# Patient Record
Sex: FEMALE | Race: WHITE | NOT HISPANIC OR LATINO | Employment: OTHER | URBAN - METROPOLITAN AREA
[De-identification: names, ages, dates, MRNs, and addresses within clinical notes are randomized per-mention and may not be internally consistent; named-entity substitution may affect disease eponyms.]

---

## 2017-08-14 ENCOUNTER — OFFICE VISIT (OUTPATIENT)
Dept: ORTHOPEDIC SURGERY | Facility: CLINIC | Age: 82
End: 2017-08-14

## 2017-08-14 VITALS — TEMPERATURE: 98.5 F | WEIGHT: 154 LBS

## 2017-08-14 DIAGNOSIS — S83.241A TEAR OF MEDIAL MENISCUS OF RIGHT KNEE, CURRENT, UNSPECIFIED TEAR TYPE, INITIAL ENCOUNTER: ICD-10-CM

## 2017-08-14 DIAGNOSIS — M17.11 OSTEOARTHROSIS, LOCALIZED, PRIMARY, KNEE, RIGHT: ICD-10-CM

## 2017-08-14 DIAGNOSIS — M25.562 CHRONIC PAIN OF LEFT KNEE: Primary | ICD-10-CM

## 2017-08-14 DIAGNOSIS — G89.29 CHRONIC PAIN OF LEFT KNEE: Primary | ICD-10-CM

## 2017-08-14 PROCEDURE — 20610 DRAIN/INJ JOINT/BURSA W/O US: CPT | Performed by: ORTHOPAEDIC SURGERY

## 2017-08-14 PROCEDURE — 73562 X-RAY EXAM OF KNEE 3: CPT | Performed by: ORTHOPAEDIC SURGERY

## 2017-08-14 PROCEDURE — 99203 OFFICE O/P NEW LOW 30 MIN: CPT | Performed by: ORTHOPAEDIC SURGERY

## 2017-08-14 RX ORDER — LOSARTAN POTASSIUM AND HYDROCHLOROTHIAZIDE 12.5; 5 MG/1; MG/1
TABLET ORAL
COMMUNITY
Start: 2017-08-09 | End: 2020-10-01

## 2017-08-14 RX ORDER — LORAZEPAM 1 MG/1
1 TABLET ORAL NIGHTLY PRN
Status: ON HOLD | COMMUNITY
Start: 2017-04-06 | End: 2021-06-10 | Stop reason: SDUPTHER

## 2017-08-14 RX ADMIN — METHYLPREDNISOLONE ACETATE 80 MG: 80 INJECTION, SUSPENSION INTRA-ARTICULAR; INTRALESIONAL; INTRAMUSCULAR; SOFT TISSUE at 16:09

## 2017-08-14 RX ADMIN — BUPIVACAINE HYDROCHLORIDE 4 ML: 5 INJECTION, SOLUTION EPIDURAL; INTRACAUDAL at 16:09

## 2017-08-14 NOTE — PROGRESS NOTES
New Left Knee      Patient: Cristina Lemus        YOB: 1930    Medical Record Number: [id number]        Chief Complaints: Left Knee Pain  Chief Complaint   Patient presents with   • Left Knee - Establish Care           History of Present Illness: This is a  87 y.o. female who presents complaining of left knee pain is been ongoing 2 months she has significant night pain which is her biggest complaint.  Is primarily medial pain she has significant swelling no history of similar symptoms her symptoms are described as moderate intermittent with aching swelling her past medical history marked for cardiac disease which she states is stable      Allergies:   Allergies   Allergen Reactions   • Statins Nausea And Vomiting   • Sulfa Antibiotics Nausea And Vomiting and Other (See Comments)     CHILLS AND FEVER       Medications:   Home Medications:  No current outpatient prescriptions on file prior to visit.     No current facility-administered medications on file prior to visit.      Current Medications:  Scheduled Meds:  Continuous Infusions:  No current facility-administered medications for this visit.   PRN Meds:.    History reviewed. No pertinent past medical history.   No past surgical history on file.     Social History     Occupational History   • Not on file.     Social History Main Topics   • Smoking status: Never Smoker   • Smokeless tobacco: Not on file   • Alcohol use No   • Drug use: No   • Sexual activity: Defer    Social History     Social History Narrative   • No narrative on file      History reviewed. No pertinent family history.          Review of Systems: 14 point review of systems are remarkable for the knee pain only the remainder are negative    Review of Systems      Physical Exam: 87 y.o. female  General Appearance:    Alert, cooperative, in no acute distress                 Vitals:    08/14/17 1537   Temp: 98.5 °F (36.9 °C)   TempSrc: Temporal Artery    Weight: 154 lb (69.9 kg)      Patient  is alert and read ×3 no acute distress appears her above-listed at height weight and age.  Affect is normal respiratory rate is normal unlabored. Heart rate regular rate rhythm, sclera, dentition and hearing are normal for the purpose of this exam.        Ortho Exam Physical exam of the left knee reveals no effusion no redness.  The patient does have tenderness about the medial joint line.  No tenderness about the lateral joint line.  A negative bounce home and a positive medial Madonna.  There is some pain medially  with a lateral Madonna.  Patient has a stable ligamentous exam.  Quads are reasonable and symmetric bilaterally.  Calf is soft and nontender.  There is no overlying skin changes no lymphedema lymphadenopathy.  Patient has good hip range of motion full symmetric and asymptomatic and a normal ankle exam.    Large Joint Arthrocentesis  Date/Time: 8/14/2017 4:09 PM  Consent given by: patient  Site marked: site marked  Timeout: Immediately prior to procedure a time out was called to verify the correct patient, procedure, equipment, support staff and site/side marked as required   Supporting Documentation  Indications: pain   Procedure Details  Location: knee - L knee  Preparation: Patient was prepped and draped in the usual sterile fashion  Needle size: 25 G  Approach: anteromedial  Medications administered: 80 mg methylPREDNISolone acetate 80 MG/ML; 4 mL bupivacaine (PF) 0.5 %                     Radiology:   AP, Lateral and merchant views of the left knee  were ordered/reviewed to evauateknee pain.  I've no comparative films these show some osteopenia as well as mild patellofemoral OA and mild medial compartment narrowing      Assessment/Plan:    Left knee pain this may be degenerative her night pain makes me concerned about meniscal pathology.  At her age of is reasonable to try an injection as a diagnostic and therapeutic tool she is agreeable to do that she tolerated it well we'll also start her in  some stretching and strengthening she fails improvement we'll pursue other means of testing

## 2017-08-20 RX ORDER — METHYLPREDNISOLONE ACETATE 80 MG/ML
80 INJECTION, SUSPENSION INTRA-ARTICULAR; INTRALESIONAL; INTRAMUSCULAR; SOFT TISSUE
Status: COMPLETED | OUTPATIENT
Start: 2017-08-14 | End: 2017-08-14

## 2017-08-20 RX ORDER — BUPIVACAINE HYDROCHLORIDE 5 MG/ML
4 INJECTION, SOLUTION EPIDURAL; INTRACAUDAL
Status: COMPLETED | OUTPATIENT
Start: 2017-08-14 | End: 2017-08-14

## 2018-02-06 ENCOUNTER — OFFICE VISIT (OUTPATIENT)
Dept: ORTHOPEDIC SURGERY | Facility: CLINIC | Age: 83
End: 2018-02-06

## 2018-02-06 VITALS — WEIGHT: 150 LBS | HEIGHT: 65 IN | TEMPERATURE: 97.7 F | BODY MASS INDEX: 24.99 KG/M2

## 2018-02-06 DIAGNOSIS — M17.12 PRIMARY LOCALIZED OSTEOARTHROSIS OF LEFT LOWER LEG: Primary | ICD-10-CM

## 2018-02-06 PROCEDURE — 20610 DRAIN/INJ JOINT/BURSA W/O US: CPT | Performed by: ORTHOPAEDIC SURGERY

## 2018-02-06 RX ORDER — METHYLPREDNISOLONE ACETATE 80 MG/ML
80 INJECTION, SUSPENSION INTRA-ARTICULAR; INTRALESIONAL; INTRAMUSCULAR; SOFT TISSUE
Status: COMPLETED | OUTPATIENT
Start: 2018-02-06 | End: 2018-02-06

## 2018-02-06 RX ORDER — BUPIVACAINE HYDROCHLORIDE 5 MG/ML
4 INJECTION, SOLUTION EPIDURAL; INTRACAUDAL
Status: COMPLETED | OUTPATIENT
Start: 2018-02-06 | End: 2018-02-06

## 2018-02-06 RX ADMIN — METHYLPREDNISOLONE ACETATE 80 MG: 80 INJECTION, SUSPENSION INTRA-ARTICULAR; INTRALESIONAL; INTRAMUSCULAR; SOFT TISSUE at 13:22

## 2018-02-06 RX ADMIN — BUPIVACAINE HYDROCHLORIDE 4 ML: 5 INJECTION, SOLUTION EPIDURAL; INTRACAUDAL at 13:22

## 2018-02-06 NOTE — PROGRESS NOTES
"Knee Joint Injection      Patient: Cristina Lemus        YOB: 1930          Chief Complaint   Patient presents with   • Left Knee - Follow-up, Injections           History of Present Illness: Pt gets intermittent  injections with good relief. Is here for repeat injection. Understands options.      Physical Exam: 87 y.o. female  General Appearance:    Alert, cooperative, in no acute distress                   Vitals:    02/06/18 1319   Temp: 97.7 °F (36.5 °C)   TempSrc: Temporal Artery    Weight: 68 kg (150 lb)   Height: 165.1 cm (65\")      Patient is alert and read ×3 no acute distress appears her above-listed at height weight and age.  Affect is normal respiratory rate is normal unlabored. Heart rate regular rate rhythm, sclera, dentition and hearing are normal for the purpose of this exam.  Exam and complaints are unchanged.      Procedure:  Large Joint Arthrocentesis  Date/Time: 2/6/2018 1:22 PM  Consent given by: patient  Site marked: site marked  Timeout: Immediately prior to procedure a time out was called to verify the correct patient, procedure, equipment, support staff and site/side marked as required   Supporting Documentation  Indications: pain and joint swelling   Procedure Details  Location: knee - L knee  Preparation: Patient was prepped and draped in the usual sterile fashion  Needle size: 22 G  Approach: anterolateral  Medications administered: 4 mL bupivacaine (PF) 0.5 %; 80 mg methylPREDNISolone acetate 80 MG/ML  Patient tolerance: patient tolerated the procedure well with no immediate complications                Assessment. Persistent knee pain      Plan: Is to proceed with injection    The knee joint was injected under strict sterile technique with Marcaine and Depo-Medrol this was done sterilely and tolerated tolerated well.            "

## 2020-10-01 ENCOUNTER — LAB (OUTPATIENT)
Dept: LAB | Facility: HOSPITAL | Age: 85
End: 2020-10-01

## 2020-10-01 ENCOUNTER — OFFICE VISIT (OUTPATIENT)
Dept: CARDIOLOGY | Facility: CLINIC | Age: 85
End: 2020-10-01

## 2020-10-01 VITALS
SYSTOLIC BLOOD PRESSURE: 152 MMHG | DIASTOLIC BLOOD PRESSURE: 78 MMHG | BODY MASS INDEX: 23.56 KG/M2 | HEIGHT: 65 IN | HEART RATE: 73 BPM | WEIGHT: 141.4 LBS

## 2020-10-01 DIAGNOSIS — I10 HTN, GOAL BELOW 140/80: ICD-10-CM

## 2020-10-01 DIAGNOSIS — I50.32 CHRONIC DIASTOLIC (CONGESTIVE) HEART FAILURE (HCC): Primary | ICD-10-CM

## 2020-10-01 DIAGNOSIS — N18.4 STAGE 4 CHRONIC KIDNEY DISEASE (HCC): ICD-10-CM

## 2020-10-01 DIAGNOSIS — I48.21 PERMANENT ATRIAL FIBRILLATION (HCC): ICD-10-CM

## 2020-10-01 DIAGNOSIS — I50.32 CHRONIC DIASTOLIC (CONGESTIVE) HEART FAILURE (HCC): ICD-10-CM

## 2020-10-01 PROBLEM — Z86.711 HISTORY OF PULMONARY EMBOLUS (PE): Status: ACTIVE | Noted: 2020-06-30

## 2020-10-01 PROBLEM — I48.0 PAROXYSMAL ATRIAL FIBRILLATION: Status: ACTIVE | Noted: 2017-07-07

## 2020-10-01 LAB
ANION GAP SERPL CALCULATED.3IONS-SCNC: 14 MMOL/L (ref 5–15)
BUN SERPL-MCNC: 31 MG/DL (ref 8–23)
BUN/CREAT SERPL: 13.1 (ref 7–25)
CALCIUM SPEC-SCNC: 9.7 MG/DL (ref 8.2–9.6)
CHLORIDE SERPL-SCNC: 105 MMOL/L (ref 98–107)
CO2 SERPL-SCNC: 18 MMOL/L (ref 22–29)
CREAT SERPL-MCNC: 2.36 MG/DL (ref 0.57–1)
GFR SERPL CREATININE-BSD FRML MDRD: 19 ML/MIN/1.73
GLUCOSE SERPL-MCNC: 106 MG/DL (ref 65–99)
POTASSIUM SERPL-SCNC: 4.1 MMOL/L (ref 3.5–5.2)
SODIUM SERPL-SCNC: 137 MMOL/L (ref 136–145)

## 2020-10-01 PROCEDURE — 99204 OFFICE O/P NEW MOD 45 MIN: CPT | Performed by: INTERNAL MEDICINE

## 2020-10-01 PROCEDURE — 93000 ELECTROCARDIOGRAM COMPLETE: CPT | Performed by: INTERNAL MEDICINE

## 2020-10-01 PROCEDURE — 80048 BASIC METABOLIC PNL TOTAL CA: CPT

## 2020-10-01 PROCEDURE — 36415 COLL VENOUS BLD VENIPUNCTURE: CPT

## 2020-10-01 RX ORDER — ISOSORBIDE MONONITRATE 30 MG/1
30 TABLET, EXTENDED RELEASE ORAL DAILY
COMMUNITY
Start: 2020-09-21 | End: 2021-06-09

## 2020-10-01 RX ORDER — AMLODIPINE BESYLATE 10 MG/1
10 TABLET ORAL DAILY
COMMUNITY
Start: 2020-09-23 | End: 2021-02-10 | Stop reason: SDUPTHER

## 2020-10-01 RX ORDER — PANTOPRAZOLE SODIUM 40 MG/1
40 TABLET, DELAYED RELEASE ORAL EVERY MORNING
COMMUNITY
Start: 2020-09-21 | End: 2021-02-10

## 2020-10-01 RX ORDER — FUROSEMIDE 40 MG/1
40 TABLET ORAL DAILY
Qty: 30 TABLET | Refills: 11 | Status: SHIPPED | OUTPATIENT
Start: 2020-10-01 | End: 2020-10-26 | Stop reason: SDUPTHER

## 2020-10-01 NOTE — PROGRESS NOTES
[facility] New Patient Office Note     Encounter Date:10/01/20  Patient:Cristina Lemus  :3/13/1930  MRN:[id number]    Referring Provider: Lm Mart MD    Consulted for: Establish with new cardiologist    Chief Complaint:   Chief Complaint   Patient presents with   • Establish Care     History of Presenting Illness:      Ms. Evans is a 89 y.o. woman with past medical history notable for permanent atrial fibrillation, hypertension, chronic diastolic heart failure, and chronic kidney disease stage III who presents to our office to establish with a new cardiologist.  She has been followed in the [facility] and for the most part appears to receive good care.  She did have a recent hospitalization in  of this year where she was found to have new DVT and pulmonary emboli despite being on Coumadin therapy.  She was transitioned back to Eliquis therapy and discharged home.    In general patient does not want to be on Eliquis.  She was previously on Eliquis when she was first diagnosed with atrial fibrillation and was transitioned to Coumadin per patient preference.  She states that she feels cold whenever she takes medication.  Unfortunately it sounds like she is not particularly compliant with her Coumadin use as she feels like it flares of her gout.  This likely explains her DVT and PE while on Coumadin therapy.  Her INR was therapy therapeutic when she arrived to the hospital in .  Unfortunately since leaving the hospital they did try to transition her back to Eliquis but she again asked to go back to Coumadin.  From what I can tell it seems like her Coumadin levels were supratherapeutic and hard to control once again and was transitioned back to Eliquis.    Fortunately from an atrial fibrillation standpoint she has minimal symptoms.    She does have chronic lower extremity swelling from diastolic dysfunction as well as likely venous insufficiency.  This is been a chronic issue for her and  relatively stable.    With regards to her hypertension this is been under reasonable control although it is slightly elevated today in the office.      Review of Systems:  Review of Systems   Constitution: Positive for malaise/fatigue and weight gain.   HENT: Negative.    Cardiovascular: Positive for leg swelling.   Respiratory: Positive for shortness of breath.    Endocrine: Negative.    Hematologic/Lymphatic: Negative.    Skin: Negative.    Musculoskeletal: Negative.    Gastrointestinal: Negative.    Genitourinary: Negative.    Neurological: Negative.    Psychiatric/Behavioral: Negative.    Allergic/Immunologic: Negative.        Prior to Admission medications    Medication Sig Start Date End Date Taking? Authorizing Provider   amLODIPine (NORVASC) 10 MG tablet Take 10 mg by mouth Daily. 9/23/20  Yes Oswald Sheppard MD   apixaban (ELIQUIS) 2.5 MG tablet tablet Take 2.5 mg by mouth 2 (two) times a day. 9/22/20  Yes Oswald Sheppard MD   isosorbide mononitrate (IMDUR) 30 MG 24 hr tablet Take 30 mg by mouth Daily. 9/21/20  Yes Oswald Sheppard MD   LORazepam (ATIVAN) 1 MG tablet TAKE ONE TABLET BY MOUTH AT BEDTIME 4/6/17  Yes ProviderOswald MD   pantoprazole (PROTONIX) 40 MG EC tablet Take 40 mg by mouth Every Morning. 9/21/20  Yes Oswald Sheppard MD   furosemide (LASIX) 40 MG tablet Take 1 tablet by mouth Daily. 10/1/20   Lm Mart MD   aspirin 81 MG tablet Take 81 mg by mouth.  10/1/20  Oswald Sheppard MD   losartan-hydrochlorothiazide (HYZAAR) 50-12.5 MG per tablet  8/9/17 10/1/20  Oswald Sheppard MD       Allergies   Allergen Reactions   • Statins Nausea And Vomiting   • Sulfa Antibiotics Nausea And Vomiting and Other (See Comments)     CHILLS AND FEVER       Past Medical History:   Diagnosis Date   • Atrial fibrillation (CMS/HCC)    • CHF (congestive heart failure) (CMS/HCC)        Past Surgical History:   Procedure Laterality Date   • BACK SURGERY     • CYST  "REMOVAL     • OTHER SURGICAL HISTORY      rupture disk       Social History     Socioeconomic History   • Marital status: Single     Spouse name: Not on file   • Number of children: Not on file   • Years of education: Not on file   • Highest education level: Not on file   Tobacco Use   • Smoking status: Never Smoker   • Smokeless tobacco: Never Used   Substance and Sexual Activity   • Alcohol use: No     Comment: caffeine use    • Drug use: No   • Sexual activity: Defer       History reviewed. No pertinent family history.    The following portions of the patient's history were reviewed and updated as appropriate: allergies, current medications, past family history, past medical history, past social history, past surgical history and problem list.       Objective:       Vitals:    10/01/20 1359   BP: 152/78   BP Location: Left arm   Patient Position: Sitting   Pulse: 73   Weight: 64.1 kg (141 lb 6.4 oz)   Height: 165.1 cm (65\")       Physical Exam:  Constitutional:  Chronically ill-appearing, frail, no acute distress   HENT: Oropharynx clear and membrane moist  Eyes: Normal conjunctiva, no sclera icterus.  Neck: Supple, no carotid bruit bilaterally.  Cardiovascular: Irregularly irregular rate and rhythm, No Murmur, 1+ bilateral lower extremity edema.  Pulmonary: Normal respiratory effort, normal lung sounds, no wheezing.  Abdominal: Soft, nontender, no hepatosplenomegaly, liver is non-pulsatile.  Neurological: Alert and orient x 3.   Skin: Warm, dry, no ecchymosis, no rash.  Psych: Appropriate mood and affect. Normal judgment and insight.      Lab Results   Component Value Date    GLUCOSE 106 (H) 10/01/2020    BUN 31 (H) 10/01/2020    CREATININE 2.36 (H) 10/01/2020    EGFRIFNONA 19 (L) 10/01/2020    BCR 13.1 10/01/2020    K 4.1 10/01/2020    CO2 18.0 (L) 10/01/2020    CALCIUM 9.7 (H) 10/01/2020    ALBUMIN 3.8 07/01/2020    LABIL2 1.1 07/01/2020    AST 38 07/01/2020    ALT 13 07/01/2020       Lab Results   Component " Value Date    WBC 5.98 07/01/2020    HGB 12.0 07/01/2020    HCT 36.4 07/01/2020    MCV 87.9 07/01/2020     07/01/2020       Lab Results   Component Value Date    TROPONINI 0.028 06/30/2020       No results found for: CHOL, CHLPL  No results found for: TRIG  No results found for: HDL  No results found for: LDL, LDLDIRECT    Lab Results   Component Value Date    TSH 1.830 01/13/2020         ECG 12 Lead    Date/Time: 10/1/2020 4:51 PM  Performed by: Lm Mart MD  Authorized by: Lm Mart MD   Previous ECG: no previous ECG available  Rhythm: atrial fibrillation  Conduction: non-specific intraventricular conduction delay  Other findings: non-specific ST-T wave changes    Clinical impression: abnormal EKG        Echocardiogram 6/25/2020 [facility]:  · Normal LV systolic function with apical hypokinesis  · Asymmetric apical hypertrophy  · Mild aortic insufficiency  · Mild/moderate mitral regurgitation  · Mild tricuspid regurgitation          Assessment:          Diagnosis Plan   1. Chronic diastolic (congestive) heart failure (CMS/HCC)  Basic Metabolic Panel   2. Permanent atrial fibrillation (CMS/HCC)     3. HTN, goal below 140/80     4. Stage 4 chronic kidney disease (CMS/[facility])            Plan:       Ms. Evans is a 89 y.o. woman with past medical history notable for permanent atrial fibrillation, hypertension, chronic diastolic heart failure, and chronic kidney disease stage III who presents to our office to establish with a new cardiologist.  Overall she has received appropriate care up to this point.  I did have a long discussion with her and do agree that Coumadin would be a poor choice to manage her atrial fibrillation as well as her recent DVT/PE.  I would continue with renally dosed Eliquis.  I have talked her about adding a low-dose diuretic to see if this can help out with some of her leg swelling obviously with her chronic kidney disease we have to closely monitor her creatinine  clearance on this medication.  I have ordered the Lasix and will have her get a BMP next week.  Hopefully this can also help out somewhat with her hypertension.    Permanent atrial fibrillation:  · Continue renally dosed Eliquis at 2.5 mg twice a day  · Rate well controlled not on any rate controlling agents  · Metoprolol was held during her recent admission due to bradycardia    Hypertension:  · Monitor response to starting low-dose diuretic    Chronic diastolic congestive heart failure:  · Start Lasix 40 mg daily  · Follow-up BMP next week    Chronic kidney disease:  · We will need to closely monitor creatinine clearance while adding diuretics      Follow-up:  4 weeks      Thank you for allowing me to participate in the care of Cristina Lemus. Feel free to contact me directly with any further questions or concerns.    Lm Mart MD  [facility]  10/01/20  16:47 EDT

## 2020-10-02 NOTE — TELEPHONE ENCOUNTER
is out on vacation today.Pt request a refill on Eliquis. Pt was in the office yesterday.    Can you please sign this for me today?     Thanks

## 2020-10-15 ENCOUNTER — TELEPHONE (OUTPATIENT)
Dept: CARDIOLOGY | Facility: CLINIC | Age: 85
End: 2020-10-15

## 2020-10-15 NOTE — TELEPHONE ENCOUNTER
"Patient called stating that she wants to stop the Eliquis because she does not feel right on the medication. She thinks it's making her \"freeze\" all the time.     She can be reached at [phone]    Thanks   "

## 2020-10-15 NOTE — TELEPHONE ENCOUNTER
Called and talked with patient.  Try to convince her that she needs to be on a blood thinner given her atrial fibrillation and recent PE.  I have concerns about her going on Coumadin as she had a PE while on Coumadin and it looks like she had troubles controlling her INR with supratherapeutic INR's and was transition back to Eliquis.  I did suggest that we try her on a low dose Xarelto to see if this suture better and does not cause her to feel cold all the time.  Can we try and get her some samples to try.  Xarelto 15 mg daily

## 2020-10-16 ENCOUNTER — TELEPHONE (OUTPATIENT)
Dept: CARDIOLOGY | Facility: CLINIC | Age: 85
End: 2020-10-16

## 2020-10-16 DIAGNOSIS — I48.21 PERMANENT ATRIAL FIBRILLATION (HCC): Primary | ICD-10-CM

## 2020-10-16 RX ORDER — WARFARIN SODIUM 2 MG/1
2 TABLET ORAL DAILY
Qty: 180 TABLET | Refills: 3 | Status: SHIPPED | OUTPATIENT
Start: 2020-10-16 | End: 2020-12-14 | Stop reason: SDUPTHER

## 2020-10-16 NOTE — TELEPHONE ENCOUNTER
Correction patient has poor creatinine clearance and cannot prescribe Xarelto.  We will try and reach out to patient and explain.

## 2020-10-16 NOTE — TELEPHONE ENCOUNTER
Spoke with Ms. Lemus by phone. She reports she cannot  warfarin until Monday, 10/19/20, at which time she will begin warfarin. She agreed to her first INR check on Thurs, 10/22/20.

## 2020-10-16 NOTE — TELEPHONE ENCOUNTER
Hello to whom this may concern ,    This patient will be starting Coumadin over the weekend. Is there any way to get her an apt for Monday?  has already put in a referral. Please let me know if there is anything I need to do or help.    Thanks

## 2020-10-16 NOTE — TELEPHONE ENCOUNTER
Called and talked with patient and reviewed records from [facility] and appears that she stopped the warfarin due to concerns for gout which she states was not a major problem and better than then current symptoms she is having with Eliquis.      I discussed with her that we can try going back on coumadin again as it does seem like she will try and be more compliant with taking appropriate doses as I explained to her that she would have to go back to Eliquis if she does not do well with coumadin.    Hany can we work on getting her into the coumadin clinic I have called in warfarin and INR orders as well as referral.  Probably have her start her coumadin tomorrow and with INR on Monday

## 2020-10-22 ENCOUNTER — ANTICOAGULATION VISIT (OUTPATIENT)
Dept: PHARMACY | Facility: HOSPITAL | Age: 85
End: 2020-10-22

## 2020-10-22 DIAGNOSIS — I48.21 PERMANENT ATRIAL FIBRILLATION (HCC): ICD-10-CM

## 2020-10-22 LAB
INR PPP: 1.2 (ref 0.91–1.09)
PROTHROMBIN TIME: 14.2 SECONDS (ref 10–13.8)

## 2020-10-22 PROCEDURE — G0463 HOSPITAL OUTPT CLINIC VISIT: HCPCS

## 2020-10-22 PROCEDURE — 36416 COLLJ CAPILLARY BLOOD SPEC: CPT

## 2020-10-22 PROCEDURE — 85610 PROTHROMBIN TIME: CPT

## 2020-10-22 NOTE — PATIENT INSTRUCTIONS
Lab draw from [facility] at home on Monday 10/26---they will call Sunday evening to confirm (may be phone number from [place]).

## 2020-10-22 NOTE — PROGRESS NOTES
Anticoagulation Clinic Progress Note  Anticoagulation Summary  As of 10/22/2020    INR goal:  2.0-3.0   TTR:  --   INR used for dosin.2 (10/22/2020)   Warfarin maintenance plan:  2 mg every day   Weekly warfarin total:  14 mg   Weekly max warfarin dose:  14 mg   Plan last modified:  Skye Salmeron RPH (10/22/2020)   Next INR check:  10/26/2020   Target end date:  Indefinite    Indications    Permanent atrial fibrillation (CMS/HCC) [I48.21]             Anticoagulation Episode Summary     INR check location:      Preferred lab:      Send INR reminders to:  [facility]    Comments:        Anticoagulation Care Providers     Provider Role Specialty Phone number    Lm Mart MD Referring Cardiology [phone]          Clinic Interview:      Education:  Cristina Lemus is a new start in the [facility]. We discussed the followin) Warfarin's indication, mechanism, and dosing  2) Enforced the importance of taking warfarin as instructed and at the same time every day, preferably in the evening so that we can make dose adjustments more easily following subsequent clinic visits  3) What she should do about a missed dose; pts can take missed doses within about 12 hours of their usual scheduled dose, but she was instructed on the importance of not doubling up on doses unless told to do so by the [facility]  4) Explained possible side effects of warfarin therapy, including increased risk of bleeding, s/sx of bleeding and s/sx of any additional clots/PE/CVA.   5) Discussed monitoring of warfarin, the INR, goal INR range, and the frequency of monitoring  6) Reviewed drug/food/tobacco/EtOH interactions and provided written information covering these topics in more detail, explaining that green, leafy vegetables interact most heavily with warfarin  7) Instructed the pt not to take or discontinue any medications without informing her physician/pharmacist and  reminded her to inform us of any dietary changes, as well  8) Explained that she would be coming into the clinic more frequently in these first few weeks of therapy as we try to adjust her dose and achieve a therapeutic INR x 2 consecutive readings. Once that is achieved, patient will follow up in clinic every 4 weeks, on average.    She stated no problems with transportation or scheduling clinic appts in this manner. she expressed understanding of the information provided and has no additional questions at this time.    Cristina Lemus was presented with a copy of the Patients Rights and Responsibilities. she expressed verbal consent and agreement to receive care in the [facility] under the current collaborative care agreement with [facility].       INR History:  Can see INRs from [place] in July/August but no warfarin doses.    Plan:  1. INR is Subtherapeutic today- see above in Anticoagulation Summary.   Will instruct Cristina Lemus to Change their warfarin regimen- see above in Anticoagulation Summary.  2. Follow up in 4 days--[facility] draw per pt preference so she does not have to come to clinic.  3. Patient declines warfarin refills.  4. Verbal and written information provided. Patient expresses understanding and has no further questions at this time.      Skye Salmeron [facility]

## 2020-10-26 LAB — INR PPP: 1.08

## 2020-10-26 RX ORDER — FUROSEMIDE 40 MG/1
40 TABLET ORAL DAILY
Qty: 30 TABLET | Refills: 11 | Status: SHIPPED | OUTPATIENT
Start: 2020-10-26 | End: 2021-02-22 | Stop reason: SDUPTHER

## 2020-10-27 ENCOUNTER — ANTICOAGULATION VISIT (OUTPATIENT)
Dept: PHARMACY | Facility: HOSPITAL | Age: 85
End: 2020-10-27

## 2020-10-27 DIAGNOSIS — I48.21 PERMANENT ATRIAL FIBRILLATION (HCC): ICD-10-CM

## 2020-10-27 NOTE — PROGRESS NOTES
Anticoagulation Clinic Progress Note    Anticoagulation Summary  As of 10/27/2020    INR goal:  2.0-3.0   TTR:  --   INR used for dosin.08 (10/26/2020)   Warfarin maintenance plan:  2 mg every day   Weekly warfarin total:  14 mg   Plan last modified:  Skye Salmeron RPH (10/22/2020)   Next INR check:  10/29/2020   Target end date:  Indefinite    Indications    Permanent atrial fibrillation (CMS/HCC) [I48.21]             Anticoagulation Episode Summary     INR check location:      Preferred lab:      Send INR reminders to:   [facility]  POOL    Comments:  tried calling [facility] but no stable warf dosing available from July/Aug 2020, prior to Eliquis      Anticoagulation Care Providers     Provider Role Specialty Phone number    Lm Mart MD Referring Cardiology [phone]          Clinic Interview:      INR History:  Anticoagulation Monitoring 10/22/2020 10/27/2020   INR 1.2 1.08   INR Date 10/22/2020 10/26/2020   INR Goal 2.0-3.0 2.0-3.0   Trend - Same   Last Week Total 6 mg 16 mg   Next Week Total 16 mg 18 mg   Sun 2 mg -   Mon - -   Tue - 4 mg (10/27)   Wed - 4 mg (10/28)   Thu 3 mg (10/22) -   Fri 2 mg -   Sat 3 mg (10/24) -   Visit Report - -       Plan:  1. INR is Subtherapeutic today- see above in Anticoagulation Summary.   Will instruct Cristina Lemus to Increase their warfarin regimen- see above in Anticoagulation Summary.  2. Follow up in 3 days since need to do [facility] draw at home.  3. Spoke with daughter Shannan today. They have been instructed to call if any changes in medications, doses, concerns, etc.   Skye Salmeron RPH

## 2020-10-30 LAB — INR PPP: 1.32

## 2020-11-02 ENCOUNTER — ANTICOAGULATION VISIT (OUTPATIENT)
Dept: PHARMACY | Facility: HOSPITAL | Age: 85
End: 2020-11-02

## 2020-11-02 DIAGNOSIS — I48.21 PERMANENT ATRIAL FIBRILLATION (HCC): ICD-10-CM

## 2020-11-02 NOTE — PROGRESS NOTES
Anticoagulation Clinic Progress Note    Anticoagulation Summary  As of 2020    INR goal:  2.0-3.0   TTR:  0.0 % (4 d)   INR used for dosin.32 (10/30/2020)   Warfarin maintenance plan:  2 mg every day   Weekly warfarin total:  14 mg   Plan last modified:  Skye Salmeron RPH (10/22/2020)   Next INR check:  2020   Target end date:  Indefinite    Indications    Permanent atrial fibrillation (CMS/HCC) [I48.21]             Anticoagulation Episode Summary     INR check location:      Preferred lab:      [facility]    Comments:  tried calling [facility] but no stable warf dosing available from July/Aug 2020 , prior to Eliquis; [facility]      Anticoagulation Care Providers     Provider Role Specialty Phone number    Lm Mart MD Referring Cardiology [phone]          Clinic Interview:      INR History:  Anticoagulation Monitoring 10/22/2020 10/27/2020 2020   INR 1.2 1.08 1.32   INR Date 10/22/2020 10/26/2020 10/30/2020   INR Goal 2.0-3.0 2.0-3.0 2.0-3.0   Trend - Same Same   Last Week Total 6 mg 16 mg 18 mg   Next Week Total 16 mg 18 mg 20 mg   Sun 2 mg - -   Mon - - 4 mg ()   Tue - 4 mg (10/27) 4 mg (11/3)   Wed - 4 mg (10/28) 4 mg ()   Thu 3 mg (10/22) - -   Fri 2 mg - -   Sat 3 mg (10/24) - -   Visit Report - - -   Some recent data might be hidden       Plan:  1. INR is Subtherapeutic from [facility] on Friday- see above in Anticoagulation Summary.  Will instruct Cristina Lemus to Increase their warfarin regimen- see above in Anticoagulation Summary.  2. Follow up in 3 days in clinic   3 Verbal information provided. Daughter expresses understanding and has no further questions at this time.    Skye Salmeron RPH

## 2020-11-05 ENCOUNTER — ANTICOAGULATION VISIT (OUTPATIENT)
Dept: PHARMACY | Facility: HOSPITAL | Age: 85
End: 2020-11-05

## 2020-11-05 ENCOUNTER — OFFICE VISIT (OUTPATIENT)
Dept: CARDIOLOGY | Facility: CLINIC | Age: 85
End: 2020-11-05

## 2020-11-05 VITALS
DIASTOLIC BLOOD PRESSURE: 74 MMHG | SYSTOLIC BLOOD PRESSURE: 148 MMHG | HEART RATE: 65 BPM | WEIGHT: 133.6 LBS | HEIGHT: 65 IN | BODY MASS INDEX: 22.26 KG/M2

## 2020-11-05 DIAGNOSIS — N18.4 STAGE 4 CHRONIC KIDNEY DISEASE (HCC): ICD-10-CM

## 2020-11-05 DIAGNOSIS — I48.21 PERMANENT ATRIAL FIBRILLATION (HCC): ICD-10-CM

## 2020-11-05 DIAGNOSIS — I10 HTN, GOAL BELOW 140/80: ICD-10-CM

## 2020-11-05 DIAGNOSIS — I50.32 CHRONIC DIASTOLIC (CONGESTIVE) HEART FAILURE (HCC): Primary | ICD-10-CM

## 2020-11-05 LAB
INR PPP: 1.5 (ref 0.91–1.09)
PROTHROMBIN TIME: 18.6 SECONDS (ref 10–13.8)

## 2020-11-05 PROCEDURE — 93000 ELECTROCARDIOGRAM COMPLETE: CPT | Performed by: INTERNAL MEDICINE

## 2020-11-05 PROCEDURE — 36416 COLLJ CAPILLARY BLOOD SPEC: CPT

## 2020-11-05 PROCEDURE — 85610 PROTHROMBIN TIME: CPT

## 2020-11-05 PROCEDURE — G0463 HOSPITAL OUTPT CLINIC VISIT: HCPCS

## 2020-11-05 PROCEDURE — 99214 OFFICE O/P EST MOD 30 MIN: CPT | Performed by: INTERNAL MEDICINE

## 2020-11-05 NOTE — PROGRESS NOTES
Anticoagulation Clinic Progress Note    Anticoagulation Summary  As of 2020    INR goal:  2.0-3.0   TTR:  0.0 % (1.4 wk)   INR used for dosin.5 (2020)   Warfarin maintenance plan:  2 mg every day   Weekly warfarin total:  14 mg   Plan last modified:  Jennifer Groves, Pharmacy Intern (2020)   Next INR check:  2020   Target end date:  Indefinite    Indications    Permanent atrial fibrillation (CMS/HCC) [I48.21]             Anticoagulation Episode Summary     INR check location:      Preferred lab:      Send INR reminders to:   MAJO CHRISTINA  POOL    Comments:  tried calling Chaim BARCLAY but no stable warf dosing available from July/Aug 2020 , prior to Eliquis; PRECISION      Anticoagulation Care Providers     Provider Role Specialty Phone number    Lm Mart MD Referring Cardiology [phone]          Clinic Interview:  Patient Findings     Positives:  Missed doses    Negatives:  Signs/symptoms of thrombosis, Signs/symptoms of bleeding,   Laboratory test error suspected, Change in health, Change in alcohol use,   Change in activity, Upcoming invasive procedure, Emergency department   visit, Upcoming dental procedure, Extra doses, Change in medications,   Change in diet/appetite, Hospital admission, Bruising, Other complaints    Comments:  Monday () missed half dose (2 mg / 4 mg).      Clinical Outcomes     Negatives:  Major bleeding event, Thromboembolic event,   Anticoagulation-related hospital admission, Anticoagulation-related ED   visit, Anticoagulation-related fatality    Comments:  Monday () missed half dose (2 mg / 4 mg).        INR History:  Anticoagulation Monitoring 10/27/2020 2020 2020   INR 1.08 1.32 1.5   INR Date 10/26/2020 10/30/2020 2020   INR Goal 2.0-3.0 2.0-3.0 2.0-3.0   Trend Same Same Same   Last Week Total 16 mg 18 mg 18 mg   Next Week Total 18 mg 20 mg 18 mg   Sun - - 2 mg   Mon - 4 mg () -   Tu 4 mg (10/27) 4 mg (11/3) -   Wed  4 mg (10/28) 4 mg (11/4) -   Thu - - 4 mg (11/5)   Fri - - 2 mg   Sat - - 2 mg   Visit Report - - -   Some recent data might be hidden       Plan:  1. INR is Subtherapeutic today- see above in Anticoagulation Summary.  Will instruct Cristina Lemus to Change their warfarin regimen- see above in Anticoagulation Summary.  2. Follow up in 4 days  3. Patient declines warfarin refills.  4. Verbal and written information provided. Patient expresses understanding and has no further questions at this time.    Jennifer Groves, Pharmacy Intern

## 2020-11-05 NOTE — PROGRESS NOTES
[facility] Follow Up Office Note     Encounter Date:20  Patient:Cristina Lemus  :3/13/1930  MRN:[id number]      Chief Complaint:   Chief Complaint   Patient presents with   • Congestive Heart Failure     1 month f/u   • Atrial Fibrillation   • Hypertension     History of Presenting Illness:      Ms. Evans is a 89 y.o. woman with past medical history notable for permanent atrial fibrillation, hypertension, chronic diastolic heart failure, and chronic kidney disease stage III who presents to our office for scheduled follow up.  She was last seen approximately 1 month ago as a new patient visit and had multiple chronic medical issues were working on making some adjustments to these and thus wanted to see her back for close follow-up.  We did start her on a low-dose Lasix which seems to have helped out somewhat with some of her leg swelling.  Our plan was to continue her on Eliquis even though she stated that she did not like the side effects from it but did have issues with Coumadin in the past.  She finally stated that she was essentially willing to not take her Eliquis and thus we decided to at least try and have her on some appropriate anticoagulation given her recent DVT and history of atrial fibrillation and thus did switch her over to Coumadin with close monitoring in her anticoagulation clinic.  Unfortunately we are limited in anticoagulation options for her given her chronic kidney disease.  Fortunately with these changes she seems to be doing reasonably well.  Her leg swelling is stable.  She thus far is following up with the anticoagulation clinic for her INR checks.    Denies any bleeding on her current medical regimen.    Review of Systems:  Review of Systems   Constitution: Positive for malaise/fatigue.   HENT: Negative.    Cardiovascular: Positive for leg swelling.   Respiratory: Positive for shortness of breath.    Endocrine: Negative.    Hematologic/Lymphatic: Negative.    Skin: Negative.     Musculoskeletal: Negative.    Gastrointestinal: Negative.    Genitourinary: Negative.    Neurological: Negative.    Psychiatric/Behavioral: Negative.    Allergic/Immunologic: Negative.        Prior to Admission medications    Medication Sig Start Date End Date Taking? Authorizing Provider   amLODIPine (NORVASC) 10 MG tablet Take 10 mg by mouth Daily. 9/23/20  Yes Oswald Sheppard MD   apixaban (ELIQUIS) 2.5 MG tablet tablet Take 2.5 mg by mouth 2 (two) times a day. 9/22/20  Yes Oswald Sheppard MD   isosorbide mononitrate (IMDUR) 30 MG 24 hr tablet Take 30 mg by mouth Daily. 9/21/20  Yes Oswald Sheppard MD   LORazepam (ATIVAN) 1 MG tablet TAKE ONE TABLET BY MOUTH AT BEDTIME 4/6/17  Yes Oswald Sheppard MD   pantoprazole (PROTONIX) 40 MG EC tablet Take 40 mg by mouth Every Morning. 9/21/20  Yes Oswald Sheppard MD   furosemide (LASIX) 40 MG tablet Take 1 tablet by mouth Daily. 10/1/20   Lm Mart MD   aspirin 81 MG tablet Take 81 mg by mouth.  10/1/20  Oswald Sheppard MD   losartan-hydrochlorothiazide (HYZAAR) 50-12.5 MG per tablet  8/9/17 10/1/20  Oswald Sheppard MD       Allergies   Allergen Reactions   • Statins Nausea And Vomiting   • Sulfa Antibiotics Nausea And Vomiting and Other (See Comments)     CHILLS AND FEVER       Past Medical History:   Diagnosis Date   • Atrial fibrillation (CMS/HCC)    • CHF (congestive heart failure) (CMS/[facility])        Past Surgical History:   Procedure Laterality Date   • BACK SURGERY     • CYST REMOVAL     • OTHER SURGICAL HISTORY      rupture disk       Social History     Socioeconomic History   • Marital status: Single     Spouse name: Not on file   • Number of children: Not on file   • Years of education: Not on file   • Highest education level: Not on file   Tobacco Use   • Smoking status: Never Smoker   • Smokeless tobacco: Never Used   Substance and Sexual Activity   • Alcohol use: No     Comment: caffeine use    • Drug use: No  "  • Sexual activity: Defer       History reviewed. No pertinent family history.    The following portions of the patient's history were reviewed and updated as appropriate: allergies, current medications, past family history, past medical history, past social history, past surgical history and problem list.       Objective:       Vitals:    11/05/20 1423   BP: 148/74   BP Location: Left arm   Patient Position: Sitting   Pulse: 65   Weight: 60.6 kg (133 lb 9.6 oz)   Height: 165.1 cm (65\")       Physical Exam:  Constitutional:  Chronically ill-appearing, frail, no acute distress   HENT: Oropharynx clear and membrane moist  Eyes: Normal conjunctiva, no sclera icterus.  Neck: Supple, no carotid bruit bilaterally.  Cardiovascular: Irregularly irregular rate and rhythm, No Murmur, 1+ bilateral lower extremity edema.  Pulmonary: Normal respiratory effort, normal lung sounds, no wheezing.  Abdominal: Soft, nontender, no hepatosplenomegaly, liver is non-pulsatile.  Neurological: Alert and orient x 3.   Skin: Warm, dry, no ecchymosis, no rash.  Psych: Appropriate mood and affect. Normal judgment and insight.      Lab Results   Component Value Date    GLUCOSE 106 (H) 10/01/2020    BUN 31 (H) 10/01/2020    CREATININE 2.36 (H) 10/01/2020    EGFRIFNONA 19 (L) 10/01/2020    BCR 13.1 10/01/2020    K 4.1 10/01/2020    CO2 18.0 (L) 10/01/2020    CALCIUM 9.7 (H) 10/01/2020    ALBUMIN 3.8 07/01/2020    LABIL2 1.1 07/01/2020    AST 38 07/01/2020    ALT 13 07/01/2020       Lab Results   Component Value Date    WBC 5.98 07/01/2020    HGB 12.0 07/01/2020    HCT 36.4 07/01/2020    MCV 87.9 07/01/2020     07/01/2020       Lab Results   Component Value Date    TROPONINI 0.028 06/30/2020       No results found for: CHOL, CHLPL  No results found for: TRIG  No results found for: HDL  No results found for: LDL, LDLDIRECT    Lab Results   Component Value Date    TSH 1.830 01/13/2020         ECG 12 Lead    Date/Time: 11/5/2020 3:29 " PM  Performed by: Lm Mart MD  Authorized by: Lm Mart MD   Comparison: compared with previous ECG from 10/1/2020  Similar to previous ECG  Rhythm: atrial fibrillation  T inversion: V3, V4, V5 and V6    Clinical impression: abnormal EKG        Echocardiogram 6/25/2020 [facility]:  · Normal LV systolic function with apical hypokinesis  · Asymmetric apical hypertrophy  · Mild aortic insufficiency  · Mild/moderate mitral regurgitation  · Mild tricuspid regurgitation          Assessment:          Diagnosis Plan   1. Chronic diastolic (congestive) heart failure (CMS/HCC)     2. Permanent atrial fibrillation (CMS/HCC)     3. Stage 4 chronic kidney disease (CMS/HCC)  Basic Metabolic Panel   4. HTN, goal below 140/80            Plan:       Ms. Evans is a 89 y.o. woman with past medical history notable for permanent atrial fibrillation, hypertension, chronic diastolic heart failure, and chronic kidney disease stage III who presents to our office for scheduled follow-up.  Overall patient seem to doing reasonably well.  She does need a follow-up BMP as she accidentally got her lab work the day of her last office visit afterwards.  We will follow-up on these lab results and adjust diuretics as needed.    Permanent atrial fibrillation:  · Continue Coumadin   · Rate well controlled not on any rate controlling agents  · Metoprolol was held during her recent admission due to bradycardia    Hypertension:  · Reasonably well controlled for age no changes needed at this time     Chronic diastolic congestive heart failure:  · Continue Lasix 40 mg daily  · Follow-up BMP ordered    Chronic kidney disease:  · We will need to closely monitor creatinine clearance while adding diuretics      Follow-up:  3 Months      Thank you for allowing me to participate in the care of Cristina Lemus. Feel free to contact me directly with any further questions or concerns.    Lm Mart MD  [facility]  11/05/20  15:31 EST

## 2020-11-05 NOTE — PROGRESS NOTES
I have supervised and reviewed the notes, assessments, and/or procedures performed by our Pharmacy Intern. The documented assessment and plan were developed cooperatively, and the plan was implemented in my presence. I concur with the documentation of this patient encounter.    Skye Salmeron [facility]

## 2020-11-08 LAB — INR PPP: 1.81

## 2020-11-09 ENCOUNTER — ANTICOAGULATION VISIT (OUTPATIENT)
Dept: PHARMACY | Facility: HOSPITAL | Age: 85
End: 2020-11-09

## 2020-11-09 DIAGNOSIS — I48.21 PERMANENT ATRIAL FIBRILLATION (HCC): ICD-10-CM

## 2020-11-09 NOTE — PROGRESS NOTES
Anticoagulation Clinic Progress Note    Anticoagulation Summary  As of 2020    INR goal:  2.0-3.0   TTR:  0.0 % (1.9 wk)   INR used for dosin.81 (2020)   Warfarin maintenance plan:  4 mg every Mon, Wed, Fri; 2 mg all other days   Weekly warfarin total:  20 mg   Plan last modified:  Wilma Lynne RPH (2020)   Next INR check:  2020   Target end date:  Indefinite    Indications    Permanent atrial fibrillation (CMS/HCC) [I48.21]             Anticoagulation Episode Summary     INR check location:      Preferred lab:      Send INR reminders to:  AURELIANO MARIE  POOL    Comments:  tried calling Chaim BARCLAY but no stable warf dosing available from July/Aug 2020 , prior to Eliquis; PRECISION      Anticoagulation Care Providers     Provider Role Specialty Phone number    Lm Mart MD Referring Cardiology [phone]          Clinic Interview:  Patient Findings     Negatives:  Signs/symptoms of thrombosis, Signs/symptoms of bleeding,   Laboratory test error suspected, Change in health, Change in alcohol use,   Change in activity, Upcoming invasive procedure, Emergency department   visit, Upcoming dental procedure, Missed doses, Extra doses, Change in   medications, Change in diet/appetite, Hospital admission, Bruising, Other   complaints      Clinical Outcomes     Negatives:  Major bleeding event, Thromboembolic event,   Anticoagulation-related hospital admission, Anticoagulation-related ED   visit, Anticoagulation-related fatality        INR History:  Anticoagulation Monitoring 2020   INR 1.32 1.5 1.81   INR Date 10/30/2020 2020 2020   INR Goal 2.0-3.0 2.0-3.0 2.0-3.0   Trend Same Up Up   Last Week Total 18 mg 18 mg 20 mg   Next Week Total 20 mg 18 mg 20 mg   Sun - 2 mg 2 mg   Mon 4 mg () - 4 mg ()   Tue 4 mg (11/3) - 2 mg   Wed 4 mg () - 4 mg   Thu - 4 mg () 2 mg   Fri - 2 mg 4 mg   Sat - 2 mg 2 mg   Visit Report - - -      Some recent data might be hidden       Plan:  1. INR is Subtherapeutic today- see above in Anticoagulation Summary.   Will instruct Cristina Lemus to Increase their warfarin regimen- see above in Anticoagulation Summary.  2. Follow up in 1 week  3. Discussed with patient. They have been instructed to call if any changes in medications, doses, concerns, etc. Patient expresses understanding and has no further questions at this time.    Wilma Lynne [facility]

## 2020-11-17 LAB — INR PPP: 1.93

## 2020-11-18 ENCOUNTER — ANTICOAGULATION VISIT (OUTPATIENT)
Dept: PHARMACY | Facility: HOSPITAL | Age: 85
End: 2020-11-18

## 2020-11-18 DIAGNOSIS — I48.21 PERMANENT ATRIAL FIBRILLATION (HCC): ICD-10-CM

## 2020-11-18 NOTE — PROGRESS NOTES
Anticoagulation Clinic Progress Note    Anticoagulation Summary  As of 2020    INR goal:  2.0-3.0   TTR:  0.0 % (3.1 wk)   INR used for dosin.93 (2020)   Warfarin maintenance plan:  4 mg every Mon, Wed, Fri; 2 mg all other days   Weekly warfarin total:  20 mg   Plan last modified:  Wilma Lynne RPH (2020)   Next INR check:  2020   Target end date:  Indefinite    Indications    Permanent atrial fibrillation (CMS/HCC) [I48.21]             Anticoagulation Episode Summary     INR check location:      Preferred lab:      Send INR reminders to:  AURELIANO MARIE  POOL    Comments:  tried calling Chaim BARCLAY but no stable warf dosing available from July/Aug 2020 , prior to Eliquis; PRECISION      Anticoagulation Care Providers     Provider Role Specialty Phone number    Lm Mart MD Referring Cardiology [phone]          Clinic Interview:  Patient Findings     Negatives:  Signs/symptoms of thrombosis, Signs/symptoms of bleeding,   Laboratory test error suspected, Change in health, Change in alcohol use,   Change in activity, Upcoming invasive procedure, Emergency department   visit, Upcoming dental procedure, Missed doses, Extra doses, Change in   medications, Change in diet/appetite, Hospital admission, Bruising, Other   complaints      Clinical Outcomes     Negatives:  Major bleeding event, Thromboembolic event,   Anticoagulation-related hospital admission, Anticoagulation-related ED   visit, Anticoagulation-related fatality        INR History:  Anticoagulation Monitoring 2020   INR 1.5 1.81 1.93   INR Date 2020   INR Goal 2.0-3.0 2.0-3.0 2.0-3.0   Trend Up Up Same   Last Week Total 18 mg 20 mg 20 mg   Next Week Total 18 mg 20 mg 20 mg   Sun 2 mg 2 mg 2 mg   Mon - 4 mg () -   Tue - 2 mg -   Wed - 4 mg 4 mg   Thu 4 mg () 2 mg 2 mg   Fri 2 mg 4 mg 4 mg   Sat 2 mg 2 mg 2 mg   Visit Report - - -   Some recent  data might be hidden       Plan:  1. INR is Subtherapeutic today- see above in Anticoagulation Summary.   Will instruct Cristina Lemus to Continue their warfarin regimen with INR being so close to goal and due for higher dose this evening- see above in Anticoagulation Summary.  2. Follow up in 1 week  3. Spoke with patient. They have been instructed to call if any changes in medications, doses, concerns, etc. Patient expresses understanding and has no further questions at this time.    Wilma Lynne, [facility]

## 2020-11-24 ENCOUNTER — ANTICOAGULATION VISIT (OUTPATIENT)
Dept: PHARMACY | Facility: HOSPITAL | Age: 85
End: 2020-11-24

## 2020-11-24 DIAGNOSIS — I48.21 PERMANENT ATRIAL FIBRILLATION (HCC): ICD-10-CM

## 2020-11-24 LAB — INR PPP: 1.69

## 2020-11-24 NOTE — PROGRESS NOTES
Anticoagulation Clinic Progress Note    Anticoagulation Summary  As of 2020    INR goal:  2.0-3.0   TTR:  0.0 % (4 wk)   INR used for dosin.69 (2020)   Warfarin maintenance plan:  2 mg every Tue, Thu, Sat; 4 mg all other days   Weekly warfarin total:  22 mg   Plan last modified:  Wilma Lynne RPH (2020)   Next INR check:  2020   Target end date:  Indefinite    Indications    Permanent atrial fibrillation (CMS/HCC) [I48.21]             Anticoagulation Episode Summary     INR check location:      Preferred lab:      Send INR reminders to:  AURELIANO MARIE  POOL    Comments:  tried calling Chaim BARCLAY but no stable warf dosing available from July/Aug 2020 , prior to Eliquis; PRECISION      Anticoagulation Care Providers     Provider Role Specialty Phone number    Lm Mart MD Referring Cardiology [phone]          Clinic Interview:  Patient Findings     Negatives:  Signs/symptoms of thrombosis, Signs/symptoms of bleeding,   Laboratory test error suspected, Change in health, Change in alcohol use,   Change in activity, Upcoming invasive procedure, Emergency department   visit, Upcoming dental procedure, Missed doses, Extra doses, Change in   medications, Change in diet/appetite, Hospital admission, Bruising, Other   complaints    Comments:  No changes reported      Clinical Outcomes     Negatives:  Major bleeding event, Thromboembolic event,   Anticoagulation-related hospital admission, Anticoagulation-related ED   visit, Anticoagulation-related fatality    Comments:  No changes or missed doses reported        INR History:  Anticoagulation Monitoring 2020   INR 1.81 1.93 1.69   INR Date 2020   INR Goal 2.0-3.0 2.0-3.0 2.0-3.0   Trend Up Same Up   Last Week Total 20 mg 20 mg 20 mg   Next Week Total 20 mg 20 mg 24 mg   Sun 2 mg 2 mg 4 mg   Mon 4 mg () - -   Tue 2 mg - 4 mg ()   Wed 4 mg 4 mg 4 mg   Thu 2  mg 2 mg 2 mg   Fri 4 mg 4 mg 4 mg   Sat 2 mg 2 mg 2 mg   Visit Report - - -   Some recent data might be hidden       Plan:  1. INR is Subtherapeutic today- see above in Anticoagulation Summary.   Will instruct Cristina Lemus to Change their warfarin regimen- see above in Anticoagulation Summary.  2. Follow up in 1 week  3. Spoke with patient. They have been instructed to call if any changes in medications, doses, concerns, etc. Patient expresses understanding and has no further questions at this time.    Wilma Lynne, [facility]

## 2020-11-30 LAB — INR PPP: 1.83

## 2020-12-01 ENCOUNTER — ANTICOAGULATION VISIT (OUTPATIENT)
Dept: PHARMACY | Facility: HOSPITAL | Age: 85
End: 2020-12-01

## 2020-12-01 DIAGNOSIS — I48.21 PERMANENT ATRIAL FIBRILLATION (HCC): ICD-10-CM

## 2020-12-06 LAB — INR PPP: 2.15

## 2020-12-07 ENCOUNTER — ANTICOAGULATION VISIT (OUTPATIENT)
Dept: PHARMACY | Facility: HOSPITAL | Age: 85
End: 2020-12-07

## 2020-12-07 DIAGNOSIS — I48.21 PERMANENT ATRIAL FIBRILLATION (HCC): ICD-10-CM

## 2020-12-07 NOTE — PROGRESS NOTES
Anticoagulation Clinic Progress Note    Anticoagulation Summary  As of 2020    INR goal:  2.0-3.0   TTR:  6.9 % (1.4 mo)   INR used for dosin.15 (2020)   Warfarin maintenance plan:  2 mg every Sat; 4 mg all other days   Weekly warfarin total:  26 mg   Plan last modified:  Mc Zaidi [facility] (2020)   Next INR check:  2020   Target end date:  Indefinite    Indications    Permanent atrial fibrillation (CMS/HCC) [I48.21]             Anticoagulation Episode Summary     INR check location:      Preferred lab:      Send INR reminders to:   MAJO MARIE  POOL    Comments:  tried calling Rucker DENY but no stable warf dosing available from July/Aug 2020 , prior to Eliquis; PRECISION      Anticoagulation Care Providers     Provider Role Specialty Phone number    Lm Mart MD Referring Cardiology [phone]          Clinic Interview:  No pertinent clinical findings have been reported.    INR History:  Anticoagulation Monitoring 2020   INR 1.69 1.83 2.15   INR Date 2020   INR Goal 2.0-3.0 2.0-3.0 2.0-3.0   Trend Up Up Same   Last Week Total 20 mg 24 mg 26 mg   Next Week Total 24 mg 26 mg 26 mg   Sun 4 mg 4 mg 4 mg   Mon - - 4 mg   Tue 4 mg () 4 mg 4 mg   Wed 4 mg 4 mg 4 mg   Thu 2 mg 4 mg 4 mg   Fri 4 mg 4 mg 4 mg   Sat 2 mg 2 mg 2 mg   Visit Report - - -   Some recent data might be hidden       Plan:  1. INR is therapeutic today- see above in Anticoagulation Summary.    Cristina Lemus to continue their warfarin regimen of 26mg/week- see above in Anticoagulation Summary.  2. Follow up in 1 week  3. Pt has agreed to only be called if INR out of range. They have been instructed to call if any changes in medications, doses, concerns, etc. Patient expresses understanding and has no further questions at this time.    Jaki Castro [facility]

## 2020-12-13 LAB — INR PPP: 2.8

## 2020-12-14 ENCOUNTER — ANTICOAGULATION VISIT (OUTPATIENT)
Dept: PHARMACY | Facility: HOSPITAL | Age: 85
End: 2020-12-14

## 2020-12-14 DIAGNOSIS — I48.21 PERMANENT ATRIAL FIBRILLATION (HCC): ICD-10-CM

## 2020-12-14 RX ORDER — WARFARIN SODIUM 2 MG/1
TABLET ORAL
Qty: 170 TABLET | Refills: 3 | Status: SHIPPED | OUTPATIENT
Start: 2020-12-14 | End: 2021-06-09

## 2020-12-14 NOTE — PROGRESS NOTES
Anticoagulation Clinic Progress Note    Anticoagulation Summary  As of 2020    INR goal:  2.0-3.0   TTR:  20.4 % (1.6 mo)   INR used for dosin.80 (2020)   Warfarin maintenance plan:  2 mg every Sat; 4 mg all other days   Weekly warfarin total:  26 mg   No change documented:  Mc Zaidi RPH   Plan last modified:  Mc Zaidi RPH (2020)   Next INR check:  2020   Target end date:  Indefinite    Indications    Permanent atrial fibrillation (CMS/HCC) [I48.21]             Anticoagulation Episode Summary     INR check location:      Preferred lab:      Send INR reminders to:   MAJO MARIE  POOL    Comments:  tried calling Chaim BARCLAY but no stable warf dosing available from July/Aug 2020 , prior to Eliquis; PRECISION      Anticoagulation Care Providers     Provider Role Specialty Phone number    Lm Mart MD Referring Cardiology [phone]          Clinic Interview:  Patient Findings     Negatives:  Signs/symptoms of thrombosis, Signs/symptoms of bleeding,   Laboratory test error suspected, Change in health, Change in alcohol use,   Change in activity, Upcoming invasive procedure, Emergency department   visit, Upcoming dental procedure, Missed doses, Extra doses, Change in   medications, Change in diet/appetite, Hospital admission, Bruising, Other   complaints      Clinical Outcomes     Negatives:  Major bleeding event, Thromboembolic event,   Anticoagulation-related hospital admission, Anticoagulation-related ED   visit, Anticoagulation-related fatality        INR History:  Anticoagulation Monitoring 2020   INR 1.83 2.15 2.80   INR Date 2020   INR Goal 2.0-3.0 2.0-3.0 2.0-3.0   Trend Up Same Same   Last Week Total 24 mg 26 mg 26 mg   Next Week Total 26 mg 26 mg 26 mg   Sun 4 mg 4 mg 4 mg   Mon - 4 mg 4 mg   Tue 4 mg 4 mg 4 mg   Wed 4 mg 4 mg 4 mg   Thu 4 mg 4 mg 4 mg   Fri 4 mg 4 mg 4 mg   Sat 2 mg 2 mg 2 mg   Visit  Report - - -   Some recent data might be hidden       Plan:  1. INR is Therapeutic today- see above in Anticoagulation Summary.   Will instruct Cristina Lemus to Continue their warfarin regimen- see above in Anticoagulation Summary.  2. Follow up in 2 weeks  3. They have been instructed to call if any changes in medications, doses, concerns, etc. Patient expresses understanding and has no further questions at this time.    Mc Zaidi [facility]

## 2020-12-27 LAB — INR PPP: 3.4

## 2020-12-28 ENCOUNTER — ANTICOAGULATION VISIT (OUTPATIENT)
Dept: PHARMACY | Facility: HOSPITAL | Age: 85
End: 2020-12-28

## 2020-12-28 DIAGNOSIS — I48.21 PERMANENT ATRIAL FIBRILLATION (HCC): ICD-10-CM

## 2020-12-28 NOTE — PROGRESS NOTES
Anticoagulation Clinic Progress Note    Anticoagulation Summary  As of 12/28/2020    INR goal:  2.0-3.0   TTR:  23.4 % (2.1 mo)   INR used for dosing:  3.40 (12/27/2020)   Warfarin maintenance plan:  2 mg every Mon, Fri; 4 mg all other days   Weekly warfarin total:  24 mg   Plan last modified:  Anderson Fong RPH (12/28/2020)   Next INR check:  1/3/2021   Priority:  High   Target end date:  Indefinite    Indications    Permanent atrial fibrillation (CMS/HCC) [I48.21]             Anticoagulation Episode Summary     INR check location:      Preferred lab:      Send INR reminders to:  AURELIANO MARIE  POOL    Comments:  tried calling Chaim BARCLAY but no stable warf dosing available from July/Aug 2020 , prior to Eliquis; [facility]      Anticoagulation Care Providers     Provider Role Specialty Phone number    Lm Mart MD Referring Cardiology [phone]          Clinic Interview:  Patient Findings     Negatives:  Signs/symptoms of thrombosis, Signs/symptoms of bleeding,   Laboratory test error suspected, Change in health, Change in alcohol use,   Change in activity, Upcoming invasive procedure, Emergency department   visit, Upcoming dental procedure, Missed doses, Extra doses, Change in   medications, Change in diet/appetite, Hospital admission, Bruising, Other   complaints      Clinical Outcomes     Negatives:  Major bleeding event, Thromboembolic event,   Anticoagulation-related hospital admission, Anticoagulation-related ED   visit, Anticoagulation-related fatality        INR History:  Anticoagulation Monitoring 12/7/2020 12/14/2020 12/28/2020   INR 2.15 2.80 3.40   INR Date 12/6/2020 12/13/2020 12/27/2020   INR Goal 2.0-3.0 2.0-3.0 2.0-3.0   Trend Same Same Down   Last Week Total 26 mg 26 mg 26 mg   Next Week Total 26 mg 26 mg 24 mg   Sun 4 mg 4 mg -   Mon 4 mg 4 mg 2 mg   Tue 4 mg 4 mg 4 mg   Wed 4 mg 4 mg 4 mg   Thu 4 mg 4 mg 4 mg   Fri 4 mg 4 mg 2 mg   Sat 2 mg 2 mg 4 mg   Visit Report - - -      Some recent data might be hidden       Plan:  1. INR is Supratherapeutic today- see above in Anticoagulation Summary.   Will instruct Cristina Lemus to Change their warfarin regimen- see above in Anticoagulation Summary.  2. Follow up in 1 week  3. Pt will be contacted with each INR result    Anderson Fong RPH

## 2021-01-06 LAB — INR PPP: 2.65

## 2021-01-07 ENCOUNTER — ANTICOAGULATION VISIT (OUTPATIENT)
Dept: PHARMACY | Facility: HOSPITAL | Age: 86
End: 2021-01-07

## 2021-01-07 DIAGNOSIS — I48.21 PERMANENT ATRIAL FIBRILLATION (HCC): ICD-10-CM

## 2021-01-07 NOTE — PROGRESS NOTES
Anticoagulation Clinic Progress Note    Anticoagulation Summary  As of 2021    INR goal:  2.0-3.0   TTR:  26.6 % (2.4 mo)   INR used for dosin.65 (2021)   Warfarin maintenance plan:  2 mg every Mon, Fri; 4 mg all other days   Weekly warfarin total:  24 mg   Plan last modified:  Anderson Fong RP (2020)   Next INR check:  2021   Priority:  High   Target end date:  Indefinite    Indications    Permanent atrial fibrillation (CMS/HCC) [I48.21]             Anticoagulation Episode Summary     INR check location:      Preferred lab:      Send INR reminders to:   MAJO MARIE  POOL    Comments:  tried calling Chaim BARCLAY but no stable warf dosing available from July/Aug 2020 , prior to Eliquis; [facility]      Anticoagulation Care Providers     Provider Role Specialty Phone number    Lm Mart MD Referring Cardiology [phone]          Clinic Interview:      INR History:  Anticoagulation Monitoring 2020   INR 2.80 3.40 2.65   INR Date 2020   INR Goal 2.0-3.0 2.0-3.0 2.0-3.0   Trend Same Down Same   Last Week Total 26 mg 26 mg 24 mg   Next Week Total 26 mg 24 mg 24 mg   Sun 4 mg - 4 mg   Mon 4 mg 2 mg 2 mg   Tue 4 mg 4 mg 4 mg   Wed 4 mg 4 mg -   Thu 4 mg 4 mg 4 mg   Fri 4 mg 2 mg 2 mg   Sat 2 mg 4 mg 4 mg   Visit Report - - -   Some recent data might be hidden       Plan:  1. INR is Therapeutic today- see above in Anticoagulation Summary.   Will instruct Cristina Leums to Continue their warfarin regimen- see above in Anticoagulation Summary.  2. Follow up in 1 week--[facility]  3. Spoke with daughter today. They have been instructed to call if any changes in medications, doses, concerns, etc.   Skye Salmeron [facility]

## 2021-01-13 LAB — INR PPP: 2.38

## 2021-01-14 ENCOUNTER — ANTICOAGULATION VISIT (OUTPATIENT)
Dept: PHARMACY | Facility: HOSPITAL | Age: 86
End: 2021-01-14

## 2021-01-14 DIAGNOSIS — I48.21 PERMANENT ATRIAL FIBRILLATION (HCC): ICD-10-CM

## 2021-01-14 NOTE — PROGRESS NOTES
Anticoagulation Clinic Progress Note    Anticoagulation Summary  As of 2021    INR goal:  2.0-3.0   TTR:  33.1 % (2.6 mo)   INR used for dosin.38 (2021)   Warfarin maintenance plan:  2 mg every Mon, Fri; 4 mg all other days   Weekly warfarin total:  24 mg   Plan last modified:  Anderson Fong [facility] (2020)   Next INR check:  2021   Priority:  High   Target end date:  Indefinite    Indications    Permanent atrial fibrillation (CMS/HCC) [I48.21]             Anticoagulation Episode Summary     INR check location:      Preferred lab:      Send INR reminders to:   MAJO MARIE  POOL    Comments:  tried calling Chaim BARCLAY but no stable warf dosing available from July/Aug 2020 , prior to Eliquis; [facility]      Anticoagulation Care Providers     Provider Role Specialty Phone number    Lm aMrt MD Referring Cardiology [phone]          Clinic Interview:      INR History:  Anticoagulation Monitoring 2020   INR 3.40 2.65 2.38   INR Date 2020   INR Goal 2.0-3.0 2.0-3.0 2.0-3.0   Trend Down Same Same   Last Week Total 26 mg 24 mg 24 mg   Next Week Total 24 mg 24 mg 24 mg   Sun - 4 mg 4 mg   Mon 2 mg 2 mg 2 mg   Tue 4 mg 4 mg 4 mg   Wed 4 mg - 4 mg   Thu 4 mg 4 mg 4 mg   Fri 2 mg 2 mg 2 mg   Sat 4 mg 4 mg 4 mg   Visit Report - - -   Some recent data might be hidden       Plan:  1. INR is Therapeutic today- see above in Anticoagulation Summary.   Will instruct Cristina Lemus to Continue their warfarin regimen- see above in Anticoagulation Summary.  2. Follow up in 2 weeks--[facility]  3. Spoke with daughter today. They have been instructed to call if any changes in medications, doses, concerns, etc. Patient expresses understanding and has no further questions at this time.    Skye Salmeron [facility]

## 2021-01-19 ENCOUNTER — APPOINTMENT (OUTPATIENT)
Dept: CARDIOLOGY | Facility: HOSPITAL | Age: 86
End: 2021-01-19

## 2021-01-19 ENCOUNTER — HOSPITAL ENCOUNTER (EMERGENCY)
Facility: HOSPITAL | Age: 86
Discharge: HOME | End: 2021-01-19
Attending: EMERGENCY MEDICINE | Admitting: EMERGENCY MEDICINE

## 2021-01-19 VITALS
OXYGEN SATURATION: 96 % | HEART RATE: 97 BPM | RESPIRATION RATE: 16 BRPM | SYSTOLIC BLOOD PRESSURE: 143 MMHG | TEMPERATURE: 96.6 F | DIASTOLIC BLOOD PRESSURE: 73 MMHG

## 2021-01-19 DIAGNOSIS — D68.32 WARFARIN-INDUCED COAGULOPATHY (HCC): ICD-10-CM

## 2021-01-19 DIAGNOSIS — I80.01 THROMBOPHLEBITIS OF SUPERFICIAL VEINS OF RIGHT LOWER EXTREMITY: Primary | ICD-10-CM

## 2021-01-19 DIAGNOSIS — T45.515A WARFARIN-INDUCED COAGULOPATHY (HCC): ICD-10-CM

## 2021-01-19 LAB
ALBUMIN SERPL-MCNC: 4.6 G/DL (ref 3.5–5.2)
ALBUMIN/GLOB SERPL: 1.5 G/DL
ALP SERPL-CCNC: 81 U/L (ref 39–117)
ALT SERPL W P-5'-P-CCNC: 8 U/L (ref 1–33)
ANION GAP SERPL CALCULATED.3IONS-SCNC: 15.5 MMOL/L (ref 5–15)
AST SERPL-CCNC: 20 U/L (ref 1–32)
BASOPHILS # BLD AUTO: 0.03 10*3/MM3 (ref 0–0.2)
BASOPHILS NFR BLD AUTO: 0.4 % (ref 0–1.5)
BH CV LOW VAS RIGHT VARICOSITY BK VESSEL: 1
BH CV LOWER VASCULAR LEFT COMMON FEMORAL AUGMENT: NORMAL
BH CV LOWER VASCULAR LEFT COMMON FEMORAL COMPETENT: NORMAL
BH CV LOWER VASCULAR LEFT COMMON FEMORAL COMPRESS: NORMAL
BH CV LOWER VASCULAR LEFT COMMON FEMORAL PHASIC: NORMAL
BH CV LOWER VASCULAR LEFT COMMON FEMORAL SPONT: NORMAL
BH CV LOWER VASCULAR RIGHT COMMON FEMORAL AUGMENT: NORMAL
BH CV LOWER VASCULAR RIGHT COMMON FEMORAL COMPETENT: NORMAL
BH CV LOWER VASCULAR RIGHT COMMON FEMORAL COMPRESS: NORMAL
BH CV LOWER VASCULAR RIGHT COMMON FEMORAL PHASIC: NORMAL
BH CV LOWER VASCULAR RIGHT COMMON FEMORAL SPONT: NORMAL
BH CV LOWER VASCULAR RIGHT DISTAL FEMORAL COMPRESS: NORMAL
BH CV LOWER VASCULAR RIGHT GASTRONEMIUS COMPRESS: NORMAL
BH CV LOWER VASCULAR RIGHT GREATER SAPH AK COMPRESS: NORMAL
BH CV LOWER VASCULAR RIGHT GREATER SAPH BK COMPRESS: NORMAL
BH CV LOWER VASCULAR RIGHT LESSER SAPH COMPRESS: NORMAL
BH CV LOWER VASCULAR RIGHT MID FEMORAL AUGMENT: NORMAL
BH CV LOWER VASCULAR RIGHT MID FEMORAL COMPETENT: NORMAL
BH CV LOWER VASCULAR RIGHT MID FEMORAL COMPRESS: NORMAL
BH CV LOWER VASCULAR RIGHT MID FEMORAL PHASIC: NORMAL
BH CV LOWER VASCULAR RIGHT MID FEMORAL SPONT: NORMAL
BH CV LOWER VASCULAR RIGHT PERONEAL COMPRESS: NORMAL
BH CV LOWER VASCULAR RIGHT POPLITEAL AUGMENT: NORMAL
BH CV LOWER VASCULAR RIGHT POPLITEAL COMPETENT: NORMAL
BH CV LOWER VASCULAR RIGHT POPLITEAL COMPRESS: NORMAL
BH CV LOWER VASCULAR RIGHT POPLITEAL PHASIC: NORMAL
BH CV LOWER VASCULAR RIGHT POPLITEAL SPONT: NORMAL
BH CV LOWER VASCULAR RIGHT POSTERIOR TIBIAL COMPRESS: NORMAL
BH CV LOWER VASCULAR RIGHT PROFUNDA FEMORAL COMPRESS: NORMAL
BH CV LOWER VASCULAR RIGHT PROXIMAL FEMORAL COMPRESS: NORMAL
BH CV LOWER VASCULAR RIGHT SAPHENOFEMORAL JUNCTION COMPRESS: NORMAL
BH CV LOWER VASCULAR RIGHT VARICOSITY BK COMPRESS: NORMAL
BH CV LOWER VASCULAR RIGHT VARICOSITY BK THROMBUS: NORMAL
BILIRUB SERPL-MCNC: 0.6 MG/DL (ref 0–1.2)
BUN SERPL-MCNC: 52 MG/DL (ref 8–23)
BUN/CREAT SERPL: 20.2 (ref 7–25)
CALCIUM SPEC-SCNC: 9 MG/DL (ref 8.2–9.6)
CHLORIDE SERPL-SCNC: 100 MMOL/L (ref 98–107)
CO2 SERPL-SCNC: 21.5 MMOL/L (ref 22–29)
CREAT SERPL-MCNC: 2.57 MG/DL (ref 0.57–1)
DEPRECATED RDW RBC AUTO: 43.3 FL (ref 37–54)
EOSINOPHIL # BLD AUTO: 0.13 10*3/MM3 (ref 0–0.4)
EOSINOPHIL NFR BLD AUTO: 1.9 % (ref 0.3–6.2)
ERYTHROCYTE [DISTWIDTH] IN BLOOD BY AUTOMATED COUNT: 14.1 % (ref 12.3–15.4)
GFR SERPL CREATININE-BSD FRML MDRD: 18 ML/MIN/1.73
GLOBULIN UR ELPH-MCNC: 3 GM/DL
GLUCOSE SERPL-MCNC: 120 MG/DL (ref 65–99)
HCT VFR BLD AUTO: 35.4 % (ref 34–46.6)
HGB BLD-MCNC: 11.8 G/DL (ref 12–15.9)
HOLD SPECIMEN: NORMAL
HOLD SPECIMEN: NORMAL
IMM GRANULOCYTES # BLD AUTO: 0.01 10*3/MM3 (ref 0–0.05)
IMM GRANULOCYTES NFR BLD AUTO: 0.1 % (ref 0–0.5)
INR PPP: 2.42 (ref 0.9–1.1)
LYMPHOCYTES # BLD AUTO: 1.54 10*3/MM3 (ref 0.7–3.1)
LYMPHOCYTES NFR BLD AUTO: 22.4 % (ref 19.6–45.3)
MCH RBC QN AUTO: 28 PG (ref 26.6–33)
MCHC RBC AUTO-ENTMCNC: 33.3 G/DL (ref 31.5–35.7)
MCV RBC AUTO: 83.9 FL (ref 79–97)
MONOCYTES # BLD AUTO: 0.6 10*3/MM3 (ref 0.1–0.9)
MONOCYTES NFR BLD AUTO: 8.7 % (ref 5–12)
NEUTROPHILS NFR BLD AUTO: 4.55 10*3/MM3 (ref 1.7–7)
NEUTROPHILS NFR BLD AUTO: 66.5 % (ref 42.7–76)
NRBC BLD AUTO-RTO: 0 /100 WBC (ref 0–0.2)
PLATELET # BLD AUTO: 272 10*3/MM3 (ref 140–450)
PMV BLD AUTO: 11 FL (ref 6–12)
POTASSIUM SERPL-SCNC: 3.5 MMOL/L (ref 3.5–5.2)
PROT SERPL-MCNC: 7.6 G/DL (ref 6–8.5)
PROTHROMBIN TIME: 26.1 SECONDS (ref 11.7–14.2)
RBC # BLD AUTO: 4.22 10*6/MM3 (ref 3.77–5.28)
SODIUM SERPL-SCNC: 137 MMOL/L (ref 136–145)
WBC # BLD AUTO: 6.86 10*3/MM3 (ref 3.4–10.8)
WHOLE BLOOD HOLD SPECIMEN: NORMAL
WHOLE BLOOD HOLD SPECIMEN: NORMAL

## 2021-01-19 PROCEDURE — 93971 EXTREMITY STUDY: CPT

## 2021-01-19 PROCEDURE — 85025 COMPLETE CBC W/AUTO DIFF WBC: CPT

## 2021-01-19 PROCEDURE — 36415 COLL VENOUS BLD VENIPUNCTURE: CPT

## 2021-01-19 PROCEDURE — 99282 EMERGENCY DEPT VISIT SF MDM: CPT

## 2021-01-19 PROCEDURE — 80053 COMPREHEN METABOLIC PANEL: CPT | Performed by: EMERGENCY MEDICINE

## 2021-01-19 PROCEDURE — 85610 PROTHROMBIN TIME: CPT

## 2021-01-19 NOTE — DISCHARGE INSTRUCTIONS
As we discussed, apply some warm compresses 30 to 40 minutes 5 times a day.  Make sure you not burn your skin.  Also use compression stockings to lower extremities.

## 2021-01-19 NOTE — ED NOTES
"Pt c/o \"lump\" in her right calf that she found this am. Pt reports previous hx of DVT. Pt reports she is on Coumadin.    Mask placed on patient in triage. Triage staff wore appropriate PPE during interaction with patient.        Diane Allen, RN  01/19/21 5539    "

## 2021-01-19 NOTE — ED PROVIDER NOTES
EMERGENCY DEPARTMENT ENCOUNTER    Room Number:  18/18  Date of encounter:  1/19/2021  PCP: Laura Sharpe MD  Historian: Patient      HPI:  Chief Complaint: Right lower extremity with area of the knee of the calf that is a little tender and swollen  A complete HPI/ROS/PMH/PSH/SH/FH are unobtainable due to: Not applicable  Context: Cristina Lemus is a 89 y.o. female who presents to the ED c/o planes of an area to her right calf that is little swollen and tender.  Is been going on for about a day.  She wants to make sure it is not deep vein clot.  She is on Coumadin because a history of DVTs in the past.  Denies any chest pain, shortness of breath, or any other complaints.  Denies any fever or chills or trauma to that area.        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   • History of pulmonary embolus (PE) 06/30/2020   • HTN, goal below 140/80 09/01/2016   • Permanent atrial fibrillation (CMS/HCC) 07/07/2017   • Stage 4 chronic kidney disease (CMS/HCC) 06/30/2020   • Chronic diastolic (congestive) heart failure (CMS/HCC) 10/01/2020     Resolved Ambulatory Problems     Diagnosis Date Noted   • No Resolved Ambulatory Problems     Past Medical History:   Diagnosis Date   • Atrial fibrillation (CMS/HCC)    • CHF (congestive heart failure) (CMS/[facility])          PAST SURGICAL HISTORY  Past Surgical History:   Procedure Laterality Date   • BACK SURGERY     • CYST REMOVAL     • OTHER SURGICAL HISTORY      rupture disk         FAMILY HISTORY  No family history on file.      SOCIAL HISTORY  Social History     Socioeconomic History   • Marital status: Single     Spouse name: Not on file   • Number of children: Not on file   • Years of education: Not on file   • Highest education level: Not on file   Tobacco Use   • Smoking status: Never Smoker   •  Smokeless tobacco: Never Used   Substance and Sexual Activity   • Alcohol use: No     Comment: caffeine use    • Drug use: No   • Sexual activity: Defer         ALLERGIES  Statins and Sulfa antibiotics        REVIEW OF SYSTEMS  Review of Systems     All systems reviewed and negative except for those discussed in HPI.       PHYSICAL EXAM    I have reviewed the triage vital signs and nursing notes.    ED Triage Vitals   Temp Heart Rate Resp BP SpO2   01/19/21 1543 01/19/21 1543 01/19/21 1543 01/19/21 1544 01/19/21 1543   96.6 °F (35.9 °C) 97 16 143/73 96 %      Temp src Heart Rate Source Patient Position BP Location FiO2 (%)   01/19/21 1543 -- -- -- --   Tympanic           GENERAL: Elderly female no acute distress.Vital signs on my initial evaluation unremarkable  HENT: nares patent  Head/neck/ face are symmetric without gross deformity, signs of trauma, or swelling  EYES: no scleral icterus, no conjunctival pallor.  NECK: Supple, no meningismus  CV: regular rhythm, regular rate with intact distal pulses.  RESPIRATORY: normal effort and no respiratory distress.  ABDOMEN: soft and non-tender.  MUSCULOSKELETAL: no deformity.  To right lower extremity at her proximal calf and medial aspect there is an small area that is approximately an inch to an inch and a half prominent superficial dilated vein.  There is very minimal tenderness associated with an area of palpitation.  It is not warm or red is not hot.  Rest of compartments are soft.  Intact distal pulses.  No motor or sensory changes.  NEURO: alert and appropriate, moves all extremities, follows commands.  Alert and oriented x3.  No focal motor or sensory changes.  SKIN: warm, dry    Vital signs and nursing notes reviewed.        LAB RESULTS  Recent Results (from the past 24 hour(s))   Comprehensive Metabolic Panel    Collection Time: 01/19/21  4:14 PM    Specimen: Blood   Result Value Ref Range    Glucose 120 (H) 65 - 99 mg/dL    BUN 52 (H) 8 - 23 mg/dL     Creatinine 2.57 (H) 0.57 - 1.00 mg/dL    Sodium 137 136 - 145 mmol/L    Potassium 3.5 3.5 - 5.2 mmol/L    Chloride 100 98 - 107 mmol/L    CO2 21.5 (L) 22.0 - 29.0 mmol/L    Calcium 9.0 8.2 - 9.6 mg/dL    Total Protein 7.6 6.0 - 8.5 g/dL    Albumin 4.60 3.50 - 5.20 g/dL    ALT (SGPT) 8 1 - 33 U/L    AST (SGOT) 20 1 - 32 U/L    Alkaline Phosphatase 81 39 - 117 U/L    Total Bilirubin 0.6 0.0 - 1.2 mg/dL    eGFR Non African Amer 18 (L) >60 mL/min/1.73    Globulin 3.0 gm/dL    A/G Ratio 1.5 g/dL    BUN/Creatinine Ratio 20.2 7.0 - 25.0    Anion Gap 15.5 (H) 5.0 - 15.0 mmol/L   Protime-INR    Collection Time: 01/19/21  4:14 PM    Specimen: Blood   Result Value Ref Range    Protime 26.1 (H) 11.7 - 14.2 Seconds    INR 2.42 (H) 0.90 - 1.10   Light Blue Top    Collection Time: 01/19/21  4:14 PM   Result Value Ref Range    Extra Tube hold for add-on    Green Top (Gel)    Collection Time: 01/19/21  4:14 PM   Result Value Ref Range    Extra Tube Hold for add-ons.    Lavender Top    Collection Time: 01/19/21  4:14 PM   Result Value Ref Range    Extra Tube hold for add-on    Gold Top - SST    Collection Time: 01/19/21  4:14 PM   Result Value Ref Range    Extra Tube Hold for add-ons.    CBC Auto Differential    Collection Time: 01/19/21  4:14 PM    Specimen: Blood   Result Value Ref Range    WBC 6.86 3.40 - 10.80 10*3/mm3    RBC 4.22 3.77 - 5.28 10*6/mm3    Hemoglobin 11.8 (L) 12.0 - 15.9 g/dL    Hematocrit 35.4 34.0 - 46.6 %    MCV 83.9 79.0 - 97.0 fL    MCH 28.0 26.6 - 33.0 pg    MCHC 33.3 31.5 - 35.7 g/dL    RDW 14.1 12.3 - 15.4 %    RDW-SD 43.3 37.0 - 54.0 fl    MPV 11.0 6.0 - 12.0 fL    Platelets 272 140 - 450 10*3/mm3    Neutrophil % 66.5 42.7 - 76.0 %    Lymphocyte % 22.4 19.6 - 45.3 %    Monocyte % 8.7 5.0 - 12.0 %    Eosinophil % 1.9 0.3 - 6.2 %    Basophil % 0.4 0.0 - 1.5 %    Immature Grans % 0.1 0.0 - 0.5 %    Neutrophils, Absolute 4.55 1.70 - 7.00 10*3/mm3    Lymphocytes, Absolute 1.54 0.70 - 3.10 10*3/mm3    Monocytes,  Absolute 0.60 0.10 - 0.90 10*3/mm3    Eosinophils, Absolute 0.13 0.00 - 0.40 10*3/mm3    Basophils, Absolute 0.03 0.00 - 0.20 10*3/mm3    Immature Grans, Absolute 0.01 0.00 - 0.05 10*3/mm3    nRBC 0.0 0.0 - 0.2 /100 WBC   Duplex Venous Lower Extremity Right    Collection Time: 01/19/21  5:33 PM   Result Value Ref Range    Right Varicosity BK Vessel 1     Right Common Femoral Spont Y     Right Common Femoral Phasic Y     Right Common Femoral Augment Y     Right Common Femoral Competent Y     Right Common Femoral Compress C     Right Saphenofemoral Junction Compress C     Right Profunda Femoral Compress C     Right Proximal Femoral Compress C     Right Mid Femoral Spont Y     Right Mid Femoral Phasic Y     Right Mid Femoral Augment Y     Right Mid Femoral Competent Y     Right Mid Femoral Compress C     Right Distal Femoral Compress C     Right Popliteal Spont Y     Right Popliteal Phasic Y     Right Popliteal Augment Y     Right Popliteal Competent Y     Right Popliteal Compress C     Right Posterior Tibial Compress C     Right Peroneal Compress C     Right GastronemiusSoleal Compress C     Right Greater Saph AK Compress C     Right Greater Saph BK Compress C     Right Lesser Saph Compress C     Right Varicosity BK Compress N     Right Varicosity BK Thrombus A     Left Common Femoral Spont Y     Left Common Femoral Phasic Y     Left Common Femoral Augment Y     Left Common Femoral Competent Y     Left Common Femoral Compress C        Ordered the above labs and independently reviewed the results.        RADIOLOGY  No Radiology Exams Resulted Within Past 24 Hours    I ordered the above noted radiological studies. Reviewed by me and discussed with radiologist.  See dictation for official radiology interpretation.      PROCEDURES    Procedures      MEDICATIONS GIVEN IN ER    Medications - No data to display      PROGRESS, DATA ANALYSIS, CONSULTS, AND MEDICAL DECISION MAKING    Venous Doppler of patient's right lower  extremity has a very localized area of SVT.  There is no DVT to her right lower extremities.  Her Coumadin level is therapeutic.  I reviewed her lab work and no acute change.  Informed the patient to apply warm compresses and also do some compression stockings to help decrease the dilation and irritation to the superficial veins.  All questions answered  We are currently under a pandemic from the COVID19 infection.  The patient presented to the emergency department by ambulance or personal vehicle. I followed the current protocols required by Infection Control at [facility] in my evaluation and treatment of the patient. The patient was wearing a face mask during my evaluation and throughout my encounter. During my whole encounter with this patient I used appropriate personal protective equipment.  This equipment consisted of eye protection, facemask, gown, and gloves.  I applied this equipment before entering the room.      All labs have been independently reviewed by me.  All radiology studies have been reviewed by me and discussed with radiologist dictating the report.   EKG's independently viewed and interpreted by me.  Discussion below represents my analysis of pertinent findings related to patient's condition, differential diagnosis, treatment plan and final disposition.      ED Course as of Jan 19 1831   Tue Jan 19, 2021 1821 Chronic renal failure no significant change from 3 months ago.   Creatinine(!): 2.57 [MM]   1821 Warfarin induced coagulopathy.   INR(!): 2.42 [MM]   1822 Smokes ago hemoglobin was 12.   Hemoglobin(!): 11.8 [MM]      ED Course User Index  [MM] Malcolm Smith MD       AS OF 18:31 EST VITALS:    BP - 143/73  HR - 97  TEMP - 96.6 °F (35.9 °C) (Tympanic)  02 SATS - 96%        DIAGNOSIS  Final diagnoses:   Thrombophlebitis of superficial veins of right lower extremity   Warfarin-induced coagulopathy (CMS/HCC)         DISPOSITION  DISCHARGE    Patient discharged in stable  condition.    Reviewed implications of results, diagnosis, meds, responsibility to follow up, warning signs and symptoms of possible worsening, potential complications and reasons to return to ER, including worsening of symptoms, fever, chest pain, shortness of breath, any concerns..    Patient/Family voiced understanding of above instructions.    Discussed plan for discharge, as there is no emergent indication for admission. Pt/family is agreeable and understands need for follow up and repeat testing.  Pt is aware that discharge does not mean that nothing is wrong but it indicates no emergency is present that requires admission and they must continue care with follow-up as given below or physician of their choice.     FOLLOW-UP  Laura Sharpe MD  [address]  [place] [address]  [phone]    In 1 week  Return if pain worsens, shortness of breath, fever, any concerns         Medication List      No changes were made to your prescriptions during this visit.                  Malcolm Smith MD  01/19/21 1078

## 2021-01-28 LAB — INR PPP: 2.2

## 2021-01-29 ENCOUNTER — ANTICOAGULATION VISIT (OUTPATIENT)
Dept: PHARMACY | Facility: HOSPITAL | Age: 86
End: 2021-01-29

## 2021-01-29 DIAGNOSIS — I48.21 PERMANENT ATRIAL FIBRILLATION (HCC): ICD-10-CM

## 2021-01-29 NOTE — PROGRESS NOTES
Anticoagulation Clinic Progress Note    Anticoagulation Summary  As of 2021    INR goal:  2.0-3.0   TTR:  43.8 % (3.1 mo)   INR used for dosin.20 (2021)   Warfarin maintenance plan:  2 mg every Mon, Fri; 4 mg all other days   Weekly warfarin total:  24 mg   No change documented:  Marisol Villegas [facility]   Plan last modified:  Anderson Fong RPH (2020)   Next INR check:  2021   Priority:  High   Target end date:  Indefinite    Indications    Permanent atrial fibrillation (CMS/HCC) [I48.21]             Anticoagulation Episode Summary     INR check location:      Preferred lab:      Send INR reminders to:   MAJO MARIE  POOL    Comments:  tried calling Chaim BARCLAY but no stable warf dosing available from July/Aug 2020 , prior to Eliquis; [facility]      Anticoagulation Care Providers     Provider Role Specialty Phone number    Lm Mart MD Referring Cardiology [phone]          Clinic Interview:      INR History:  Anticoagulation Monitoring 2021   INR 2.65 2.38 2.20   INR Date 2021   INR Goal 2.0-3.0 2.0-3.0 2.0-3.0   Trend Same Same Same   Last Week Total 24 mg 24 mg 24 mg   Next Week Total 24 mg 24 mg 24 mg   Sun 4 mg 4 mg 4 mg   Mon 2 mg 2 mg 2 mg   Tue 4 mg 4 mg 4 mg   Wed - 4 mg 4 mg   Thu 4 mg 4 mg 4 mg   Fri 2 mg 2 mg 2 mg   Sat 4 mg 4 mg 4 mg   Visit Report - - -   Some recent data might be hidden       Plan:  1. INR is Therapeutic today- see above in Anticoagulation Summary.   Will instruct Cristina Lemus to Continue their warfarin regimen- see above in Anticoagulation Summary.  2. Follow up in 2 weeks  3. Pt has agreed to only be called if INR out of range. They have been instructed to call if any changes in medications, doses, concerns, etc. Patient expresses understanding and has no further questions at this time.    Marisol Villegas RP

## 2021-02-10 ENCOUNTER — OFFICE VISIT (OUTPATIENT)
Dept: CARDIOLOGY | Facility: CLINIC | Age: 86
End: 2021-02-10

## 2021-02-10 VITALS
HEART RATE: 67 BPM | SYSTOLIC BLOOD PRESSURE: 152 MMHG | DIASTOLIC BLOOD PRESSURE: 70 MMHG | WEIGHT: 131.4 LBS | BODY MASS INDEX: 21.89 KG/M2 | HEIGHT: 65 IN

## 2021-02-10 DIAGNOSIS — I48.21 PERMANENT ATRIAL FIBRILLATION (HCC): ICD-10-CM

## 2021-02-10 DIAGNOSIS — I50.32 CHRONIC DIASTOLIC (CONGESTIVE) HEART FAILURE (HCC): Primary | ICD-10-CM

## 2021-02-10 DIAGNOSIS — N18.4 STAGE 4 CHRONIC KIDNEY DISEASE (HCC): ICD-10-CM

## 2021-02-10 DIAGNOSIS — Z86.711 HISTORY OF PULMONARY EMBOLUS (PE): ICD-10-CM

## 2021-02-10 PROCEDURE — 93000 ELECTROCARDIOGRAM COMPLETE: CPT | Performed by: INTERNAL MEDICINE

## 2021-02-10 PROCEDURE — 99214 OFFICE O/P EST MOD 30 MIN: CPT | Performed by: INTERNAL MEDICINE

## 2021-02-10 RX ORDER — AMLODIPINE BESYLATE 5 MG/1
5 TABLET ORAL DAILY
Qty: 90 TABLET | Refills: 3
Start: 2021-02-10 | End: 2021-02-22 | Stop reason: SDUPTHER

## 2021-02-10 NOTE — PROGRESS NOTES
[facility] Follow Up Office Note     Encounter Date:02/10/21  Patient:Cristina Lemus  :3/13/1930  MRN:[id number]      Chief Complaint: Follow up atrial fibrillation and diastolic heart failure  Chief Complaint   Patient presents with   • Congestive Heart Failure     3 month f/u   • Atrial Fibrillation   • Hypertension     History of Presenting Illness:      Ms. Evans is a 89 y.o. woman with past medical history notable for permanent atrial fibrillation, hypertension, chronic diastolic heart failure, and chronic kidney disease stage III who presents to our office for scheduled follow up.  She was last seen approximately 3 months ago and at that time was doing better from a volume standpoint after starting lasix.  Since then she has done well and has lost about 10 lbs.  She did have a recent concern for leg swelling and pain and went the ED where she was found to have a superficial thrombophlebitis and was managed conservatively and has done well since.  Labs during that visit demonstrated stable Cr and Hb.  Her doppler was negative for DVT but positive for superficial thrombosis.       Review of Systems:  Review of Systems   Constitution: Positive for malaise/fatigue.   HENT: Negative.    Eyes: Negative.    Cardiovascular: Positive for leg swelling.   Respiratory: Positive for shortness of breath.    Endocrine: Negative.    Hematologic/Lymphatic: Negative.    Skin: Negative.    Musculoskeletal: Negative.    Gastrointestinal: Negative.    Genitourinary: Negative.    Neurological: Negative.    Psychiatric/Behavioral: Negative.    Allergic/Immunologic: Negative.      Current Outpatient Medications on File Prior to Visit   Medication Sig Dispense Refill   • amLODIPine (NORVASC) 10 MG tablet Take 10 mg by mouth Daily.     • furosemide (LASIX) 40 MG tablet Take 1 tablet by mouth Daily. 30 tablet 11   • isosorbide mononitrate (IMDUR) 30 MG 24 hr tablet Take 30 mg by mouth Daily.     • LORazepam (ATIVAN) 1 MG tablet  "TAKE ONE TABLET BY MOUTH AT BEDTIME     • warfarin (COUMADIN) 2 MG tablet Take one tablet (2 mg) by mouth Sat, and take two tablets (4 mg) all other days or as directed. 170 tablet 3   • [DISCONTINUED] pantoprazole (PROTONIX) 40 MG EC tablet Take 40 mg by mouth Every Morning.       No current facility-administered medications on file prior to visit.          Allergies   Allergen Reactions   • Statins Nausea And Vomiting   • Sulfa Antibiotics Nausea And Vomiting and Other (See Comments)     CHILLS AND FEVER       Past Medical History:   Diagnosis Date   • Atrial fibrillation (CMS/HCC)    • CHF (congestive heart failure) (CMS/HCC)        Past Surgical History:   Procedure Laterality Date   • BACK SURGERY     • CYST REMOVAL     • OTHER SURGICAL HISTORY      rupture disk       Social History     Socioeconomic History   • Marital status: Single     Spouse name: Not on file   • Number of children: Not on file   • Years of education: Not on file   • Highest education level: Not on file   Tobacco Use   • Smoking status: Never Smoker   • Smokeless tobacco: Never Used   Substance and Sexual Activity   • Alcohol use: No     Comment: caffeine use    • Drug use: No   • Sexual activity: Defer       History reviewed. No pertinent family history.    The following portions of the patient's history were reviewed and updated as appropriate: allergies, current medications, past family history, past medical history, past social history, past surgical history and problem list.       Objective:       Vitals:    02/10/21 1431   BP: 152/70   BP Location: Right arm   Patient Position: Sitting   Pulse: 67   Weight: 59.6 kg (131 lb 6.4 oz)   Height: 165.1 cm (65\")       Physical Exam:  Constitutional:  Chronically ill-appearing, frail, no acute distress   HENT: Oropharynx clear and membrane moist  Eyes: Normal conjunctiva, no sclera icterus.  Neck: Supple, no carotid bruit bilaterally.  Cardiovascular: Irregularly irregular rate and rhythm, No " Murmur, 1+ bilateral lower extremity edema.  Pulmonary: Normal respiratory effort, normal lung sounds, no wheezing.  Abdominal: Soft, nontender, no hepatosplenomegaly, liver is non-pulsatile.  Neurological: Alert and orient x 3.   Skin: Warm, dry, no ecchymosis, no rash.  Psych: Appropriate mood and affect. Normal judgment and insight.      Lab Results   Component Value Date    GLUCOSE 120 (H) 01/19/2021    BUN 52 (H) 01/19/2021    CREATININE 2.57 (H) 01/19/2021    EGFRIFNONA 18 (L) 01/19/2021    BCR 20.2 01/19/2021    K 3.5 01/19/2021    CO2 21.5 (L) 01/19/2021    CALCIUM 9.0 01/19/2021    ALBUMIN 4.60 01/19/2021    LABIL2 1.1 07/01/2020    AST 20 01/19/2021    ALT 8 01/19/2021       Lab Results   Component Value Date    WBC 6.86 01/19/2021    HGB 11.8 (L) 01/19/2021    HCT 35.4 01/19/2021    MCV 83.9 01/19/2021     01/19/2021       Lab Results   Component Value Date    TROPONINI 0.028 06/30/2020       No results found for: CHOL, CHLPL  No results found for: TRIG  No results found for: HDL  No results found for: LDL, LDLDIRECT    Lab Results   Component Value Date    TSH 1.830 01/13/2020         ECG 12 Lead    Date/Time: 2/10/2021 3:41 PM  Performed by: Lm Mart MD  Authorized by: Lm Mart MD   Comparison: compared with previous ECG from 11/5/2020  Rhythm: atrial fibrillation  Conduction: non-specific intraventricular conduction delay    Clinical impression: non-specific ECG        Lower Extremity Duplex 1/19/2021:  · Acute right lower extremity superficial thrombophlebitis noted in the varicosity (below knee).  · All other right sided veins appeared normal.    Echocardiogram 6/25/2020 [facility]:  · Normal LV systolic function with apical hypokinesis  · EF 71%  · Asymmetric apical hypertrophy  · Mild aortic insufficiency  · Mild/moderate mitral regurgitation  · Mild tricuspid regurgitation        Assessment:          Diagnosis Plan   1. Chronic diastolic (congestive) heart failure  (CMS/HCC)     2. Permanent atrial fibrillation (CMS/HCC)     3. Stage 4 chronic kidney disease (CMS/HCC)     4. History of pulmonary embolus (PE)            Plan:       Ms. Evans is a 89 y.o. woman with past medical history notable for permanent atrial fibrillation, hypertension, chronic diastolic heart failure, and chronic kidney disease stage 3b who presents to our office for scheduled follow-up.  Overall patient seem to doing reasonably well.  She does have some continued leg swelling and I would like to try and decrease her amlodipine to see if this will help with her leg swelling.  We are limited on other medications due bradycardia with beta blockers in the past and CKD.    Permanent atrial fibrillation:  · Continue Coumadin   · Rate well controlled not on any rate controlling agents  · Metoprolol was held during her recent admission due to bradycardia    Hypertension:  · Reasonably well controlled for age   · Will decrease amlodipine from 10 mg to 5 mg     Chronic diastolic congestive heart failure:  · Continue Lasix 40 mg daily  · Recent BMP 1/2021 shows stable Cr and normal NA, and K levels    Chronic kidney disease:  · Stable on last BMP checked 1/2021      Follow-up:  3 Months      Thank you for allowing me to participate in the care of Cristina Lemus. Feel free to contact me directly with any further questions or concerns.    Lm Mart MD  [facility]  02/10/21  14:59 EST

## 2021-02-13 LAB — INR PPP: 2.45

## 2021-02-15 ENCOUNTER — ANTICOAGULATION VISIT (OUTPATIENT)
Dept: PHARMACY | Facility: HOSPITAL | Age: 86
End: 2021-02-15

## 2021-02-15 DIAGNOSIS — I48.21 PERMANENT ATRIAL FIBRILLATION (HCC): ICD-10-CM

## 2021-02-18 NOTE — PROGRESS NOTES
Anticoagulation Clinic Progress Note    Anticoagulation Summary  As of 2/15/2021    INR goal:  2.0-3.0   TTR:  52.0 % (3.7 mo)   INR used for dosin.45 (2021)   Warfarin maintenance plan:  2 mg every Mon, Fri; 4 mg all other days   Weekly warfarin total:  24 mg   Plan last modified:  Anderson Fong [facility] (2020)   Next INR check:  3/1/2021   Priority:  High   Target end date:  Indefinite    Indications    Permanent atrial fibrillation (CMS/HCC) [I48.21]             Anticoagulation Episode Summary     INR check location:      Preferred lab:      Send INR reminders to:   MAJO MARIE  POOL    Comments:  tried calling Chaim BARCLAY but no stable warf dosing available from July/Aug 2020 , prior to Eliquis; PRECISION      Anticoagulation Care Providers     Provider Role Specialty Phone number    Lm Mart MD Referring Cardiology [phone]          INR History:  Anticoagulation Monitoring 2021 2021 2/15/2021   INR 2.38 2.20 2.45   INR Date 2021   INR Goal 2.0-3.0 2.0-3.0 2.0-3.0   Trend Same Same Same   Last Week Total 24 mg 24 mg 24 mg   Next Week Total 24 mg 24 mg 24 mg   Sun 4 mg 4 mg 4 mg   Mon 2 mg 2 mg 2 mg   Tue 4 mg 4 mg 4 mg   Wed 4 mg 4 mg 4 mg   Thu 4 mg 4 mg 4 mg   Fri 2 mg 2 mg 2 mg   Sat 4 mg 4 mg 4 mg   Visit Report - - -   Some recent data might be hidden       Plan:  1. INR is Therapeutic - see above in Anticoagulation Summary.   Will instruct Cristina Lemus to Continue their warfarin regimen- see above in Anticoagulation Summary.  2. Follow up in 2.5 weeks  3. Spoke with pt on . They have been instructed to call if any changes in medications, doses, concerns, etc. Patient expresses understanding and has no further questions at this time.    Skye Salmeron [facility]

## 2021-02-22 RX ORDER — AMLODIPINE BESYLATE 5 MG/1
5 TABLET ORAL DAILY
Qty: 90 TABLET | Refills: 3 | Status: SHIPPED | OUTPATIENT
Start: 2021-02-22 | End: 2021-06-11 | Stop reason: HOSPADM

## 2021-02-22 RX ORDER — FUROSEMIDE 40 MG/1
40 TABLET ORAL DAILY
Qty: 30 TABLET | Refills: 11 | Status: ON HOLD | OUTPATIENT
Start: 2021-02-22 | End: 2021-06-11 | Stop reason: SDUPTHER

## 2021-02-26 ENCOUNTER — TELEPHONE (OUTPATIENT)
Dept: CARDIOLOGY | Facility: CLINIC | Age: 86
End: 2021-02-26

## 2021-02-26 NOTE — TELEPHONE ENCOUNTER
Patient called stating that when she was in the office 02/10/21  changed her Amlodipine from 10 mg daily to 5 mg daily. She stated that the 5 mg was not keeping her B/P down.    I tried calling patient to get some B/P readings. No answer left voicemail for her to call me back.    She can be reached at [phone]

## 2021-03-02 LAB — INR PPP: 3.13

## 2021-03-03 ENCOUNTER — ANTICOAGULATION VISIT (OUTPATIENT)
Dept: PHARMACY | Facility: HOSPITAL | Age: 86
End: 2021-03-03

## 2021-03-03 DIAGNOSIS — I48.21 PERMANENT ATRIAL FIBRILLATION (HCC): ICD-10-CM

## 2021-03-04 NOTE — PROGRESS NOTES
Anticoagulation Clinic Progress Note    Anticoagulation Summary  As of 3/3/2021    INR goal:  2.0-3.0   TTR:  55.8 % (4.2 mo)   INR used for dosing:  3.13 (3/2/2021)   Warfarin maintenance plan:  2 mg every Mon, Fri; 4 mg all other days   Weekly warfarin total:  24 mg   Plan last modified:  Anderson Fong RPH (12/28/2020)   Next INR check:  3/16/2021   Priority:  High   Target end date:  Indefinite    Indications    Permanent atrial fibrillation (CMS/HCC) [I48.21]             Anticoagulation Episode Summary     INR check location:      Preferred lab:      Send INR reminders to:   MAJO MARIE  POOL    Comments:  tried calling Chaim BARCLAY but no stable warf dosing available from July/Aug 2020 , prior to Eliquis; PRECISION      Anticoagulation Care Providers     Provider Role Specialty Phone number    Lm Mart MD Referring Cardiology [phone]          Clinic Interview:  Patient Findings     Negatives:  Signs/symptoms of thrombosis, Signs/symptoms of bleeding,   Laboratory test error suspected, Change in health, Change in alcohol use,   Change in activity, Upcoming invasive procedure, Emergency department   visit, Upcoming dental procedure, Missed doses, Extra doses, Change in   medications, Change in diet/appetite, Hospital admission, Bruising, Other   complaints      Clinical Outcomes     Negatives:  Major bleeding event, Thromboembolic event,   Anticoagulation-related hospital admission, Anticoagulation-related ED   visit, Anticoagulation-related fatality        INR History:  Anticoagulation Monitoring 1/29/2021 2/15/2021 3/3/2021   INR 2.20 2.45 3.13   INR Date 1/28/2021 2/13/2021 3/2/2021   INR Goal 2.0-3.0 2.0-3.0 2.0-3.0   Trend Same Same Same   Last Week Total 24 mg 24 mg 24 mg   Next Week Total 24 mg 24 mg 22 mg   Sun 4 mg 4 mg 4 mg   Mon 2 mg 2 mg 2 mg   Tue 4 mg 4 mg 4 mg   Wed 4 mg 4 mg 4 mg   Thu 4 mg 4 mg 2 mg (3/4); Otherwise 4 mg   Fri 2 mg 2 mg 2 mg   Sat 4 mg 4 mg 4 mg      Visit Report - - -   Some recent data might be hidden       Plan:  1. INR was Supratherapeutic 3/2/21 - see above in Anticoagulation Summary. Unsuccessful reaching by phone until 3/4/21.   Will instruct Cristina Lemus to Change their warfarin regimen- see above in Anticoagulation Summary.  2. Follow up in 2 weeks  3. They have been instructed to call if any changes in medications, doses, concerns, etc. Patient expresses understanding and has no further questions at this time.    Mc Zaidi [facility]

## 2021-03-16 LAB — INR PPP: 2.62

## 2021-03-17 ENCOUNTER — ANTICOAGULATION VISIT (OUTPATIENT)
Dept: PHARMACY | Facility: HOSPITAL | Age: 86
End: 2021-03-17

## 2021-03-17 DIAGNOSIS — I48.21 PERMANENT ATRIAL FIBRILLATION (HCC): ICD-10-CM

## 2021-03-17 NOTE — PROGRESS NOTES
Anticoagulation Clinic Progress Note    Anticoagulation Summary  As of 3/17/2021    INR goal:  2.0-3.0   TTR:  57.7 % (4.7 mo)   INR used for dosin.62 (3/16/2021)   Warfarin maintenance plan:  2 mg every Mon, Fri; 4 mg all other days   Weekly warfarin total:  24 mg   Plan last modified:  Anderson Fong [facility] (2020)   Next INR check:  3/30/2021   Priority:  High   Target end date:  Indefinite    Indications    Permanent atrial fibrillation (CMS/HCC) [I48.21]             Anticoagulation Episode Summary     INR check location:      Preferred lab:      Send INR reminders to:   MAJO VASQUEZ  POOL    Comments:  tried calling Chaim BARCLAY but no stable warf dosing available from July/Aug 2020 , prior to Eliquis; PRECISION      Anticoagulation Care Providers     Provider Role Specialty Phone number    Lm Mart MD Referring Cardiology [phone]          Clinic Interview:      INR History:  Anticoagulation Monitoring 2/15/2021 3/3/2021 3/17/2021   INR 2.45 3.13 2.62   INR Date 2021 3/2/2021 3/16/2021   INR Goal 2.0-3.0 2.0-3.0 2.0-3.0   Trend Same Same Same   Last Week Total 24 mg 24 mg 24 mg   Next Week Total 24 mg 22 mg 24 mg   Sun 4 mg 4 mg 4 mg   Mon 2 mg 2 mg 2 mg   Tue 4 mg 4 mg 4 mg   Wed 4 mg 4 mg 4 mg   Thu 4 mg 2 mg (3/4); Otherwise 4 mg 4 mg   Fri 2 mg 2 mg 2 mg   Sat 4 mg 4 mg 4 mg   Visit Report - - -   Some recent data might be hidden       Plan:  1. INR is Therapeutic today- see above in Anticoagulation Summary.   Will instruct Cristina Lemus to Continue their warfarin regimen- see above in Anticoagulation Summary.  2. Follow up in 2 weeks--Precision  3. Spoke with daughter today. They have been instructed to call if any changes in medications, doses, concerns, etc.   Skye Salmeron [facility]

## 2021-03-31 LAB — INR PPP: 3.08

## 2021-04-01 ENCOUNTER — ANTICOAGULATION VISIT (OUTPATIENT)
Dept: PHARMACY | Facility: HOSPITAL | Age: 86
End: 2021-04-01

## 2021-04-01 DIAGNOSIS — I48.21 PERMANENT ATRIAL FIBRILLATION (HCC): ICD-10-CM

## 2021-04-01 NOTE — PROGRESS NOTES
Anticoagulation Clinic Progress Note    Anticoagulation Summary  As of 4/1/2021    INR goal:  2.0-3.0   TTR:  60.1 % (5.2 mo)   INR used for dosing:  3.08 (3/31/2021)   Warfarin maintenance plan:  2 mg every Mon, Wed, Fri; 4 mg all other days   Weekly warfarin total:  22 mg   Plan last modified:  Mc Zaidi RP (4/1/2021)   Next INR check:  4/13/2021   Priority:  High   Target end date:  Indefinite    Indications    Permanent atrial fibrillation (CMS/HCC) [I48.21]             Anticoagulation Episode Summary     INR check location:      Preferred lab:      Send INR reminders to:  AURELIANO MARIE  POOL    Comments:  tried calling Chaim BARCLAY but no stable warf dosing available from July/Aug 2020 , prior to Eliquis; PRECISION      Anticoagulation Care Providers     Provider Role Specialty Phone number    Lm Mart MD Referring Cardiology [phone]          Clinic Interview:  Patient Findings     Negatives:  Signs/symptoms of thrombosis, Signs/symptoms of bleeding,   Laboratory test error suspected, Change in health, Change in alcohol use,   Change in activity, Upcoming invasive procedure, Emergency department   visit, Upcoming dental procedure, Missed doses, Extra doses, Change in   medications, Change in diet/appetite, Hospital admission, Bruising, Other   complaints      Clinical Outcomes     Negatives:  Major bleeding event, Thromboembolic event,   Anticoagulation-related hospital admission, Anticoagulation-related ED   visit, Anticoagulation-related fatality        INR History:  Anticoagulation Monitoring 3/3/2021 3/17/2021 4/1/2021   INR 3.13 2.62 3.08   INR Date 3/2/2021 3/16/2021 3/31/2021   INR Goal 2.0-3.0 2.0-3.0 2.0-3.0   Trend Same Same Down   Last Week Total 24 mg 24 mg 24 mg   Next Week Total 22 mg 24 mg 22 mg   Sun 4 mg 4 mg 4 mg   Mon 2 mg 2 mg 2 mg   Tue 4 mg 4 mg 4 mg   Wed 4 mg 4 mg 2 mg   Thu 2 mg (3/4); Otherwise 4 mg 4 mg 4 mg   Fri 2 mg 2 mg 2 mg   Sat 4 mg 4 mg 4 mg      Visit Report - - -   Some recent data might be hidden       Plan:  1. INR is Supratherapeutic today- see above in Anticoagulation Summary.   Will instruct Cristina Lemus to Change their warfarin regimen- see above in Anticoagulation Summary.  2. Follow up in 2 weeks  3. They have been instructed to call if any changes in medications, doses, concerns, etc. Patient expresses understanding and has no further questions at this time.    Mc Zaidi [facility]

## 2021-04-13 LAB — INR PPP: 3.47

## 2021-04-14 ENCOUNTER — ANTICOAGULATION VISIT (OUTPATIENT)
Dept: PHARMACY | Facility: HOSPITAL | Age: 86
End: 2021-04-14

## 2021-04-14 DIAGNOSIS — I48.21 PERMANENT ATRIAL FIBRILLATION (HCC): Primary | ICD-10-CM

## 2021-04-14 NOTE — PROGRESS NOTES
Anticoagulation Clinic Progress Note    Anticoagulation Summary  As of 4/14/2021    INR goal:  2.0-3.0   TTR:  55.5 % (5.6 mo)   INR used for dosing:  3.47 (4/13/2021)   Warfarin maintenance plan:  4 mg every Sun, Tue, Thu; 2 mg all other days   Weekly warfarin total:  20 mg   Plan last modified:  Mc Zaidi RPH (4/14/2021)   Next INR check:  4/27/2021   Priority:  High   Target end date:  Indefinite    Indications    Permanent atrial fibrillation (CMS/HCC) [I48.21]             Anticoagulation Episode Summary     INR check location:      Preferred lab:      Send INR reminders to:   MAJO VASQUEZ  POOL    Comments:  tried calling Chaim BARCLAY but no stable warf dosing available from July/Aug 2020 , prior to Eliquis; PRECISION      Anticoagulation Care Providers     Provider Role Specialty Phone number    Lm Mart MD Referring Cardiology [phone]          Clinic Interview:  Patient Findings     Positives:  Change in health    Negatives:  Signs/symptoms of thrombosis, Signs/symptoms of bleeding,   Laboratory test error suspected, Change in alcohol use, Change in   activity, Upcoming invasive procedure, Emergency department visit,   Upcoming dental procedure, Missed doses, Extra doses, Change in   medications, Change in diet/appetite, Hospital admission, Bruising, Other   complaints    Comments:  Reports minor gout flare, but denies any change in meds r/t   it.      Clinical Outcomes     Negatives:  Major bleeding event, Thromboembolic event,   Anticoagulation-related hospital admission, Anticoagulation-related ED   visit, Anticoagulation-related fatality    Comments:  Reports minor gout flare, but denies any change in meds r/t   it.        INR History:  Anticoagulation Monitoring 3/17/2021 4/1/2021 4/14/2021   INR 2.62 3.08 3.47   INR Date 3/16/2021 3/31/2021 4/13/2021   INR Goal 2.0-3.0 2.0-3.0 2.0-3.0   Trend Same Down Down   Last Week Total 24 mg 24 mg 22 mg   Next Week Total 24 mg 22 mg 20  mg   Sun 4 mg 4 mg 4 mg   Mon 2 mg 2 mg 2 mg   Tue 4 mg 4 mg 4 mg   Wed 4 mg 2 mg 2 mg   Thu 4 mg 4 mg 4 mg   Fri 2 mg 2 mg 2 mg   Sat 4 mg 4 mg 2 mg   Visit Report - - -   Some recent data might be hidden       Plan:  1. INR is Supratherapeutic today- see above in Anticoagulation Summary.   Will instruct Cristina Lemus to Change their warfarin regimen- see above in Anticoagulation Summary.  2. Follow up in 2 weeks  3. They have been instructed to call if any changes in medications, doses, concerns, etc. Patient expresses understanding and has no further questions at this time.    Mc Zaidi [facility]

## 2021-04-20 NOTE — PROGRESS NOTES
"Chief Complaint  Establish Care    Subjective          Cristina Lemus presents to [facility]  History of Present Illness     Prior PCP [facility]    Sees Dr. Mart for HTN, atrial fibrillation for which she takes amlodipine, furosemide, Imdur, and coumadin.      Takes lorazepam for sleep from Dr. Sharpe.   PDMP reviewed and is appropriate.  It appears she got a fill on March 1 for 90 days.  I explained to the patient that she will need to call Dr. Sweeney's office to release that medication to me.    Also says in the chart that she has stage IV chronic kidney disease.  It appears her creatinine has been greater than 2 for several draws    Also says she has gout but I do not see a uric acid in the chart.  I will need to get a uric acid to confirm and then decide if treatment would be helpful.    Objective   Vital Signs:   /78 (BP Location: Right arm, Patient Position: Sitting, Cuff Size: Adult)   Pulse 84   Temp 97.3 °F (36.3 °C) (Temporal)   Resp 16   Ht 165.1 cm (65\")   Wt 58.5 kg (129 lb)   SpO2 98%   BMI 21.47 kg/m²     Physical Exam  Vitals and nursing note reviewed.   Constitutional:       General: She is not in acute distress.     Appearance: Normal appearance.   Cardiovascular:      Rate and Rhythm: Normal rate. Rhythm regularly irregular.      Heart sounds: Normal heart sounds.   Pulmonary:      Effort: Pulmonary effort is normal.      Breath sounds: Normal breath sounds.   Musculoskeletal:      Right lower leg: No edema.      Left lower leg: No edema.   Neurological:      Mental Status: She is alert.        Result Review :   The following data was reviewed by: Wood Elias MD on 04/23/2021:  Common labs    Common Labsle 7/1/20 7/1/20 10/1/20 1/19/21 1/19/21    0342 0342  1614 1614   Glucose   106 (A)  120 (A)   Glucose  89      BUN  41 (A) 31 (A)  52 (A)   Creatinine  1.7 (A) 2.36 (A)  2.57 (A)   eGFR Non African Am   19 (A)  18 (A)   Sodium  131 (A) 137  137 "   Potassium  4.4 4.1  3.5   Chloride  104 105  100   Calcium  8.5 9.7 (A)  9.0   Albumin  3.8   4.60   Total Bilirubin  1.0   0.6   Alkaline Phosphatase  52   81   AST (SGOT)  38   20   ALT (SGPT)  13   8   WBC 5.98   6.86    Hemoglobin 12.0   11.8 (A)    Hematocrit 36.4   35.4    Platelets 209   272    (A) Abnormal value       Comments are available for some flowsheets but are not being displayed.                     Assessment and Plan    Diagnoses and all orders for this visit:    1. Encounter to establish care with new doctor (Primary)  -     Comprehensive Metabolic Panel    2. Stage 4 chronic kidney disease (CMS/HCC)  -     Comprehensive Metabolic Panel    3. HTN, goal below 140/80  -     Comprehensive Metabolic Panel    4. Chronic gout without tophus, unspecified cause, unspecified site  -     Comprehensive Metabolic Panel  -     Uric acid    5. Anxiety disorder, unspecified type      Gets INRs checked at her home.  CMP and uric acid today.  Once the uric acid is back we can discuss possibilities of treatment.  Also the CMP will help me determine what stage she is in of the kidney disease.  We will plan on following up with her about every 3 months as she does not have a kidney doctor monitoring her kidney function.  Blood pressure today is stable so she should continue the medications as listed above.      Follow Up   Return in about 3 months (around 7/23/2021) for Recheck.  Patient was given instructions and counseling regarding her condition or for health maintenance advice. Please see specific information pulled into the AVS if appropriate.

## 2021-04-23 ENCOUNTER — OFFICE VISIT (OUTPATIENT)
Dept: INTERNAL MEDICINE | Facility: CLINIC | Age: 86
End: 2021-04-23

## 2021-04-23 VITALS
RESPIRATION RATE: 16 BRPM | BODY MASS INDEX: 21.49 KG/M2 | HEIGHT: 65 IN | DIASTOLIC BLOOD PRESSURE: 78 MMHG | OXYGEN SATURATION: 98 % | WEIGHT: 129 LBS | HEART RATE: 84 BPM | SYSTOLIC BLOOD PRESSURE: 140 MMHG | TEMPERATURE: 97.3 F

## 2021-04-23 DIAGNOSIS — N18.4 STAGE 4 CHRONIC KIDNEY DISEASE (HCC): ICD-10-CM

## 2021-04-23 DIAGNOSIS — I10 HTN, GOAL BELOW 140/80: ICD-10-CM

## 2021-04-23 DIAGNOSIS — M1A.9XX0 CHRONIC GOUT WITHOUT TOPHUS, UNSPECIFIED CAUSE, UNSPECIFIED SITE: ICD-10-CM

## 2021-04-23 DIAGNOSIS — Z76.89 ENCOUNTER TO ESTABLISH CARE WITH NEW DOCTOR: Primary | ICD-10-CM

## 2021-04-23 DIAGNOSIS — F41.9 ANXIETY DISORDER, UNSPECIFIED TYPE: ICD-10-CM

## 2021-04-23 PROBLEM — I48.21 PERMANENT ATRIAL FIBRILLATION (HCC): Chronic | Status: ACTIVE | Noted: 2017-07-07

## 2021-04-23 LAB
ALBUMIN SERPL-MCNC: 4.7 G/DL (ref 3.5–5.2)
ALBUMIN/GLOB SERPL: 1.5 G/DL
ALP SERPL-CCNC: 88 U/L (ref 39–117)
ALT SERPL-CCNC: 11 U/L (ref 1–33)
AST SERPL-CCNC: 22 U/L (ref 1–32)
BILIRUB SERPL-MCNC: 0.6 MG/DL (ref 0–1.2)
BUN SERPL-MCNC: 54 MG/DL (ref 8–23)
BUN/CREAT SERPL: 20.7 (ref 7–25)
CALCIUM SERPL-MCNC: 9.5 MG/DL (ref 8.2–9.6)
CHLORIDE SERPL-SCNC: 100 MMOL/L (ref 98–107)
CO2 SERPL-SCNC: 25.5 MMOL/L (ref 22–29)
CREAT SERPL-MCNC: 2.61 MG/DL (ref 0.57–1)
GLOBULIN SER CALC-MCNC: 3.2 GM/DL
GLUCOSE SERPL-MCNC: 101 MG/DL (ref 65–99)
POTASSIUM SERPL-SCNC: 4.3 MMOL/L (ref 3.5–5.2)
PROT SERPL-MCNC: 7.9 G/DL (ref 6–8.5)
SODIUM SERPL-SCNC: 140 MMOL/L (ref 136–145)
URATE SERPL-MCNC: 10.9 MG/DL (ref 2.4–5.7)

## 2021-04-23 PROCEDURE — 99204 OFFICE O/P NEW MOD 45 MIN: CPT | Performed by: FAMILY MEDICINE

## 2021-04-23 RX ORDER — COLCHICINE 0.6 MG/1
1.2 TABLET ORAL
COMMUNITY
Start: 2021-03-16 | End: 2021-04-23

## 2021-04-23 NOTE — PATIENT INSTRUCTIONS
Gout    Gout is painful swelling of your joints. Gout is a type of arthritis. It is caused by having too much uric acid in your body. Uric acid is a chemical that is made when your body breaks down substances called purines. If your body has too much uric acid, sharp crystals can form and build up in your joints. This causes pain and swelling.  Gout attacks can happen quickly and be very painful (acute gout). Over time, the attacks can affect more joints and happen more often (chronic gout).  What are the causes?  · Too much uric acid in your blood. This can happen because:  ? Your kidneys do not remove enough uric acid from your blood.  ? Your body makes too much uric acid.  ? You eat too many foods that are high in purines. These foods include organ meats, some seafood, and beer.  · Trauma or stress.  What increases the risk?  · Having a family history of gout.  · Being male and middle-aged.  · Being female and having gone through menopause.  · Being very overweight (obese).  · Drinking alcohol, especially beer.  · Not having enough water in the body (being dehydrated).  · Losing weight too quickly.  · Having an organ transplant.  · Having lead poisoning.  · Taking certain medicines.  · Having kidney disease.  · Having a skin condition called psoriasis.  What are the signs or symptoms?  An attack of acute gout usually happens in just one joint. The most common place is the big toe. Attacks often start at night. Other joints that may be affected include joints of the feet, ankle, knee, fingers, wrist, or elbow. Symptoms of an attack may include:  · Very bad pain.  · Warmth.  · Swelling.  · Stiffness.  · Shiny, red, or purple skin.  · Tenderness. The affected joint may be very painful to touch.  · Chills and fever.  Chronic gout may cause symptoms more often. More joints may be involved. You may also have white or yellow lumps (tophi) on your hands or feet or in other areas near your joints.  How is this  treated?  · Treatment for this condition has two phases: treating an acute attack and preventing future attacks.  · Acute gout treatment may include:  ? NSAIDs.  ? Steroids. These are taken by mouth or injected into a joint.  ? Colchicine. This medicine relieves pain and swelling. It can be given by mouth or through an IV tube.  · Preventive treatment may include:  ? Taking small doses of NSAIDs or colchicine daily.  ? Using a medicine that reduces uric acid levels in your blood.  ? Making changes to your diet. You may need to see a food expert (dietitian) about what to eat and drink to prevent gout.  Follow these instructions at home:  During a gout attack    · If told, put ice on the painful area:  ? Put ice in a plastic bag.  ? Place a towel between your skin and the bag.  ? Leave the ice on for 20 minutes, 2-3 times a day.  · Raise (elevate) the painful joint above the level of your heart as often as you can.  · Rest the joint as much as possible. If the joint is in your leg, you may be given crutches.  · Follow instructions from your doctor about what you cannot eat or drink.  Avoiding future gout attacks  · Eat a low-purine diet. Avoid foods and drinks such as:  ? Liver.  ? Kidney.  ? Anchovies.  ? Asparagus.  ? Herring.  ? Mushrooms.  ? Mussels.  ? Beer.  · Stay at a healthy weight. If you want to lose weight, talk with your doctor. Do not lose weight too fast.  · Start or continue an exercise plan as told by your doctor.  Eating and drinking  · Drink enough fluids to keep your pee (urine) pale yellow.  · If you drink alcohol:  ? Limit how much you use to:  § 0-1 drink a day for women.  § 0-2 drinks a day for men.  ? Be aware of how much alcohol is in your drink. In the [place] one drink equals one 12 oz bottle of beer (355 mL), one 5 oz glass of wine (148 mL), or one 1½ oz glass of hard liquor (44 mL).  General instructions  · Take over-the-counter and prescription medicines only as told by your doctor.  · Do  not drive or use heavy machinery while taking prescription pain medicine.  · Return to your normal activities as told by your doctor. Ask your doctor what activities are safe for you.  · Keep all follow-up visits as told by your doctor. This is important.  Contact a doctor if:  · You have another gout attack.  · You still have symptoms of a gout attack after 10 days of treatment.  · You have problems (side effects) because of your medicines.  · You have chills or a fever.  · You have burning pain when you pee (urinate).  · You have pain in your lower back or belly.  Get help right away if:  · You have very bad pain.  · Your pain cannot be controlled.  · You cannot pee.  Summary  · Gout is painful swelling of the joints.  · The most common site of pain is the big toe, but it can affect other joints.  · Medicines and avoiding some foods can help to prevent and treat gout attacks.  This information is not intended to replace advice given to you by your health care provider. Make sure you discuss any questions you have with your health care provider.  Document Revised: 07/10/2019 Document Reviewed: 07/10/2019  Elsevier Patient Education © 2021 [employer]

## 2021-04-24 DIAGNOSIS — N18.4 STAGE 4 CHRONIC KIDNEY DISEASE (HCC): ICD-10-CM

## 2021-04-24 DIAGNOSIS — E79.0 HYPERURICEMIA: Primary | ICD-10-CM

## 2021-04-24 RX ORDER — ALLOPURINOL 100 MG/1
50 TABLET ORAL DAILY
Qty: 15 TABLET | Refills: 2 | Status: SHIPPED | OUTPATIENT
Start: 2021-04-26 | End: 2021-06-30 | Stop reason: SDDI

## 2021-04-26 ENCOUNTER — TELEPHONE (OUTPATIENT)
Dept: INTERNAL MEDICINE | Facility: CLINIC | Age: 86
End: 2021-04-26

## 2021-04-26 NOTE — TELEPHONE ENCOUNTER
Caller: Steven Lemus    Relationship: Self    Best call back number: [phone]     What is the best time to reach you: ANYTIME    Who are you requesting to speak with MA OR DOCTOR      Do you know the name of the person who called: STEVEN    What was the call regarding: PATIENT IS WANTING A CALLBACK TO DISCUSS HER MEDICATIONS. THE WARFARIN, AND THE ALLOPURINOL.    Do you require a callback: YES

## 2021-04-27 NOTE — TELEPHONE ENCOUNTER
Patient informed,  she states she will just continue current regimen for now.  Will take the allopurinol and continue the warfarin.

## 2021-04-27 NOTE — TELEPHONE ENCOUNTER
Pt was advised.  She was more concerned about the risk of INR change.    She had two more questions-asked if she can get a steroid pack so she can get some relief quickly.  Also asked if she can go back on Eliquis.  She stated she did not have problems with gout when she was taking that.

## 2021-04-27 NOTE — TELEPHONE ENCOUNTER
I started her on 50mg which is a renally dosed amount.  There is a theoretical risk of an INR change but benefits outweigh risks.  If she wants to discuss with her nephrologist first that would be fine.

## 2021-04-27 NOTE — TELEPHONE ENCOUNTER
Pt picked up the Allopurinol yesterday, but did not take any, as she states the pharmacist showed some concern with the Warfarin and Allopurinol being taken together.  She wanted to make sure okay to take and reports her hand pain is worsening.

## 2021-04-28 LAB — INR PPP: 2.31

## 2021-04-29 ENCOUNTER — ANTICOAGULATION VISIT (OUTPATIENT)
Dept: PHARMACY | Facility: HOSPITAL | Age: 86
End: 2021-04-29

## 2021-04-29 DIAGNOSIS — I48.21 PERMANENT ATRIAL FIBRILLATION (HCC): Primary | ICD-10-CM

## 2021-04-29 NOTE — PROGRESS NOTES
Anticoagulation Clinic Progress Note  Anticoagulation Summary  As of 2021    INR goal:  2.0-3.0   TTR:  55.8 % (6.1 mo)   INR used for dosin.31 (2021)   Warfarin maintenance plan:  4 mg every Sun, Tue, Thu; 2 mg all other days   Weekly warfarin total:  20 mg   Plan last modified:  Mc Zaidi RPH (2021)   Next INR check:  2021   Priority:  High   Target end date:  Indefinite    Indications    Permanent atrial fibrillation (CMS/HCC) [I48.21]             Anticoagulation Episode Summary     INR check location:      Preferred lab:      Send INR reminders to:   MAJO MARIE  POOL    Comments:  tried calling Chaim BARCLAY but no stable warf dosing available from July/Aug 2020 , prior to Eliquis; [facility]      Anticoagulation Care Providers     Provider Role Specialty Phone number    Lm Mart MD Referring Cardiology [phone]        Clinic Interview:  Patient Findings     Positives:  Change in medications    Negatives:  Signs/symptoms of thrombosis, Signs/symptoms of bleeding,   Laboratory test error suspected, Change in health, Change in alcohol use,   Change in activity, Upcoming invasive procedure, Emergency department   visit, Upcoming dental procedure, Missed doses, Extra doses, Change in   diet/appetite, Hospital admission, Bruising, Other complaints    Comments:  Recently started on Allopurinol 50mg daily ( SCr 2.61)     Clinical Outcomes     Negatives:  Major bleeding event, Thromboembolic event,   Anticoagulation-related hospital admission, Anticoagulation-related ED   visit, Anticoagulation-related fatality    Comments:  Recently started on Allopurinol 50mg daily ( SCr 2.61)     INR History:  Anticoagulation Monitoring 2021   INR 3.08 3.47 2.31   INR Date 3/31/2021 2021 2021   INR Goal 2.0-3.0 2.0-3.0 2.0-3.0   Trend Down Down Same   Last Week Total 24 mg 22 mg 20 mg   Next Week Total 22 mg 20 mg 20 mg   Sun 4 mg 4 mg 4 mg    Mon 2 mg 2 mg 2 mg   Tue 4 mg 4 mg -   Wed 2 mg 2 mg -   Thu 4 mg 4 mg 4 mg   Fri 2 mg 2 mg 2 mg   Sat 4 mg 2 mg 2 mg   Visit Report - - -   Some recent data might be hidden     Plan:  1. INR is Therapeutic today- see above in Anticoagulation Summary.   Will instruct Cristina Lemus to Continue their warfarin regimen- see above in Anticoagulation Summary.  2. Follow up in 1 week via [facility] labs given drug-drug interaction with Allopurinol.    3. They have been instructed to call if any changes in medications, doses, concerns, etc. Patient expresses understanding and has no further questions at this time.    Tylor Schmitt, PharmD

## 2021-05-03 NOTE — TELEPHONE ENCOUNTER
Caller: Cristina Lemus    Relationship: Self    Best call back number: [phone]    Medication needed:   Requested Prescriptions     Pending Prescriptions Disp Refills   • LORazepam (ATIVAN) 1 MG tablet       Sig: Take 1 tablet by mouth every night at bedtime.       When do you need the refill by: BEFORE NEXT WEEK     What additional details did the patient provide when requesting the medication: PATIENT STATES THAT THE PHARMACY INFORMED HER THAT THE PROVIDER WILL HAVE TO ALL THEM IN ORDER TO SET THIS PRESCRIPTION UP WITH THEM.     Does the patient have less than a 3 day supply:  [] Yes  [x] No    What is the patient's preferred pharmacy: [facility] - [place], [place] - [address] AT PORTAL TO [facility] - [phone]  - [phone] FX

## 2021-05-04 LAB — INR PPP: 2.42

## 2021-05-05 ENCOUNTER — ANTICOAGULATION VISIT (OUTPATIENT)
Dept: PHARMACY | Facility: HOSPITAL | Age: 86
End: 2021-05-05

## 2021-05-05 DIAGNOSIS — I48.21 PERMANENT ATRIAL FIBRILLATION (HCC): Primary | ICD-10-CM

## 2021-05-05 NOTE — PROGRESS NOTES
Anticoagulation Clinic Progress Note    Anticoagulation Summary  As of 2021    INR goal:  2.0-3.0   TTR:  57.2 % (6.3 mo)   INR used for dosin.42 (2021)   Warfarin maintenance plan:  4 mg every Sun, Tue, Thu; 2 mg all other days   Weekly warfarin total:  20 mg   No change documented:  Mc Zaidi RPH   Plan last modified:  Mc Zaidi RPH (2021)   Next INR check:  2021   Priority:  High   Target end date:  Indefinite    Indications    Permanent atrial fibrillation (CMS/HCC) [I48.21]             Anticoagulation Episode Summary     INR check location:      Preferred lab:      Send INR reminders to:   MAJO MARIE  POOL    Comments:  tried calling Chaim BARCLYA but no stable warf dosing available from July/Aug 2020 , prior to Eliquis; [facility]      Anticoagulation Care Providers     Provider Role Specialty Phone number    Lm Mart MD Referring Cardiology [phone]          Clinic Interview:  Patient Findings     Negatives:  Signs/symptoms of thrombosis, Signs/symptoms of bleeding,   Laboratory test error suspected, Change in health, Change in alcohol use,   Change in activity, Upcoming invasive procedure, Emergency department   visit, Upcoming dental procedure, Missed doses, Extra doses, Change in   medications, Change in diet/appetite, Hospital admission, Bruising, Other   complaints      Clinical Outcomes     Negatives:  Major bleeding event, Thromboembolic event,   Anticoagulation-related hospital admission, Anticoagulation-related ED   visit, Anticoagulation-related fatality        INR History:  Anticoagulation Monitoring 2021   INR 3.47 2.31 2.42   INR Date 2021   INR Goal 2.0-3.0 2.0-3.0 2.0-3.0   Trend Down Same Same   Last Week Total 22 mg 20 mg 20 mg   Next Week Total 20 mg 20 mg 20 mg   Sun 4 mg 4 mg 4 mg   Mon 2 mg 2 mg 2 mg   Tue 4 mg - 4 mg   Wed 2 mg - 2 mg   Thu 4 mg 4 mg 4 mg   Fri 2 mg 2 mg 2 mg   Sat 2 mg  2 mg 2 mg   Visit Report - - -   Some recent data might be hidden       Plan:  1. INR is Therapeutic today- see above in Anticoagulation Summary.   Will instruct Cristina Lemus to Continue their warfarin regimen- see above in Anticoagulation Summary.  2. Follow up in 2 weeks  3. They have been instructed to call if any changes in medications, doses, concerns, etc. Patient expresses understanding and has no further questions at this time.    Mc Zaidi [facility]

## 2021-05-06 RX ORDER — LORAZEPAM 1 MG/1
1 TABLET ORAL
Qty: 90 TABLET | Refills: 2 | OUTPATIENT
Start: 2021-05-06

## 2021-05-06 NOTE — TELEPHONE ENCOUNTER
I explained to the patient when I met her that Dr. Sharpe would have to put something in the chart indicating that she is no longer her patient and would turn over the Rx to me.  I see nothing in the chart.

## 2021-05-18 LAB — INR PPP: 2.62

## 2021-05-25 ENCOUNTER — ANTICOAGULATION VISIT (OUTPATIENT)
Dept: PHARMACY | Facility: HOSPITAL | Age: 86
End: 2021-05-25

## 2021-05-25 DIAGNOSIS — I48.21 PERMANENT ATRIAL FIBRILLATION (HCC): Primary | ICD-10-CM

## 2021-05-25 NOTE — PROGRESS NOTES
Anticoagulation Clinic Progress Note    Anticoagulation Summary  As of 2021    INR goal:  2.0-3.0   TTR:  60.1 % (6.8 mo)   INR used for dosin.62 (2021)   Warfarin maintenance plan:  4 mg every Mon, Wed, Fri; 2 mg all other days   Weekly warfarin total:  20 mg   Plan last modified:  Mc Zaidi RPH (2021)   Next INR check:  6/15/2021   Priority:  High   Target end date:  Indefinite    Indications    Permanent atrial fibrillation (CMS/HCC) [I48.21]             Anticoagulation Episode Summary     INR check location:      Preferred lab:      Send INR reminders to:   MAJO MARIE  POOL    Comments:  tried calling Chaim BARCLAY but no stable warf dosing available from July/Aug 2020 , prior to Eliquis; [facility]      Anticoagulation Care Providers     Provider Role Specialty Phone number    Lm Mrat MD Referring Cardiology [phone]          Clinic Interview:  Patient Findings     Positives:  Other complaints    Negatives:  Signs/symptoms of thrombosis, Signs/symptoms of bleeding,   Laboratory test error suspected, Change in health, Change in alcohol use,   Change in activity, Upcoming invasive procedure, Emergency department   visit, Upcoming dental procedure, Missed doses, Extra doses, Change in   medications, Change in diet/appetite, Hospital admission, Bruising    Comments:  Pt reports taking 4 mg MWF, 2 mg AOD (same wkly dose, just   different days of the week).       Clinical Outcomes     Negatives:  Major bleeding event, Thromboembolic event,   Anticoagulation-related hospital admission, Anticoagulation-related ED   visit, Anticoagulation-related fatality    Comments:  Pt reports taking 4 mg MWF, 2 mg AOD (same wkly dose, just   different days of the week).         INR History:  Anticoagulation Monitoring 2021   INR 2.31 2.42 2.62   INR Date 2021   INR Goal 2.0-3.0 2.0-3.0 2.0-3.0   Trend Same Same Same   Last Week Total 20  mg 20 mg 20 mg   Next Week Total 20 mg 20 mg 20 mg   Sun 4 mg 4 mg 2 mg   Mon 2 mg 2 mg 4 mg   Tue - 4 mg 2 mg   Wed - 2 mg 4 mg   Thu 4 mg 4 mg 2 mg   Fri 2 mg 2 mg 4 mg   Sat 2 mg 2 mg 2 mg   Visit Report - - -   Some recent data might be hidden       Plan:  1. INR was Therapeutic 5/18/21 - see above in Anticoagulation Summary.   Will instruct Cristina Lemus to Continue their warfarin regimen as reported - see above in Anticoagulation Summary.  2. Follow up in 4 weeks from last INR check  3. They have been instructed to call if any changes in medications, doses, concerns, etc. Patient expresses understanding and has no further questions at this time.    Mc Zaidi RP

## 2021-05-26 ENCOUNTER — OFFICE VISIT (OUTPATIENT)
Dept: CARDIOLOGY | Facility: CLINIC | Age: 86
End: 2021-05-26

## 2021-05-26 VITALS
HEIGHT: 65 IN | DIASTOLIC BLOOD PRESSURE: 92 MMHG | WEIGHT: 124 LBS | HEART RATE: 77 BPM | BODY MASS INDEX: 20.66 KG/M2 | SYSTOLIC BLOOD PRESSURE: 154 MMHG

## 2021-05-26 DIAGNOSIS — I10 HTN, GOAL BELOW 140/80: Chronic | ICD-10-CM

## 2021-05-26 DIAGNOSIS — I48.21 PERMANENT ATRIAL FIBRILLATION (HCC): Chronic | ICD-10-CM

## 2021-05-26 DIAGNOSIS — I50.32 CHRONIC DIASTOLIC (CONGESTIVE) HEART FAILURE (HCC): Primary | ICD-10-CM

## 2021-05-26 PROCEDURE — 93000 ELECTROCARDIOGRAM COMPLETE: CPT | Performed by: INTERNAL MEDICINE

## 2021-05-26 PROCEDURE — 99214 OFFICE O/P EST MOD 30 MIN: CPT | Performed by: INTERNAL MEDICINE

## 2021-05-26 NOTE — PROGRESS NOTES
[facility] Follow Up Office Note     Encounter Date:21  Patient:Cristina Lemus  :3/13/1930  MRN:[id number]      Chief Complaint: Follow up atrial fibrillation and diastolic heart failure  Chief Complaint   Patient presents with   • Congestive Heart Failure     3 month f/u   • Atrial Fibrillation   • Hypertension     History of Presenting Illness:      Ms. Evans is a 89 y.o. woman with past medical history notable for permanent atrial fibrillation, hypertension, chronic diastolic heart failure, and chronic kidney disease stage III who presents to our office for scheduled follow up.  Since her last appointment patient is doing about the same.  We did decrease her amlodipine to try and see if this would help out somewhat with her leg swelling but unfortunately this has not changed much.  Her blood pressure remains borderline elevated but given her chronic kidney disease and advanced age we have been fine with letting it be a little bit on the higher side.  Otherwise she remains fairly weak and deconditioned but seems to be doing reasonably well.    Daughter is here to help with providing history and updates on patient's condition.    Review of Systems:  Review of Systems   Constitutional: Positive for malaise/fatigue.   HENT: Negative.    Eyes: Negative.    Cardiovascular: Positive for leg swelling.   Respiratory: Positive for shortness of breath.    Endocrine: Negative.    Hematologic/Lymphatic: Negative.    Skin: Negative.    Musculoskeletal: Negative.    Gastrointestinal: Negative.    Genitourinary: Negative.    Neurological: Negative.    Psychiatric/Behavioral: Negative.    Allergic/Immunologic: Negative.      Current Outpatient Medications on File Prior to Visit   Medication Sig Dispense Refill   • allopurinol (ZYLOPRIM) 100 MG tablet Take 0.5 tablets by mouth Daily. 15 tablet 2   • amLODIPine (NORVASC) 5 MG tablet Take 1 tablet by mouth Daily. 90 tablet 3   • furosemide (LASIX) 40 MG tablet Take 1  "tablet by mouth Daily. 30 tablet 11   • isosorbide mononitrate (IMDUR) 30 MG 24 hr tablet Take 30 mg by mouth Daily.     • LORazepam (ATIVAN) 1 MG tablet TAKE ONE TABLET BY MOUTH AT BEDTIME     • warfarin (COUMADIN) 2 MG tablet Take one tablet (2 mg) by mouth Sat, and take two tablets (4 mg) all other days or as directed. 170 tablet 3     No current facility-administered medications on file prior to visit.         Allergies   Allergen Reactions   • Statins Nausea And Vomiting   • Sulfa Antibiotics Nausea And Vomiting and Other (See Comments)     CHILLS AND FEVER       Past Medical History:   Diagnosis Date   • Atrial fibrillation (CMS/HCC)    • CHF (congestive heart failure) (CMS/HCC)        Past Surgical History:   Procedure Laterality Date   • BACK SURGERY     • CYST REMOVAL     • OTHER SURGICAL HISTORY      rupture disk       Social History     Socioeconomic History   • Marital status: Single     Spouse name: Not on file   • Number of children: Not on file   • Years of education: Not on file   • Highest education level: Not on file   Tobacco Use   • Smoking status: Never Smoker   • Smokeless tobacco: Never Used   Substance and Sexual Activity   • Alcohol use: No     Comment: caffeine use    • Drug use: No   • Sexual activity: Defer       History reviewed. No pertinent family history.    The following portions of the patient's history were reviewed and updated as appropriate: allergies, current medications, past family history, past medical history, past social history, past surgical history and problem list.       Objective:       Vitals:    05/26/21 1424   BP: 154/92   BP Location: Left arm   Patient Position: Sitting   Pulse: 77   Weight: 56.2 kg (124 lb)   Height: 165.1 cm (65\")       Body mass index is 20.63 kg/m².     Physical Exam:  Constitutional:  Chronically ill-appearing, frail, no acute distress   HENT: Oropharynx clear and membrane moist  Eyes: Normal conjunctiva, no sclera icterus.  Neck: Supple, no " carotid bruit bilaterally.  Cardiovascular: Irregularly irregular rate and rhythm, No Murmur, 1+ bilateral lower extremity edema.  Pulmonary: Normal respiratory effort, normal lung sounds, no wheezing.  Abdominal: Soft, nontender, no hepatosplenomegaly, liver is non-pulsatile.  Neurological: Alert and orient x 3.   Skin: Warm, dry, no ecchymosis, no rash.  Psych: Appropriate mood and affect. Normal judgment and insight.      Lab Results   Component Value Date    GLUCOSE 120 (H) 01/19/2021    BUN 54 (H) 04/23/2021    CREATININE 2.61 (H) 04/23/2021    EGFRIFNONA 17 (L) 04/23/2021    EGFRIFAFRI 21 (L) 04/23/2021    BCR 20.7 04/23/2021    K 4.3 04/23/2021    CO2 25.5 04/23/2021    CALCIUM 9.5 04/23/2021    PROTENTOTREF 7.9 04/23/2021    ALBUMIN 4.70 04/23/2021    LABIL2 1.5 04/23/2021    AST 22 04/23/2021    ALT 11 04/23/2021       Lab Results   Component Value Date    WBC 6.86 01/19/2021    HGB 11.8 (L) 01/19/2021    HCT 35.4 01/19/2021    MCV 83.9 01/19/2021     01/19/2021       Lab Results   Component Value Date    TROPONINI 0.028 06/30/2020       No results found for: CHOL, CHLPL  No results found for: TRIG  No results found for: HDL  No results found for: LDL, LDLDIRECT    Lab Results   Component Value Date    TSH 1.830 01/13/2020         ECG 12 Lead    Date/Time: 5/26/2021 2:52 PM  Performed by: Lm Mart MD  Authorized by: Lm Mart MD   Comparison: compared with previous ECG from 2/10/2021  Similar to previous ECG  Rhythm: atrial fibrillation  Conduction: non-specific intraventricular conduction delay  T inversion: V4, V5 and V6    Clinical impression: abnormal EKG        Lower Extremity Duplex 1/19/2021:  · Acute right lower extremity superficial thrombophlebitis noted in the varicosity (below knee).  · All other right sided veins appeared normal.    Echocardiogram 6/25/2020 [facility]:  · Normal LV systolic function with apical hypokinesis  · EF 71%  · Asymmetric apical  hypertrophy  · Mild aortic insufficiency  · Mild/moderate mitral regurgitation  · Mild tricuspid regurgitation        Assessment:          Diagnosis Plan   1. Chronic diastolic (congestive) heart failure (CMS/HCC)     2. Permanent atrial fibrillation (CMS/HCC)  ECG 12 Lead   3. HTN, goal below 140/80            Plan:       Ms. Evans is a 89 y.o. woman with past medical history notable for permanent atrial fibrillation, hypertension, chronic diastolic heart failure, and chronic kidney disease stage 3b who presents to our office for scheduled follow-up.  Overall patient is doing about the same.  She continues to have leg swelling but a lot of this I think is venous insufficiency.  In general her weight is significantly better compared to where it has been when we first met.  She seems to tolerated continue diuresis.  She is seeing her nephrologist tomorrow who closely follows her for her kidney disease.  Her blood pressure is elevated today but given her age I think this is a reasonable range for her to be in.  No changes are needed at this time and we will see her back in 3 months      Permanent atrial fibrillation:  · Continue Coumadin   · Rate well controlled not on any rate controlling agents  · Metoprolol was held during her recent admission due to bradycardia    Hypertension:  · Reasonably well controlled for age   · Will continue amlodipine 5 mg     Chronic diastolic congestive heart failure:  · Continue Lasix 40 mg daily  · Recent BMP 1/2021 shows stable Cr and normal NA, and K levels    Chronic kidney disease:  · Stable on last BMP checked 1/2021  · Seeing nephrologist tomorrow      Follow-up:  3 Months      Thank you for allowing me to participate in the care of Cristina Lemus. Feel free to contact me directly with any further questions or concerns.    Lm Mart MD  [facility]  05/26/21  14:53 EDT

## 2021-05-27 ENCOUNTER — TRANSCRIBE ORDERS (OUTPATIENT)
Dept: ADMINISTRATIVE | Facility: HOSPITAL | Age: 86
End: 2021-05-27

## 2021-05-27 DIAGNOSIS — N17.9 ACUTE RENAL FAILURE, UNSPECIFIED ACUTE RENAL FAILURE TYPE (HCC): Primary | ICD-10-CM

## 2021-05-27 DIAGNOSIS — N18.9 CHRONIC KIDNEY DISEASE, UNSPECIFIED CKD STAGE: ICD-10-CM

## 2021-05-28 ENCOUNTER — HOSPITAL ENCOUNTER (OUTPATIENT)
Dept: ULTRASOUND IMAGING | Facility: HOSPITAL | Age: 86
Discharge: HOME | End: 2021-05-28

## 2021-05-28 ENCOUNTER — LAB (OUTPATIENT)
Dept: LAB | Facility: HOSPITAL | Age: 86
End: 2021-05-28

## 2021-05-28 ENCOUNTER — TRANSCRIBE ORDERS (OUTPATIENT)
Dept: ADMINISTRATIVE | Facility: HOSPITAL | Age: 86
End: 2021-05-28

## 2021-05-28 DIAGNOSIS — I10 ESSENTIAL HYPERTENSION, MALIGNANT: ICD-10-CM

## 2021-05-28 DIAGNOSIS — E55.9 VITAMIN D DEFICIENCY: ICD-10-CM

## 2021-05-28 DIAGNOSIS — N17.9 ACUTE RENAL FAILURE, UNSPECIFIED ACUTE RENAL FAILURE TYPE (HCC): ICD-10-CM

## 2021-05-28 DIAGNOSIS — N18.5 CHRONIC KIDNEY DISEASE, STAGE V (HCC): Primary | ICD-10-CM

## 2021-05-28 DIAGNOSIS — N25.81 SECONDARY HYPERPARATHYROIDISM OF RENAL ORIGIN (HCC): ICD-10-CM

## 2021-05-28 DIAGNOSIS — N18.5 CHRONIC KIDNEY DISEASE, STAGE V (HCC): ICD-10-CM

## 2021-05-28 DIAGNOSIS — N18.9 CHRONIC KIDNEY DISEASE, UNSPECIFIED CKD STAGE: ICD-10-CM

## 2021-05-28 LAB
25(OH)D3 SERPL-MCNC: 19.7 NG/ML (ref 30–100)
ALBUMIN SERPL-MCNC: 4.8 G/DL (ref 3.5–5.2)
ALBUMIN/GLOB SERPL: 1.3 G/DL
ALP SERPL-CCNC: 86 U/L (ref 39–117)
ALT SERPL W P-5'-P-CCNC: 13 U/L (ref 1–33)
ANION GAP SERPL CALCULATED.3IONS-SCNC: 19.7 MMOL/L (ref 5–15)
AST SERPL-CCNC: 28 U/L (ref 1–32)
BACTERIA UR QL AUTO: ABNORMAL /HPF
BASOPHILS # BLD AUTO: 0.03 10*3/MM3 (ref 0–0.2)
BASOPHILS NFR BLD AUTO: 0.4 % (ref 0–1.5)
BILIRUB SERPL-MCNC: 0.7 MG/DL (ref 0–1.2)
BILIRUB UR QL STRIP: NEGATIVE
BUN SERPL-MCNC: 76 MG/DL (ref 8–23)
BUN/CREAT SERPL: 23.2 (ref 7–25)
C3 SERPL-MCNC: 119 MG/DL (ref 82–167)
C4 SERPL-MCNC: 20 MG/DL (ref 14–44)
CALCIUM SPEC-SCNC: 9.6 MG/DL (ref 8.2–9.6)
CHLORIDE SERPL-SCNC: 99 MMOL/L (ref 98–107)
CLARITY UR: CLEAR
CO2 SERPL-SCNC: 19.3 MMOL/L (ref 22–29)
COLOR UR: YELLOW
CREAT SERPL-MCNC: 3.27 MG/DL (ref 0.57–1)
CREAT UR-MCNC: 21.6 MG/DL
DEPRECATED RDW RBC AUTO: 47.8 FL (ref 37–54)
EOSINOPHIL # BLD AUTO: 0.14 10*3/MM3 (ref 0–0.4)
EOSINOPHIL NFR BLD AUTO: 2 % (ref 0.3–6.2)
ERYTHROCYTE [DISTWIDTH] IN BLOOD BY AUTOMATED COUNT: 15.9 % (ref 12.3–15.4)
GFR SERPL CREATININE-BSD FRML MDRD: 13 ML/MIN/1.73
GFR SERPL CREATININE-BSD FRML MDRD: ABNORMAL ML/MIN/{1.73_M2}
GLOBULIN UR ELPH-MCNC: 3.8 GM/DL
GLUCOSE SERPL-MCNC: 104 MG/DL (ref 65–99)
GLUCOSE UR STRIP-MCNC: NEGATIVE MG/DL
HBV SURFACE AG SERPL QL IA: NORMAL
HCT VFR BLD AUTO: 35 % (ref 34–46.6)
HCV AB SER DONR QL: NORMAL
HGB BLD-MCNC: 11.4 G/DL (ref 12–15.9)
HGB UR QL STRIP.AUTO: ABNORMAL
HYALINE CASTS UR QL AUTO: ABNORMAL /LPF
IMM GRANULOCYTES # BLD AUTO: 0.02 10*3/MM3 (ref 0–0.05)
IMM GRANULOCYTES NFR BLD AUTO: 0.3 % (ref 0–0.5)
KETONES UR QL STRIP: NEGATIVE
LEUKOCYTE ESTERASE UR QL STRIP.AUTO: ABNORMAL
LYMPHOCYTES # BLD AUTO: 1.3 10*3/MM3 (ref 0.7–3.1)
LYMPHOCYTES NFR BLD AUTO: 18.7 % (ref 19.6–45.3)
MAGNESIUM SERPL-MCNC: 2.3 MG/DL (ref 1.7–2.3)
MCH RBC QN AUTO: 26.9 PG (ref 26.6–33)
MCHC RBC AUTO-ENTMCNC: 32.6 G/DL (ref 31.5–35.7)
MCV RBC AUTO: 82.5 FL (ref 79–97)
MONOCYTES # BLD AUTO: 0.44 10*3/MM3 (ref 0.1–0.9)
MONOCYTES NFR BLD AUTO: 6.3 % (ref 5–12)
NEUTROPHILS NFR BLD AUTO: 5.02 10*3/MM3 (ref 1.7–7)
NEUTROPHILS NFR BLD AUTO: 72.3 % (ref 42.7–76)
NITRITE UR QL STRIP: NEGATIVE
NRBC BLD AUTO-RTO: 0 /100 WBC (ref 0–0.2)
PH UR STRIP.AUTO: 6 [PH] (ref 5–8)
PHOSPHATE SERPL-MCNC: 4.2 MG/DL (ref 2.5–4.5)
PLATELET # BLD AUTO: 226 10*3/MM3 (ref 140–450)
PMV BLD AUTO: 11.1 FL (ref 6–12)
POTASSIUM SERPL-SCNC: 3 MMOL/L (ref 3.5–5.2)
PROT SERPL-MCNC: 8.6 G/DL (ref 6–8.5)
PROT UR QL STRIP: ABNORMAL
PROT UR-MCNC: 44 MG/DL
PTH-INTACT SERPL-MCNC: 190 PG/ML (ref 15–65)
RBC # BLD AUTO: 4.24 10*6/MM3 (ref 3.77–5.28)
RBC # UR: ABNORMAL /HPF
REF LAB TEST METHOD: ABNORMAL
SODIUM SERPL-SCNC: 138 MMOL/L (ref 136–145)
SP GR UR STRIP: 1.01 (ref 1–1.03)
SQUAMOUS #/AREA URNS HPF: ABNORMAL /HPF
URATE SERPL-MCNC: 8.6 MG/DL (ref 2.4–5.7)
UROBILINOGEN UR QL STRIP: ABNORMAL
WBC # BLD AUTO: 6.95 10*3/MM3 (ref 3.4–10.8)
WBC UR QL AUTO: ABNORMAL /HPF

## 2021-05-28 PROCEDURE — 83520 IMMUNOASSAY QUANT NOS NONAB: CPT

## 2021-05-28 PROCEDURE — 87340 HEPATITIS B SURFACE AG IA: CPT

## 2021-05-28 PROCEDURE — 76775 US EXAM ABDO BACK WALL LIM: CPT

## 2021-05-28 PROCEDURE — 83735 ASSAY OF MAGNESIUM: CPT

## 2021-05-28 PROCEDURE — 86256 FLUORESCENT ANTIBODY TITER: CPT

## 2021-05-28 PROCEDURE — 82570 ASSAY OF URINE CREATININE: CPT

## 2021-05-28 PROCEDURE — 86038 ANTINUCLEAR ANTIBODIES: CPT

## 2021-05-28 PROCEDURE — 84156 ASSAY OF PROTEIN URINE: CPT

## 2021-05-28 PROCEDURE — 82784 ASSAY IGA/IGD/IGG/IGM EACH: CPT

## 2021-05-28 PROCEDURE — 83970 ASSAY OF PARATHORMONE: CPT

## 2021-05-28 PROCEDURE — 82306 VITAMIN D 25 HYDROXY: CPT

## 2021-05-28 PROCEDURE — 84550 ASSAY OF BLOOD/URIC ACID: CPT

## 2021-05-28 PROCEDURE — 36415 COLL VENOUS BLD VENIPUNCTURE: CPT

## 2021-05-28 PROCEDURE — 84100 ASSAY OF PHOSPHORUS: CPT

## 2021-05-28 PROCEDURE — 86160 COMPLEMENT ANTIGEN: CPT

## 2021-05-28 PROCEDURE — 81001 URINALYSIS AUTO W/SCOPE: CPT

## 2021-05-28 PROCEDURE — 85025 COMPLETE CBC W/AUTO DIFF WBC: CPT

## 2021-05-28 PROCEDURE — 86803 HEPATITIS C AB TEST: CPT

## 2021-05-28 PROCEDURE — 86225 DNA ANTIBODY NATIVE: CPT

## 2021-05-28 PROCEDURE — 86335 IMMUNFIX E-PHORSIS/URINE/CSF: CPT

## 2021-05-28 PROCEDURE — 80053 COMPREHEN METABOLIC PANEL: CPT

## 2021-05-28 PROCEDURE — 86334 IMMUNOFIX E-PHORESIS SERUM: CPT

## 2021-05-29 LAB
ANA SER QL: POSITIVE
DSDNA AB SER-ACNC: 1 IU/ML (ref 0–9)
Lab: NORMAL

## 2021-05-30 LAB
C-ANCA TITR SER IF: NORMAL TITER
MYELOPEROXIDASE AB SER IA-ACNC: <9 U/ML (ref 0–9)
P-ANCA ATYPICAL TITR SER IF: NORMAL TITER
P-ANCA TITR SER IF: NORMAL TITER
PROTEINASE3 AB SER IA-ACNC: <3.5 U/ML (ref 0–3.5)

## 2021-06-01 LAB
IGA SERPL-MCNC: 387 MG/DL (ref 64–422)
IGG SERPL-MCNC: 1422 MG/DL (ref 586–1602)
IGM SERPL-MCNC: 53 MG/DL (ref 26–217)
PROT PATTERN SERPL IFE-IMP: NORMAL

## 2021-06-02 LAB — INTERPRETATION UR IFE-IMP: NORMAL

## 2021-06-04 ENCOUNTER — TELEPHONE (OUTPATIENT)
Dept: INTERNAL MEDICINE | Facility: CLINIC | Age: 86
End: 2021-06-04

## 2021-06-04 RX ORDER — LORAZEPAM 1 MG/1
1 TABLET ORAL
Qty: 90 TABLET | Refills: 1 | OUTPATIENT
Start: 2021-06-04

## 2021-06-04 NOTE — TELEPHONE ENCOUNTER
Pt informed verbalized understanding, will call Dr. Mayberry again to ask to send us communication stating okay for Dr. Elias to take over the prescription.

## 2021-06-04 NOTE — TELEPHONE ENCOUNTER
This patient had intended to switch this medication to me but last prescribed by Dr. Mayberry.  I have not received any correspondence from that doctor indicating the okay for me to take it over.  I had informed the patient at our first visit that she has to get his permission and have that doctor send me something to okay me to take it over.

## 2021-06-04 NOTE — TELEPHONE ENCOUNTER
I had patient Cristina Lemus's previous Doctors office call from DR Sharpe's office called wanting to know if we fill patients Ativan for Anxiety apparently patient called her previous Doctor for the prescription

## 2021-06-04 NOTE — TELEPHONE ENCOUNTER
Caller: Cristina Lemus    Relationship: Self    Best call back number: [phone]     Medication needed:   Requested Prescriptions     Pending Prescriptions Disp Refills   • LORazepam (ATIVAN) 1 MG tablet       Sig: Take 1 tablet by mouth every night at bedtime.       When do you need the refill by: 06/04/21    What additional details did the patient provide when requesting the medication: PATIENT STATED THAT SHE SWITCHED THIS MEDICATION OVER TO DR. PRADO    Does the patient have less than a 3 day supply:  [x] Yes  [] No    What is the patient's preferred pharmacy: [facility] - [place], [place] - [address] AT PORTAL TO [facility] - [phone]  - [phone] FX

## 2021-06-06 NOTE — TELEPHONE ENCOUNTER
She would have to discuss with her heart doctor Dr. Mart.  He would have to make the call about her anticoagulation I would also be concerned about the steroid exacerbating her atrial fibrillation.  So she would need to check with him.  I was trying to place her on the most safe regimen I could think of.   Attending with

## 2021-06-07 ENCOUNTER — TELEPHONE (OUTPATIENT)
Dept: INTERNAL MEDICINE | Facility: CLINIC | Age: 86
End: 2021-06-07

## 2021-06-07 NOTE — TELEPHONE ENCOUNTER
Glenna called and spoke with Dr. Sharpe's office.  They were her previous provider and Glenna advised them to fax a letter to [phone]  stating she was their pt before and okay for us to take over her prescription.

## 2021-06-09 ENCOUNTER — HOSPITAL ENCOUNTER (OUTPATIENT)
Facility: HOSPITAL | Age: 86
Setting detail: OBSERVATION
Discharge: HOME | End: 2021-06-11
Attending: INTERNAL MEDICINE | Admitting: STUDENT IN AN ORGANIZED HEALTH CARE EDUCATION/TRAINING PROGRAM

## 2021-06-09 DIAGNOSIS — E86.0 DEHYDRATION: ICD-10-CM

## 2021-06-09 DIAGNOSIS — N17.9 AKI (ACUTE KIDNEY INJURY) (HCC): Primary | ICD-10-CM

## 2021-06-09 DIAGNOSIS — I50.32 CHRONIC DIASTOLIC (CONGESTIVE) HEART FAILURE (HCC): ICD-10-CM

## 2021-06-09 DIAGNOSIS — I48.21 PERMANENT ATRIAL FIBRILLATION (HCC): ICD-10-CM

## 2021-06-09 DIAGNOSIS — Z79.01 CURRENT USE OF LONG TERM ANTICOAGULATION: ICD-10-CM

## 2021-06-09 LAB
ALBUMIN SERPL-MCNC: 4.5 G/DL (ref 3.5–5.2)
ALBUMIN/GLOB SERPL: 1.2 G/DL
ALP SERPL-CCNC: 79 U/L (ref 39–117)
ALT SERPL W P-5'-P-CCNC: 14 U/L (ref 1–33)
ANION GAP SERPL CALCULATED.3IONS-SCNC: 18.9 MMOL/L (ref 5–15)
AST SERPL-CCNC: 25 U/L (ref 1–32)
BACTERIA UR QL AUTO: NORMAL /HPF
BASOPHILS # BLD AUTO: 0.03 10*3/MM3 (ref 0–0.2)
BASOPHILS NFR BLD AUTO: 0.5 % (ref 0–1.5)
BILIRUB SERPL-MCNC: 0.6 MG/DL (ref 0–1.2)
BILIRUB UR QL STRIP: NEGATIVE
BUN SERPL-MCNC: 83 MG/DL (ref 8–23)
BUN/CREAT SERPL: 24.8 (ref 7–25)
CALCIUM SPEC-SCNC: 9.3 MG/DL (ref 8.2–9.6)
CHLORIDE SERPL-SCNC: 91 MMOL/L (ref 98–107)
CLARITY UR: CLEAR
CO2 SERPL-SCNC: 25.1 MMOL/L (ref 22–29)
COLOR UR: YELLOW
CREAT SERPL-MCNC: 3.35 MG/DL (ref 0.57–1)
DEPRECATED RDW RBC AUTO: 47.8 FL (ref 37–54)
EOSINOPHIL # BLD AUTO: 0.12 10*3/MM3 (ref 0–0.4)
EOSINOPHIL NFR BLD AUTO: 2 % (ref 0.3–6.2)
ERYTHROCYTE [DISTWIDTH] IN BLOOD BY AUTOMATED COUNT: 15.8 % (ref 12.3–15.4)
GFR SERPL CREATININE-BSD FRML MDRD: 13 ML/MIN/1.73
GFR SERPL CREATININE-BSD FRML MDRD: ABNORMAL ML/MIN/{1.73_M2}
GLOBULIN UR ELPH-MCNC: 3.7 GM/DL
GLUCOSE SERPL-MCNC: 109 MG/DL (ref 65–99)
GLUCOSE UR STRIP-MCNC: NEGATIVE MG/DL
HCT VFR BLD AUTO: 37.3 % (ref 34–46.6)
HGB BLD-MCNC: 12.1 G/DL (ref 12–15.9)
HGB UR QL STRIP.AUTO: ABNORMAL
HYALINE CASTS UR QL AUTO: NORMAL /LPF
IMM GRANULOCYTES # BLD AUTO: 0.02 10*3/MM3 (ref 0–0.05)
IMM GRANULOCYTES NFR BLD AUTO: 0.3 % (ref 0–0.5)
INR PPP: 2.43 (ref 0.9–1.1)
KETONES UR QL STRIP: NEGATIVE
LEUKOCYTE ESTERASE UR QL STRIP.AUTO: NEGATIVE
LYMPHOCYTES # BLD AUTO: 1.34 10*3/MM3 (ref 0.7–3.1)
LYMPHOCYTES NFR BLD AUTO: 22 % (ref 19.6–45.3)
MCH RBC QN AUTO: 27.3 PG (ref 26.6–33)
MCHC RBC AUTO-ENTMCNC: 32.4 G/DL (ref 31.5–35.7)
MCV RBC AUTO: 84.2 FL (ref 79–97)
MONOCYTES # BLD AUTO: 0.43 10*3/MM3 (ref 0.1–0.9)
MONOCYTES NFR BLD AUTO: 7.1 % (ref 5–12)
NEUTROPHILS NFR BLD AUTO: 4.14 10*3/MM3 (ref 1.7–7)
NEUTROPHILS NFR BLD AUTO: 68.1 % (ref 42.7–76)
NITRITE UR QL STRIP: NEGATIVE
NRBC BLD AUTO-RTO: 0 /100 WBC (ref 0–0.2)
PH UR STRIP.AUTO: 6 [PH] (ref 5–8)
PLATELET # BLD AUTO: 279 10*3/MM3 (ref 140–450)
PMV BLD AUTO: 12.2 FL (ref 6–12)
POTASSIUM SERPL-SCNC: 3.5 MMOL/L (ref 3.5–5.2)
PROT SERPL-MCNC: 8.2 G/DL (ref 6–8.5)
PROT UR QL STRIP: ABNORMAL
PROTHROMBIN TIME: 26.1 SECONDS (ref 11.7–14.2)
QT INTERVAL: 485 MS
RBC # BLD AUTO: 4.43 10*6/MM3 (ref 3.77–5.28)
RBC # UR: NORMAL /HPF
REF LAB TEST METHOD: NORMAL
SARS-COV-2 ORF1AB RESP QL NAA+PROBE: NOT DETECTED
SODIUM SERPL-SCNC: 135 MMOL/L (ref 136–145)
SP GR UR STRIP: 1.01 (ref 1–1.03)
SQUAMOUS #/AREA URNS HPF: NORMAL /HPF
UROBILINOGEN UR QL STRIP: ABNORMAL
WBC # BLD AUTO: 6.08 10*3/MM3 (ref 3.4–10.8)
WBC UR QL AUTO: NORMAL /HPF

## 2021-06-09 PROCEDURE — 80053 COMPREHEN METABOLIC PANEL: CPT | Performed by: NURSE PRACTITIONER

## 2021-06-09 PROCEDURE — 85610 PROTHROMBIN TIME: CPT | Performed by: NURSE PRACTITIONER

## 2021-06-09 PROCEDURE — U0005 INFEC AGEN DETEC AMPLI PROBE: HCPCS | Performed by: EMERGENCY MEDICINE

## 2021-06-09 PROCEDURE — G0378 HOSPITAL OBSERVATION PER HR: HCPCS

## 2021-06-09 PROCEDURE — 99285 EMERGENCY DEPT VISIT HI MDM: CPT

## 2021-06-09 PROCEDURE — 93005 ELECTROCARDIOGRAM TRACING: CPT | Performed by: NURSE PRACTITIONER

## 2021-06-09 PROCEDURE — P9612 CATHETERIZE FOR URINE SPEC: HCPCS

## 2021-06-09 PROCEDURE — 96360 HYDRATION IV INFUSION INIT: CPT

## 2021-06-09 PROCEDURE — C9803 HOPD COVID-19 SPEC COLLECT: HCPCS

## 2021-06-09 PROCEDURE — 85025 COMPLETE CBC W/AUTO DIFF WBC: CPT | Performed by: NURSE PRACTITIONER

## 2021-06-09 PROCEDURE — 93010 ELECTROCARDIOGRAM REPORT: CPT | Performed by: INTERNAL MEDICINE

## 2021-06-09 PROCEDURE — 96361 HYDRATE IV INFUSION ADD-ON: CPT

## 2021-06-09 PROCEDURE — 81001 URINALYSIS AUTO W/SCOPE: CPT | Performed by: NURSE PRACTITIONER

## 2021-06-09 PROCEDURE — U0004 COV-19 TEST NON-CDC HGH THRU: HCPCS | Performed by: EMERGENCY MEDICINE

## 2021-06-09 RX ORDER — LORAZEPAM 1 MG/1
1 TABLET ORAL NIGHTLY PRN
Status: DISCONTINUED | OUTPATIENT
Start: 2021-06-09 | End: 2021-06-11 | Stop reason: HOSPADM

## 2021-06-09 RX ORDER — CLONIDINE HYDROCHLORIDE 0.1 MG/1
0.1 TABLET ORAL 3 TIMES DAILY
Status: ON HOLD | COMMUNITY
End: 2021-06-09

## 2021-06-09 RX ORDER — ALLOPURINOL 100 MG/1
50 TABLET ORAL DAILY
Status: DISCONTINUED | OUTPATIENT
Start: 2021-06-10 | End: 2021-06-11 | Stop reason: HOSPADM

## 2021-06-09 RX ORDER — UREA 10 %
3 LOTION (ML) TOPICAL NIGHTLY PRN
Status: DISCONTINUED | OUTPATIENT
Start: 2021-06-09 | End: 2021-06-11 | Stop reason: HOSPADM

## 2021-06-09 RX ORDER — WARFARIN SODIUM 2 MG/1
4 TABLET ORAL SEE ADMIN INSTRUCTIONS
COMMUNITY
End: 2022-02-25 | Stop reason: SDUPTHER

## 2021-06-09 RX ORDER — WARFARIN SODIUM 4 MG/1
4 TABLET ORAL
Status: DISCONTINUED | OUTPATIENT
Start: 2021-06-09 | End: 2021-06-11

## 2021-06-09 RX ORDER — HYDRALAZINE HYDROCHLORIDE 25 MG/1
25 TABLET, FILM COATED ORAL ONCE
Status: COMPLETED | OUTPATIENT
Start: 2021-06-10 | End: 2021-06-10

## 2021-06-09 RX ORDER — ONDANSETRON 4 MG/1
4 TABLET, FILM COATED ORAL EVERY 6 HOURS PRN
Status: DISCONTINUED | OUTPATIENT
Start: 2021-06-09 | End: 2021-06-11 | Stop reason: HOSPADM

## 2021-06-09 RX ORDER — TORSEMIDE 100 MG/1
1 TABLET ORAL DAILY
COMMUNITY
Start: 2021-05-27 | End: 2021-06-09

## 2021-06-09 RX ORDER — WARFARIN SODIUM 2 MG/1
2 TABLET ORAL SEE ADMIN INSTRUCTIONS
COMMUNITY
End: 2022-04-01

## 2021-06-09 RX ORDER — WARFARIN SODIUM 2 MG/1
2 TABLET ORAL
Status: DISCONTINUED | OUTPATIENT
Start: 2021-06-10 | End: 2021-06-10

## 2021-06-09 RX ORDER — NITROGLYCERIN 0.4 MG/1
0.4 TABLET SUBLINGUAL
Status: DISCONTINUED | OUTPATIENT
Start: 2021-06-09 | End: 2021-06-11 | Stop reason: HOSPADM

## 2021-06-09 RX ORDER — SODIUM CHLORIDE 9 MG/ML
75 INJECTION, SOLUTION INTRAVENOUS CONTINUOUS
Status: DISCONTINUED | OUTPATIENT
Start: 2021-06-09 | End: 2021-06-11

## 2021-06-09 RX ORDER — ACETAMINOPHEN 325 MG/1
650 TABLET ORAL EVERY 4 HOURS PRN
Status: DISCONTINUED | OUTPATIENT
Start: 2021-06-09 | End: 2021-06-11 | Stop reason: HOSPADM

## 2021-06-09 RX ORDER — CLONIDINE HYDROCHLORIDE 0.1 MG/1
0.1 TABLET ORAL 3 TIMES DAILY
COMMUNITY
End: 2021-06-27 | Stop reason: HOSPADM

## 2021-06-09 RX ORDER — ONDANSETRON 2 MG/ML
4 INJECTION INTRAMUSCULAR; INTRAVENOUS EVERY 6 HOURS PRN
Status: DISCONTINUED | OUTPATIENT
Start: 2021-06-09 | End: 2021-06-11 | Stop reason: HOSPADM

## 2021-06-09 RX ORDER — AMLODIPINE BESYLATE 10 MG/1
10 TABLET ORAL DAILY
Status: DISCONTINUED | OUTPATIENT
Start: 2021-06-10 | End: 2021-06-11 | Stop reason: HOSPADM

## 2021-06-09 RX ORDER — CLONIDINE HYDROCHLORIDE 0.1 MG/1
0.1 TABLET ORAL 3 TIMES DAILY
Status: DISCONTINUED | OUTPATIENT
Start: 2021-06-09 | End: 2021-06-11 | Stop reason: HOSPADM

## 2021-06-09 RX ADMIN — WARFARIN 4 MG: 4 TABLET ORAL at 21:57

## 2021-06-09 RX ADMIN — SODIUM CHLORIDE 75 ML/HR: 9 INJECTION, SOLUTION INTRAVENOUS at 20:53

## 2021-06-09 RX ADMIN — SODIUM CHLORIDE 500 ML: 9 INJECTION, SOLUTION INTRAVENOUS at 18:00

## 2021-06-09 NOTE — ED NOTES
Pt takes coumadin, a bp med and a water pill.  Can't remember the names of her meds     Degonda, Janet, RN  06/09/21 2558

## 2021-06-09 NOTE — ED PROVIDER NOTES
I supervised care provided by the midlevel provider.   We have discussed this patient's history, physical exam, and treatment plan.  I have reviewed the note and personally saw and examined the patient and agree with the plan of care.       I have seen and examined this patient. Comes to Ed because of generalized weakness and unable to get up or walk because to weak. Has had urinary frequency, but denies anydysuria. She has decrease PO intake for several days. No desire to eat or drink. Denies any pain anywhere. No fever or chills. No focal weakness,     GENERAL:  Elderly female fraile. not distressed  HENT: nares patent  Head/neck/ face are symmetric without gross deformity or swelling. Dry Oral mucosa  EYES: no scleral icterus  CV: regular rhythm, regular rate with intact distal pulses  RESPIRATORY: normal effort and no respiratory distress  ABDOMEN: soft and non-tender  MUSCULOSKELETAL: no deformity. No edema intact and symetric distal pulses.  NEURO: alert and appropriate, moves all extremities, follows commands. No focal motor or sensory changes.  SKIN: warm, dry    Vital signs and nursing notes reviewed.    Plan I have reviewed lab work. Has worsening renal function from 1 month ago. I suspect she has acute on chronic renal failure like from volume contraction.     We are currently under a pandemic from the COVID19 infection.  The patient presented to the emergency department by ambulance or personal vehicle. I followed the current protocols required by Infection Control at [facility] in my evaluation and treatment of the patient. The patient was wearing a face mask during my evaluation and throughout my encounter. During my whole encounter with this patient I used appropriate personal protective equipment.  This equipment consisted of eye protection, facemask, gown, and gloves.  I applied this equipment before entering the room.    ECG 12 Lead   Final Result   HEART RATE= 75  bpm   RR Interval=  796  ms   MD Interval=   ms   P Horizontal Axis=   deg   P Front Axis=   deg   QRSD Interval= 109  ms   QT Interval= 485  ms   QRS Axis= -26  deg   T Wave Axis= 242  deg   - ABNORMAL ECG -   Atrial fibrillation   Ventricular premature complex   Borderline left axis deviation   Abnrm T, consider ischemia, anterolateral lds   Prolonged QT interval   NO PRIOR TRACING AVAILABLE FOR COMPARISON   Electronically Signed By: Edouard Ojeda ([facility]) 09-Jun-2021 17:01:19   Date and Time of Study: 2021-06-09 15:46:14        EKR reading DONE 1546  Atrial fibrillation with rate of 75. MILD IVCD.   NON-SPECIFIC ST an T wave changes., NO acute ischemic injury pattern appreciated.  QT prolonged  NO old EKG         Malcolm Smith MD  06/09/21 1729

## 2021-06-09 NOTE — PROGRESS NOTES
Clinical Pharmacy Services: Medication History    Cristina Lemus is a 89 y.o. female presenting to [facility] for   Chief Complaint   Patient presents with   • Urinary Frequency       She  has a past medical history of Atrial fibrillation (CMS/[facility]) and CHF (congestive heart failure) (CMS/[facility]).    Allergies as of 06/09/2021 - Reviewed 06/09/2021   Allergen Reaction Noted   • Statins Nausea And Vomiting 04/04/2017   • Sulfa antibiotics Nausea And Vomiting and Other (See Comments) 04/04/2017       Medication information was obtained from: patient and pharmacy  Pharmacy and Phone Number:   [facility] [facility] [address] [facility] [place] [address] [facility] [address] - [phone]  - [phone]   [address] [facility] [address]  Phone: [phone] Fax: [phone]    [facility] - [place], [place] - [address] to [facility] - [phone]  - [phone]   [address]  [facility] [address]  Phone: [phone] Fax: [phone]        Prior to Admission Medications     Prescriptions Last Dose Informant Patient Reported? Taking?    allopurinol (ZYLOPRIM) 100 MG tablet  Pharmacy No Yes    Take 0.5 tablets by mouth Daily.    amLODIPine (NORVASC) 5 MG tablet  Pharmacy No Yes    Take 1 tablet by mouth Daily.    Patient taking differently:  Take 10 mg by mouth Daily.    cloNIDine (CATAPRES) 0.1 MG tablet  Pharmacy Yes Yes    Take 0.1 mg by mouth 3 (Three) Times a Day.    furosemide (LASIX) 40 MG tablet  Pharmacy No Yes    Take 1 tablet by mouth Daily.    LORazepam (ATIVAN) 1 MG tablet  Pharmacy Yes Yes    Take 1 mg by mouth At Night As Needed.    warfarin (COUMADIN) 2 MG tablet  Self Yes Yes    Take 2 mg by mouth See Admin Instructions. Every day BUT Monday, Wednesday and Friday    warfarin (COUMADIN) 2 MG tablet  Self Yes Yes    Take 4 mg by mouth 3 (Three) Times a Week. Monday,Wednesday and Friday            Medication  notes:     This medication list is complete to the best of my knowledge as of 6/9/2021    Please call if questions.    Arlette Mitchell [facility]  Medication History Technician  [phone]    6/9/2021 18:28 EDT

## 2021-06-09 NOTE — ED PROVIDER NOTES
EMERGENCY DEPARTMENT ENCOUNTER    Room Number:  12/12  Date of encounter:  6/9/2021  PCP: Wood Elias MD  Historian: patient  Full history not obtainable due to: none     HPI:  Chief Complaint: Urinary frequency     Context: Cristina Lemus is a 89 y.o. female who presents to the ED c/o urinary frequency onset in the middle of the night. Symptoms were constant. Severe. Nothing seemed to worse or improve them. No dysuria or hematuria. No fever. No vomiting. No pain. No soa. Associated generalized weakness this am. States she felt so weak it was difficult to walk.       MEDICAL RECORD REVIEW:    2D Echo 6/2020 , results as below. EF 71%  Conclusions      Summary    Normal LV systolic function with apical hypokinesis    Asymmetric apical hypertrophy    Mild aortic insufficiency    Mild/moderate mitral regurgitation    Mild tricuspid regurgitation      PAST MEDICAL HISTORY    Active Ambulatory Problems     Diagnosis Date Noted   • AVNRT (AV joy re-entry tachycardia) (CMS/[facility]) 10/29/2014   • History of pulmonary embolus (PE) 06/30/2020   • HTN, goal below 140/80 09/01/2016   • Permanent atrial fibrillation (CMS/[facility]) 07/07/2017   • Stage 4 chronic kidney disease (CMS/HCC) 06/30/2020   • Chronic diastolic (congestive) heart failure (CMS/[facility]) 10/01/2020   • Anxiety disorder 04/23/2021   • Chronic gout without tophus 04/23/2021     Resolved Ambulatory Problems     Diagnosis Date Noted   • No Resolved Ambulatory Problems     Past Medical History:   Diagnosis Date   • Atrial fibrillation (CMS/[facility])    • CHF (congestive heart failure) (CMS/[facility])          PAST SURGICAL HISTORY  Past Surgical History:   Procedure Laterality Date   • BACK SURGERY     • CYST REMOVAL     • OTHER SURGICAL HISTORY      rupture disk         FAMILY HISTORY  History reviewed. No pertinent family history.      SOCIAL HISTORY  Social History     Socioeconomic History   • Marital status: Single     Spouse name: Not on file   • Number of children: Not on  file   • Years of education: Not on file   • Highest education level: Not on file   Tobacco Use   • Smoking status: Never Smoker   • Smokeless tobacco: Never Used   Substance and Sexual Activity   • Alcohol use: No     Comment: caffeine use    • Drug use: No   • Sexual activity: Defer         ALLERGIES  Statins and Sulfa antibiotics        REVIEW OF SYSTEMS  Review of Systems   All systems reviewed and marked as negative except as listed in HPI       PHYSICAL EXAM    I have reviewed the triage vital signs and nursing notes.    ED Triage Vitals [06/09/21 1343]   Temp Heart Rate Resp BP SpO2   97.7 °F (36.5 °C) 81 16 162/66 98 %      Temp src Heart Rate Source Patient Position BP Location FiO2 (%)   Tympanic -- -- -- --       GENERAL: Alert well developed, well nourished in no distress, elderly and frail appearing   HENT: NCAT, neck supple, trachea midline  EYES: no scleral icterus, PERRL, normal conjunctivae  CV: regular rhythm, regular rate, no murmur  RESPIRATORY: unlabored effort, CTAB  ABDOMEN: soft, nontender, nondistended, bowel sounds present  MUSCULOSKELETAL: no gross deformity  NEURO: alert,  sensory and motor function of extremities grossly intact, speech clear, mental status normal/baseline  SKIN: warm, dry, no rash  PSYCH:  Appropriate mood and affect    Vital signs and nursing notes reviewed.          LAB RESULTS  Recent Results (from the past 24 hour(s))   Protime-INR    Collection Time: 06/09/21  3:19 PM    Specimen: Blood   Result Value Ref Range    Protime 26.1 (H) 11.7 - 14.2 Seconds    INR 2.43 (H) 0.90 - 1.10   Comprehensive Metabolic Panel    Collection Time: 06/09/21  3:19 PM    Specimen: Blood   Result Value Ref Range    Glucose 109 (H) 65 - 99 mg/dL    BUN 83 (H) 8 - 23 mg/dL    Creatinine 3.35 (H) 0.57 - 1.00 mg/dL    Sodium 135 (L) 136 - 145 mmol/L    Potassium 3.5 3.5 - 5.2 mmol/L    Chloride 91 (L) 98 - 107 mmol/L    CO2 25.1 22.0 - 29.0 mmol/L    Calcium 9.3 8.2 - 9.6 mg/dL    Total  Protein 8.2 6.0 - 8.5 g/dL    Albumin 4.50 3.50 - 5.20 g/dL    ALT (SGPT) 14 1 - 33 U/L    AST (SGOT) 25 1 - 32 U/L    Alkaline Phosphatase 79 39 - 117 U/L    Total Bilirubin 0.6 0.0 - 1.2 mg/dL    eGFR Non African Amer 13 (L) >60 mL/min/1.73    eGFR  African Amer      Globulin 3.7 gm/dL    A/G Ratio 1.2 g/dL    BUN/Creatinine Ratio 24.8 7.0 - 25.0    Anion Gap 18.9 (H) 5.0 - 15.0 mmol/L   CBC Auto Differential    Collection Time: 06/09/21  3:19 PM    Specimen: Blood   Result Value Ref Range    WBC 6.08 3.40 - 10.80 10*3/mm3    RBC 4.43 3.77 - 5.28 10*6/mm3    Hemoglobin 12.1 12.0 - 15.9 g/dL    Hematocrit 37.3 34.0 - 46.6 %    MCV 84.2 79.0 - 97.0 fL    MCH 27.3 26.6 - 33.0 pg    MCHC 32.4 31.5 - 35.7 g/dL    RDW 15.8 (H) 12.3 - 15.4 %    RDW-SD 47.8 37.0 - 54.0 fl    MPV 12.2 (H) 6.0 - 12.0 fL    Platelets 279 140 - 450 10*3/mm3    Neutrophil % 68.1 42.7 - 76.0 %    Lymphocyte % 22.0 19.6 - 45.3 %    Monocyte % 7.1 5.0 - 12.0 %    Eosinophil % 2.0 0.3 - 6.2 %    Basophil % 0.5 0.0 - 1.5 %    Immature Grans % 0.3 0.0 - 0.5 %    Neutrophils, Absolute 4.14 1.70 - 7.00 10*3/mm3    Lymphocytes, Absolute 1.34 0.70 - 3.10 10*3/mm3    Monocytes, Absolute 0.43 0.10 - 0.90 10*3/mm3    Eosinophils, Absolute 0.12 0.00 - 0.40 10*3/mm3    Basophils, Absolute 0.03 0.00 - 0.20 10*3/mm3    Immature Grans, Absolute 0.02 0.00 - 0.05 10*3/mm3    nRBC 0.0 0.0 - 0.2 /100 WBC   Urinalysis With Microscopic If Indicated (No Culture) - Urine, Catheter    Collection Time: 06/09/21  3:20 PM    Specimen: Urine, Catheter   Result Value Ref Range    Color, UA Yellow Yellow, Straw    Appearance, UA Clear Clear    pH, UA 6.0 5.0 - 8.0    Specific Gravity, UA 1.007 1.005 - 1.030    Glucose, UA Negative Negative    Ketones, UA Negative Negative    Bilirubin, UA Negative Negative    Blood, UA Trace (A) Negative    Protein,  mg/dL (2+) (A) Negative    Leuk Esterase, UA Negative Negative    Nitrite, UA Negative Negative    Urobilinogen, UA 0.2  E.U./dL 0.2 - 1.0 E.U./dL   Urinalysis, Microscopic Only - Urine, Catheter    Collection Time: 06/09/21  3:20 PM    Specimen: Urine, Catheter   Result Value Ref Range    RBC, UA 0-2 None Seen, 0-2 /HPF    WBC, UA 0-2 None Seen, 0-2 /HPF    Bacteria, UA None Seen None Seen /HPF    Squamous Epithelial Cells, UA 0-2 None Seen, 0-2 /HPF    Hyaline Casts, UA 0-2 None Seen /LPF    Methodology Automated Microscopy    ECG 12 Lead    Collection Time: 06/09/21  3:46 PM   Result Value Ref Range    QT Interval 485 ms       Ordered the above labs and independently reviewed the results.        PROCEDURES    Procedures        MEDICATIONS GIVEN IN ER    Medications   nitroglycerin (NITROSTAT) SL tablet 0.4 mg (has no administration in time range)   acetaminophen (TYLENOL) tablet 650 mg (has no administration in time range)   ondansetron (ZOFRAN) tablet 4 mg (has no administration in time range)     Or   ondansetron (ZOFRAN) injection 4 mg (has no administration in time range)   melatonin tablet 3 mg (has no administration in time range)   sodium chloride 0.9 % infusion (has no administration in time range)   sodium chloride 0.9 % bolus 500 mL (500 mL Intravenous New Bag 6/9/21 1800)         PROGRESS, DATA ANALYSIS, CONSULTS, AND MEDICAL DECISION MAKING    All labs have been independently reviewed by me.  All radiology studies have been reviewed by me.   EKG's independently reviewed by me.  Discussion below represents my analysis of pertinent findings related to patient's condition, differential diagnosis, treatment plan and final disposition.    DIFFERENTIAL DIAGNOSIS INCLUDE BUT NOT LIMITED TO: Dehydration, KIM, metabolic encephalopathy, arrhythmia, unstable angina, AMI, hypothyroidism, electrolyte imbalance, anemia, sepsis, UTI, pneumonia, viral syndrome      ED Course as of Jun 09 1905   Wed Jun 09, 2021   1624 Creatinine(!): 3.35 [JS]   1730 Medical record review reveals Cr of 3.27 twelve days prior and 2.61 1 month prior.  Prior to 1 month ago Cr was ranging around 2.6.    [JS]   1802 Discussed pt with Dr Joseph who agrees to admit the pt to the hospital     [JS]   1859 BUN(!): 83 [JS]   1904 WBC: 6.08 [JS]   1905 Hemoglobin: 12.1 [JS]   1905 INR(!): 2.43 [JS]      ED Course User Index  [JS] Arlette Abel APRN       AS OF 19:05 EDT VITALS:        BP - 178/90  HR - 66  TEMP - 97.7 °F (36.5 °C) (Tympanic)  O2 SATS - 95%        DIAGNOSIS  Final diagnoses:   KIM (acute kidney injury) (CMS/HCC)   Dehydration   Current use of long term anticoagulation         DISPOSITION  Admission     Pt masked in first look. I wore appropriate PPE throughout my encounters with the pt. I performed hand hygiene on entry into the pt room and upon exit.     Dictated utilizing Dragon dictation:  Much of this encounter note is an electronic transcription/translation of spoken language to printed text. The electronic translation of spoken language may permit erroneous, or at times, nonsensical words or phrases to be inadvertently transcribed; Although I have reviewed the note for such errors, some may still exist.     Arlette Abel, RAE  06/09/21 1905

## 2021-06-09 NOTE — ED NOTES
Pt to this ED from home via EMS c/o increased urinary frequency and generalized weakness beginning last night.  Pt denies pain, fever, chills, N/V/D; reports history of kidney disease.     Wilma Hatch RN  06/09/21 6245

## 2021-06-09 NOTE — H&P
HISTORY AND PHYSICAL   [facility]        Patient Identification:  Name: Cristina Lemus  Age: 89 y.o.  Sex: female  :  3/13/1930  MRN: [id number]                     Primary Care Physician: Wood Elias MD    Chief Complaint:  89 year old female who presented to the emergency room with frequency of urination which started last night; she denies pain; no hematuria; no nausea or vomiting; she also felt weak and could not walk; she lives with her daughter who has not been ill    History of Present Illness:   As above    Past Medical History:  Past Medical History:   Diagnosis Date   • Atrial fibrillation (CMS/HCC)    • CHF (congestive heart failure) (CMS/HCC)      Past Surgical History:  Past Surgical History:   Procedure Laterality Date   • BACK SURGERY     • CYST REMOVAL     • OTHER SURGICAL HISTORY      rupture disk      Home Meds:  (Not in a hospital admission)      Allergies:  Allergies   Allergen Reactions   • Statins Nausea And Vomiting   • Sulfa Antibiotics Nausea And Vomiting and Other (See Comments)     CHILLS AND FEVER     Immunizations:  Immunization History   Administered Date(s) Administered   • COVID-19 (MODERNA) 2021   • FLUAD TRI 65YR+ 10/08/2020     Social History:   Social History     Social History Narrative   • Not on file     Social History     Socioeconomic History   • Marital status: Single     Spouse name: Not on file   • Number of children: Not on file   • Years of education: Not on file   • Highest education level: Not on file   Tobacco Use   • Smoking status: Never Smoker   • Smokeless tobacco: Never Used   Substance and Sexual Activity   • Alcohol use: No     Comment: caffeine use    • Drug use: No   • Sexual activity: Defer       Family History:  History reviewed. No pertinent family history.     Review of Systems  See history of present illness and past medical history.  Patient denies headache, dizziness, syncope, falls, trauma, change in vision, change in hearing,  "change in taste, changes in weight, changes in appetite, focal weakness, numbness, or paresthesia.  Patient denies chest pain,  cough, sinus pressure, rhinorrhea, epistaxis, hemoptysis, nausea, vomiting,hematemesis, diarrhea, constipation or hematchezia.  Denies cold or heat intolerance, polydipsia, polyuria, polyphagia. Denies hematuria, pyuria, dysuria, hesitancy, frequency or urgency. Denies consumption of raw and under cooked meats foods or change in water source.  Denies fever, chills, sweats, night sweats.  Denies missing any routine medications. Remainder of ROS is negative.    Objective:  T Max 24 hrs: Temp (24hrs), Av.7 °F (36.5 °C), Min:97.7 °F (36.5 °C), Max:97.7 °F (36.5 °C)    Vitals Ranges:   Temp:  [97.7 °F (36.5 °C)] 97.7 °F (36.5 °C)  Heart Rate:  [63-97] 66  Resp:  [16] 16  BP: (162-191)/(66-95) 178/90      Exam:  /90 (BP Location: Right arm, Patient Position: Lying)   Pulse 66   Temp 97.7 °F (36.5 °C) (Tympanic)   Resp 16   Ht 165.1 cm (65\")   Wt 53.5 kg (118 lb)   SpO2 95%   BMI 19.64 kg/m²     General Appearance:    Alert, cooperative, no distress, appears stated age   Head:    Normocephalic, without obvious abnormality, atraumatic   Eyes:    PERRL, conjunctivae/corneas clear, EOM's intact, both eyes   Ears:    Normal external ear canals, both ears   Nose:   Nares normal, septum midline, mucosa normal, no drainage    or sinus tenderness   Throat:   Lips, mucosa, and tongue normal   Neck:   Supple, symmetrical, trachea midline, no adenopathy;     thyroid:  no enlargement/tenderness/nodules; no carotid    bruit or JVD   Back:     Symmetric, no curvature, ROM normal, no CVA tenderness   Lungs:     Decreased breath sounds bilaterally, respirations unlabored   Chest Wall:    No tenderness or deformity    Heart:    Regular rate and rhythm, S1 and S2 normal, no murmur, rub   or gallop   Abdomen:     Soft, nontender, bowel sounds active all four quadrants,     no masses, no " hepatomegaly, no splenomegaly   Extremities:   Extremities normal, atraumatic, no cyanosis or edema   Pulses:   2+ and symmetric all extremities   Skin:   Skin color, texture, turgor normal, no rashes or lesions   Lymph nodes:   Cervical, supraclavicular, and axillary nodes normal   Neurologic:   CNII-XII intact, normal strength, sensation intact throughout      .    Data Review:  Labs in chart were reviewed.  WBC   Date Value Ref Range Status   06/09/2021 6.08 3.40 - 10.80 10*3/mm3 Final     Hemoglobin   Date Value Ref Range Status   06/09/2021 12.1 12.0 - 15.9 g/dL Final     Hematocrit   Date Value Ref Range Status   06/09/2021 37.3 34.0 - 46.6 % Final     Platelets   Date Value Ref Range Status   06/09/2021 279 140 - 450 10*3/mm3 Final     Sodium   Date Value Ref Range Status   06/09/2021 135 (L) 136 - 145 mmol/L Final     Potassium   Date Value Ref Range Status   06/09/2021 3.5 3.5 - 5.2 mmol/L Final     Comment:     Slight hemolysis detected by analyzer. Results may be affected.     Chloride   Date Value Ref Range Status   06/09/2021 91 (L) 98 - 107 mmol/L Final     CO2   Date Value Ref Range Status   06/09/2021 25.1 22.0 - 29.0 mmol/L Final     BUN   Date Value Ref Range Status   06/09/2021 83 (H) 8 - 23 mg/dL Final     Creatinine   Date Value Ref Range Status   06/09/2021 3.35 (H) 0.57 - 1.00 mg/dL Final     Glucose   Date Value Ref Range Status   06/09/2021 109 (H) 65 - 99 mg/dL Final     Calcium   Date Value Ref Range Status   06/09/2021 9.3 8.2 - 9.6 mg/dL Final     AST (SGOT)   Date Value Ref Range Status   06/09/2021 25 1 - 32 U/L Final     ALT (SGPT)   Date Value Ref Range Status   06/09/2021 14 1 - 33 U/L Final     Alkaline Phosphatase   Date Value Ref Range Status   06/09/2021 79 39 - 117 U/L Final                Imaging Results (All)     None        Patient Active Problem List   Diagnosis Code   • AVNRT (AV joy re-entry tachycardia) (CMS/HCC) I47.1   • History of pulmonary embolus (PE) Z86.711   •  HTN, goal below 140/80 I10   • Permanent atrial fibrillation (CMS/HCC) I48.21   • Stage 4 chronic kidney disease (CMS/[facility]) N18.4   • Chronic diastolic (congestive) heart failure (CMS/[facility]) I50.32   • Anxiety disorder F41.9   • Chronic gout without tophus [id number]   • KIM (acute kidney injury) (CMS/[facility]) N17.9       Assessment:    KIM (acute kidney injury) (CMS/[facility])  ckd4  paf  Chronic diastolic chf  Hypertension  Hyperglycemia  hyponatremia    Plan:  Will hold lasix  Hydrate gently  Monitor on telemetry  inr is therapeutic  Physical therapy to see  D.w ED Provider  Rizwana Joseph MD  6/9/2021  18:37 EDT

## 2021-06-10 DIAGNOSIS — F51.01 PRIMARY INSOMNIA: Primary | ICD-10-CM

## 2021-06-10 LAB
ANION GAP SERPL CALCULATED.3IONS-SCNC: 14.8 MMOL/L (ref 5–15)
BUN SERPL-MCNC: 80 MG/DL (ref 8–23)
BUN/CREAT SERPL: 27 (ref 7–25)
CALCIUM SPEC-SCNC: 8.1 MG/DL (ref 8.2–9.6)
CHLORIDE SERPL-SCNC: 100 MMOL/L (ref 98–107)
CO2 SERPL-SCNC: 23.2 MMOL/L (ref 22–29)
CREAT SERPL-MCNC: 2.96 MG/DL (ref 0.57–1)
DEPRECATED RDW RBC AUTO: 46.7 FL (ref 37–54)
ERYTHROCYTE [DISTWIDTH] IN BLOOD BY AUTOMATED COUNT: 15.7 % (ref 12.3–15.4)
GFR SERPL CREATININE-BSD FRML MDRD: 15 ML/MIN/1.73
GLUCOSE SERPL-MCNC: 84 MG/DL (ref 65–99)
HCT VFR BLD AUTO: 28.7 % (ref 34–46.6)
HGB BLD-MCNC: 9.4 G/DL (ref 12–15.9)
INR PPP: 2.99 (ref 0.9–1.1)
MCH RBC QN AUTO: 26.7 PG (ref 26.6–33)
MCHC RBC AUTO-ENTMCNC: 32.8 G/DL (ref 31.5–35.7)
MCV RBC AUTO: 81.5 FL (ref 79–97)
PLATELET # BLD AUTO: 212 10*3/MM3 (ref 140–450)
PMV BLD AUTO: 12 FL (ref 6–12)
POTASSIUM SERPL-SCNC: 3.4 MMOL/L (ref 3.5–5.2)
PROTHROMBIN TIME: 30.8 SECONDS (ref 11.7–14.2)
RBC # BLD AUTO: 3.52 10*6/MM3 (ref 3.77–5.28)
SODIUM SERPL-SCNC: 138 MMOL/L (ref 136–145)
WBC # BLD AUTO: 6.02 10*3/MM3 (ref 3.4–10.8)

## 2021-06-10 PROCEDURE — G0378 HOSPITAL OBSERVATION PER HR: HCPCS

## 2021-06-10 PROCEDURE — 96361 HYDRATE IV INFUSION ADD-ON: CPT

## 2021-06-10 PROCEDURE — 80048 BASIC METABOLIC PNL TOTAL CA: CPT | Performed by: INTERNAL MEDICINE

## 2021-06-10 PROCEDURE — 85610 PROTHROMBIN TIME: CPT | Performed by: INTERNAL MEDICINE

## 2021-06-10 PROCEDURE — 85027 COMPLETE CBC AUTOMATED: CPT | Performed by: INTERNAL MEDICINE

## 2021-06-10 RX ORDER — LORAZEPAM 1 MG/1
1 TABLET ORAL NIGHTLY PRN
Qty: 90 TABLET | Refills: 1 | Status: SHIPPED | OUTPATIENT
Start: 2021-06-10 | End: 2021-12-03

## 2021-06-10 RX ORDER — WARFARIN SODIUM 2 MG/1
2 TABLET ORAL
Status: DISCONTINUED | OUTPATIENT
Start: 2021-06-12 | End: 2021-06-11 | Stop reason: HOSPADM

## 2021-06-10 RX ORDER — WARFARIN SODIUM 1 MG/1
0.5 TABLET ORAL
Status: COMPLETED | OUTPATIENT
Start: 2021-06-10 | End: 2021-06-10

## 2021-06-10 RX ADMIN — SODIUM CHLORIDE 75 ML/HR: 9 INJECTION, SOLUTION INTRAVENOUS at 12:04

## 2021-06-10 RX ADMIN — AMLODIPINE BESYLATE 10 MG: 10 TABLET ORAL at 08:44

## 2021-06-10 RX ADMIN — HYDRALAZINE HYDROCHLORIDE 25 MG: 25 TABLET, FILM COATED ORAL at 00:36

## 2021-06-10 RX ADMIN — WARFARIN 0.5 MG: 1 TABLET ORAL at 17:23

## 2021-06-10 RX ADMIN — ALLOPURINOL 50 MG: 100 TABLET ORAL at 08:44

## 2021-06-10 RX ADMIN — LORAZEPAM 1 MG: 1 TABLET ORAL at 01:17

## 2021-06-10 NOTE — PLAN OF CARE
Goal Outcome Evaluation:  Plan of Care Reviewed With: patient        Progress: improving  Outcome Summary: pt admitted for KIM and dehydration. BP elevated-hydralazine given. IVF started. VS otherwise stable. continue to monitor

## 2021-06-10 NOTE — PROGRESS NOTES
" LOS: 0 days     Name: Cristina Lemus  Age: 89 y.o.  Sex: female  :  3/13/1930  MRN: [id number]         Primary Care Physician: Wood Elias MD    Subjective   Subjective  Feels better today overall.  Still with some increased urinary frequency and urgency.  Denies dysuria.  No fevers or chills.  No abdominal pain.    Objective   Vital Signs  Temp:  [97.7 °F (36.5 °C)-98.2 °F (36.8 °C)] 98 °F (36.7 °C)  Heart Rate:  [63-98] 98  Resp:  [16-18] 16  BP: (154-191)/(66-95) 155/84  Body mass index is 19.54 kg/m².    Objective:  General Appearance:  Comfortable and in no acute distress (Looks elderly, roughly her stated age, little bit weak and deconditioned appearing).    Vital signs: (most recent): Blood pressure 155/84, pulse 98, temperature 98 °F (36.7 °C), temperature source Oral, resp. rate 16, height 165.1 cm (65\"), weight 53.3 kg (117 lb 6.4 oz), SpO2 91 %.    Lungs:  Normal effort and normal respiratory rate.  There are decreased breath sounds.    Heart: Normal rate.  Regular rhythm.    Abdomen: Abdomen is soft.  Bowel sounds are normal.   There is no abdominal tenderness.     Extremities: There is no dependent edema or local swelling.    Neurological: Patient is alert and oriented to person, place and time.    Skin:  Warm and dry.              Results Review:       I reviewed the patient's new clinical results.    Results from last 7 days   Lab Units 06/10/21  03321  1519   WBC 10*3/mm3 6.02 6.08   HEMOGLOBIN g/dL 9.4* 12.1   PLATELETS 10*3/mm3 212 279     Results from last 7 days   Lab Units 06/10/21  03321  1519   SODIUM mmol/L 138 135*   POTASSIUM mmol/L 3.4* 3.5   CHLORIDE mmol/L 100 91*   CO2 mmol/L 23.2 25.1   BUN mg/dL 80* 83*   CREATININE mg/dL 2.96* 3.35*   CALCIUM mg/dL 8.1* 9.3   GLUCOSE mg/dL 84 109*     Results from last 7 days   Lab Units 06/10/21  0331 21  1519   INR  2.99* 2.43*             Scheduled Meds:   allopurinol, 50 mg, Oral, Daily  amLODIPine, 10 mg, Oral, " Daily  cloNIDine, 0.1 mg, Oral, TID  warfarin, 0.5 mg, Oral, Once  [START ON 6/12/2021] warfarin, 2 mg, Oral, Once per day on Sun Tue Thu Sat  warfarin, 4 mg, Oral, Once per day on Mon Wed Fri      PRN Meds:   •  acetaminophen  •  LORazepam  •  melatonin  •  nitroglycerin  •  ondansetron **OR** ondansetron  •  Pharmacy to dose warfarin  Continuous Infusions:  Pharmacy to dose warfarin,   sodium chloride, 75 mL/hr, Last Rate: 75 mL/hr (06/09/21 4617)        Assessment/Plan   Active Hospital Problems    Diagnosis  POA   • **KIM (acute kidney injury) (CMS/[facility]) [N17.9]  Yes   • Chronic diastolic (congestive) heart failure (CMS/[facility]) [I50.32]  Yes   • Stage 4 chronic kidney disease (CMS/[facility]) [N18.4]  Yes   • History of pulmonary embolus (PE) [Z86.711]  Yes   • Permanent atrial fibrillation (CMS/[facility]) [I48.21]  Yes      Resolved Hospital Problems   No resolved problems to display.       Assessment & Plan    -Currently on gentle IV fluids with improvement of creatinine today.  Lasix currently on hold.  We will consult her nephrologist who she recently established care with at the end of last month.  -Pharmacy to dose Coumadin  -Up out of bed today        I wore full protective equipment throughout the patient encounter including eye protection and facemask.  Hand hygiene was performed before donning protective equipment and after removal when leaving the room.    Carlos Carr MD  [facility]  06/10/21  11:35 EDT

## 2021-06-10 NOTE — PLAN OF CARE
Problem: Adult Inpatient Plan of Care  Goal: Plan of Care Review  Outcome: Ongoing, Progressing  Flowsheets (Taken 6/10/2021 7590)  Progress: improving  Plan of Care Reviewed With: patient  Outcome Summary: Pt vitals stable up to chair most of day. Ambulated in moore x1. IVF continued. Neph consulted. No c/o pain. Will continue to monitor   Goal Outcome Evaluation:  Plan of Care Reviewed With: patient        Progress: improving  Outcome Summary: Pt vitals stable up to chair most of day. Ambulated in moore x1. IVF continued. Neph consulted. No c/o pain. Will continue to monitor

## 2021-06-10 NOTE — CASE MANAGEMENT/SOCIAL WORK
Discharge Planning Assessment  [facility]     Patient Name: Cristina Lemus  MRN: [id number]  Today's Date: 6/10/2021    Admit Date: 6/9/2021    Discharge Needs Assessment     Row Name 06/10/21 1015       Living Environment    Lives With  child(liana), adult    Name(s) of Who Lives With Patient  Shannan Lemus, daughter, [phone]    Current Living Arrangements  home/apartment/condo    Primary Care Provided by  self    Provides Primary Care For  no one, unable/limited ability to care for self    Family Caregiver if Needed  child(liana), adult    Family Caregiver Names  Shannan Lemus, daughter, [phone]    Quality of Family Relationships  unable to assess    Able to Return to Prior Arrangements  yes       Resource/Environmental Concerns    Resource/Environmental Concerns  none       Transition Planning    Patient/Family Anticipates Transition to  home with family    Patient/Family Anticipated Services at Transition  none    Transportation Anticipated  other (see comments) Will need transport home at D/C       Discharge Needs Assessment    Readmission Within the Last 30 Days  no previous admission in last 30 days    Equipment Currently Used at Home  walker, rolling    Concerns to be Addressed  care coordination/care conferences;discharge planning    Anticipated Changes Related to Illness  none    Equipment Needed After Discharge  none    Discharge Facility/Level of Care Needs  home with home health Pt states she has someone come to her home to draw her INRs    Provided Post Acute Provider List?  Refused    Provided Post Acute Provider Quality & Resource List?  Refused    Current Discharge Risk  chronically ill        Discharge Plan    No documentation.       Continued Care and Services - Admitted Since 6/9/2021    Coordination has not been started for this encounter.         Demographic Summary     Row Name 06/10/21 1014       General Information    Admission Type  observation    Arrived From  emergency department     Required Notices Provided  Observation Status Notice    Reason for Consult  discharge planning;care coordination/care conference    Preferred Language  English     Used During This Interaction  no       Contact Information    Permission Granted to Share Info With  family/designee Shannan Lemus, daughter, [phone]        Functional Status     Row Name 06/10/21 1015       Functional Status    Usual Activity Tolerance  moderate    Current Activity Tolerance  moderate       Functional Status, IADL    Medications  assistive equipment and person    Meal Preparation  assistive equipment and person    Housekeeping  assistive equipment and person    Laundry  assistive equipment and person    Shopping  assistive equipment and person       Mental Status    General Appearance WDL  WDL       Mental Status Summary    Recent Changes in Mental Status/Cognitive Functioning  no changes                Donaldo Grover, RN

## 2021-06-10 NOTE — CASE MANAGEMENT/SOCIAL WORK
Continued Stay Note  [facility]     Patient Name: Cristina Lemus  MRN: [id number]  Today's Date: 6/10/2021    Admit Date: 6/9/2021    Discharge Plan     Row Name 06/10/21 1202       Plan    Plan  Return home with daughter. PT eval pending. Will need transport setup at D/C by [facility].    Patient/Family in Agreement with Plan  yes    Plan Comments  Met with pt. at bedside. Explained roll of . Face sheet and pharmacy verified. Pt lives with Shannan Lemus, daughter, [phone]. There are no steps to enter home.  DME equipment includes a walker.  Pt is independent with ADLs. Pt has never been to Rehab or used HH. Pt states [facility] come to her home and draws her INRs. Pt’s PCP is Dr. LADAN Elias. Uses [facility] on [address]. Pt nor daughter drive. Pt states she gets to appointments by cab or [employer]. At discharge, pt. will need transport setup by [facility]. Explained that [facility] would follow to assess for discharge needs.  Donaldo Grover RN-BC        Discharge Codes    No documentation.             Donaldo Grover RN

## 2021-06-10 NOTE — PROGRESS NOTES
Pharmacy Consult: Warfarin Dosing/ Monitoring    Cristina Lemus is a 89 y.o. female, estimated creatinine clearance is 10.4 mL/min (A) (by C-G formula based on SCr of 2.96 mg/dL (H)). weighing 53.3 kg (117 lb 6.4 oz).     has a past medical history of Atrial fibrillation (CMS/HCC) and CHF (congestive heart failure) (CMS/formerly [facility]).    Social History     Tobacco Use   • Smoking status: Never Smoker   • Smokeless tobacco: Never Used   Substance Use Topics   • Alcohol use: No     Comment: caffeine use    • Drug use: No       Results from last 7 days   Lab Units 06/10/21  0331 06/09/21  1519   INR  2.99* 2.43*   HEMOGLOBIN g/dL 9.4* 12.1   HEMATOCRIT % 28.7* 37.3   PLATELETS 10*3/mm3 212 279     Results from last 7 days   Lab Units 06/10/21  0331 06/09/21  1519   SODIUM mmol/L 138 135*   POTASSIUM mmol/L 3.4* 3.5   CHLORIDE mmol/L 100 91*   CO2 mmol/L 23.2 25.1   BUN mg/dL 80* 83*   CREATININE mg/dL 2.96* 3.35*   CALCIUM mg/dL 8.1* 9.3   BILIRUBIN mg/dL  --  0.6   ALK PHOS U/L  --  79   ALT (SGPT) U/L  --  14   AST (SGOT) U/L  --  25   GLUCOSE mg/dL 84 109*     Anticoagulation history: chronic warfarin for A.fib (warfarin 2 mg nightly except 4 mg Mon, Wed, Fri)    Hospital Anticoagulation:  Consulting provider: Dr. Joseph  Start date: 6/9/21  Indication: A.fib  Target INR: 2-3  Expected duration: Indefinite   Bridge Therapy: No                Date 6/9 6/10           INR 2.43 2.99           Warfarin dose 4 mg 0.5 mg             Potential drug interactions: none    Relevant nutrition status: Regular diet; consistent carbs + renal    Education complete?/ Date:TBD      Assessment/Plan:  · INR therapeutic at 2.99 (Goal 2-3). In light of the regimen being at the upper end of normal I will opt to give the patient a reduced dose today of warfarin 0.5 mg x 1 with tentative plans to resume home regimen tomorrow as detailed above pending INR in the AM.     Pharmacy will continue to follow until discharge or discontinuation of warfarin.    Verónica Hinojosa, [facility]  6/10/2021

## 2021-06-10 NOTE — CONSULTS
[facility] Consult Note      Patient Name: Cristina Lemus  : 3/13/1930  MRN: [id number]  Primary Care Physician:  Wood Elias MD  Referring Physician: Rizwana Joseph MD  Date of admission: 2021    Subjective     Reason for Consult: KIM on CKD stage IV    HPI:   Cristina Lemus is a 89 y.o. female with underlying CKD stage IV, chronic diastolic congestive heart failure, baseline serum creatinine of around 2.5 mg/dL, presented with weakness and urinary frequency.  He was suspected to be volume depleted.  She was started on gentle IV hydration with normal saline and her Lasix has been placed on hold.  She says she feels somewhat better today.  Appetite slowly improving  Denies shortness of breath or lower extremity swelling  Upon presentation serum creatinine was up to 3.3 mg/dL    Review of Systems:   14 point review of systems is otherwise negative except for mentioned above on HPI    Personal History     Past Medical History:   Diagnosis Date   • Atrial fibrillation (CMS/HCC)    • CHF (congestive heart failure) (CMS/HCC)        Past Surgical History:   Procedure Laterality Date   • BACK SURGERY     • CYST REMOVAL     • OTHER SURGICAL HISTORY      rupture disk       Family History: family history is not on file.    Social History:  reports that she has never smoked. She has never used smokeless tobacco. She reports that she does not drink alcohol and does not use drugs.    Home Medications:  Prior to Admission medications    Medication Sig Start Date End Date Taking? Authorizing Provider   allopurinol (ZYLOPRIM) 100 MG tablet Take 0.5 tablets by mouth Daily. 21  Yes Wood Elias MD   amLODIPine (NORVASC) 5 MG tablet Take 1 tablet by mouth Daily.  Patient taking differently: Take 10 mg by mouth Daily. 21  Yes Lm Mart MD   cloNIDine (CATAPRES) 0.1 MG tablet Take 0.1 mg by mouth 3 (Three) Times a Day. Pt states she has not started taking this medication.    Yes Oswald Sheppard MD   furosemide (LASIX) 40 MG tablet Take 1 tablet by mouth Daily. 2/22/21  Yes Lm Mart MD   warfarin (COUMADIN) 2 MG tablet Take 2 mg by mouth See Admin Instructions. Every day BUT Monday, Wednesday and Friday   Yes Oswald Sheppard MD   warfarin (COUMADIN) 2 MG tablet Take 4 mg by mouth 3 (Three) Times a Week. Monday,Wednesday and Friday   Yes Oswald Sheppard MD   LORazepam (ATIVAN) 1 MG tablet Take 1 mg by mouth At Night As Needed. 4/6/17 6/10/21 Yes Oswald Sheppard MD   LORazepam (ATIVAN) 1 MG tablet Take 1 tablet by mouth At Night As Needed for Sleep. 6/10/21   Wood Elias MD       Allergies:  Allergies   Allergen Reactions   • Statins Nausea And Vomiting   • Sulfa Antibiotics Nausea And Vomiting and Other (See Comments)     CHILLS AND FEVER       Objective     Vitals:   Temp:  [97.9 °F (36.6 °C)-98.2 °F (36.8 °C)] 98 °F (36.7 °C)  Heart Rate:  [65-98] 65  Resp:  [16-18] 16  BP: (141-191)/(69-95) 141/69    Intake/Output Summary (Last 24 hours) at 6/10/2021 1536  Last data filed at 6/10/2021 1304  Gross per 24 hour   Intake 280 ml   Output 500 ml   Net -220 ml       Physical Exam:    General Appearance: alert, oriented x 3, no acute distress   Skin: warm and dry  HEENT: oral mucosa normal, nonicteric sclera  Neck: supple, no JVD  Lungs: CTA  Heart: RRR, normal S1 and S2  Abdomen: soft, nontender, nondistended  Extremities: no edema, cyanosis or clubbing  Neuro: normal speech and mental status     Scheduled Meds:     allopurinol, 50 mg, Oral, Daily  amLODIPine, 10 mg, Oral, Daily  cloNIDine, 0.1 mg, Oral, TID  warfarin, 0.5 mg, Oral, Once  [START ON 6/12/2021] warfarin, 2 mg, Oral, Once per day on Sun Tue Thu Sat  warfarin, 4 mg, Oral, Once per day on Mon Wed Fri      IV Meds:   Pharmacy to dose warfarin,   sodium chloride, 75 mL/hr, Last Rate: 75 mL/hr (06/10/21 2777)        Results Reviewed:   I have personally reviewed the results from the time of  this admission to 6/10/2021 15:36 EDT     Lab Results   Component Value Date    GLUCOSE 84 06/10/2021    CALCIUM 8.1 (L) 06/10/2021     06/10/2021    K 3.4 (L) 06/10/2021    CO2 23.2 06/10/2021     06/10/2021    BUN 80 (H) 06/10/2021    CREATININE 2.96 (H) 06/10/2021    EGFRIFAFRI  06/09/2021      Comment:      <15 Indicative of kidney failure.    EGFRIFNONA 15 (L) 06/10/2021    BCR 27.0 (H) 06/10/2021    ANIONGAP 14.8 06/10/2021      Lab Results   Component Value Date    MG 2.3 05/28/2021    PHOS 4.2 05/28/2021    ALBUMIN 4.50 06/09/2021     US Renal Bilateral    Result Date: 5/28/2021  RENAL ULTRASOUND  HISTORY: Chronic kidney disease stage V  COMPARISON: None  TECHNIQUE: Real-time ultrasound of the kidneys and bladder was performed. Static images reviewed.  FINDINGS: The right kidney measures 9 cm in length, and the left kidney measures 8.3 cm in length. There is a cyst within the right kidney that measures 1 cm. There is thinning of the renal cortex bilaterally consistent with history of chronic renal disease. There is no hydronephrosis. Survey view of the bladder is unremarkable.      Impression: Renal cortical thinning chronic renal disease  This report was finalized on 5/28/2021 1:29 PM by Dr. Brian Abel M.D.         Assessment / Plan     ASSESSMENT:  1.  Acute kidney injury on CKD stage IV, baseline creatinine of 2.5 mg/dL, with creatinine up to 3.3 mg/dL Lauren due to prerenal state from mild volume depletion.  2.  Hypertension  3.  Chronic diastolic congestive heart failure    PLAN:  1.  Agree with gentle IV hydration with normal saline at 75 cc/h which we will likely stop by tomorrow if appetite continues to improve as blood pressure is quite stable  2.  Agree with holding Lasix for now  3.  Renal panel magnesium in a.m.    Thank you for involving us in the care of Cristina Lemus.  Please feel free to call with any questions.    Karlos Reyes MD  06/10/21  15:36 EDT    [facility]  [phone]      Much of this encounter note is an electronic transcription/translation of spoken language to printed text. The electronic translation of spoken language may permit erroneous, or at times, nonsensical words or phrases to be inadvertently transcribed; Although I have reviewed the note for such errors, some may still exist.

## 2021-06-10 NOTE — PROGRESS NOTES
Pharmacy Consult: Warfarin Dosing/ Monitoring    Cristina Lemus is a 89 y.o. female, estimated creatinine clearance is 9.2 mL/min (A) (by C-G formula based on SCr of 3.35 mg/dL (H)). weighing 53.3 kg (117 lb 6.4 oz).     has a past medical history of Atrial fibrillation (CMS/HCC) and CHF (congestive heart failure) (CMS/[facility]).    Social History     Tobacco Use    Smoking status: Never Smoker    Smokeless tobacco: Never Used   Substance Use Topics    Alcohol use: No     Comment: caffeine use     Drug use: No       Results from last 7 days   Lab Units 06/09/21  1519   INR  2.43*   HEMOGLOBIN g/dL 12.1   HEMATOCRIT % 37.3   PLATELETS 10*3/mm3 279     Results from last 7 days   Lab Units 06/09/21  1519   SODIUM mmol/L 135*   POTASSIUM mmol/L 3.5   CHLORIDE mmol/L 91*   CO2 mmol/L 25.1   BUN mg/dL 83*   CREATININE mg/dL 3.35*   CALCIUM mg/dL 9.3   BILIRUBIN mg/dL 0.6   ALK PHOS U/L 79   ALT (SGPT) U/L 14   AST (SGOT) U/L 25   GLUCOSE mg/dL 109*     Anticoagulation history: chronic warfarin for A.fib (warfarin 2 mg nightly except 4 mg Mon, Wed, Fri)    Hospital Anticoagulation:  Consulting provider: Dr. Joseph  Start date: 6/9/21  Indication: A.fib  Target INR: 2-3  Expected duration: Indefinite   Bridge Therapy: No                Date 6/9            INR 2.43            Warfarin dose 4 mg              Potential drug interactions: none    Relevant nutrition status:     Other:     Education complete?/ Date: not yet completed    Assessment/Plan:  INR therapuetic, continue home regimen.  Monitor daily PT/INR  Follow up 6/10    Pharmacy will continue to follow until discharge or discontinuation of warfarin.   Jayjay Alston III, [facility]  6/10/2021

## 2021-06-11 ENCOUNTER — READMISSION MANAGEMENT (OUTPATIENT)
Dept: CALL CENTER | Facility: HOSPITAL | Age: 86
End: 2021-06-11

## 2021-06-11 ENCOUNTER — HOME HEALTH ADMISSION (OUTPATIENT)
Dept: HOME HEALTH SERVICES | Facility: HOME HEALTHCARE | Age: 86
End: 2021-06-11

## 2021-06-11 VITALS
DIASTOLIC BLOOD PRESSURE: 84 MMHG | HEART RATE: 66 BPM | WEIGHT: 124 LBS | OXYGEN SATURATION: 90 % | SYSTOLIC BLOOD PRESSURE: 148 MMHG | RESPIRATION RATE: 18 BRPM | HEIGHT: 65 IN | TEMPERATURE: 98.1 F | BODY MASS INDEX: 20.66 KG/M2

## 2021-06-11 LAB
ALBUMIN SERPL-MCNC: 3.5 G/DL (ref 3.5–5.2)
ANION GAP SERPL CALCULATED.3IONS-SCNC: 13.6 MMOL/L (ref 5–15)
BUN SERPL-MCNC: 75 MG/DL (ref 8–23)
BUN/CREAT SERPL: 27.1 (ref 7–25)
CALCIUM SPEC-SCNC: 8 MG/DL (ref 8.2–9.6)
CHLORIDE SERPL-SCNC: 104 MMOL/L (ref 98–107)
CO2 SERPL-SCNC: 21.4 MMOL/L (ref 22–29)
CREAT SERPL-MCNC: 2.77 MG/DL (ref 0.57–1)
DEPRECATED RDW RBC AUTO: 48.9 FL (ref 37–54)
ERYTHROCYTE [DISTWIDTH] IN BLOOD BY AUTOMATED COUNT: 15.9 % (ref 12.3–15.4)
GFR SERPL CREATININE-BSD FRML MDRD: 16 ML/MIN/1.73
GLUCOSE SERPL-MCNC: 84 MG/DL (ref 65–99)
HCT VFR BLD AUTO: 28.7 % (ref 34–46.6)
HGB BLD-MCNC: 9.3 G/DL (ref 12–15.9)
INR PPP: 2.99 (ref 0.9–1.1)
MAGNESIUM SERPL-MCNC: 2.3 MG/DL (ref 1.7–2.3)
MCH RBC QN AUTO: 27.3 PG (ref 26.6–33)
MCHC RBC AUTO-ENTMCNC: 32.4 G/DL (ref 31.5–35.7)
MCV RBC AUTO: 84.2 FL (ref 79–97)
PHOSPHATE SERPL-MCNC: 3.6 MG/DL (ref 2.5–4.5)
PLATELET # BLD AUTO: 203 10*3/MM3 (ref 140–450)
PMV BLD AUTO: 10.9 FL (ref 6–12)
POTASSIUM SERPL-SCNC: 3.9 MMOL/L (ref 3.5–5.2)
PROTHROMBIN TIME: 30.8 SECONDS (ref 11.7–14.2)
RBC # BLD AUTO: 3.41 10*6/MM3 (ref 3.77–5.28)
SODIUM SERPL-SCNC: 139 MMOL/L (ref 136–145)
WBC # BLD AUTO: 7.17 10*3/MM3 (ref 3.4–10.8)

## 2021-06-11 PROCEDURE — 97530 THERAPEUTIC ACTIVITIES: CPT

## 2021-06-11 PROCEDURE — 80069 RENAL FUNCTION PANEL: CPT | Performed by: INTERNAL MEDICINE

## 2021-06-11 PROCEDURE — 97162 PT EVAL MOD COMPLEX 30 MIN: CPT

## 2021-06-11 PROCEDURE — 85027 COMPLETE CBC AUTOMATED: CPT | Performed by: INTERNAL MEDICINE

## 2021-06-11 PROCEDURE — 96361 HYDRATE IV INFUSION ADD-ON: CPT

## 2021-06-11 PROCEDURE — 85610 PROTHROMBIN TIME: CPT | Performed by: INTERNAL MEDICINE

## 2021-06-11 PROCEDURE — 83735 ASSAY OF MAGNESIUM: CPT | Performed by: INTERNAL MEDICINE

## 2021-06-11 PROCEDURE — G0378 HOSPITAL OBSERVATION PER HR: HCPCS

## 2021-06-11 RX ORDER — FUROSEMIDE 40 MG/1
40 TABLET ORAL DAILY
Qty: 30 TABLET | Refills: 11 | Status: SHIPPED | OUTPATIENT
Start: 2021-06-14 | End: 2021-06-27 | Stop reason: HOSPADM

## 2021-06-11 RX ORDER — WARFARIN SODIUM 4 MG/1
4 TABLET ORAL
Status: DISCONTINUED | OUTPATIENT
Start: 2021-06-12 | End: 2021-06-11 | Stop reason: HOSPADM

## 2021-06-11 RX ORDER — AMLODIPINE BESYLATE 10 MG/1
10 TABLET ORAL DAILY
Qty: 90 TABLET | Refills: 0 | Status: SHIPPED | OUTPATIENT
Start: 2021-06-12 | End: 2021-08-17 | Stop reason: SDUPTHER

## 2021-06-11 RX ORDER — WARFARIN SODIUM 2 MG/1
2 TABLET ORAL
Status: DISCONTINUED | OUTPATIENT
Start: 2021-06-11 | End: 2021-06-11 | Stop reason: HOSPADM

## 2021-06-11 RX ADMIN — ALLOPURINOL 50 MG: 100 TABLET ORAL at 08:41

## 2021-06-11 RX ADMIN — AMLODIPINE BESYLATE 10 MG: 10 TABLET ORAL at 08:41

## 2021-06-11 RX ADMIN — SODIUM CHLORIDE 75 ML/HR: 9 INJECTION, SOLUTION INTRAVENOUS at 00:12

## 2021-06-11 RX ADMIN — LORAZEPAM 1 MG: 1 TABLET ORAL at 00:11

## 2021-06-11 NOTE — OUTREACH NOTE
Prep Survey      Responses   [facility] patient discharged from?  [place]   Is LACE score < 7 ?  No   Emergency Room discharge w/ pulse ox?  No   Eligibility  [facility]   Date of Admission  06/09/21   Date of Discharge  06/11/21   Discharge Disposition  Home or Self Care   Discharge diagnosis  KIM Stage 4 chronic kidney disease    Does the patient have one of the following disease processes/diagnoses(primary or secondary)?  Other   Does the patient have Home health ordered?  Yes   What is the Home health agency?   [facility]     Is there a DME ordered?  No   Prep survey completed?  Yes          Jaki Lopez RN

## 2021-06-11 NOTE — DISCHARGE SUMMARY
Patient Name: Cristina Lemus  : 3/13/1930  MRN: [id number]    Date of Admission: 2021  Date of Discharge:  2021  Primary Care Physician: Wood Elias MD      Chief Complaint:   Urinary Frequency      Discharge Diagnoses     Active Hospital Problems    Diagnosis  POA   • **KIM (acute kidney injury) (CMS/[facility]) [N17.9]  Yes   • Chronic diastolic (congestive) heart failure (CMS/[facility]) [I50.32]  Yes   • Stage 4 chronic kidney disease (CMS/[facility]) [N18.4]  Yes   • History of pulmonary embolus (PE) [Z86.711]  Yes   • Permanent atrial fibrillation (CMS/[facility]) [I48.21]  Yes      Resolved Hospital Problems   No resolved problems to display.        Hospital Course       89-year-old female with a past medical history of permanent atrial fibrillation, history of pulmonary embolus, and chronic diastolic heart failure presented the ER on  with urinary frequency.  She is found to have an acute kidney injury with her creatinine 3.35 on arrival.  Nephrology was consulted, her Lasix was held.  Her creatinine improved with gentle fluid resuscitation.  She was discharged on  with the instruction to not restart Lasix until Monday, .  I have communicated this with her daughter.      In addition, her blood pressure was also somewhat elevated throughout the course of this admission.  Amlodipine was increased from 5 mg to 10 mg daily.    She will have a follow-up appointment nephrology for further management of her diuretic as well as monitoring of her kidney function.    Day of Discharge     Physical Exam:  Temp:  [98.1 °F (36.7 °C)] 98.1 °F (36.7 °C)  Heart Rate:  [67-72] 72  Resp:  [16-18] 18  BP: (158-167)/(81-91) 167/84  Body mass index is 20.63 kg/m².  Physical Exam  Constitutional:       Appearance: Normal appearance.   HENT:      Head: Normocephalic and atraumatic.   Cardiovascular:      Rate and Rhythm: Normal rate. Rhythm irregular.   Pulmonary:      Effort: Pulmonary effort is normal. No respiratory distress.    Abdominal:      General: There is no distension.      Palpations: Abdomen is soft.      Tenderness: There is no abdominal tenderness.   Neurological:      General: No focal deficit present.      Mental Status: She is alert and oriented to person, place, and time.         Consultants     Consult Orders (all) (From admission, onward)     Start     Ordered    06/10/21 1035  Inpatient Nephrology Consult  Once     Specialty:  Nephrology  Provider:  Hong Newman MD    06/10/21 1035    06/09/21 1756  [facility] (on-call MD unless specified) Details  Once     Specialty:  Hospitalist  Provider:  Rizwana Joseph MD    06/09/21 1755              Procedures     Imaging Results (All)     None          Pertinent Labs     Results from last 7 days   Lab Units 06/11/21  0406 06/10/21  0331 06/09/21  1519   WBC 10*3/mm3 7.17 6.02 6.08   HEMOGLOBIN g/dL 9.3* 9.4* 12.1   PLATELETS 10*3/mm3 203 212 279     Results from last 7 days   Lab Units 06/11/21  0406 06/10/21  0331 06/09/21  1519   SODIUM mmol/L 139 138 135*   POTASSIUM mmol/L 3.9 3.4* 3.5   CHLORIDE mmol/L 104 100 91*   CO2 mmol/L 21.4* 23.2 25.1   BUN mg/dL 75* 80* 83*   CREATININE mg/dL 2.77* 2.96* 3.35*   GLUCOSE mg/dL 84 84 109*   Estimated Creatinine Clearance: 11.7 mL/min (A) (by C-G formula based on SCr of 2.77 mg/dL (H)).  Results from last 7 days   Lab Units 06/11/21  0406 06/09/21  1519   ALBUMIN g/dL 3.50 4.50   BILIRUBIN mg/dL  --  0.6   ALK PHOS U/L  --  79   AST (SGOT) U/L  --  25   ALT (SGPT) U/L  --  14     Results from last 7 days   Lab Units 06/11/21  0406 06/10/21  0331 06/09/21  1519   CALCIUM mg/dL 8.0* 8.1* 9.3   ALBUMIN g/dL 3.50  --  4.50   MAGNESIUM mg/dL 2.3  --   --    PHOSPHORUS mg/dL 3.6  --   --                Invalid input(s): LDLCALC        Test Results Pending at Discharge       Discharge Details        Discharge Medications      Changes to Medications      Instructions Start Date   amLODIPine 10 MG tablet  Commonly known as:  NORVASC  What changed:   · medication strength  · how much to take   10 mg, Oral, Daily   Start Date: June 12, 2021     furosemide 40 MG tablet  Commonly known as: LASIX  What changed: These instructions start on June 14, 2021. If you are unsure what to do until then, ask your doctor or other care provider.   40 mg, Oral, Daily   Start Date: June 14, 2021     LORazepam 1 MG tablet  Commonly known as: ATIVAN  What changed: reasons to take this   1 mg, Oral, Nightly PRN         Continue These Medications      Instructions Start Date   allopurinol 100 MG tablet  Commonly known as: ZYLOPRIM   50 mg, Oral, Daily      cloNIDine 0.1 MG tablet  Commonly known as: CATAPRES   0.1 mg, Oral, 3 Times Daily, Pt states she has not started taking this medication.       warfarin 2 MG tablet  Commonly known as: COUMADIN   2 mg, Oral, See Admin Instructions, Every day BUT Monday, Wednesday and Friday       warfarin 2 MG tablet  Commonly known as: COUMADIN   4 mg, Oral, 3 Times Weekly, Monday,Wednesday and Friday              Allergies   Allergen Reactions   • Statins Nausea And Vomiting   • Sulfa Antibiotics Nausea And Vomiting and Other (See Comments)     CHILLS AND FEVER         Discharge Disposition:  Home or Self Care    Discharge Diet:  Diet Order   Procedures   • Diet Regular; Cardiac, Consistent Carbohydrate, Renal       Discharge Activity: as tolerated       CODE STATUS:    Code Status and Medical Interventions:   Ordered at: 06/09/21 1845     Code Status:    CPR     Medical Interventions (Level of Support Prior to Arrest):    Full       Future Appointments   Date Time Provider Department Center   7/22/2021  1:15 PM Wood Elias MD [facility]   8/31/2021  1:30 PM Joelle Pinto APRN [facility]     Additional Instructions for the Follow-ups that You Need to Schedule     Ambulatory Referral to Home Health   As directed      Face to Face Visit Date: 6/11/2021    Follow-up provider for Plan of Care?: I  treated the patient in an acute care facility and will not continue treatment after discharge.    Follow-up provider: WOOD PRADO [[id number]]    Reason/Clinical Findings: age related physical debility    Describe mobility limitations that make leaving home difficult: age related physical debility    Nursing/Therapeutic Services Requested: Physical Therapy    PT orders: Strengthening Therapeutic exercise    Frequency: 1 Week 1           Follow-up Information     Wood Prado MD .    Specialties: Family Medicine, Emergency Medicine  Contact information:  [address]  [facility] [address]  [place] [id number] [phone]             Karlos Reyes MD Follow up.    Specialty: Nephrology  Why: Call to be an appointment with your nephrologist approximately 1 week after discharge or earliest available for monitoring of your kidney function.  Contact information:  [address]  [facility] [address]  [place] [id number] [phone]                   Additional Instructions for the Follow-ups that You Need to Schedule     Ambulatory Referral to Home Health   As directed      Face to Face Visit Date: 6/11/2021    Follow-up provider for Plan of Care?: I treated the patient in an acute care facility and will not continue treatment after discharge.    Follow-up provider: WOOD PRADO [[id number]]    Reason/Clinical Findings: age related physical debility    Describe mobility limitations that make leaving home difficult: age related physical debility    Nursing/Therapeutic Services Requested: Physical Therapy    PT orders: Strengthening Therapeutic exercise    Frequency: 1 Week 1           Time Spent on Discharge:  Greater than 30 minutes      Roger Blackburn MD  [facility]  06/11/21  13:12 EDT

## 2021-06-11 NOTE — PROGRESS NOTES
Home Health SN/PT orders received. Spoke with patient regarding [facility] services to which she is agreeable. INRs are drawn at home by [facility] and Coumadin managed by [facility]. Contact information confirmed. Thank you.

## 2021-06-11 NOTE — CASE MANAGEMENT/SOCIAL WORK
Case Management Discharge Note      Final Note: Home with daughter and [facility] to see for nursing and PT. Family transporting home.    Provided Post Acute Provider List?: Refused  Provided Post Acute Provider Quality & Resource List?: Refused    Selected Continued Care - Admitted Since 6/9/2021     Destination    No services have been selected for the patient.              Durable Medical Equipment    No services have been selected for the patient.              Dialysis/Infusion    No services have been selected for the patient.              Home Medical Care Coordination complete    Service Provider Selected Services Address Phone Fax Patient Preferred     [facility]  Home Health Services [address] [address] [phone] --          Therapy    No services have been selected for the patient.              Community Resources    No services have been selected for the patient.              Community & DME    No services have been selected for the patient.                  Transportation Services  Private: Car    Final Discharge Disposition Code: 06 - home with home health care

## 2021-06-11 NOTE — PLAN OF CARE
Goal Outcome Evaluation:  Plan of Care Reviewed With: patient        Progress: improving  Outcome Summary: no acute changes overnight. BP better tonight. IVF continue. VSS. continue to monitor

## 2021-06-11 NOTE — PLAN OF CARE
Goal Outcome Evaluation:  Plan of Care Reviewed With: patient        Progress: improving  Outcome Summary: Patient is a 89 yo female who presented with weakness and increased urinary frequency and admitted with KIM and dehydration. The patient states she lives with her daughter with no steps to enter home. Pt uses rwx at home for the last few months and is independent with mobility and ADLs. The patient presents today with decreased balance, activity tolerance, and strength. The patient completed supine to sitting EOB with SBA, STSx2 with CGA, and ambulated 175ft with rwx and CGA. No LOB or unsteadiness with ambulation this date. Slight unsteadiness in standing to perform lower body dressing at toilet. The patient will likely continue to benefit from skilled PT to increase level of independence. Anticipate home with daughter.  Patient was intermittently wearing a face mask during this therapy encounter. Therapist used appropriate personal protective equipment including eye protection, mask, and gloves.  Mask used was standard procedure mask. Appropriate PPE was worn during the entire therapy session. Hand hygiene was completed before and after therapy session. Patient is not in enhanced droplet precautions.

## 2021-06-11 NOTE — PROGRESS NOTES
Pharmacy Consult: Warfarin Dosing/ Monitoring    Cristina Lemus is a 89 y.o. female, estimated creatinine clearance is 11.7 mL/min (A) (by C-G formula based on SCr of 2.77 mg/dL (H)). weighing 56.2 kg (124 lb).     has a past medical history of Atrial fibrillation (CMS/[facility]) and CHF (congestive heart failure) (CMS/[facility]).    Social History     Tobacco Use   • Smoking status: Never Smoker   • Smokeless tobacco: Never Used   Substance Use Topics   • Alcohol use: No     Comment: caffeine use    • Drug use: No       Results from last 7 days   Lab Units 06/11/21  0406 06/10/21  0331 06/09/21  1519   INR  2.99* 2.99* 2.43*   HEMOGLOBIN g/dL 9.3* 9.4* 12.1   HEMATOCRIT % 28.7* 28.7* 37.3   PLATELETS 10*3/mm3 203 212 279     Results from last 7 days   Lab Units 06/11/21  0406 06/10/21  0331 06/09/21  1519   SODIUM mmol/L 139 138 135*   POTASSIUM mmol/L 3.9 3.4* 3.5   CHLORIDE mmol/L 104 100 91*   CO2 mmol/L 21.4* 23.2 25.1   BUN mg/dL 75* 80* 83*   CREATININE mg/dL 2.77* 2.96* 3.35*   CALCIUM mg/dL 8.0* 8.1* 9.3   BILIRUBIN mg/dL  --   --  0.6   ALK PHOS U/L  --   --  79   ALT (SGPT) U/L  --   --  14   AST (SGOT) U/L  --   --  25   GLUCOSE mg/dL 84 84 109*     Anticoagulation history: chronic warfarin for A.fib (warfarin 2 mg nightly except 4 mg Mon, Wed, Fri)    Hospital Anticoagulation:  Consulting provider: Dr. Joseph  Start date: 6/9/21  Indication: A.fib  Target INR: 2-3  Expected duration: Indefinite   Bridge Therapy: No                Date 6/9 6/10 6/11          INR 2.43 2.99 2.99          Warfarin dose 4 mg 0.5 mg 2 mg            Potential drug interactions: none    Relevant nutrition status: Regular diet; consistent carbs + renal    Education complete?/ Date:TBD      Assessment/Plan:  · INR therapeutic at 2.99 (Goal 2-3), unchanged from yesterday. In light of the regimen being at the upper end of normal I will opt to give the patient a reduced dose today of warfarin 2 mg x 1 with tentative plans to resume home regimen  tomorrow as detailed above pending INR in the AM.     Pharmacy will continue to follow until discharge or discontinuation of warfarin.   Verónica Hinojosa, [facility]  6/11/2021

## 2021-06-11 NOTE — DISCHARGE PLACEMENT REQUEST
"Steven Lemus (89 y.o. Female)     Date of Birth Social Security Number Address Home Phone MRN    03/13/1930  [address] [place] [id number] [phone] [id number]    [employer] Marital Status          None Single       Admission Date Admission Type Admitting Provider Attending Provider Department, Room/Bed    6/9/21 Emergency Stingl, MD Alexey Reynoso Aakash, MD [address], [address]    Discharge Date Discharge Disposition Discharge Destination         Home or Self Care              Attending Provider: Roger Blackburn MD    Allergies: Statins, Sulfa Antibiotics    Isolation: None   Infection: None   Code Status: CPR    Ht: 165.1 cm (65\")   Wt: 56.2 kg (124 lb)    Admission Cmt: None   Principal Problem: KIM (acute kidney injury) (CMS/[facility]) [N17.9]                 Active Insurance as of 6/9/2021     Primary Coverage     Payor Plan Insurance Group Employer/Plan Group    MEDICARE MEDICARE A & B      Payor Plan Address Payor Plan Phone Number Payor Plan Fax Number Effective Dates    [address] [phone]  3/1/1995 - None Entered    [facility] [id number]       Subscriber Name Subscriber Birth Date Member ID       STEVEN LEMUS 3/13/1930 [id number]           Secondary Coverage     Payor Plan Insurance Group Employer/Plan Group    [facility] SUPP [id number]     Payor Plan Address Payor Plan Phone Number Payor Plan Fax Number Effective Dates    [address]   12/1/2016 - None Entered    [facility] [id number]       Subscriber Name Subscriber Birth Date Member ID       STEVEN LEMUS 3/13/1930 [id number]                 Emergency Contacts      (Rel.) Home Phone Work Phone Mobile Phone    Shannan Lemus (Daughter) -- -- [phone]              "

## 2021-06-11 NOTE — PROGRESS NOTES
[facility] Progress Note      Patient Name: Cristina Lemus  : 3/13/1930  MRN: [id number]  Primary Care Physician:  Wood Elias MD  Date of admission: 2021    Subjective     Interval History:   Feeling better overall  Blood pressure stable  Excellent urine output  Renal function improved    Review of Systems:   14 point review of systems is otherwise negative except for mentioned above on HPI    Objective     Vitals:   Temp:  [98 °F (36.7 °C)-98.1 °F (36.7 °C)] 98.1 °F (36.7 °C)  Heart Rate:  [65-72] 72  Resp:  [16-18] 18  BP: (141-167)/(69-91) 167/84    Intake/Output Summary (Last 24 hours) at 2021 1139  Last data filed at 2021 0900  Gross per 24 hour   Intake 360 ml   Output 450 ml   Net -90 ml       Physical Exam:    General Appearance: alert, oriented x 3, no acute distress   Skin: warm and dry  HEENT: oral mucosa normal, nonicteric sclera  Neck: supple, no JVD  Lungs: CTA  Heart: RRR, normal S1 and S2  Abdomen: soft, nontender, nondistended  Extremities: no edema, cyanosis or clubbing  Neuro: normal speech and mental status     Scheduled Meds:     allopurinol, 50 mg, Oral, Daily  amLODIPine, 10 mg, Oral, Daily  cloNIDine, 0.1 mg, Oral, TID  [START ON 2021] warfarin, 2 mg, Oral, Once per day on Sun Tue Thu Sat  warfarin, 2 mg, Oral, Once  [START ON 2021] warfarin, 4 mg, Oral, Once per day on       IV Meds:   Pharmacy to dose warfarin,         Results Reviewed:   I have personally reviewed the results from the time of this admission to 2021 11:39 EDT     Lab Results   Component Value Date    GLUCOSE 84 2021    CALCIUM 8.0 (L) 2021     2021    K 3.9 2021    CO2 21.4 (L) 2021     2021    BUN 75 (H) 2021    CREATININE 2.77 (H) 2021    EGFRIFAFRI  2021      Comment:      <15 Indicative of kidney failure.    EGFRIFNONA 16 (L) 2021    BCR 27.1 (H) 2021    ANIONGAP 13.6  06/11/2021      Lab Results   Component Value Date    CALCIUM 8.0 (L) 06/11/2021    PHOS 3.6 06/11/2021     Lab Results   Component Value Date    MG 2.3 06/11/2021      US Renal Bilateral    Result Date: 5/28/2021  RENAL ULTRASOUND  HISTORY: Chronic kidney disease stage V  COMPARISON: None  TECHNIQUE: Real-time ultrasound of the kidneys and bladder was performed. Static images reviewed.  FINDINGS: The right kidney measures 9 cm in length, and the left kidney measures 8.3 cm in length. There is a cyst within the right kidney that measures 1 cm. There is thinning of the renal cortex bilaterally consistent with history of chronic renal disease. There is no hydronephrosis. Survey view of the bladder is unremarkable.      Impression: Renal cortical thinning chronic renal disease  This report was finalized on 5/28/2021 1:29 PM by Dr. Brian Abel M.D.         Assessment / Plan     ASSESSMENT:  1.  Acute kidney injury on CKD stage IV, baseline creatinine of 2.5 mg/dL, with creatinine up to 3.3 mg/dL likely due to prerenal state from mild volume depletion.  2.  Hypertension  3.  Chronic diastolic congestive heart failure    PLAN:  1.  Discontinue IV fluids  2.  Encourage p.o. intake continue to hold Lasix for today  3.  Renal panel in a.m.  Otherwise okay to DC from renal standpoint with outpatient follow-up in the renal clinic in 1 to 2 weeks for reassessment of her volume status and adjustment of diuretics as necessary.    Thank you for involving us in the care of Cristina Lemus.  Please feel free to call with any questions.    Karlos Reyes MD  06/11/21  11:39 EDT    [facility]  [phone]      Much of this encounter note is an electronic transcription/translation of spoken language to printed text. The electronic translation of spoken language may permit erroneous, or at times, nonsensical words or phrases to be inadvertently transcribed; Although I have reviewed the note for such errors,  some may still exist.

## 2021-06-12 ENCOUNTER — HOME CARE VISIT (OUTPATIENT)
Dept: HOME HEALTH SERVICES | Facility: HOME HEALTHCARE | Age: 86
End: 2021-06-12

## 2021-06-12 PROCEDURE — G0299 HHS/HOSPICE OF RN EA 15 MIN: HCPCS

## 2021-06-14 ENCOUNTER — TRANSITIONAL CARE MANAGEMENT TELEPHONE ENCOUNTER (OUTPATIENT)
Dept: CALL CENTER | Facility: HOSPITAL | Age: 86
End: 2021-06-14

## 2021-06-14 ENCOUNTER — HOME CARE VISIT (OUTPATIENT)
Dept: HOME HEALTH SERVICES | Facility: HOME HEALTHCARE | Age: 86
End: 2021-06-14

## 2021-06-14 PROCEDURE — G0299 HHS/HOSPICE OF RN EA 15 MIN: HCPCS

## 2021-06-14 NOTE — OUTREACH NOTE
Call Center TCM Note      Responses   [facility] patient discharged from?  [facility]   Does the patient have one of the following disease processes/diagnoses(primary or secondary)?  Other   TCM attempt successful?  Yes   Call start time  1311   Call end time  1318   Discharge diagnosis  KIM Stage 4 chronic kidney disease, CHF   Is patient permission given to speak with other caregiver?  Yes   List who call center can speak with  Shannan Lemus, daughter   Person spoke with today (if not patient) and relationship  Shannan Lemus, daughter   Meds reviewed with patient/caregiver?  Yes   Is the patient having any side effects they believe may be caused by any medication additions or changes?  No   Does the patient have all medications ordered at discharge?  Yes   Is the patient taking all medications as directed (includes completed medication regime)?  No [Patient not taking the clonidine. Daughter states that it just makes her sick. ]   What is preventing the patient from taking all medications as directed?  Side effects   Nursing Interventions  Nurse provided patient education   Does the patient have a primary care provider?   Yes   Comments regarding PCP  PCP Dr Wood Elias. Daughter declined to schedule PCP appt with call today. She reports patient seeing nephrologist next week.    Has the patient kept scheduled appointments due by today?  N/A   What is the Home health agency?   [facility] [place], operated by [facility]    Has home health visited the patient within 72 hours of discharge?  Yes   Psychosocial issues?  No   Did the patient receive a copy of their discharge instructions?  Yes   Nursing interventions  Reviewed instructions with patient   What is the patient's perception of their health status since discharge?  Improving   Is the patient/caregiver able to teach back signs and symptoms related to disease process for when to call PCP?  Yes   Is the patient/caregiver able to teach back signs and symptoms related to disease process  for when to call 911?  Yes   Is the patient/caregiver able to teach back the hierarchy of who to call/visit for symptoms/problems? PCP, Specialist, Home health nurse, Urgent Care, ED, 911  Yes   If the patient is a current smoker, are they able to teach back resources for cessation?  Not a smoker   TCM call completed?  Yes   Wrap up additional comments  Denies further questions or needs today.           Jaki Galvan RN    6/14/2021, 13:18 EDT

## 2021-06-15 ENCOUNTER — TELEPHONE (OUTPATIENT)
Dept: INTERNAL MEDICINE | Facility: CLINIC | Age: 86
End: 2021-06-15

## 2021-06-15 ENCOUNTER — HOME CARE VISIT (OUTPATIENT)
Dept: HOME HEALTH SERVICES | Facility: HOME HEALTHCARE | Age: 86
End: 2021-06-15

## 2021-06-15 VITALS
OXYGEN SATURATION: 94 % | TEMPERATURE: 98.3 F | SYSTOLIC BLOOD PRESSURE: 142 MMHG | RESPIRATION RATE: 20 BRPM | DIASTOLIC BLOOD PRESSURE: 72 MMHG | HEART RATE: 68 BPM

## 2021-06-15 VITALS
SYSTOLIC BLOOD PRESSURE: 122 MMHG | HEART RATE: 62 BPM | TEMPERATURE: 97.5 F | RESPIRATION RATE: 18 BRPM | OXYGEN SATURATION: 96 % | DIASTOLIC BLOOD PRESSURE: 64 MMHG

## 2021-06-15 DIAGNOSIS — Z91.14 NONCOMPLIANCE WITH MEDICATION REGIMEN: Primary | ICD-10-CM

## 2021-06-15 DIAGNOSIS — Z78.9 SELF-CARE DEFICIT: ICD-10-CM

## 2021-06-15 LAB — INR PPP: 2.4

## 2021-06-15 NOTE — TELEPHONE ENCOUNTER
I put an order in for social work.  I have never ordered social work in an ambulatory environment so I am not sure how that ends up working.  However the order is in.

## 2021-06-15 NOTE — HOME HEALTH
nursing arrived to find patient not feeling well. She states she is very weak, c/o headache, denies any dizziness now but was this am.   has some sinus drip,  denies any gi or gu issues.  appetite fair, she had some beef stew and cheese sandwich, pb and crackers for breakfast.   just does not feel well. They did not call any physicians today for apts. Dtr states they were suppose to go see nephroligist tomorrow for labs. but they do not know if he still   needs them since she was in hosp But did not call them to even see .   Patient looks totally frustrated today, feels bad, refuses to take her clonidine. Has not talked to MD about it either.  visits, Mon, Thur, x 2 then Thurs x 2

## 2021-06-15 NOTE — TELEPHONE ENCOUNTER
Aimee w/ [facility] called to inform us that Ms. Lemus has stopped taking her Allopurinol (as she indicated she do not need it) she also stopped taking her Clonidine ( she states it make her feel funny as well as her tongue).     She reports that Ms. Lemus was not feeling well yesterday and she really need to be in rehab. As Ms. Lemus is very weak w/ no strength and has not been using her walker and has been holding onto the walls to walk around the house.    Aimee asked if an order for a  can be placed so they can try to get Ms. Lemus into a rehab facility as she's not agreeable to being in one.      Please advise.    Thanks,    Venkata

## 2021-06-16 ENCOUNTER — HOME CARE VISIT (OUTPATIENT)
Dept: HOME HEALTH SERVICES | Facility: HOME HEALTHCARE | Age: 86
End: 2021-06-16

## 2021-06-16 ENCOUNTER — ANTICOAGULATION VISIT (OUTPATIENT)
Dept: PHARMACY | Facility: HOSPITAL | Age: 86
End: 2021-06-16

## 2021-06-16 DIAGNOSIS — I48.21 PERMANENT ATRIAL FIBRILLATION (HCC): Primary | ICD-10-CM

## 2021-06-16 PROCEDURE — G0151 HHCP-SERV OF PT,EA 15 MIN: HCPCS

## 2021-06-16 NOTE — CASE COMMUNICATION
HUD SOC – HUDDLE START OF CARE    Caregiver Status: LIVES WITH DTR WHO HAS MS  AND UP WITH WALKER, PATIENT IS NORMALLY HER CG   Availability: LIVE TOGETHER  Ability to meet patient's needs: AT TIMES, THEY REALLY WORK TOGETHER.   Willingness: YES     Risk for Hospitalization:  HIGH     Patient's goal(s): TO GET STONGER   Services required to achieve goals:  SN, PT , MSW    Potential Issues for goal attainment:  WEAK AND FATIGUES EASILY, STUBBORN AS WELL     Discipline Assessment Updates:   89 YO FEMALE CHF, NO AD, A&O X 3  NEW MEDS, SEE CARE PLAN FOR GOALS.     Problems identified: NOT TAKING SOME OF HER MEDS AS SHE REFUSES TO TAKE ALLOPURINAL     Disease processes: CHF, AFIB,   Acuity and severity: LEVEL 2  Comorbidities:  Level of independence with management: INDEPENDENT , WITH OCCASIONAL ASSIST FROM DTR     Knowledge deficits: DISEASE PROCESS MGMT , MEDS,   Current conditions: CHF AFIB  Medications: SEE NOTE  Care procedures:     Functional status and safety: UP NO AD ,HOLDING ONTO WALLS , EDUCATION REVIEWED.     Mobility:    Self-care:     ADLs:     Any Duplication of Services: NONE     Environmental issues: NONE   physical environment:  set up/compatibility with patients needs  accessibility and safety    Equipment/Adjunct Services: NO ISSUES, SHE HAS WALKER REFUSES TO USE.   Devices for care present in the home:  Devices needed and not present:  NONE     Community resources involved/needed: MSW TO SEE FOR POSSIBLE PLACEMENT TO REHAB  Dialysis:  Transportation:   Meals on Wheels:    Any additional needs:    Based upon record review and collaboration conference, the recommended frequency for this patient is:     SN-----1WK1, 2WK2, 1WK3    PT-----1WK1 TO EVAL    OT----    ST-----    HHA---    MSW---TO EVAL          Please note that above is a recommendation only and that the plan of care should reflect the patient's clinical needs and goals. If in agreement, please complete note. If a different frequency is  necessary, please explain in note box and proceed per patients clinical needs.

## 2021-06-16 NOTE — PROGRESS NOTES
Anticoagulation Clinic Progress Note  Anticoagulation Summary  As of 2021    INR goal:  2.0-3.0   TTR:  64.9 % (7.7 mo)   INR used for dosin.40 (6/15/2021)   Warfarin maintenance plan:  4 mg every Mon, Wed, Fri; 2 mg all other days   Weekly warfarin total:  20 mg   No change documented:  Tylor Schmitt, PharmD   Plan last modified:  Mc Zaidi RP (2021)   Next INR check:  2021   Priority:  High   Target end date:  Indefinite    Indications    Permanent atrial fibrillation (CMS/HCC) [I48.21]             Anticoagulation Episode Summary     INR check location:      Preferred lab:      Send INR reminders to:   MAJO MARIE  POOL    Comments:  tried calling Chaim BARCLAY but no stable warf dosing available from July/Aug 2020 , prior to Eliquis; [facility]      Anticoagulation Care Providers     Provider Role Specialty Phone number    Lm Mart MD Referring Cardiology [phone]        Clinic Interview:  Patient Findings     Positives:  Hospital admission    Negatives:  Signs/symptoms of thrombosis, Signs/symptoms of bleeding,   Laboratory test error suspected, Change in health, Change in alcohol use,   Change in activity, Upcoming invasive procedure, Emergency department   visit, Upcoming dental procedure, Missed doses, Extra doses, Change in   medications, Change in diet/appetite, Bruising, Other complaints    Comments:  In hospital - for presentation with urinary frequency.    Found to have KIM.  During hospital had 1 day of lower dosing (0.5mg on 6/10) and   patient states she resumed typical home dosing on .        Clinical Outcomes     Negatives:  Major bleeding event, Thromboembolic event,   Anticoagulation-related hospital admission, Anticoagulation-related ED   visit, Anticoagulation-related fatality      INR History:  Anticoagulation Monitoring 2021   INR 2.42 2.62 2.40   INR Date 2021 2021 6/15/2021   INR Goal 2.0-3.0  2.0-3.0 2.0-3.0   Trend Same Same Same   Last Week Total 20 mg 20 mg 18.5 mg   Next Week Total 20 mg 20 mg 20 mg   Sun 4 mg 2 mg 2 mg   Mon 2 mg 4 mg 4 mg   Tue 4 mg 2 mg -   Wed 2 mg 4 mg 4 mg   Thu 4 mg 2 mg 2 mg   Fri 2 mg 4 mg 4 mg   Sat 2 mg 2 mg 2 mg   Visit Report - - -   Some recent data might be hidden     Plan:  1. INR is Therapeutic today- see above in Anticoagulation Summary.   Will instruct Cristina Lemus to Continue their warfarin regimen- see above in Anticoagulation Summary.  2. Follow up in 1 week via [facility]  3. They have been instructed to call if any changes in medications, doses, concerns, etc. Patient expresses understanding and has no further questions at this time.    Tylor Schmitt, PharmD

## 2021-06-17 VITALS
BODY MASS INDEX: 21.02 KG/M2 | OXYGEN SATURATION: 97 % | DIASTOLIC BLOOD PRESSURE: 80 MMHG | SYSTOLIC BLOOD PRESSURE: 148 MMHG | WEIGHT: 126.3 LBS | HEART RATE: 71 BPM | TEMPERATURE: 98.2 F

## 2021-06-17 PROCEDURE — G0180 MD CERTIFICATION HHA PATIENT: HCPCS | Performed by: FAMILY MEDICINE

## 2021-06-17 NOTE — HOME HEALTH
She was referred to  due to weakness and CHF(E).  Nursing has already evaluated.  She lives with her daughter who can provide limited help due to her MS.  Patient is [place], has some mild age-related memory deficits but overall was responsive to evaluation today.  She has B LE edema and I had her weigh herself, which she looked to be able to do safely.  She was at 126.3 pounds today, which is a potential 9 pound weight gain over this week.  She was instructed to weight everyday, at same time of day, for a better record of her weight gain.     She is walking with no AD with enviromental support and wide KONG.  No LOB seen today and daughter states the patient is better today vs the nurses's visit last time.  She uses her walker or daughter's rollator when leaving her condo.     Patient given basic instruction for UE and LE ROM but she was not interested in progressive HEP.  PT eval only.

## 2021-06-18 ENCOUNTER — HOME CARE VISIT (OUTPATIENT)
Dept: HOME HEALTH SERVICES | Facility: HOME HEALTHCARE | Age: 86
End: 2021-06-18

## 2021-06-18 VITALS
OXYGEN SATURATION: 95 % | WEIGHT: 128.13 LBS | HEART RATE: 64 BPM | TEMPERATURE: 97.1 F | BODY MASS INDEX: 21.32 KG/M2 | SYSTOLIC BLOOD PRESSURE: 128 MMHG | DIASTOLIC BLOOD PRESSURE: 74 MMHG

## 2021-06-18 PROCEDURE — G0299 HHS/HOSPICE OF RN EA 15 MIN: HCPCS

## 2021-06-18 PROCEDURE — G0300 HHS/HOSPICE OF LPN EA 15 MIN: HCPCS

## 2021-06-21 ENCOUNTER — HOME CARE VISIT (OUTPATIENT)
Dept: HOME HEALTH SERVICES | Facility: HOME HEALTHCARE | Age: 86
End: 2021-06-21

## 2021-06-23 LAB — INR PPP: 2.37

## 2021-06-24 ENCOUNTER — APPOINTMENT (OUTPATIENT)
Dept: CT IMAGING | Facility: HOSPITAL | Age: 86
End: 2021-06-24

## 2021-06-24 ENCOUNTER — HOSPITAL ENCOUNTER (INPATIENT)
Facility: HOSPITAL | Age: 86
LOS: 2 days | Discharge: HOME HEALTH | End: 2021-06-27
Attending: EMERGENCY MEDICINE | Admitting: INTERNAL MEDICINE

## 2021-06-24 ENCOUNTER — ANTICOAGULATION VISIT (OUTPATIENT)
Dept: PHARMACY | Facility: HOSPITAL | Age: 86
End: 2021-06-24

## 2021-06-24 ENCOUNTER — APPOINTMENT (OUTPATIENT)
Dept: GENERAL RADIOLOGY | Facility: HOSPITAL | Age: 86
End: 2021-06-24

## 2021-06-24 ENCOUNTER — HOME CARE VISIT (OUTPATIENT)
Dept: HOME HEALTH SERVICES | Facility: HOME HEALTHCARE | Age: 86
End: 2021-06-24

## 2021-06-24 DIAGNOSIS — I48.20 CHRONIC ATRIAL FIBRILLATION (HCC): ICD-10-CM

## 2021-06-24 DIAGNOSIS — Z79.01 CHRONIC ANTICOAGULATION: ICD-10-CM

## 2021-06-24 DIAGNOSIS — I48.21 PERMANENT ATRIAL FIBRILLATION (HCC): Primary | ICD-10-CM

## 2021-06-24 DIAGNOSIS — R93.89 ABNORMAL CHEST X-RAY: ICD-10-CM

## 2021-06-24 DIAGNOSIS — R77.8 TROPONIN LEVEL ELEVATED: ICD-10-CM

## 2021-06-24 DIAGNOSIS — N18.9 CHRONIC KIDNEY DISEASE, UNSPECIFIED CKD STAGE: ICD-10-CM

## 2021-06-24 DIAGNOSIS — R06.00 ACUTE DYSPNEA: Primary | ICD-10-CM

## 2021-06-24 LAB
ALBUMIN SERPL-MCNC: 4.8 G/DL (ref 3.5–5.2)
ALBUMIN/GLOB SERPL: 1.5 G/DL
ALP SERPL-CCNC: 81 U/L (ref 39–117)
ALT SERPL W P-5'-P-CCNC: 15 U/L (ref 1–33)
ANION GAP SERPL CALCULATED.3IONS-SCNC: 16.5 MMOL/L (ref 5–15)
AST SERPL-CCNC: 21 U/L (ref 1–32)
BASOPHILS # BLD AUTO: 0.04 10*3/MM3 (ref 0–0.2)
BASOPHILS NFR BLD AUTO: 0.7 % (ref 0–1.5)
BILIRUB SERPL-MCNC: 0.6 MG/DL (ref 0–1.2)
BUN SERPL-MCNC: 59 MG/DL (ref 8–23)
BUN/CREAT SERPL: 19.8 (ref 7–25)
CALCIUM SPEC-SCNC: 9.2 MG/DL (ref 8.2–9.6)
CHLORIDE SERPL-SCNC: 100 MMOL/L (ref 98–107)
CO2 SERPL-SCNC: 19.5 MMOL/L (ref 22–29)
CREAT SERPL-MCNC: 2.98 MG/DL (ref 0.57–1)
D-LACTATE SERPL-SCNC: 0.7 MMOL/L (ref 0.5–2)
DEPRECATED RDW RBC AUTO: 49.9 FL (ref 37–54)
EOSINOPHIL # BLD AUTO: 0.19 10*3/MM3 (ref 0–0.4)
EOSINOPHIL NFR BLD AUTO: 3.2 % (ref 0.3–6.2)
ERYTHROCYTE [DISTWIDTH] IN BLOOD BY AUTOMATED COUNT: 16.1 % (ref 12.3–15.4)
GFR SERPL CREATININE-BSD FRML MDRD: 15 ML/MIN/1.73
GLOBULIN UR ELPH-MCNC: 3.3 GM/DL
GLUCOSE SERPL-MCNC: 99 MG/DL (ref 65–99)
HCT VFR BLD AUTO: 37.3 % (ref 34–46.6)
HGB BLD-MCNC: 11.9 G/DL (ref 12–15.9)
HOLD SPECIMEN: NORMAL
HOLD SPECIMEN: NORMAL
IMM GRANULOCYTES # BLD AUTO: 0.01 10*3/MM3 (ref 0–0.05)
IMM GRANULOCYTES NFR BLD AUTO: 0.2 % (ref 0–0.5)
INR PPP: 2.5 (ref 0.9–1.1)
INR PPP: 2.65 (ref 0.9–1.1)
LYMPHOCYTES # BLD AUTO: 1.14 10*3/MM3 (ref 0.7–3.1)
LYMPHOCYTES NFR BLD AUTO: 19 % (ref 19.6–45.3)
MCH RBC QN AUTO: 27 PG (ref 26.6–33)
MCHC RBC AUTO-ENTMCNC: 31.9 G/DL (ref 31.5–35.7)
MCV RBC AUTO: 84.8 FL (ref 79–97)
MONOCYTES # BLD AUTO: 0.34 10*3/MM3 (ref 0.1–0.9)
MONOCYTES NFR BLD AUTO: 5.7 % (ref 5–12)
NEUTROPHILS NFR BLD AUTO: 4.27 10*3/MM3 (ref 1.7–7)
NEUTROPHILS NFR BLD AUTO: 71.2 % (ref 42.7–76)
NRBC BLD AUTO-RTO: 0 /100 WBC (ref 0–0.2)
NT-PROBNP SERPL-MCNC: 5191 PG/ML (ref 0–1800)
PLATELET # BLD AUTO: 255 10*3/MM3 (ref 140–450)
PMV BLD AUTO: 10.7 FL (ref 6–12)
POTASSIUM SERPL-SCNC: 3.6 MMOL/L (ref 3.5–5.2)
PROCALCITONIN SERPL-MCNC: 0.08 NG/ML (ref 0–0.25)
PROT SERPL-MCNC: 8.1 G/DL (ref 6–8.5)
PROTHROMBIN TIME: 26.7 SECONDS (ref 11.7–14.2)
PROTHROMBIN TIME: 28 SECONDS (ref 11.7–14.2)
QT INTERVAL: 471 MS
RBC # BLD AUTO: 4.4 10*6/MM3 (ref 3.77–5.28)
SARS-COV-2 ORF1AB RESP QL NAA+PROBE: NOT DETECTED
SODIUM SERPL-SCNC: 136 MMOL/L (ref 136–145)
TROPONIN T SERPL-MCNC: 0.08 NG/ML (ref 0–0.03)
TROPONIN T SERPL-MCNC: 0.09 NG/ML (ref 0–0.03)
WBC # BLD AUTO: 5.99 10*3/MM3 (ref 3.4–10.8)
WHOLE BLOOD HOLD SPECIMEN: NORMAL

## 2021-06-24 PROCEDURE — 93005 ELECTROCARDIOGRAM TRACING: CPT | Performed by: EMERGENCY MEDICINE

## 2021-06-24 PROCEDURE — U0005 INFEC AGEN DETEC AMPLI PROBE: HCPCS | Performed by: EMERGENCY MEDICINE

## 2021-06-24 PROCEDURE — 71250 CT THORAX DX C-: CPT

## 2021-06-24 PROCEDURE — 80053 COMPREHEN METABOLIC PANEL: CPT | Performed by: EMERGENCY MEDICINE

## 2021-06-24 PROCEDURE — 93010 ELECTROCARDIOGRAM REPORT: CPT | Performed by: INTERNAL MEDICINE

## 2021-06-24 PROCEDURE — 84145 PROCALCITONIN (PCT): CPT | Performed by: EMERGENCY MEDICINE

## 2021-06-24 PROCEDURE — 84484 ASSAY OF TROPONIN QUANT: CPT | Performed by: INTERNAL MEDICINE

## 2021-06-24 PROCEDURE — 85610 PROTHROMBIN TIME: CPT | Performed by: INTERNAL MEDICINE

## 2021-06-24 PROCEDURE — 83605 ASSAY OF LACTIC ACID: CPT | Performed by: EMERGENCY MEDICINE

## 2021-06-24 PROCEDURE — G0378 HOSPITAL OBSERVATION PER HR: HCPCS

## 2021-06-24 PROCEDURE — 85025 COMPLETE CBC W/AUTO DIFF WBC: CPT

## 2021-06-24 PROCEDURE — 83880 ASSAY OF NATRIURETIC PEPTIDE: CPT

## 2021-06-24 PROCEDURE — 99284 EMERGENCY DEPT VISIT MOD MDM: CPT

## 2021-06-24 PROCEDURE — U0004 COV-19 TEST NON-CDC HGH THRU: HCPCS | Performed by: EMERGENCY MEDICINE

## 2021-06-24 PROCEDURE — 85610 PROTHROMBIN TIME: CPT

## 2021-06-24 PROCEDURE — 36415 COLL VENOUS BLD VENIPUNCTURE: CPT

## 2021-06-24 PROCEDURE — 84484 ASSAY OF TROPONIN QUANT: CPT | Performed by: EMERGENCY MEDICINE

## 2021-06-24 PROCEDURE — 87040 BLOOD CULTURE FOR BACTERIA: CPT | Performed by: EMERGENCY MEDICINE

## 2021-06-24 PROCEDURE — 71045 X-RAY EXAM CHEST 1 VIEW: CPT

## 2021-06-24 RX ORDER — FUROSEMIDE 10 MG/ML
60 INJECTION INTRAMUSCULAR; INTRAVENOUS DAILY
Status: COMPLETED | OUTPATIENT
Start: 2021-06-25 | End: 2021-06-27

## 2021-06-24 RX ORDER — ERGOCALCIFEROL 1.25 MG/1
50000 CAPSULE ORAL ONCE
Status: COMPLETED | OUTPATIENT
Start: 2021-06-24 | End: 2021-06-24

## 2021-06-24 RX ORDER — ACETAMINOPHEN 160 MG/5ML
650 SOLUTION ORAL EVERY 4 HOURS PRN
Status: DISCONTINUED | OUTPATIENT
Start: 2021-06-24 | End: 2021-06-27 | Stop reason: HOSPADM

## 2021-06-24 RX ORDER — SODIUM CHLORIDE 0.9 % (FLUSH) 0.9 %
10 SYRINGE (ML) INJECTION AS NEEDED
Status: DISCONTINUED | OUTPATIENT
Start: 2021-06-24 | End: 2021-06-27 | Stop reason: HOSPADM

## 2021-06-24 RX ORDER — ONDANSETRON 2 MG/ML
4 INJECTION INTRAMUSCULAR; INTRAVENOUS EVERY 6 HOURS PRN
Status: DISCONTINUED | OUTPATIENT
Start: 2021-06-24 | End: 2021-06-27 | Stop reason: HOSPADM

## 2021-06-24 RX ORDER — AMLODIPINE BESYLATE 10 MG/1
10 TABLET ORAL NIGHTLY
Status: DISCONTINUED | OUTPATIENT
Start: 2021-06-25 | End: 2021-06-27 | Stop reason: HOSPADM

## 2021-06-24 RX ORDER — ACETAMINOPHEN 650 MG/1
650 SUPPOSITORY RECTAL EVERY 4 HOURS PRN
Status: DISCONTINUED | OUTPATIENT
Start: 2021-06-24 | End: 2021-06-27 | Stop reason: HOSPADM

## 2021-06-24 RX ORDER — WARFARIN SODIUM 1 MG/1
1 TABLET ORAL
Status: COMPLETED | OUTPATIENT
Start: 2021-06-24 | End: 2021-06-24

## 2021-06-24 RX ORDER — SODIUM CHLORIDE 0.9 % (FLUSH) 0.9 %
10 SYRINGE (ML) INJECTION EVERY 12 HOURS SCHEDULED
Status: DISCONTINUED | OUTPATIENT
Start: 2021-06-24 | End: 2021-06-27 | Stop reason: HOSPADM

## 2021-06-24 RX ORDER — ACETAMINOPHEN 325 MG/1
650 TABLET ORAL EVERY 4 HOURS PRN
Status: DISCONTINUED | OUTPATIENT
Start: 2021-06-24 | End: 2021-06-27 | Stop reason: HOSPADM

## 2021-06-24 RX ORDER — FUROSEMIDE 10 MG/ML
60 INJECTION INTRAMUSCULAR; INTRAVENOUS DAILY
Status: DISCONTINUED | OUTPATIENT
Start: 2021-06-25 | End: 2021-06-24

## 2021-06-24 RX ORDER — MELATONIN
5000 DAILY
Status: DISCONTINUED | OUTPATIENT
Start: 2021-06-25 | End: 2021-06-27 | Stop reason: HOSPADM

## 2021-06-24 RX ADMIN — ERGOCALCIFEROL 50000 UNITS: 1.25 CAPSULE ORAL at 20:02

## 2021-06-24 RX ADMIN — WARFARIN 1 MG: 1 TABLET ORAL at 21:53

## 2021-06-24 RX ADMIN — SODIUM CHLORIDE, PRESERVATIVE FREE 10 ML: 5 INJECTION INTRAVENOUS at 20:02

## 2021-06-24 NOTE — PROGRESS NOTES
Anticoagulation Clinic Progress Note    Anticoagulation Summary  As of 2021    INR goal:  2.0-3.0   TTR:  66.1 % (8 mo)   INR used for dosin.37 (2021)   Warfarin maintenance plan:  4 mg every Mon, Wed, Fri; 2 mg all other days   Weekly warfarin total:  20 mg   No change documented:  Hubert Miranda, PharmD   Plan last modified:  Mc Zaidi RPH (2021)   Next INR check:  2021   Priority:  High   Target end date:  Indefinite    Indications    Permanent atrial fibrillation (CMS/HCC) [I48.21]             Anticoagulation Episode Summary     INR check location:      Preferred lab:      Send INR reminders to:   [facility]  POOL    Comments:  tried calling Chaim BARCLAY but no stable warf dosing available from July/Aug 2020 , prior to Eliquis; [facility]      Anticoagulation Care Providers     Provider Role Specialty Phone number    Lm Mart MD Referring Cardiology [phone]          Clinic Interview:  Patient Findings     Negatives:  Signs/symptoms of thrombosis, Signs/symptoms of bleeding,   Laboratory test error suspected, Change in health, Change in alcohol use,   Change in activity, Upcoming invasive procedure, Emergency department   visit, Upcoming dental procedure, Missed doses, Extra doses, Change in   medications, Change in diet/appetite, Hospital admission, Bruising, Other   complaints      Clinical Outcomes     Negatives:  Major bleeding event, Thromboembolic event,   Anticoagulation-related hospital admission, Anticoagulation-related ED   visit, Anticoagulation-related fatality        INR History:  Anticoagulation Monitoring 2021   INR 2.62 2.40 2.37   INR Date 2021 6/15/2021 2021   INR Goal 2.0-3.0 2.0-3.0 2.0-3.0   Trend Same Same Same   Last Week Total 20 mg 18.5 mg 20 mg   Next Week Total 20 mg 20 mg 20 mg   Sun 2 mg 2 mg 2 mg   Mon 4 mg 4 mg 4 mg   Tue 2 mg - 2 mg   Wed 4 mg 4 mg 4 mg   Thu 2 mg 2 mg 2 mg   Fri 4 mg 4 mg 4 mg    Sat 2 mg 2 mg 2 mg   Visit Report - - -   Some recent data might be hidden       Plan:  1. INR is Therapeutic today- see above in Anticoagulation Summary.   Will instruct Cristina Lemus to Continue their warfarin regimen- see above in Anticoagulation Summary.  2. Follow up in 2 weeks - [facility] Home INR Draw   3. They have been instructed to call if any changes in medications, doses, concerns, etc. Patient expresses understanding and has no further questions at this time.    Hubert Miranda, PharmD

## 2021-06-25 PROBLEM — R06.00 ACUTE DYSPNEA: Status: ACTIVE | Noted: 2021-06-25

## 2021-06-25 PROBLEM — R06.00 DYSPNEA: Status: ACTIVE | Noted: 2021-06-25

## 2021-06-25 PROBLEM — I10 ESSENTIAL HYPERTENSION: Status: ACTIVE | Noted: 2021-06-25

## 2021-06-25 PROBLEM — J90 PLEURAL EFFUSION: Status: ACTIVE | Noted: 2021-06-25

## 2021-06-25 LAB
ANION GAP SERPL CALCULATED.3IONS-SCNC: 13.1 MMOL/L (ref 5–15)
BASOPHILS # BLD AUTO: 0.03 10*3/MM3 (ref 0–0.2)
BASOPHILS NFR BLD AUTO: 0.5 % (ref 0–1.5)
BUN SERPL-MCNC: 59 MG/DL (ref 8–23)
BUN/CREAT SERPL: 21.6 (ref 7–25)
CALCIUM SPEC-SCNC: 8.3 MG/DL (ref 8.2–9.6)
CHLORIDE SERPL-SCNC: 103 MMOL/L (ref 98–107)
CO2 SERPL-SCNC: 20.9 MMOL/L (ref 22–29)
CREAT SERPL-MCNC: 2.73 MG/DL (ref 0.57–1)
DEPRECATED RDW RBC AUTO: 51.8 FL (ref 37–54)
EOSINOPHIL # BLD AUTO: 0.24 10*3/MM3 (ref 0–0.4)
EOSINOPHIL NFR BLD AUTO: 4.1 % (ref 0.3–6.2)
ERYTHROCYTE [DISTWIDTH] IN BLOOD BY AUTOMATED COUNT: 16.2 % (ref 12.3–15.4)
GFR SERPL CREATININE-BSD FRML MDRD: 16 ML/MIN/1.73
GLUCOSE SERPL-MCNC: 79 MG/DL (ref 65–99)
HCT VFR BLD AUTO: 32.2 % (ref 34–46.6)
HGB BLD-MCNC: 9.9 G/DL (ref 12–15.9)
IMM GRANULOCYTES # BLD AUTO: 0.03 10*3/MM3 (ref 0–0.05)
IMM GRANULOCYTES NFR BLD AUTO: 0.5 % (ref 0–0.5)
INR PPP: 2.99 (ref 0.9–1.1)
LYMPHOCYTES # BLD AUTO: 1.42 10*3/MM3 (ref 0.7–3.1)
LYMPHOCYTES NFR BLD AUTO: 24.5 % (ref 19.6–45.3)
MCH RBC QN AUTO: 26.8 PG (ref 26.6–33)
MCHC RBC AUTO-ENTMCNC: 30.7 G/DL (ref 31.5–35.7)
MCV RBC AUTO: 87 FL (ref 79–97)
MONOCYTES # BLD AUTO: 0.52 10*3/MM3 (ref 0.1–0.9)
MONOCYTES NFR BLD AUTO: 9 % (ref 5–12)
NEUTROPHILS NFR BLD AUTO: 3.56 10*3/MM3 (ref 1.7–7)
NEUTROPHILS NFR BLD AUTO: 61.4 % (ref 42.7–76)
NRBC BLD AUTO-RTO: 0 /100 WBC (ref 0–0.2)
PLATELET # BLD AUTO: 201 10*3/MM3 (ref 140–450)
PMV BLD AUTO: 11.4 FL (ref 6–12)
POTASSIUM SERPL-SCNC: 3.5 MMOL/L (ref 3.5–5.2)
PROTHROMBIN TIME: 30.8 SECONDS (ref 11.7–14.2)
RBC # BLD AUTO: 3.7 10*6/MM3 (ref 3.77–5.28)
SODIUM SERPL-SCNC: 137 MMOL/L (ref 136–145)
TROPONIN T SERPL-MCNC: 0.08 NG/ML (ref 0–0.03)
WBC # BLD AUTO: 5.8 10*3/MM3 (ref 3.4–10.8)

## 2021-06-25 PROCEDURE — 25010000002 FUROSEMIDE PER 20 MG: Performed by: INTERNAL MEDICINE

## 2021-06-25 PROCEDURE — 85025 COMPLETE CBC W/AUTO DIFF WBC: CPT | Performed by: INTERNAL MEDICINE

## 2021-06-25 PROCEDURE — 80048 BASIC METABOLIC PNL TOTAL CA: CPT | Performed by: INTERNAL MEDICINE

## 2021-06-25 PROCEDURE — 85610 PROTHROMBIN TIME: CPT | Performed by: INTERNAL MEDICINE

## 2021-06-25 PROCEDURE — 97535 SELF CARE MNGMENT TRAINING: CPT

## 2021-06-25 PROCEDURE — 25010000002 EPOETIN ALFA-EPBX 3000 UNIT/ML SOLUTION: Performed by: INTERNAL MEDICINE

## 2021-06-25 PROCEDURE — 97165 OT EVAL LOW COMPLEX 30 MIN: CPT

## 2021-06-25 PROCEDURE — 97161 PT EVAL LOW COMPLEX 20 MIN: CPT

## 2021-06-25 RX ORDER — WARFARIN SODIUM 1 MG/1
0.5 TABLET ORAL
Status: COMPLETED | OUTPATIENT
Start: 2021-06-25 | End: 2021-06-25

## 2021-06-25 RX ORDER — POTASSIUM CHLORIDE 750 MG/1
40 TABLET, FILM COATED, EXTENDED RELEASE ORAL ONCE
Status: DISCONTINUED | OUTPATIENT
Start: 2021-06-25 | End: 2021-06-25

## 2021-06-25 RX ORDER — POTASSIUM CHLORIDE 750 MG/1
20 TABLET, FILM COATED, EXTENDED RELEASE ORAL DAILY
Status: DISCONTINUED | OUTPATIENT
Start: 2021-06-25 | End: 2021-06-27 | Stop reason: HOSPADM

## 2021-06-25 RX ORDER — UREA 10 %
1 LOTION (ML) TOPICAL NIGHTLY
Status: DISCONTINUED | OUTPATIENT
Start: 2021-06-25 | End: 2021-06-27 | Stop reason: HOSPADM

## 2021-06-25 RX ADMIN — FUROSEMIDE 60 MG: 10 INJECTION, SOLUTION INTRAMUSCULAR; INTRAVENOUS at 09:38

## 2021-06-25 RX ADMIN — Medication 5000 UNITS: at 09:38

## 2021-06-25 RX ADMIN — AMLODIPINE BESYLATE 10 MG: 10 TABLET ORAL at 20:56

## 2021-06-25 RX ADMIN — SODIUM CHLORIDE, PRESERVATIVE FREE 10 ML: 5 INJECTION INTRAVENOUS at 09:41

## 2021-06-25 RX ADMIN — Medication 1 MG: at 23:13

## 2021-06-25 RX ADMIN — WARFARIN 0.5 MG: 1 TABLET ORAL at 18:52

## 2021-06-25 RX ADMIN — POTASSIUM CHLORIDE 20 MEQ: 750 TABLET, EXTENDED RELEASE ORAL at 12:32

## 2021-06-25 RX ADMIN — EPOETIN ALFA-EPBX 5000 UNITS: 3000 INJECTION, SOLUTION INTRAVENOUS; SUBCUTANEOUS at 12:32

## 2021-06-26 LAB
ANION GAP SERPL CALCULATED.3IONS-SCNC: 11.2 MMOL/L (ref 5–15)
BUN SERPL-MCNC: 61 MG/DL (ref 8–23)
BUN/CREAT SERPL: 20.5 (ref 7–25)
CALCIUM SPEC-SCNC: 8.3 MG/DL (ref 8.2–9.6)
CHLORIDE SERPL-SCNC: 105 MMOL/L (ref 98–107)
CO2 SERPL-SCNC: 21.8 MMOL/L (ref 22–29)
CREAT SERPL-MCNC: 2.98 MG/DL (ref 0.57–1)
DEPRECATED RDW RBC AUTO: 48.3 FL (ref 37–54)
ERYTHROCYTE [DISTWIDTH] IN BLOOD BY AUTOMATED COUNT: 15.9 % (ref 12.3–15.4)
GFR SERPL CREATININE-BSD FRML MDRD: 15 ML/MIN/1.73
GLUCOSE SERPL-MCNC: 80 MG/DL (ref 65–99)
HCT VFR BLD AUTO: 29.4 % (ref 34–46.6)
HGB BLD-MCNC: 9.6 G/DL (ref 12–15.9)
INR PPP: 2.6 (ref 0.9–1.1)
MCH RBC QN AUTO: 27 PG (ref 26.6–33)
MCHC RBC AUTO-ENTMCNC: 32.7 G/DL (ref 31.5–35.7)
MCV RBC AUTO: 82.6 FL (ref 79–97)
PLATELET # BLD AUTO: 196 10*3/MM3 (ref 140–450)
PMV BLD AUTO: 11.9 FL (ref 6–12)
POTASSIUM SERPL-SCNC: 3.5 MMOL/L (ref 3.5–5.2)
PROTHROMBIN TIME: 27.6 SECONDS (ref 11.7–14.2)
RBC # BLD AUTO: 3.56 10*6/MM3 (ref 3.77–5.28)
SODIUM SERPL-SCNC: 138 MMOL/L (ref 136–145)
WBC # BLD AUTO: 5.48 10*3/MM3 (ref 3.4–10.8)

## 2021-06-26 PROCEDURE — 80048 BASIC METABOLIC PNL TOTAL CA: CPT | Performed by: INTERNAL MEDICINE

## 2021-06-26 PROCEDURE — 25010000002 FUROSEMIDE PER 20 MG: Performed by: INTERNAL MEDICINE

## 2021-06-26 PROCEDURE — 85027 COMPLETE CBC AUTOMATED: CPT | Performed by: INTERNAL MEDICINE

## 2021-06-26 PROCEDURE — 85610 PROTHROMBIN TIME: CPT | Performed by: INTERNAL MEDICINE

## 2021-06-26 RX ORDER — WARFARIN SODIUM 2 MG/1
2 TABLET ORAL
Status: DISCONTINUED | OUTPATIENT
Start: 2021-06-26 | End: 2021-06-27 | Stop reason: HOSPADM

## 2021-06-26 RX ADMIN — AMLODIPINE BESYLATE 10 MG: 10 TABLET ORAL at 21:12

## 2021-06-26 RX ADMIN — WARFARIN 2 MG: 2 TABLET ORAL at 17:12

## 2021-06-26 RX ADMIN — FUROSEMIDE 60 MG: 10 INJECTION, SOLUTION INTRAMUSCULAR; INTRAVENOUS at 09:00

## 2021-06-26 RX ADMIN — Medication 5000 UNITS: at 09:00

## 2021-06-26 RX ADMIN — POTASSIUM CHLORIDE 20 MEQ: 750 TABLET, EXTENDED RELEASE ORAL at 09:01

## 2021-06-26 RX ADMIN — SODIUM CHLORIDE, PRESERVATIVE FREE 10 ML: 5 INJECTION INTRAVENOUS at 09:01

## 2021-06-27 ENCOUNTER — TRANSCRIBE ORDERS (OUTPATIENT)
Dept: HOME HEALTH SERVICES | Facility: HOME HEALTHCARE | Age: 86
End: 2021-06-27

## 2021-06-27 ENCOUNTER — READMISSION MANAGEMENT (OUTPATIENT)
Dept: CALL CENTER | Facility: HOSPITAL | Age: 86
End: 2021-06-27

## 2021-06-27 VITALS
HEIGHT: 65 IN | DIASTOLIC BLOOD PRESSURE: 74 MMHG | BODY MASS INDEX: 20.83 KG/M2 | WEIGHT: 125 LBS | TEMPERATURE: 97.9 F | RESPIRATION RATE: 16 BRPM | OXYGEN SATURATION: 93 % | HEART RATE: 69 BPM | SYSTOLIC BLOOD PRESSURE: 163 MMHG

## 2021-06-27 DIAGNOSIS — N18.9 CHRONIC KIDNEY DISEASE, UNSPECIFIED CKD STAGE: Primary | ICD-10-CM

## 2021-06-27 PROBLEM — I50.33 ACUTE ON CHRONIC DIASTOLIC CONGESTIVE HEART FAILURE: Status: ACTIVE | Noted: 2020-10-01

## 2021-06-27 LAB
ANION GAP SERPL CALCULATED.3IONS-SCNC: 12.3 MMOL/L (ref 5–15)
BUN SERPL-MCNC: 64 MG/DL (ref 8–23)
BUN/CREAT SERPL: 24.5 (ref 7–25)
CALCIUM SPEC-SCNC: 8.4 MG/DL (ref 8.2–9.6)
CHLORIDE SERPL-SCNC: 105 MMOL/L (ref 98–107)
CO2 SERPL-SCNC: 20.7 MMOL/L (ref 22–29)
CREAT SERPL-MCNC: 2.61 MG/DL (ref 0.57–1)
DEPRECATED RDW RBC AUTO: 49.3 FL (ref 37–54)
ERYTHROCYTE [DISTWIDTH] IN BLOOD BY AUTOMATED COUNT: 16 % (ref 12.3–15.4)
GFR SERPL CREATININE-BSD FRML MDRD: 17 ML/MIN/1.73
GLUCOSE SERPL-MCNC: 82 MG/DL (ref 65–99)
HCT VFR BLD AUTO: 29.9 % (ref 34–46.6)
HGB BLD-MCNC: 9.6 G/DL (ref 12–15.9)
INR PPP: 2.34 (ref 0.9–1.1)
MCH RBC QN AUTO: 26.9 PG (ref 26.6–33)
MCHC RBC AUTO-ENTMCNC: 32.1 G/DL (ref 31.5–35.7)
MCV RBC AUTO: 83.8 FL (ref 79–97)
PLATELET # BLD AUTO: 185 10*3/MM3 (ref 140–450)
PMV BLD AUTO: 10.6 FL (ref 6–12)
POTASSIUM SERPL-SCNC: 3.5 MMOL/L (ref 3.5–5.2)
PROTHROMBIN TIME: 25.4 SECONDS (ref 11.7–14.2)
RBC # BLD AUTO: 3.57 10*6/MM3 (ref 3.77–5.28)
SODIUM SERPL-SCNC: 138 MMOL/L (ref 136–145)
WBC # BLD AUTO: 5.98 10*3/MM3 (ref 3.4–10.8)

## 2021-06-27 PROCEDURE — 25010000002 FUROSEMIDE PER 20 MG: Performed by: INTERNAL MEDICINE

## 2021-06-27 PROCEDURE — 85610 PROTHROMBIN TIME: CPT | Performed by: INTERNAL MEDICINE

## 2021-06-27 PROCEDURE — 85027 COMPLETE CBC AUTOMATED: CPT | Performed by: INTERNAL MEDICINE

## 2021-06-27 PROCEDURE — 80048 BASIC METABOLIC PNL TOTAL CA: CPT | Performed by: INTERNAL MEDICINE

## 2021-06-27 RX ORDER — MELATONIN
5000 DAILY
Qty: 150 TABLET | Refills: 0 | Status: SHIPPED | OUTPATIENT
Start: 2021-06-28 | End: 2021-07-28

## 2021-06-27 RX ORDER — POTASSIUM CHLORIDE 20 MEQ/1
20 TABLET, EXTENDED RELEASE ORAL DAILY
Qty: 30 TABLET | Refills: 0 | Status: SHIPPED | OUTPATIENT
Start: 2021-06-28 | End: 2021-07-12 | Stop reason: SDUPTHER

## 2021-06-27 RX ORDER — TORSEMIDE 20 MG/1
20 TABLET ORAL DAILY
Qty: 30 TABLET | Refills: 0 | Status: SHIPPED | OUTPATIENT
Start: 2021-06-27 | End: 2021-07-12 | Stop reason: SDUPTHER

## 2021-06-27 RX ORDER — TORSEMIDE 20 MG/1
40 TABLET ORAL DAILY
Status: DISCONTINUED | OUTPATIENT
Start: 2021-06-27 | End: 2021-06-27 | Stop reason: HOSPADM

## 2021-06-27 RX ADMIN — FUROSEMIDE 60 MG: 10 INJECTION, SOLUTION INTRAMUSCULAR; INTRAVENOUS at 08:03

## 2021-06-27 RX ADMIN — POTASSIUM CHLORIDE 20 MEQ: 750 TABLET, EXTENDED RELEASE ORAL at 08:03

## 2021-06-27 RX ADMIN — Medication 5000 UNITS: at 08:03

## 2021-06-27 RX ADMIN — SODIUM CHLORIDE, PRESERVATIVE FREE 10 ML: 5 INJECTION INTRAVENOUS at 08:04

## 2021-06-27 RX ADMIN — Medication 1 MG: at 00:09

## 2021-06-27 NOTE — OUTREACH NOTE
Prep Survey      Responses   [facility] facility patient discharged from?  [place]   Is LACE score < 7 ?  No   Emergency Room discharge w/ pulse ox?  No   Eligibility  [facility]   Date of Admission  06/24/21   Date of Discharge  06/27/21   Discharge Disposition  Home or Self Care   Discharge diagnosis  A/C CHF, acute dyspnea, pleural effusion, A-fib   Does the patient have one of the following disease processes/diagnoses(primary or secondary)?  CHF   Does the patient have Home health ordered?  No   Is there a DME ordered?  No   Prep survey completed?  Yes          Svetlana Abel RN

## 2021-06-28 ENCOUNTER — TELEPHONE (OUTPATIENT)
Dept: INTERNAL MEDICINE | Facility: CLINIC | Age: 86
End: 2021-06-28

## 2021-06-28 ENCOUNTER — TRANSITIONAL CARE MANAGEMENT TELEPHONE ENCOUNTER (OUTPATIENT)
Dept: CALL CENTER | Facility: HOSPITAL | Age: 86
End: 2021-06-28

## 2021-06-28 NOTE — OUTREACH NOTE
Call Center TCM Note      Responses   [facility] patient discharged from?  [place]   Does the patient have one of the following disease processes/diagnoses(primary or secondary)?  CHF   TCM attempt successful?  Yes   Call start time  1806   Call end time  1810   Discharge diagnosis  A/C CHF, acute dyspnea, pleural effusion, A-fib   Meds reviewed with patient/caregiver?  Yes   Is the patient having any side effects they believe may be caused by any medication additions or changes?  No   Does the patient have all medications ordered at discharge?  Yes   Is the patient taking all medications as directed (includes completed medication regime)?  Yes   Does the patient have a primary care provider?   Yes   Does the patient have an appointment with their PCP within 7 days of discharge?  No   What is preventing the patient from scheduling follow up appointments within 7 days of discharge?  Haven't had time   Nursing Interventions  Educated patient on importance of making appointment, Advised patient to make appointment   Has the patient kept scheduled appointments due by today?  N/A   Comments  No appts available with Dr. Elias in the TCM time frame.  Will message office regarding needed appt.   Psychosocial issues?  No   Did the patient receive a copy of their discharge instructions?  Yes   Nursing interventions  Reviewed instructions with patient   What is the patient's perception of their health status since discharge?  Improving   Is the patient able to teach back Heart Failure diet management?  Yes   Is the patient able to teach back Heart Failure Zones?  Yes   Is the patient able to teach back signs and symptoms of worsening condition? (i.e. weight gain, shortness of air, etc.)  Yes   If the patient is a current smoker, are they able to teach back resources for cessation?  Not a smoker   Is the patient/caregiver able to teach back the hierarchy of who to call/visit for symptoms/problems? PCP, Specialist, Home  health nurse, Urgent Care, ED, 911  Yes   Additional teach back comments  States she is doing alright.  Has new meds and is aware of 1500 ml per day fluid restriction.   TCM call completed?  Yes   Wrap up additional comments  Will message Dr. Mcghee's office for TCM follow up           Christi Jewell LPN    6/28/2021, 18:12 EDT

## 2021-06-28 NOTE — TELEPHONE ENCOUNTER
Provider: DR PRADO    Caller: MINERVA HARRIS    Relationship to Patient: PATIENT    Phone Number: [phone]    Reason for Call: PATIENT CALLED TO LET DR PRADO KNOW THAT SHE WAS RELEASED FROM THE HOSPITAL ON 6/27/2021    When was the patient last seen: 04/23/2021

## 2021-06-29 ENCOUNTER — HOME CARE VISIT (OUTPATIENT)
Dept: HOME HEALTH SERVICES | Facility: HOME HEALTHCARE | Age: 86
End: 2021-06-29

## 2021-06-29 VITALS
OXYGEN SATURATION: 98 % | DIASTOLIC BLOOD PRESSURE: 78 MMHG | TEMPERATURE: 97.7 F | RESPIRATION RATE: 20 BRPM | WEIGHT: 125 LBS | HEART RATE: 68 BPM | SYSTOLIC BLOOD PRESSURE: 134 MMHG | HEIGHT: 65 IN | BODY MASS INDEX: 20.83 KG/M2

## 2021-06-29 LAB
BACTERIA SPEC AEROBE CULT: NORMAL
BACTERIA SPEC AEROBE CULT: NORMAL

## 2021-06-29 PROCEDURE — G0299 HHS/HOSPICE OF RN EA 15 MIN: HCPCS

## 2021-06-30 ENCOUNTER — HOME CARE VISIT (OUTPATIENT)
Dept: HOME HEALTH SERVICES | Facility: HOME HEALTHCARE | Age: 86
End: 2021-06-30

## 2021-06-30 VITALS
TEMPERATURE: 97.1 F | OXYGEN SATURATION: 100 % | DIASTOLIC BLOOD PRESSURE: 74 MMHG | RESPIRATION RATE: 18 BRPM | SYSTOLIC BLOOD PRESSURE: 130 MMHG | HEART RATE: 88 BPM

## 2021-06-30 PROCEDURE — G0300 HHS/HOSPICE OF LPN EA 15 MIN: HCPCS

## 2021-06-30 PROCEDURE — G0299 HHS/HOSPICE OF RN EA 15 MIN: HCPCS

## 2021-06-30 NOTE — CASE COMMUNICATION
patient STEVEN PALMA  3/13/30, Home from hospital  need orders for nursing to continue with education, assessment   sn 2wk2, 1wk 4 for now.     only issue not taking allopurinol ( prev cc sent reg this)

## 2021-06-30 NOTE — CASE COMMUNICATION
Just wanted to let you know patient is not taking her allopurinol. Stating she does not need it , does not have gout. Patient educated on it and states she is still not going to take . She watches what she eats and does not need it.   Also need to inform you patient is home from hospital, was d/c on 6/27, we are resuming care.   sn 2wk2, 1wk 3 for now for continued education and cp assess. Will let you know if any issues.   thanks  Aimee BAEZA

## 2021-06-30 NOTE — HOME HEALTH
89-year-old female who went to the emergency room with c/o soa. She was found to be volume overloaded and with a moderate-sized right pleural effusion.  She was evaluated by nephrology and pulmonology. Patient  was diuresed with IV Lasix and eventually switched to torsemide today.No  thoracentesis was needed per pulmonoloty. Patient also noted to have chronic kidney disease stage IV.She was cleared for d/c and is now home and on room air. Patient is not taking weighing seriously. Nursing t/i on need to weigh every day and write down her weights in our folder. She v/u . She will follow up with her primary care physician in 1 week and with nephrology in 1 to 2 weeks with repeat labs at that time.

## 2021-07-02 ENCOUNTER — HOME CARE VISIT (OUTPATIENT)
Dept: HOME HEALTH SERVICES | Facility: HOME HEALTHCARE | Age: 86
End: 2021-07-02

## 2021-07-06 ENCOUNTER — HOME CARE VISIT (OUTPATIENT)
Dept: HOME HEALTH SERVICES | Facility: HOME HEALTHCARE | Age: 86
End: 2021-07-06

## 2021-07-06 ENCOUNTER — READMISSION MANAGEMENT (OUTPATIENT)
Dept: CALL CENTER | Facility: HOSPITAL | Age: 86
End: 2021-07-06

## 2021-07-06 NOTE — OUTREACH NOTE
CHF Week 2 Survey      Responses   [facility] patient discharged from?  [place]   Does the patient have one of the following disease processes/diagnoses(primary or secondary)?  CHF   Week 2 attempt successful?  Yes   Call start time  1038   Call end time  1041   Discharge diagnosis  A/C CHF, acute dyspnea, pleural effusion, A-fib   Is patient permission given to speak with other caregiver?  Yes   List who call center can speak with  Shannan Lemus, daughter   Person spoke with today (if not patient) and relationship  Shannan Lemus, daughter   Meds reviewed with patient/caregiver?  Yes   Is the patient having any side effects they believe may be caused by any medication additions or changes?  No   Does the patient have all medications ordered at discharge?  Yes   Is the patient taking all medications as directed (includes completed medication regime)?  Yes   Comments regarding appointments  Daughter reports patient has seen nephrologist since discharge.    Does the patient have a primary care provider?   Yes   Comments regarding PCP  PCP Dr Wood Elias. Appt in place for 7/12/21   Has the patient kept scheduled appointments due by today?  Yes   What is the Home health agency?   [facility]    Psychosocial issues?  No   Did the patient receive a copy of their discharge instructions?  Yes   Nursing interventions  Reviewed instructions with patient [daughter]   What is the patient's perception of their health status since discharge?  Improving   Nursing interventions  Nurse provided patient education   Is the patient weighing daily?  Yes   Does the patient have scales?  Yes   Daily weight interventions  Education provided on importance of daily weight   Is the patient able to teach back signs and symptoms of worsening condition? (i.e. weight gain, shortness of air, etc.)  Yes   If the patient is a current smoker, are they able to teach back resources for cessation?  Not a smoker   Is the patient/caregiver able to  teach back the hierarchy of who to call/visit for symptoms/problems? PCP, Specialist, Home health nurse, Urgent Care, ED, 911  Yes   CHF Week 2 call completed?  Yes   Wrap up additional comments  Denies any questions or new needs today.           Jaki Galvan RN

## 2021-07-07 LAB — INR PPP: 1.98

## 2021-07-08 ENCOUNTER — HOME CARE VISIT (OUTPATIENT)
Dept: HOME HEALTH SERVICES | Facility: HOME HEALTHCARE | Age: 86
End: 2021-07-08

## 2021-07-08 ENCOUNTER — ANTICOAGULATION VISIT (OUTPATIENT)
Dept: PHARMACY | Facility: HOSPITAL | Age: 86
End: 2021-07-08

## 2021-07-08 VITALS
RESPIRATION RATE: 18 BRPM | BODY MASS INDEX: 18.98 KG/M2 | WEIGHT: 114.06 LBS | HEART RATE: 68 BPM | TEMPERATURE: 99.5 F | OXYGEN SATURATION: 98 %

## 2021-07-08 DIAGNOSIS — I48.21 PERMANENT ATRIAL FIBRILLATION (HCC): Primary | ICD-10-CM

## 2021-07-08 PROCEDURE — G0299 HHS/HOSPICE OF RN EA 15 MIN: HCPCS

## 2021-07-08 NOTE — PROGRESS NOTES
Anticoagulation Clinic Progress Note    Anticoagulation Summary  As of 2021    INR goal:  2.0-3.0   TTR:  66.4 % (8.1 mo)   INR used for dosin.98 (2021)   Warfarin maintenance plan:  4 mg every Mon, Wed, Fri; 2 mg all other days   Weekly warfarin total:  20 mg   Plan last modified:  Mc Zaidi [facility] (2021)   Next INR check:  2021   Priority:  High   Target end date:  Indefinite    Indications    Permanent atrial fibrillation (CMS/HCC) [I48.21]             Anticoagulation Episode Summary     INR check location:      Preferred lab:      Send INR reminders to:   MAJO MARIE  POOL    Comments:  tried calling Chaim BARCLAY but no stable warf dosing available from July/Aug 2020 , prior to Eliquis; [facility]      Anticoagulation Care Providers     Provider Role Specialty Phone number    Lm Mart MD Referring Cardiology [phone]          Clinic Interview:      INR History:  Anticoagulation Monitoring 2021   INR 2.40 2.37 1.98   INR Date 6/15/2021 2021 2021   INR Goal 2.0-3.0 2.0-3.0 2.0-3.0   Trend Same Same Same   Last Week Total 18.5 mg 20 mg 20 mg   Next Week Total 20 mg 20 mg 20 mg   Sun 2 mg 2 mg 2 mg   Mon 4 mg 4 mg 4 mg   Tue - 2 mg 2 mg   Wed 4 mg 4 mg 4 mg   Thu 2 mg 2 mg 2 mg   Fri 4 mg 4 mg 4 mg   Sat 2 mg 2 mg 2 mg   Visit Report - - -   Some recent data might be hidden       Plan:  1. INR is Therapeutic today- see above in Anticoagulation Summary.   Will instruct Cristina Lemus to Continue their warfarin regimen- see above in Anticoagulation Summary.  2. Follow up in 2 weeks  3. Pt has agreed to only be called if INR out of range. They have been instructed to call if any changes in medications, doses, concerns, etc. Patient expresses understanding and has no further questions at this time.    Marisol Villegas, [facility]

## 2021-07-09 NOTE — HOME HEALTH
patient is a CHF patient ,   weighing daily, although she forgets a lot.   today weight is    which is  working with therapy.

## 2021-07-12 ENCOUNTER — OFFICE VISIT (OUTPATIENT)
Dept: INTERNAL MEDICINE | Facility: CLINIC | Age: 86
End: 2021-07-12

## 2021-07-12 VITALS
HEIGHT: 65 IN | DIASTOLIC BLOOD PRESSURE: 78 MMHG | WEIGHT: 117.63 LBS | TEMPERATURE: 97.3 F | SYSTOLIC BLOOD PRESSURE: 140 MMHG | OXYGEN SATURATION: 98 % | BODY MASS INDEX: 19.6 KG/M2 | HEART RATE: 72 BPM | RESPIRATION RATE: 16 BRPM

## 2021-07-12 DIAGNOSIS — D64.9 NORMOCYTIC ANEMIA: ICD-10-CM

## 2021-07-12 DIAGNOSIS — N18.4 STAGE 4 CHRONIC KIDNEY DISEASE (HCC): ICD-10-CM

## 2021-07-12 DIAGNOSIS — I50.33 ACUTE ON CHRONIC DIASTOLIC CONGESTIVE HEART FAILURE (HCC): ICD-10-CM

## 2021-07-12 DIAGNOSIS — Z09 HOSPITAL DISCHARGE FOLLOW-UP: Primary | ICD-10-CM

## 2021-07-12 PROCEDURE — 99214 OFFICE O/P EST MOD 30 MIN: CPT | Performed by: FAMILY MEDICINE

## 2021-07-12 RX ORDER — POTASSIUM CHLORIDE 20 MEQ/1
20 TABLET, EXTENDED RELEASE ORAL DAILY
Qty: 90 TABLET | Refills: 3 | Status: SHIPPED | OUTPATIENT
Start: 2021-07-12 | End: 2021-10-14 | Stop reason: SDUPTHER

## 2021-07-12 RX ORDER — TORSEMIDE 20 MG/1
20 TABLET ORAL DAILY
Qty: 90 TABLET | Refills: 3 | Status: SHIPPED | OUTPATIENT
Start: 2021-07-12 | End: 2021-08-19

## 2021-07-12 RX ORDER — MIRTAZAPINE 15 MG/1
TABLET, FILM COATED ORAL
COMMUNITY
Start: 2021-06-22 | End: 2021-07-12

## 2021-07-12 NOTE — PROGRESS NOTES
"Chief Complaint  Hospital Follow Up Visit    Vandana Lemus presents to [facility]  History of Present Illness     Hospital discharge follow-up    Reviewed discharge summary from hospital stay from -21 at [facility] for acute on chronic heart failure.  Treated with IV lasix for volume overload.  She was sent home with a Rx of Torsemide and potassium both of which need refills.  She was noted to be anemic in the hospital and it was a normocytic anemia.  It looks like this was presumed to be from her chronic medical illnesses.    Admits to being tired all the time but eating well.  Watching weight at home.  Weight has been stable since she has been home from the hospital.    Objective   Vital Signs:   /78 (BP Location: Right arm, Patient Position: Sitting, Cuff Size: Adult)   Pulse 72   Temp 97.3 °F (36.3 °C) (Temporal)   Resp 16   Ht 165.1 cm (65\")   Wt 53.4 kg (117 lb 10.1 oz)   SpO2 98%   BMI 19.57 kg/m²     Physical Exam  Vitals and nursing note reviewed.   Constitutional:       General: She is not in acute distress.     Appearance: Normal appearance.   Cardiovascular:      Rate and Rhythm: Normal rate and regular rhythm.      Heart sounds: Normal heart sounds. No murmur heard.     Pulmonary:      Effort: Pulmonary effort is normal.      Breath sounds: Normal breath sounds.   Musculoskeletal:      Right lower le+ Edema present.      Left lower le+ Edema present.   Neurological:      Mental Status: She is alert.        Result Review :   The following data was reviewed by: Wood Elias MD on 2021:  Common labs    Common Labsle 21    0435 0435 0543 0543 0435 0435   Glucose  79  80  82   BUN  59 (A)  61 (A)  64 (A)   Creatinine  2.73 (A)  2.98 (A)  2.61 (A)   eGFR Non African Am  16 (A)  15 (A)  17 (A)   Sodium  137  138  138   Potassium  3.5  3.5  3.5   Chloride  103  105  105   Calcium  8.3  " 8.3  8.4   WBC 5.80  5.48  5.98    Hemoglobin 9.9 (A)  9.6 (A)  9.6 (A)    Hematocrit 32.2 (A)  29.4 (A)  29.9 (A)    Platelets 201  196  185    (A) Abnormal value                      Assessment and Plan    Diagnoses and all orders for this visit:    1. Hospital discharge follow-up (Primary)  -     Comprehensive Metabolic Panel  -     CBC (No Diff)    2. Acute on chronic diastolic congestive heart failure (CMS/HCC)  -     potassium chloride (K-DUR,KLOR-CON) 20 MEQ CR tablet; Take 1 tablet by mouth Daily.  Dispense: 90 tablet; Refill: 3  -     torsemide (DEMADEX) 20 MG tablet; Take 1 tablet by mouth Daily.  Dispense: 90 tablet; Refill: 3  -     Comprehensive Metabolic Panel  -     CBC (No Diff)    3. Stage 4 chronic kidney disease (CMS/HCC)  -     potassium chloride (K-DUR,KLOR-CON) 20 MEQ CR tablet; Take 1 tablet by mouth Daily.  Dispense: 90 tablet; Refill: 3  -     torsemide (DEMADEX) 20 MG tablet; Take 1 tablet by mouth Daily.  Dispense: 90 tablet; Refill: 3  -     Comprehensive Metabolic Panel  -     CBC (No Diff)    4. Normocytic anemia  -     Comprehensive Metabolic Panel  -     CBC (No Diff)      Prescription for torsemide and potassium for her to continue these medications.  Labs today.  Plan on seeing back in about 4 weeks.        Follow Up   Return in about 4 weeks (around 8/9/2021) for Recheck.  Patient was given instructions and counseling regarding her condition or for health maintenance advice. Please see specific information pulled into the AVS if appropriate.

## 2021-07-13 ENCOUNTER — HOSPITAL ENCOUNTER (EMERGENCY)
Facility: HOSPITAL | Age: 86
Discharge: HOME | End: 2021-07-13
Attending: EMERGENCY MEDICINE | Admitting: EMERGENCY MEDICINE

## 2021-07-13 VITALS
TEMPERATURE: 97.7 F | OXYGEN SATURATION: 98 % | DIASTOLIC BLOOD PRESSURE: 83 MMHG | HEART RATE: 60 BPM | SYSTOLIC BLOOD PRESSURE: 183 MMHG | RESPIRATION RATE: 16 BRPM

## 2021-07-13 DIAGNOSIS — N18.9 ACUTE ON CHRONIC RENAL INSUFFICIENCY: Primary | ICD-10-CM

## 2021-07-13 DIAGNOSIS — N28.9 ACUTE ON CHRONIC RENAL INSUFFICIENCY: Primary | ICD-10-CM

## 2021-07-13 LAB
ALBUMIN SERPL-MCNC: 4.8 G/DL (ref 3.5–5.2)
ALBUMIN/GLOB SERPL: 1.7 G/DL
ALP SERPL-CCNC: 82 U/L (ref 39–117)
ALT SERPL-CCNC: 11 U/L (ref 1–33)
ANION GAP SERPL CALCULATED.3IONS-SCNC: 18.9 MMOL/L (ref 5–15)
AST SERPL-CCNC: 20 U/L (ref 1–32)
BASOPHILS # BLD AUTO: 0.03 10*3/MM3 (ref 0–0.2)
BASOPHILS NFR BLD AUTO: 0.6 % (ref 0–1.5)
BILIRUB SERPL-MCNC: 0.5 MG/DL (ref 0–1.2)
BUN SERPL-MCNC: 68 MG/DL (ref 8–23)
BUN SERPL-MCNC: 71 MG/DL (ref 8–23)
BUN/CREAT SERPL: 19.9 (ref 7–25)
BUN/CREAT SERPL: 22.6 (ref 7–25)
CALCIUM SERPL-MCNC: 9.9 MG/DL (ref 8.2–9.6)
CALCIUM SPEC-SCNC: 9.4 MG/DL (ref 8.2–9.6)
CHLORIDE SERPL-SCNC: 100 MMOL/L (ref 98–107)
CHLORIDE SERPL-SCNC: 101 MMOL/L (ref 98–107)
CO2 SERPL-SCNC: 22.4 MMOL/L (ref 22–29)
CO2 SERPL-SCNC: 23.1 MMOL/L (ref 22–29)
CREAT SERPL-MCNC: 3.14 MG/DL (ref 0.57–1)
CREAT SERPL-MCNC: 3.42 MG/DL (ref 0.57–1)
DEPRECATED RDW RBC AUTO: 48.3 FL (ref 37–54)
EOSINOPHIL # BLD AUTO: 0.23 10*3/MM3 (ref 0–0.4)
EOSINOPHIL NFR BLD AUTO: 4.7 % (ref 0.3–6.2)
ERYTHROCYTE [DISTWIDTH] IN BLOOD BY AUTOMATED COUNT: 15.6 % (ref 12.3–15.4)
ERYTHROCYTE [DISTWIDTH] IN BLOOD BY AUTOMATED COUNT: 15.7 % (ref 12.3–15.4)
GFR SERPL CREATININE-BSD FRML MDRD: 14 ML/MIN/1.73
GFR SERPL CREATININE-BSD FRML MDRD: ABNORMAL ML/MIN/{1.73_M2}
GLOBULIN SER CALC-MCNC: 2.9 GM/DL
GLUCOSE SERPL-MCNC: 87 MG/DL (ref 65–99)
GLUCOSE SERPL-MCNC: 88 MG/DL (ref 65–99)
HCT VFR BLD AUTO: 36.4 % (ref 34–46.6)
HCT VFR BLD AUTO: 37.6 % (ref 34–46.6)
HGB BLD-MCNC: 11.4 G/DL (ref 12–15.9)
HGB BLD-MCNC: 11.9 G/DL (ref 12–15.9)
IMM GRANULOCYTES # BLD AUTO: 0.01 10*3/MM3 (ref 0–0.05)
IMM GRANULOCYTES NFR BLD AUTO: 0.2 % (ref 0–0.5)
INR PPP: 2.16 (ref 0.9–1.1)
LYMPHOCYTES # BLD AUTO: 1.22 10*3/MM3 (ref 0.7–3.1)
LYMPHOCYTES NFR BLD AUTO: 25.2 % (ref 19.6–45.3)
MCH RBC QN AUTO: 26.4 PG (ref 26.6–33)
MCH RBC QN AUTO: 27.1 PG (ref 26.6–33)
MCHC RBC AUTO-ENTMCNC: 31.3 G/DL (ref 31.5–35.7)
MCHC RBC AUTO-ENTMCNC: 31.6 G/DL (ref 31.5–35.7)
MCV RBC AUTO: 84.3 FL (ref 79–97)
MCV RBC AUTO: 85.6 FL (ref 79–97)
MONOCYTES # BLD AUTO: 0.46 10*3/MM3 (ref 0.1–0.9)
MONOCYTES NFR BLD AUTO: 9.5 % (ref 5–12)
NEUTROPHILS NFR BLD AUTO: 2.9 10*3/MM3 (ref 1.7–7)
NEUTROPHILS NFR BLD AUTO: 59.8 % (ref 42.7–76)
NRBC BLD AUTO-RTO: 0 /100 WBC (ref 0–0.2)
PLATELET # BLD AUTO: 212 10*3/MM3 (ref 140–450)
PLATELET # BLD AUTO: 237 10*3/MM3 (ref 140–450)
PMV BLD AUTO: 11.5 FL (ref 6–12)
POTASSIUM SERPL-SCNC: 4.4 MMOL/L (ref 3.5–5.2)
POTASSIUM SERPL-SCNC: 4.7 MMOL/L (ref 3.5–5.2)
PROT SERPL-MCNC: 7.7 G/DL (ref 6–8.5)
PROTHROMBIN TIME: 23.8 SECONDS (ref 11.7–14.2)
RBC # BLD AUTO: 4.32 10*6/MM3 (ref 3.77–5.28)
RBC # BLD AUTO: 4.39 10*6/MM3 (ref 3.77–5.28)
SODIUM SERPL-SCNC: 140 MMOL/L (ref 136–145)
SODIUM SERPL-SCNC: 142 MMOL/L (ref 136–145)
WBC # BLD AUTO: 4.85 10*3/MM3 (ref 3.4–10.8)
WBC # BLD AUTO: 5.65 10*3/MM3 (ref 3.4–10.8)

## 2021-07-13 PROCEDURE — 80048 BASIC METABOLIC PNL TOTAL CA: CPT | Performed by: EMERGENCY MEDICINE

## 2021-07-13 PROCEDURE — 99284 EMERGENCY DEPT VISIT MOD MDM: CPT

## 2021-07-13 PROCEDURE — 85610 PROTHROMBIN TIME: CPT | Performed by: EMERGENCY MEDICINE

## 2021-07-13 PROCEDURE — 85025 COMPLETE CBC W/AUTO DIFF WBC: CPT | Performed by: EMERGENCY MEDICINE

## 2021-07-13 RX ORDER — SODIUM CHLORIDE 0.9 % (FLUSH) 0.9 %
10 SYRINGE (ML) INJECTION AS NEEDED
Status: DISCONTINUED | OUTPATIENT
Start: 2021-07-13 | End: 2021-07-13 | Stop reason: HOSPADM

## 2021-07-13 NOTE — ED PROVIDER NOTES
EMERGENCY DEPARTMENT ENCOUNTER    Room Number:  39/39  Date of encounter:  7/13/2021  PCP: Wood Elias MD  Historian: Patient     I used full protective equipment while examining this patient.  This includes face mask, gloves and protective eyewear.  I washed my hands before entering the room and immediately upon leaving the room.  Patient was wearing a surgical mask.      HPI:  Chief Complaint: Abnormal labs  A complete HPI/ROS/PMH/PSH/SH/FH are unobtainable due to: None    Context: Cristina Lemus is a 89 y.o. female, with a history of chronic kidney disease, who presents to the ED c/o abnormal labs.  Patient saw her PCP yesterday and had labs drawn.  BUN was 68 and creatinine was 3.4.  Her doctor called her this morning and advised her to come to the ED for further evaluation.  Patient was admitted here last month for acute on chronic CHF.  She was started on torsemide at that time.  Patient currently has no complaints and denies chest pain, shortness of breath, abdominal pain, vomiting, diarrhea, recent weight gain, or increased leg swelling.      PAST MEDICAL HISTORY  Active Ambulatory Problems     Diagnosis Date Noted   • AVNRT (AV joy re-entry tachycardia) (CMS/[facility]) 10/29/2014   • History of pulmonary embolus (PE) 06/30/2020   • HTN, goal below 140/80 09/01/2016   • Permanent atrial fibrillation (CMS/[facility]) 07/07/2017   • Stage 4 chronic kidney disease (CMS/[facility]) 06/30/2020   • Acute on chronic diastolic congestive heart failure (CMS/[facility]) 10/01/2020   • Anxiety disorder 04/23/2021   • Chronic gout without tophus 04/23/2021   • KIM (acute kidney injury) (CMS/[facility]) 06/09/2021   • Essential hypertension 06/25/2021   • Dyspnea 06/25/2021   • Pleural effusion 06/25/2021   • Acute dyspnea 06/25/2021   • Normocytic anemia 07/12/2021     Resolved Ambulatory Problems     Diagnosis Date Noted   • No Resolved Ambulatory Problems     Past Medical History:   Diagnosis Date   • Atrial fibrillation (CMS/[facility])    • CHF  (congestive heart failure) (CMS/HCC)          PAST SURGICAL HISTORY  Past Surgical History:   Procedure Laterality Date   • BACK SURGERY     • CYST REMOVAL     • OTHER SURGICAL HISTORY      rupture disk         FAMILY HISTORY  History reviewed. No pertinent family history.      SOCIAL HISTORY  Social History     Socioeconomic History   • Marital status: Single     Spouse name: Not on file   • Number of children: Not on file   • Years of education: Not on file   • Highest education level: Not on file   Tobacco Use   • Smoking status: Never Smoker   • Smokeless tobacco: Never Used   Substance and Sexual Activity   • Alcohol use: No     Comment: caffeine use    • Drug use: No   • Sexual activity: Defer         ALLERGIES  Statins and Sulfa antibiotics       REVIEW OF SYSTEMS  Review of Systems      All systems have been reviewed and are negative except as as discussed in the HPI    PHYSICAL EXAM    I have reviewed the triage vital signs and nursing notes.    ED Triage Vitals [07/13/21 0928]   Temp Heart Rate Resp BP SpO2   97.7 °F (36.5 °C) 60 16 179/83 98 %      Temp src Heart Rate Source Patient Position BP Location FiO2 (%)   Tympanic -- -- -- --       Physical Exam  GENERAL: Awake, alert, resting comfortably  HENT: NCAT, nares patent, moist mucous membranes  NECK: supple  EYES: Extraocular muscles intact, no scleral icterus  CV: regular rhythm, regular rate  RESPIRATORY: normal effort, clear to auscultation bilaterally  ABDOMEN: soft, nontender  MUSCULOSKELETAL: Extremities are nontender and without obvious deformity.  There is normal range of motion in all extremities.  There is no calf tenderness.  1+ pedal edema both lower legs  NEURO: Strength, sensation, and coordination are grossly intact.  Speech and mentation are unremarkable.  No facial droop.  SKIN: warm, dry, no rash  PSYCH: Normal mood and affect      LAB RESULTS  Recent Results (from the past 24 hour(s))   Basic Metabolic Panel    Collection Time:  07/13/21 10:10 AM    Specimen: Blood   Result Value Ref Range    Glucose 87 65 - 99 mg/dL    BUN 71 (H) 8 - 23 mg/dL    Creatinine 3.14 (H) 0.57 - 1.00 mg/dL    Sodium 142 136 - 145 mmol/L    Potassium 4.4 3.5 - 5.2 mmol/L    Chloride 100 98 - 107 mmol/L    CO2 23.1 22.0 - 29.0 mmol/L    Calcium 9.4 8.2 - 9.6 mg/dL    eGFR  African Amer      eGFR Non African Amer 14 (L) >60 mL/min/1.73    BUN/Creatinine Ratio 22.6 7.0 - 25.0    Anion Gap 18.9 (H) 5.0 - 15.0 mmol/L   Protime-INR    Collection Time: 07/13/21 10:10 AM    Specimen: Blood   Result Value Ref Range    Protime 23.8 (H) 11.7 - 14.2 Seconds    INR 2.16 (H) 0.90 - 1.10   CBC Auto Differential    Collection Time: 07/13/21 10:10 AM    Specimen: Blood   Result Value Ref Range    WBC 4.85 3.40 - 10.80 10*3/mm3    RBC 4.32 3.77 - 5.28 10*6/mm3    Hemoglobin 11.4 (L) 12.0 - 15.9 g/dL    Hematocrit 36.4 34.0 - 46.6 %    MCV 84.3 79.0 - 97.0 fL    MCH 26.4 (L) 26.6 - 33.0 pg    MCHC 31.3 (L) 31.5 - 35.7 g/dL    RDW 15.7 (H) 12.3 - 15.4 %    RDW-SD 48.3 37.0 - 54.0 fl    MPV 11.5 6.0 - 12.0 fL    Platelets 212 140 - 450 10*3/mm3    Neutrophil % 59.8 42.7 - 76.0 %    Lymphocyte % 25.2 19.6 - 45.3 %    Monocyte % 9.5 5.0 - 12.0 %    Eosinophil % 4.7 0.3 - 6.2 %    Basophil % 0.6 0.0 - 1.5 %    Immature Grans % 0.2 0.0 - 0.5 %    Neutrophils, Absolute 2.90 1.70 - 7.00 10*3/mm3    Lymphocytes, Absolute 1.22 0.70 - 3.10 10*3/mm3    Monocytes, Absolute 0.46 0.10 - 0.90 10*3/mm3    Eosinophils, Absolute 0.23 0.00 - 0.40 10*3/mm3    Basophils, Absolute 0.03 0.00 - 0.20 10*3/mm3    Immature Grans, Absolute 0.01 0.00 - 0.05 10*3/mm3    nRBC 0.0 0.0 - 0.2 /100 WBC       Ordered the above labs and independently reviewed the results.      RADIOLOGY  No Radiology Exams Resulted Within Past 24 Hours    I ordered the above noted radiological studies. Reviewed by me and discussed with radiologist.  See dictation for official radiology  interpretation.      PROCEDURES  Procedures      MEDICATIONS GIVEN IN ER    Medications   sodium chloride 0.9 % flush 10 mL (has no administration in time range)         PROGRESS, DATA ANALYSIS, CONSULTS, AND MEDICAL DECISION MAKING    All labs have been independently reviewed by me.  All radiology studies have been reviewed by me and discussed with radiologist dictating the report.   EKG's independently viewed and interpreted by me.  I have reviewed the nurse's notes, vital signs, past medical history, and medication list.  Discussion below represents my analysis of pertinent findings related to patient's condition, differential diagnosis, treatment plan and final disposition.      ED Course as of Jul 13 1508   Tue Jul 13, 2021   0945 Old records reviewed.  Patient was admitted here 6/24 through 6/27/2021 for acute on chronic diastolic heart failure.  And right pleural effusion.  She was diuresed with IV Lasix and discharged on torsemide 20 mg daily.  She also has chronic kidney disease.  On 6/27, BUN was 64 and creatinine was 2.61.  She saw her PCP yesterday for hospital follow-up and labs obtained.  BUN was 68 creatinine was 3.4.  Her PCP called her this morning with these test results and advised her to come to the ED.    [WH]   1106 Case discussed with nephrology.  He recommends having the patient hold her torsemide dose for the next 2 days and then follow-up in the office.    [WH]   1125 Test results discussed with the patient.  I also informed her of my discussion with nephrology and the plan to hold torsemide for the next 2 doses.  Return precautions were discussed.  She was advised to follow-up with her nephrologist or PCP to have her renal function rechecked in the next 4 to 5 days.    [WH]      ED Course User Index  [WH] Renny Billings MD       AS OF 15:08 EDT VITALS:    BP - (!) 183/83  HR - 60  TEMP - 97.7 °F (36.5 °C) (Tympanic)  O2 SATS - 98%      DIAGNOSIS  Final diagnoses:   Acute on chronic  renal insufficiency         DISPOSITION  Discharge    DISCHARGE    Patient discharged in stable condition.    Reviewed implications of results, diagnosis, meds, responsibility to follow up, warning signs and symptoms of possible worsening, potential complications and reasons to return to ER, including chest pain, shortness of breath, increased leg swelling, weight gain, or other concern..    Patient/Family voiced understanding of above instructions.    Discussed plan for discharge, as there is no emergent indication for admission. Patient referred to primary care provider for BP management due to today's BP. Pt/family is agreeable and understands need for follow up and repeat testing.  Pt is aware that discharge does not mean that nothing is wrong but it indicates no emergency is present that requires admission and they must continue care with follow-up as given below or physician of their choice.     FOLLOW-UP  Hong Newman MD  [address] [address]  [phone]    Schedule an appointment as soon as possible for a visit           Medication List      No changes were made to your prescriptions during this visit.           Dictated utilizing Dragon dictation:  Much of this encounter note is an electronic transcription/translation of spoken language to printed text. The electronic translation of spoken language may permit erroneous, or at times, nonsensical words or phrases to be inadvertently transcribed; Although I have reviewed the note for such errors, some may still exist.     Renny Billings MD  07/13/21 2272

## 2021-07-13 NOTE — ED NOTES
Spoke with patients daughter regarding care and disposition. Daughter informed pt was up for discharge and would require a ride home. Daughter states she would arrive in approx 30 minutes to get patient. This RN wore mask, gloves, and protective eyewear throughout patient encounter. Pt wearing mask at all times. Hand hygiene performed before and after entering room.          Delmi Aguiar RN  07/13/21 5314

## 2021-07-13 NOTE — ED TRIAGE NOTES
Patient to er from home. Was seen today in PCP office today and was directed to come to er related to abnormal labs. Reported patient had elevated kidney function. Patient transported per ems. Patient has mask on in triage along with staff. Patient alert x 4 base line.

## 2021-07-13 NOTE — HOME HEALTH
Transfer done late as PT was discharged from this patient and was not aware of her hospitalization until later.

## 2021-07-13 NOTE — DISCHARGE INSTRUCTIONS
Do not take torsemide/Demadex for the next 2 days.  You will need to have your renal function rechecked in the next 4 to 5 days.  Return to the emergency department for shortness of breath, increased leg swelling, weight gain, or other concern.

## 2021-07-14 ENCOUNTER — HOME CARE VISIT (OUTPATIENT)
Dept: HOME HEALTH SERVICES | Facility: HOME HEALTHCARE | Age: 86
End: 2021-07-14

## 2021-07-14 ENCOUNTER — READMISSION MANAGEMENT (OUTPATIENT)
Dept: CALL CENTER | Facility: HOSPITAL | Age: 86
End: 2021-07-14

## 2021-07-14 PROCEDURE — G0300 HHS/HOSPICE OF LPN EA 15 MIN: HCPCS

## 2021-07-14 PROCEDURE — G0299 HHS/HOSPICE OF RN EA 15 MIN: HCPCS

## 2021-07-14 NOTE — OUTREACH NOTE
CHF Week 3 Survey      Responses   [facility] patient discharged from?  [facility]   Does the patient have one of the following disease processes/diagnoses(primary or secondary)?  CHF   Week 3 attempt successful?  No   Unsuccessful attempts  Attempt 1          Brandy Morgan LPN

## 2021-07-15 VITALS
WEIGHT: 113.6 LBS | BODY MASS INDEX: 18.9 KG/M2 | TEMPERATURE: 97.5 F | RESPIRATION RATE: 18 BRPM | SYSTOLIC BLOOD PRESSURE: 130 MMHG | DIASTOLIC BLOOD PRESSURE: 64 MMHG | HEART RATE: 76 BPM | OXYGEN SATURATION: 99 %

## 2021-07-15 NOTE — HOME HEALTH
Pt. instructed to weigh self every morning after getting out of bed and after going to restroom and before eating. weigh yourself same amount of clothing and the scale should be on hard flat surface.Discuss to report excessive weakness and fatique, sob coral. when lying falt, bloating of the stomach, with a decrease appetite and nausea, swelling in the feet and ankles. Instructed if pt. have unrelieved chest pain, unrelieved sob, confusion or fainting to call 911 immediately.        NEXT VISIT:  T/I ON CHF  GOALS NEED TO ADDED

## 2021-07-20 LAB — INR PPP: 1.95

## 2021-07-21 ENCOUNTER — ANTICOAGULATION VISIT (OUTPATIENT)
Dept: PHARMACY | Facility: HOSPITAL | Age: 86
End: 2021-07-21

## 2021-07-21 ENCOUNTER — HOME CARE VISIT (OUTPATIENT)
Dept: HOME HEALTH SERVICES | Facility: HOME HEALTHCARE | Age: 86
End: 2021-07-21

## 2021-07-21 VITALS
HEART RATE: 58 BPM | SYSTOLIC BLOOD PRESSURE: 132 MMHG | DIASTOLIC BLOOD PRESSURE: 80 MMHG | TEMPERATURE: 97.1 F | OXYGEN SATURATION: 100 % | RESPIRATION RATE: 18 BRPM

## 2021-07-21 DIAGNOSIS — I48.21 PERMANENT ATRIAL FIBRILLATION (HCC): Primary | ICD-10-CM

## 2021-07-21 PROCEDURE — G0299 HHS/HOSPICE OF RN EA 15 MIN: HCPCS

## 2021-07-21 PROCEDURE — G0300 HHS/HOSPICE OF LPN EA 15 MIN: HCPCS

## 2021-07-21 NOTE — PROGRESS NOTES
Anticoagulation Clinic Progress Note    Anticoagulation Summary  As of 2021    INR goal:  2.0-3.0   TTR:  67.2 % (8.5 mo)   INR used for dosin.95 (2021)   Warfarin maintenance plan:  4 mg every Mon, Wed, Fri; 2 mg all other days   Weekly warfarin total:  20 mg   No change documented:  Hubert Miranda, PharmD   Plan last modified:  Mc Zaidi RPH (2021)   Next INR check:  2021   Priority:  High   Target end date:  Indefinite    Indications    Permanent atrial fibrillation (CMS/HCC) [I48.21]             Anticoagulation Episode Summary     INR check location:      Preferred lab:      Send INR reminders to:   [facility]  POOL    Comments:  tried calling Chaim BARCLAY but no stable warf dosing available from July/Aug 2020 , prior to Eliquis; [facility]      Anticoagulation Care Providers     Provider Role Specialty Phone number    Lm Mart MD Referring Cardiology [phone]          Clinic Interview:  Patient Findings     Negatives:  Signs/symptoms of thrombosis, Signs/symptoms of bleeding,   Laboratory test error suspected, Change in health, Change in alcohol use,   Change in activity, Upcoming invasive procedure, Emergency department   visit, Upcoming dental procedure, Missed doses, Extra doses, Change in   medications, Change in diet/appetite, Hospital admission, Bruising, Other   complaints      Clinical Outcomes     Negatives:  Major bleeding event, Thromboembolic event,   Anticoagulation-related hospital admission, Anticoagulation-related ED   visit, Anticoagulation-related fatality        INR History:  Anticoagulation Monitoring 2021   INR 1.98 - 1.95   INR Date 2021 - 2021   INR Goal 2.0-3.0 2.0-3.0 2.0-3.0   Trend Same Same Same   Last Week Total 20 mg 20 mg 20 mg   Next Week Total 20 mg 20 mg 20 mg   Sun 2 mg 2 mg 2 mg   Mon 4 mg 4 mg 4 mg   Tue 2 mg 2 mg -   Wed 4 mg 4 mg 4 mg   Thu 2 mg 2 mg 2 mg   Fri 4 mg 4 mg 4 mg   Sat 2 mg 2  mg 2 mg   Visit Report - - -   Some recent data might be hidden       Plan:  1. INR is Subtherapeutic today- see above in Anticoagulation Summary.   Will instruct Cristina Lemus to Continue their warfarin regimen- see above in Anticoagulation Summary. Patient due to receive a 4 mg dose today.   2. Follow up in 1 week - [facility] Home Visit  3. Pt has agreed to only be called if INR out of range. They have been instructed to call if any changes in medications, doses, concerns, etc. Patient expresses understanding and has no further questions at this time.    Hubert Miranda, PharmD

## 2021-07-23 ENCOUNTER — HOME CARE VISIT (OUTPATIENT)
Dept: HOME HEALTH SERVICES | Facility: HOME HEALTHCARE | Age: 86
End: 2021-07-23

## 2021-07-23 NOTE — CASE COMMUNICATION
Patient has 7/14 visit [facility], no MVN,  patient has 7/20 visit [facility] , no MVN  patient has 7/27 visit next week on [facility]. Please put on me so I can see and possibly d/c   and at least figure out what is going on.   thanks

## 2021-07-27 ENCOUNTER — HOME CARE VISIT (OUTPATIENT)
Dept: HOME HEALTH SERVICES | Facility: HOME HEALTHCARE | Age: 86
End: 2021-07-27

## 2021-07-27 VITALS
SYSTOLIC BLOOD PRESSURE: 128 MMHG | BODY MASS INDEX: 18.14 KG/M2 | OXYGEN SATURATION: 98 % | TEMPERATURE: 98.2 F | WEIGHT: 109 LBS | DIASTOLIC BLOOD PRESSURE: 70 MMHG | HEART RATE: 68 BPM

## 2021-07-27 LAB — INR PPP: 2.06

## 2021-07-27 PROCEDURE — G0299 HHS/HOSPICE OF RN EA 15 MIN: HCPCS

## 2021-07-28 ENCOUNTER — ANTICOAGULATION VISIT (OUTPATIENT)
Dept: PHARMACY | Facility: HOSPITAL | Age: 86
End: 2021-07-28

## 2021-07-28 DIAGNOSIS — I48.21 PERMANENT ATRIAL FIBRILLATION (HCC): Primary | ICD-10-CM

## 2021-07-28 NOTE — PROGRESS NOTES
Anticoagulation Clinic Progress Note    Anticoagulation Summary  As of 2021    INR goal:  2.0-3.0   TTR:  66.9 % (8.7 mo)   INR used for dosin.06 (2021)   Warfarin maintenance plan:  4 mg every Mon, Wed, Fri; 2 mg all other days   Weekly warfarin total:  20 mg   No change documented:  Tasneem Wilkinson RPH   Plan last modified:  Mc Zaidi, PharmD (2021)   Next INR check:  8/10/2021   Priority:  High   Target end date:  Indefinite    Indications    Permanent atrial fibrillation (CMS/HCC) [I48.21]             Anticoagulation Episode Summary     INR check location:      Preferred lab:      Send INR reminders to:   [facility]  POOL    Comments:  tried calling Chaim BARCLAY but no stable warf dosing available from July/Aug 2020 , prior to Eliquis; [facility]      Anticoagulation Care Providers     Provider Role Specialty Phone number    Lm Mart MD Referring Cardiology [phone]          Clinic Interview:  No pertinent clinical findings have been reported.    INR History:  Anticoagulation Monitoring 2021   INR - 1.95 2.06   INR Date - 2021   INR Goal 2.0-3.0 2.0-3.0 2.0-3.0   Trend Same Same Same   Last Week Total 20 mg 20 mg 20 mg   Next Week Total 20 mg 20 mg 20 mg   Sun 2 mg 2 mg 2 mg   Mon 4 mg 4 mg 4 mg   Tue - - 2 mg   Wed 4 mg 4 mg 4 mg   Thu 2 mg 2 mg 2 mg   Fri 4 mg 4 mg 4 mg   Sat 2 mg 2 mg 2 mg   Visit Report - - -   Some recent data might be hidden       Plan:  1. INR is therapeutic today- see above in Anticoagulation Summary.    Cristina Lemus to continue their warfarin regimen- see above in Anticoagulation Summary.  2. Follow up in 2 weeks  3. Pt has agreed to only be called if INR out of range. They have been instructed to call if any changes in medications, doses, concerns, etc. Patient expresses understanding and has no further questions at this time.    Tasneem Wilkinson RPH

## 2021-07-28 NOTE — CASE COMMUNICATION
HUD DC- HUDDLE DISCHARGE    Complete day 51-58     Plan for discharge: PATEINT D/C TODAY FROM AGENCY     Barriers to discharge: NONE     Ongoing needs:  NONE     Any referrals needed: NONE    Any declines in outcomes: discuss and document reason: NONE    Teaching needed prior to discharge: NEED TO CONTINUE TO WEIGHT DIALY  REPORT ISSUES TO MD/ KEEP ALL MD APTS, CONTINUE TO TAKE MEDS AS PRESCRIBED. AND EAT HEALTH HEART DIET     Date of last visit by each discipline: NURSING TODAY     Assign DC notice responsibility: WAS COLLECTED TODAY    Assign oasis discharge responsibility:  SN , DONE TODAY

## 2021-08-10 LAB — INR PPP: 2.03

## 2021-08-11 ENCOUNTER — ANTICOAGULATION VISIT (OUTPATIENT)
Dept: PHARMACY | Facility: HOSPITAL | Age: 86
End: 2021-08-11

## 2021-08-11 DIAGNOSIS — I48.21 PERMANENT ATRIAL FIBRILLATION (HCC): Primary | ICD-10-CM

## 2021-08-11 NOTE — PROGRESS NOTES
Anticoagulation Clinic Progress Note    Anticoagulation Summary  As of 2021    INR goal:  2.0-3.0   TTR:  68.6 % (9.2 mo)   INR used for dosin.03 (8/10/2021)   Warfarin maintenance plan:  4 mg every Mon, Wed, Fri; 2 mg all other days   Weekly warfarin total:  20 mg   No change documented:  Mc Zaidi, Manjinder   Plan last modified:  Mc Zaidi, PharmD (2021)   Next INR check:  2021   Priority:  High   Target end date:  Indefinite    Indications    Permanent atrial fibrillation (CMS/HCC) [I48.21]             Anticoagulation Episode Summary     INR check location:      Preferred lab:      [facility]    Comments:  tried calling Chaim BARCLAY but no stable warf dosing available from July/Aug 2020 , prior to Eliquis; [facility]      Anticoagulation Care Providers     Provider Role Specialty Phone number    Lm Mart MD Referring Cardiology [phone]          Clinic Interview:  Patient Findings     Negatives:  Signs/symptoms of thrombosis, Signs/symptoms of bleeding,   Laboratory test error suspected, Change in health, Change in alcohol use,   Change in activity, Upcoming invasive procedure, Emergency department   visit, Upcoming dental procedure, Missed doses, Extra doses, Change in   medications, Change in diet/appetite, Hospital admission, Bruising, Other   complaints      Clinical Outcomes     Negatives:  Major bleeding event, Thromboembolic event,   Anticoagulation-related hospital admission, Anticoagulation-related ED   visit, Anticoagulation-related fatality        INR History:  Anticoagulation Monitoring 2021   INR 1.95 2.06 2.03   INR Date 2021 2021 8/10/2021   INR Goal 2.0-3.0 2.0-3.0 2.0-3.0   Trend Same Same Same   Last Week Total 20 mg 20 mg 20 mg   Next Week Total 20 mg 20 mg 20 mg   Sun 2 mg 2 mg 2 mg   Mon 4 mg 4 mg 4 mg   Tue - 2 mg 2 mg   Wed 4 mg 4 mg 4 mg   Thu 2 mg 2 mg 2 mg   Fri 4 mg 4 mg 4  mg   Sat 2 mg 2 mg 2 mg   Visit Report - - -   Some recent data might be hidden       Plan:  1. INR is Therapeutic today- see above in Anticoagulation Summary.   Will instruct Cristina Lemus to Continue their warfarin regimen- see above in Anticoagulation Summary.  2. Follow up in 3 weeks  3. They have been instructed to call if any changes in medications, doses, concerns, etc. Patient expresses understanding and has no further questions at this time.    Mc Zaidi, PharmD

## 2021-08-16 NOTE — PROGRESS NOTES
"Chief Complaint  Follow-up (3 month follow up )    Subjective          Cristina Lemus presents to [facility]  History of Present Illness     Chronic diastolic congestive heart failure -stable.  Takes torsemide 20 mg daily along with the potassium supplement.  No edema.  Weight is stable.    Stage 4 CKD -trying to stay plenty hydrated.  We are monitoring the torsemide make sure that we get proper fluid balance.  No swelling    Takes amlodipine for essential hypertension which is stable.    Also has a history of atrial fibrillation which is permanent and is on warfarin for that.  Managed by cardiology    Objective   Vital Signs:   /65 (BP Location: Left arm, Patient Position: Sitting, Cuff Size: Adult)   Pulse 72   Temp 98.6 °F (37 °C)   Ht 165.1 cm (65\")   Wt 51.9 kg (114 lb 6.4 oz)   SpO2 97%   BMI 19.04 kg/m²     Physical Exam  Vitals and nursing note reviewed.   Constitutional:       General: She is not in acute distress.     Appearance: Normal appearance.   Cardiovascular:      Rate and Rhythm: Normal rate. Rhythm irregularly irregular.      Heart sounds: Normal heart sounds. No murmur heard.     Pulmonary:      Effort: Pulmonary effort is normal.      Breath sounds: Normal breath sounds.   Musculoskeletal:      Right lower leg: No edema.      Left lower leg: No edema.   Neurological:      Mental Status: She is alert.        Result Review :   The following data was reviewed by: Wood Elias MD on 08/17/2021:  Common labs    Common Labsle 6/27/21 6/27/21 7/12/21 7/12/21 7/13/21 7/13/21    0435 0435 1448 1448 1010 1010   Glucose  82    87   Glucose   88      BUN  64 (A) 68 (A)   71 (A)   Creatinine  2.61 (A) 3.42 (A)   3.14 (A)   eGFR Non  Am  17 (A) 13 (A)   14 (A)   eGFR  Am   15 (A)      Sodium  138 140   142   Potassium  3.5 4.7   4.4   Chloride  105 101   100   Calcium  8.4 9.9 (A)   9.4   Total Protein   7.7      Albumin   4.80      Total Bilirubin   0.5 "      Alkaline Phosphatase   82      AST (SGOT)   20      ALT (SGPT)   11      WBC 5.98   5.65 4.85    Hemoglobin 9.6 (A)   11.9 (A) 11.4 (A)    Hematocrit 29.9 (A)   37.6 36.4    Platelets 185   237 212    (A) Abnormal value       Comments are available for some flowsheets but are not being displayed.                     Assessment and Plan    Diagnoses and all orders for this visit:    1. Stage 4 chronic kidney disease (CMS/HCC) (Primary)  -     Basic metabolic panel    2. Chronic diastolic (congestive) heart failure (CMS/HCC)    3. HTN, goal below 140/80  -     amLODIPine (NORVASC) 10 MG tablet; Take 1 tablet by mouth Daily.  Dispense: 90 tablet; Refill: 3      Recheck BMP to check stability of chronic kidney disease.  Will make decision regarding continuation of torsemide therapy once the BMP returns.  Refilled her prescription of amlodipine 10 mg.        Follow Up   Return in about 3 months (around 11/17/2021) for Recheck - CKD, CHF.  Patient was given instructions and counseling regarding her condition or for health maintenance advice. Please see specific information pulled into the AVS if appropriate.

## 2021-08-17 ENCOUNTER — OFFICE VISIT (OUTPATIENT)
Dept: INTERNAL MEDICINE | Facility: CLINIC | Age: 86
End: 2021-08-17

## 2021-08-17 VITALS
HEART RATE: 72 BPM | DIASTOLIC BLOOD PRESSURE: 65 MMHG | BODY MASS INDEX: 19.06 KG/M2 | TEMPERATURE: 98.6 F | OXYGEN SATURATION: 97 % | WEIGHT: 114.4 LBS | SYSTOLIC BLOOD PRESSURE: 128 MMHG | HEIGHT: 65 IN

## 2021-08-17 DIAGNOSIS — I10 HTN, GOAL BELOW 140/80: ICD-10-CM

## 2021-08-17 DIAGNOSIS — I50.32 CHRONIC DIASTOLIC (CONGESTIVE) HEART FAILURE (HCC): ICD-10-CM

## 2021-08-17 DIAGNOSIS — N18.4 STAGE 4 CHRONIC KIDNEY DISEASE (HCC): Primary | ICD-10-CM

## 2021-08-17 LAB
BUN SERPL-MCNC: 74 MG/DL (ref 8–23)
BUN/CREAT SERPL: 22.4 (ref 7–25)
CALCIUM SERPL-MCNC: 9.4 MG/DL (ref 8.2–9.6)
CHLORIDE SERPL-SCNC: 102 MMOL/L (ref 98–107)
CO2 SERPL-SCNC: 21.6 MMOL/L (ref 22–29)
CREAT SERPL-MCNC: 3.31 MG/DL (ref 0.57–1)
GLUCOSE SERPL-MCNC: 129 MG/DL (ref 65–99)
POTASSIUM SERPL-SCNC: 4.3 MMOL/L (ref 3.5–5.2)
SODIUM SERPL-SCNC: 138 MMOL/L (ref 136–145)

## 2021-08-17 PROCEDURE — 99214 OFFICE O/P EST MOD 30 MIN: CPT | Performed by: FAMILY MEDICINE

## 2021-08-17 RX ORDER — AMLODIPINE BESYLATE 10 MG/1
10 TABLET ORAL DAILY
Qty: 90 TABLET | Refills: 3 | Status: SHIPPED | OUTPATIENT
Start: 2021-08-17 | End: 2022-05-16 | Stop reason: HOSPADM

## 2021-08-17 RX ORDER — FUROSEMIDE 40 MG/1
40 TABLET ORAL DAILY
COMMUNITY
Start: 2021-08-05 | End: 2021-08-19

## 2021-08-18 ENCOUNTER — TELEPHONE (OUTPATIENT)
Dept: INTERNAL MEDICINE | Facility: CLINIC | Age: 86
End: 2021-08-18

## 2021-08-18 NOTE — TELEPHONE ENCOUNTER
PATIENT CALLING IN REGARDS TO LET DR BARRERA'S KNOW THAT SHE IS TAKING FUROSEMIDE 40MG AND WANTS TO KNOW IF THIS ONE SHE SHOULD TAKE. PLEASE ADVISE,THANK YOU!

## 2021-08-19 ENCOUNTER — TELEPHONE (OUTPATIENT)
Dept: INTERNAL MEDICINE | Facility: CLINIC | Age: 86
End: 2021-08-19

## 2021-08-19 DIAGNOSIS — N18.4 STAGE 4 CHRONIC KIDNEY DISEASE (HCC): ICD-10-CM

## 2021-08-19 DIAGNOSIS — I50.33 ACUTE ON CHRONIC DIASTOLIC CONGESTIVE HEART FAILURE (HCC): ICD-10-CM

## 2021-08-19 RX ORDER — TORSEMIDE 10 MG/1
10 TABLET ORAL DAILY
Qty: 90 TABLET | Refills: 3 | Status: SHIPPED | OUTPATIENT
Start: 2021-08-19 | End: 2022-01-26 | Stop reason: HOSPADM

## 2021-09-01 LAB — INR PPP: 1.57

## 2021-09-02 ENCOUNTER — ANTICOAGULATION VISIT (OUTPATIENT)
Dept: PHARMACY | Facility: HOSPITAL | Age: 86
End: 2021-09-02

## 2021-09-02 DIAGNOSIS — I48.21 PERMANENT ATRIAL FIBRILLATION (HCC): Primary | ICD-10-CM

## 2021-09-02 NOTE — PROGRESS NOTES
Anticoagulation Clinic Progress Note    Anticoagulation Summary  As of 2021    INR goal:  2.0-3.0   TTR:  64.0 % (9.9 mo)   INR used for dosin.57 (2021)   Warfarin maintenance plan:  4 mg every Mon, Wed, Fri; 2 mg all other days   Weekly warfarin total:  20 mg   Plan last modified:  Mc Zaidi, PharmD (2021)   Next INR check:  2021   Priority:  High   Target end date:  Indefinite    Indications    Permanent atrial fibrillation (CMS/HCC) [I48.21]             Anticoagulation Episode Summary     INR check location:      Preferred lab:      Send INR reminders to:  [facility]  POOL    Comments:  tried calling Chaim BARCLAY but no stable warf dosing available from July/Aug 2020 , prior to Eliquis; PRECISION      Anticoagulation Care Providers     Provider Role Specialty Phone number    Lm Mart MD Referring Cardiology [phone]          Clinic Interview:  Patient Findings     Negatives:  Signs/symptoms of thrombosis, Signs/symptoms of bleeding,   Laboratory test error suspected, Change in health, Change in alcohol use,   Change in activity, Upcoming invasive procedure, Emergency department   visit, Upcoming dental procedure, Missed doses, Extra doses, Change in   medications, Change in diet/appetite, Hospital admission, Bruising, Other   complaints      Clinical Outcomes     Negatives:  Major bleeding event, Thromboembolic event,   Anticoagulation-related hospital admission, Anticoagulation-related ED   visit, Anticoagulation-related fatality        INR History:  Anticoagulation Monitoring 2021   INR 2.06 2.03 1.57   INR Date 2021 8/10/2021 2021   INR Goal 2.0-3.0 2.0-3.0 2.0-3.0   Trend Same Same Same   Last Week Total 20 mg 20 mg 20 mg   Next Week Total 20 mg 20 mg 22 mg   Sun 2 mg 2 mg 2 mg   Mon 4 mg 4 mg 4 mg   Tue 2 mg 2 mg 2 mg   Wed 4 mg 4 mg -   Thu 2 mg 2 mg 4 mg ()   Fri 4 mg 4 mg 4 mg   Sat 2 mg 2 mg 2 mg   Visit Report - - -    Some recent data might be hidden       Plan:  1. INR is Subtherapeutic today- see above in Anticoagulation Summary.   Will instruct Cristina Lemus to Change their warfarin regimen to include a boost today (4 mg) then resume normal. She is due for a higher dose again tomorrow- see above in Anticoagulation Summary. Patient counseled on signs and symptoms of clot/stroke.  2. Follow up in 1 week  3. They have been instructed to call if any changes in medications, doses, concerns, etc. Patient expresses understanding and has no further questions at this time.    Elizabeth Mendoza, BritniD

## 2021-09-08 LAB — INR PPP: 1.65

## 2021-09-09 ENCOUNTER — ANTICOAGULATION VISIT (OUTPATIENT)
Dept: PHARMACY | Facility: HOSPITAL | Age: 86
End: 2021-09-09

## 2021-09-09 DIAGNOSIS — I48.21 PERMANENT ATRIAL FIBRILLATION (HCC): Primary | ICD-10-CM

## 2021-09-09 NOTE — PROGRESS NOTES
Addendum: Patient called back and informed me that her furosemide was changed to torsemide about 2 weeks ago. Instructed patient to continue with the same plan discussed earlier.    Anticoagulation Clinic Progress Note    Anticoagulation Summary  As of 2021    INR goal:  2.0-3.0   TTR:  62.5 % (10.2 mo)   INR used for dosin.65 (2021)   Warfarin maintenance plan:  4 mg every Mon, Wed, Fri; 2 mg all other days   Weekly warfarin total:  20 mg   Plan last modified:  Mc Zaidi, PharmD (2021)   Next INR check:  9/15/2021   Priority:  High   Target end date:  Indefinite    Indications    Permanent atrial fibrillation (CMS/HCC) [I48.21]             Anticoagulation Episode Summary     INR check location:      Preferred lab:      Send INR reminders to:   [facility]  POOL    Comments:  tried calling Chaim BARCLAY but no stable warf dosing available from July/Aug 2020 , prior to Eliquis; PRECISION      Anticoagulation Care Providers     Provider Role Specialty Phone number    Lm Mart MD Referring Cardiology [phone]          Clinic Interview:  Patient Findings     Negatives:  Signs/symptoms of thrombosis, Signs/symptoms of bleeding,   Laboratory test error suspected, Change in health, Change in alcohol use,   Change in activity, Upcoming invasive procedure, Emergency department   visit, Upcoming dental procedure, Missed doses, Extra doses, Change in   medications, Change in diet/appetite, Hospital admission, Bruising, Other   complaints      Clinical Outcomes     Negatives:  Major bleeding event, Thromboembolic event,   Anticoagulation-related hospital admission, Anticoagulation-related ED   visit, Anticoagulation-related fatality        INR History:  Anticoagulation Monitoring 2021   INR 2.03 1.57 1.65   INR Date 8/10/2021 2021 2021   INR Goal 2.0-3.0 2.0-3.0 2.0-3.0   Trend Same Same Same   Last Week Total 20 mg 20 mg 22 mg   Next Week Total 20 mg 22  mg 24 mg   Sun 2 mg 2 mg 4 mg (9/12)   Mon 4 mg 4 mg 4 mg   Tue 2 mg 2 mg 2 mg   Wed 4 mg - -   Thu 2 mg 4 mg (9/2) 4 mg (9/9)   Fri 4 mg 4 mg 4 mg   Sat 2 mg 2 mg 2 mg   Visit Report - - -   Some recent data might be hidden       Plan:  1. INR is Subtherapeutic today- see above in Anticoagulation Summary.   Will instruct Cristina Lemus to Change their warfarin regimen- see above in Anticoagulation Summary.  2. Follow up in 1 week  3. They have been instructed to call if any changes in medications, doses, concerns, etc. Patient expresses understanding and has no further questions at this time.    Elizabeth Mendoza, PharmD

## 2021-09-13 ENCOUNTER — OFFICE VISIT (OUTPATIENT)
Dept: CARDIOLOGY | Facility: CLINIC | Age: 86
End: 2021-09-13

## 2021-09-13 VITALS
HEIGHT: 65 IN | DIASTOLIC BLOOD PRESSURE: 60 MMHG | SYSTOLIC BLOOD PRESSURE: 140 MMHG | HEART RATE: 63 BPM | WEIGHT: 115.6 LBS | BODY MASS INDEX: 19.26 KG/M2

## 2021-09-13 DIAGNOSIS — I48.21 PERMANENT ATRIAL FIBRILLATION (HCC): Chronic | ICD-10-CM

## 2021-09-13 DIAGNOSIS — N18.4 STAGE 4 CHRONIC KIDNEY DISEASE (HCC): Chronic | ICD-10-CM

## 2021-09-13 DIAGNOSIS — I10 HTN, GOAL BELOW 140/80: Chronic | ICD-10-CM

## 2021-09-13 DIAGNOSIS — I50.32 CHRONIC DIASTOLIC CONGESTIVE HEART FAILURE (HCC): Primary | ICD-10-CM

## 2021-09-13 PROCEDURE — 93000 ELECTROCARDIOGRAM COMPLETE: CPT | Performed by: INTERNAL MEDICINE

## 2021-09-13 PROCEDURE — 99214 OFFICE O/P EST MOD 30 MIN: CPT | Performed by: INTERNAL MEDICINE

## 2021-09-13 NOTE — PROGRESS NOTES
[facility] Follow Up Office Note     Encounter Date:21  Patient:Cristina Lemus  :3/13/1930  MRN:[id number]      Chief Complaint: Follow up atrial fibrillation and diastolic heart failure  Chief Complaint   Patient presents with   • Congestive Heart Failure     3 month f/u   • Atrial Fibrillation   • Hypertension     History of Presenting Illness:      Ms. Evans is a 89 y.o. woman with past medical history notable for permanent atrial fibrillation, hypertension, chronic diastolic heart failure, and chronic kidney disease stage III who presents to our office for scheduled follow up.  She was last seen approximately 3 months ago and at that visit we did try and decrease her amlodipine to see if this would help out with her lower extremity swelling.  Unfortunate this did not have much of an impact.  Her blood pressure was reasonably stable and was somewhat high but given her advanced age we work to concerned wanting to be aggressive with her blood pressure control.  It does look like she was seen by a nephrologist and she also did have her diuretics changed from Lasix to torsemide.  This did result in a slight increase in her creatinine but did improve her leg swelling.  Her creatinine has been somewhat stable on her new diuretic regimen and overall patient doing reasonably well.        Review of Systems:  Review of Systems   Constitutional: Positive for malaise/fatigue.   HENT: Negative.    Eyes: Negative.    Cardiovascular: Negative.    Respiratory: Positive for shortness of breath.    Endocrine: Negative.    Hematologic/Lymphatic: Negative.    Skin: Negative.    Musculoskeletal: Positive for back pain.   Gastrointestinal: Negative.    Genitourinary: Negative.    Neurological: Negative.    Psychiatric/Behavioral: Negative.    Allergic/Immunologic: Negative.      Current Outpatient Medications on File Prior to Visit   Medication Sig Dispense Refill   • amLODIPine (NORVASC) 10 MG tablet Take 1 tablet by  mouth Daily. 90 tablet 3   • Cholecalciferol (VITAMIN D3 PO) Take  by mouth.     • LORazepam (ATIVAN) 1 MG tablet Take 1 tablet by mouth At Night As Needed for Sleep. 90 tablet 1   • potassium chloride (K-DUR,KLOR-CON) 20 MEQ CR tablet Take 1 tablet by mouth Daily. 90 tablet 3   • torsemide (DEMADEX) 10 MG tablet Take 1 tablet by mouth Daily. 90 tablet 3   • warfarin (COUMADIN) 2 MG tablet Take 2 mg by mouth See Admin Instructions. Every day BUT Monday, Wednesday and Friday     • warfarin (COUMADIN) 2 MG tablet Take 4 mg by mouth 3 (Three) Times a Week. Monday,Wednesday and Friday       No current facility-administered medications on file prior to visit.         Allergies   Allergen Reactions   • Statins Nausea And Vomiting   • Sulfa Antibiotics Nausea And Vomiting and Other (See Comments)     CHILLS AND FEVER       Past Medical History:   Diagnosis Date   • Atrial fibrillation (CMS/HCC)    • CHF (congestive heart failure) (CMS/HCC)        Past Surgical History:   Procedure Laterality Date   • BACK SURGERY     • CYST REMOVAL     • OTHER SURGICAL HISTORY      rupture disk       Social History     Socioeconomic History   • Marital status: Single     Spouse name: Not on file   • Number of children: Not on file   • Years of education: Not on file   • Highest education level: Not on file   Tobacco Use   • Smoking status: Never Smoker   • Smokeless tobacco: Never Used   Substance and Sexual Activity   • Alcohol use: No     Comment: caffeine use    • Drug use: No   • Sexual activity: Defer       History reviewed. No pertinent family history.    The following portions of the patient's history were reviewed and updated as appropriate: allergies, current medications, past family history, past medical history, past social history, past surgical history and problem list.       Objective:       Vitals:    09/13/21 1007   BP: 140/60   BP Location: Left arm   Patient Position: Sitting   Pulse: 63   Weight: 52.4 kg (115 lb 9.6 oz)  "  Height: 165.1 cm (65\")       Body mass index is 19.24 kg/m².     Physical Exam:  Constitutional:  Chronically ill-appearing, frail, no acute distress   HENT: Oropharynx clear and membrane moist  Eyes: Normal conjunctiva, no sclera icterus.  Neck: Supple, no carotid bruit bilaterally.  Cardiovascular: Irregularly irregular rate and rhythm, No Murmur, Trace bilateral lower extremity edema.  Pulmonary: Normal respiratory effort, normal lung sounds, no wheezing.  Abdominal: Soft, nontender, no hepatosplenomegaly, liver is non-pulsatile.  Neurological: Alert and orient x 3.   Skin: Warm, dry, no ecchymosis, no rash.  Psych: Appropriate mood and affect. Normal judgment and insight.      Lab Results   Component Value Date    GLUCOSE 87 07/13/2021    BUN 74 (H) 08/17/2021    CREATININE 3.31 (H) 08/17/2021    EGFRIFNONA 13 (L) 08/17/2021    EGFRIFAFRI 16 (L) 08/17/2021    BCR 22.4 08/17/2021    K 4.3 08/17/2021    CO2 21.6 (L) 08/17/2021    CALCIUM 9.4 08/17/2021    PROTENTOTREF 7.7 07/12/2021    ALBUMIN 4.80 07/12/2021    LABIL2 1.7 07/12/2021    AST 20 07/12/2021    ALT 11 07/12/2021       Lab Results   Component Value Date    WBC 4.85 07/13/2021    HGB 11.4 (L) 07/13/2021    HCT 36.4 07/13/2021    MCV 84.3 07/13/2021     07/13/2021       Lab Results   Component Value Date    TROPONINI 0.028 06/30/2020    TROPONINT 0.079 (C) 06/24/2021       No results found for: CHOL, CHLPL  No results found for: TRIG  No results found for: HDL  No results found for: LDL, LDLDIRECT    Lab Results   Component Value Date    TSH 1.830 01/13/2020         ECG 12 Lead    Date/Time: 9/13/2021 10:43 AM  Performed by: Lm Mart MD  Authorized by: Lm Mart MD   Comparison: compared with previous ECG from 6/24/2021  Similar to previous ECG  Rhythm: atrial fibrillation  Other findings: left ventricular hypertrophy with strain    Clinical impression: abnormal EKG        Lower Extremity Duplex 1/19/2021:  · Acute right lower " extremity superficial thrombophlebitis noted in the varicosity (below knee).  · All other right sided veins appeared normal.    Echocardiogram 6/25/2020 [facility]:  · Normal LV systolic function with apical hypokinesis  · EF 71%  · Asymmetric apical hypertrophy  · Mild aortic insufficiency  · Mild/moderate mitral regurgitation  · Mild tricuspid regurgitation        Assessment:          Diagnosis Plan   1. Chronic diastolic congestive heart failure (CMS/HCC)     2. Permanent atrial fibrillation (CMS/HCC)  ECG 12 Lead   3. HTN, goal below 140/80     4. Stage 4 chronic kidney disease (CMS/HCC)            Plan:       Ms. Evans is a 89 y.o. woman with past medical history notable for permanent atrial fibrillation, hypertension, chronic diastolic heart failure, and chronic kidney disease stage 3b who presents to our office for scheduled follow-up.  Overall patient is doing reasonably well.  Her new diuretic regimen does do a slightly better job with her leg swelling however this has bumped her creatinine.  Fortunately her creatinine seems to be stable and will continue with her current medical regimen.  From a cardiac standpoint no changes needed at this time we will see her back in 3 months      Permanent atrial fibrillation:  · Continue Coumadin   · Rate well controlled not on any rate controlling agents  · Metoprolol was held during her recent admission due to bradycardia    Hypertension:  · Reasonably well controlled for age   · Will continue amlodipine 10 mg     Chronic diastolic congestive heart failure:  · Continue torsemide at 10 mg    · Recent BMP 8/2021 shows stable Cr and normal NA, and K levels although increased from last year and continue with close monitoring    Chronic kidney disease:  · Stable on last BMP checked 8/2021  · Seeing nephrologist in a few weeks      Follow-up:  3 Months      Thank you for allowing me to participate in the care of Cristina Lemus. Feel free to contact me directly with any  further questions or concerns.    Lm Mart MD  [facility]  09/13/21  10:44 EDT

## 2021-09-15 LAB — INR PPP: 2.41

## 2021-09-16 ENCOUNTER — ANTICOAGULATION VISIT (OUTPATIENT)
Dept: PHARMACY | Facility: HOSPITAL | Age: 86
End: 2021-09-16

## 2021-09-16 DIAGNOSIS — I48.21 PERMANENT ATRIAL FIBRILLATION (HCC): Primary | ICD-10-CM

## 2021-09-16 NOTE — PROGRESS NOTES
Anticoagulation Clinic Progress Note    Anticoagulation Summary  As of 2021    INR goal:  2.0-3.0   TTR:  62.3 % (10.4 mo)   INR used for dosin.41 (9/15/2021)   Warfarin maintenance plan:  2 mg every Tue, Thu, Sat; 4 mg all other days   Weekly warfarin total:  22 mg   Plan last modified:  Elizabeth Mendoza, PharmD (2021)   Next INR check:  2021   Priority:  High   Target end date:  Indefinite    Indications    Permanent atrial fibrillation (CMS/HCC) [I48.21]             Anticoagulation Episode Summary     INR check location:      Preferred lab:      Send INR reminders to:   [facility]  POOL    Comments:  tried calling Chaim BARCLAY but no stable warf dosing available from July/Aug 2020 , prior to Eliquis; PRECISION      Anticoagulation Care Providers     Provider Role Specialty Phone number    Lm Mart MD Referring Cardiology [phone]          Clinic Interview:  Patient Findings     Negatives:  Signs/symptoms of thrombosis, Signs/symptoms of bleeding,   Laboratory test error suspected, Change in health, Change in alcohol use,   Change in activity, Upcoming invasive procedure, Emergency department   visit, Upcoming dental procedure, Missed doses, Extra doses, Change in   medications, Change in diet/appetite, Hospital admission, Bruising, Other   complaints      Clinical Outcomes     Negatives:  Major bleeding event, Thromboembolic event,   Anticoagulation-related hospital admission, Anticoagulation-related ED   visit, Anticoagulation-related fatality        INR History:  Anticoagulation Monitoring 2021   INR 1.57 1.65 2.41   INR Date 2021 2021 9/15/2021   INR Goal 2.0-3.0 2.0-3.0 2.0-3.0   Trend Same Same Up   Last Week Total 20 mg 22 mg 24 mg   Next Week Total 22 mg 24 mg 22 mg   Sun 2 mg 4 mg () 4 mg   Mon 4 mg 4 mg 4 mg   Tue 2 mg 2 mg 2 mg   Wed - - -   Thu 4 mg () 4 mg () 2 mg   Fri 4 mg 4 mg 4 mg   Sat 2 mg 2 mg 2 mg   Visit  Report - - -   Some recent data might be hidden       Plan:  1. INR is Therapeutic today- see above in Anticoagulation Summary.   Will instruct Cristina Lemus to Change their warfarin regimen to include 2 mg on Tues/Thur/Sat and 4 mg all other days- see above in Anticoagulation Summary.  2. Follow up in 1 week  3. They have been instructed to call if any changes in medications, doses, concerns, etc. Patient expresses understanding and has no further questions at this time.    Elizabeth Mendoza, PharmD

## 2021-09-23 LAB — INR PPP: 2.01

## 2021-09-24 ENCOUNTER — ANTICOAGULATION VISIT (OUTPATIENT)
Dept: PHARMACY | Facility: HOSPITAL | Age: 86
End: 2021-09-24

## 2021-09-24 DIAGNOSIS — I48.21 PERMANENT ATRIAL FIBRILLATION (HCC): Primary | ICD-10-CM

## 2021-09-24 NOTE — PROGRESS NOTES
Anticoagulation Clinic Progress Note    Anticoagulation Summary  As of 2021    INR goal:  2.0-3.0   TTR:  63.3 % (10.7 mo)   INR used for dosin.01 (2021)   Warfarin maintenance plan:  2 mg every Tue, Thu, Sat; 4 mg all other days   Weekly warfarin total:  22 mg   No change documented:  Tasneem Wilkinson RPH   Plan last modified:  Elizabeth Mendoza, PharmD (2021)   Next INR check:  2021   Priority:  High   Target end date:  Indefinite    Indications    Permanent atrial fibrillation (CMS/HCC) [I48.21]             Anticoagulation Episode Summary     INR check location:      Preferred lab:      Send INR reminders to:   [facility]  POOL    Comments:  tried calling Chaim BARCLAY but no stable warf dosing available from July/Aug 2020 , prior to Eliquis; PRECISION      Anticoagulation Care Providers     Provider Role Specialty Phone number    Lm Mart MD Referring Cardiology [phone]          Clinic Interview:  No pertinent clinical findings have been reported.    INR History:  Anticoagulation Monitoring 2021   INR 1.65 2.41 2.01   INR Date 2021 9/15/2021 2021   INR Goal 2.0-3.0 2.0-3.0 2.0-3.0   Trend Same Up Same   Last Week Total 22 mg 24 mg 22 mg   Next Week Total 24 mg 22 mg 22 mg   Sun 4 mg () 4 mg 4 mg   Mon 4 mg 4 mg 4 mg   Tue 2 mg 2 mg 2 mg   Wed - - -   Thu 4 mg () 2 mg -   Fri 4 mg 4 mg 4 mg   Sat 2 mg 2 mg 2 mg   Visit Report - - -   Some recent data might be hidden       Plan:  1. INR is therapeutic today- see above in Anticoagulation Summary.    Cristina Lemus to continue their warfarin regimen- see above in Anticoagulation Summary.  2. Follow up in 1 week  3. Pt has agreed to only be called if INR out of range. They have been instructed to call if any changes in medications, doses, concerns, etc. Patient expresses understanding and has no further questions at this time.    Tasneem Wilkinson RPH

## 2021-09-29 LAB — INR PPP: 2.2

## 2021-09-30 ENCOUNTER — ANTICOAGULATION VISIT (OUTPATIENT)
Dept: PHARMACY | Facility: HOSPITAL | Age: 86
End: 2021-09-30

## 2021-09-30 DIAGNOSIS — I48.21 PERMANENT ATRIAL FIBRILLATION (HCC): Primary | ICD-10-CM

## 2021-09-30 NOTE — PROGRESS NOTES
Anticoagulation Clinic Progress Note    Anticoagulation Summary  As of 2021    INR goal:  2.0-3.0   TTR:  63.9 % (10.9 mo)   INR used for dosin.20 (2021)   Warfarin maintenance plan:  2 mg every Tue, Thu, Sat; 4 mg all other days   Weekly warfarin total:  22 mg   No change documented:  Elizabeth Mendoza, Manjinder   Plan last modified:  Elizabeth Mendoza PharmD (2021)   Next INR check:  10/13/2021   Priority:  High   Target end date:  Indefinite    Indications    Permanent atrial fibrillation (CMS/HCC) [I48.21]             Anticoagulation Episode Summary     INR check location:      Preferred lab:      Send INR reminders to:   MAJO [facility]IE  POOL    Comments:  tried calling Chaim BARCLAY but no stable warf dosing available from July/Aug 2020 , prior to Eliquis; PRECISION      Anticoagulation Care Providers     Provider Role Specialty Phone number    Lm Mart MD Referring Cardiology [phone]          Clinic Interview:  Patient Findings     Negatives:  Signs/symptoms of thrombosis, Signs/symptoms of bleeding,   Laboratory test error suspected, Change in health, Change in alcohol use,   Change in activity, Upcoming invasive procedure, Emergency department   visit, Upcoming dental procedure, Missed doses, Extra doses, Change in   medications, Change in diet/appetite, Hospital admission, Bruising, Other   complaints      Clinical Outcomes     Negatives:  Major bleeding event, Thromboembolic event,   Anticoagulation-related hospital admission, Anticoagulation-related ED   visit, Anticoagulation-related fatality        INR History:  Anticoagulation Monitoring 2021   INR 2.41 2.01 2.20   INR Date 9/15/2021 2021 2021   INR Goal 2.0-3.0 2.0-3.0 2.0-3.0   Trend Up Same Same   Last Week Total 24 mg 22 mg 22 mg   Next Week Total 22 mg 22 mg 22 mg   Sun 4 mg 4 mg 4 mg   Mon 4 mg 4 mg 4 mg   Tue 2 mg 2 mg 2 mg   Wed - - 4 mg   Thu 2 mg - 2 mg   Fri 4 mg 4 mg 4  mg   Sat 2 mg 2 mg 2 mg   Visit Report - - -   Some recent data might be hidden       Plan:  1. INR is Therapeutic today- see above in Anticoagulation Summary.   Will instruct Cristina Lemus to Continue their warfarin regimen- see above in Anticoagulation Summary.  2. Follow up in 2 weeks  3. Pt has agreed to only be called if INR out of range. They have been instructed to call if any changes in medications, doses, concerns, etc. Patient expresses understanding and has no further questions at this time.    Elizabeth Mendoza, PharmD

## 2021-10-13 LAB — INR PPP: 2.08

## 2021-10-14 ENCOUNTER — ANTICOAGULATION VISIT (OUTPATIENT)
Dept: PHARMACY | Facility: HOSPITAL | Age: 86
End: 2021-10-14

## 2021-10-14 DIAGNOSIS — N18.4 STAGE 4 CHRONIC KIDNEY DISEASE (HCC): ICD-10-CM

## 2021-10-14 DIAGNOSIS — I50.33 ACUTE ON CHRONIC DIASTOLIC CONGESTIVE HEART FAILURE (HCC): ICD-10-CM

## 2021-10-14 DIAGNOSIS — I48.21 PERMANENT ATRIAL FIBRILLATION (HCC): Primary | ICD-10-CM

## 2021-10-14 RX ORDER — POTASSIUM CHLORIDE 20 MEQ/1
20 TABLET, EXTENDED RELEASE ORAL DAILY
Qty: 90 TABLET | Refills: 0 | Status: SHIPPED | OUTPATIENT
Start: 2021-10-14 | End: 2022-01-26 | Stop reason: HOSPADM

## 2021-10-14 NOTE — PROGRESS NOTES
Anticoagulation Clinic Progress Note    Anticoagulation Summary  As of 10/14/2021    INR goal:  2.0-3.0   TTR:  65.4 % (11.3 mo)   INR used for dosin.08 (10/13/2021)   Warfarin maintenance plan:  2 mg every Tue, Thu, Sat; 4 mg all other days   Weekly warfarin total:  22 mg   No change documented:  Tasneem Wilkinson RPH   Plan last modified:  Elizabeth Mendoza, PharmD (2021)   Next INR check:  10/27/2021   Priority:  High   Target end date:  Indefinite    Indications    Permanent atrial fibrillation (HCC) [I48.21]             Anticoagulation Episode Summary     INR check location:      Preferred lab:      Send INR reminders to:   MA[facility]  POOL    Comments:  tried calling Chaim BARCLAY but no stable warf dosing available from July/Aug 2020 , prior to Eliquis; PRECISION      Anticoagulation Care Providers     Provider Role Specialty Phone number    Lm Mart MD Referring Cardiology [phone]          Clinic Interview:  No pertinent clinical findings have been reported.    INR History:  Anticoagulation Monitoring 2021 2021 10/14/2021   INR 2.01 2.20 2.08   INR Date 2021 2021 10/13/2021   INR Goal 2.0-3.0 2.0-3.0 2.0-3.0   Trend Same Same Same   Last Week Total 22 mg 22 mg 22 mg   Next Week Total 22 mg 22 mg 22 mg   Sun 4 mg 4 mg 4 mg   Mon 4 mg 4 mg 4 mg   Tue 2 mg 2 mg 2 mg   Wed - 4 mg 4 mg   Thu - 2 mg 2 mg   Fri 4 mg 4 mg 4 mg   Sat 2 mg 2 mg 2 mg   Visit Report - - -   Some recent data might be hidden       Plan:  1. INR is therapeutic today- see above in Anticoagulation Summary.    Verlucinda Lemus to continue their warfarin regimen- see above in Anticoagulation Summary.  2. Follow up in 2 weeks  3. Pt has agreed to only be called if INR out of range. They have been instructed to call if any changes in medications, doses, concerns, etc. Patient expresses understanding and has no further questions at this time.    Tasneem Wilkinson RPH

## 2021-10-14 NOTE — TELEPHONE ENCOUNTER
Caller: Cristina Lemus    Relationship: Self      Medication requested (name and dosage): potapotassium chloride (K-DUR,KLOR-CON) 20 MEQ CR tabletssium chloride (K-DUR,KLOR-CON) 20 MEQ CR tablet    Pharmacy where request should be sent: [facility] - [place], [place] - [address] AT [facility] to Reg[facility] - [phone] [facility] [phone] [phone]    Additional details provided by patient: THE PATIENT HAS A WEEKS LEFT OF MEDICATION     Best call back number: [phone]    Does the patient have less than a 3 day supply:  [] Yes  [] No    Chriss Roland Rep   10/14/21 10:26 EDT

## 2021-10-28 LAB — INR PPP: 1.88

## 2021-10-29 ENCOUNTER — ANTICOAGULATION VISIT (OUTPATIENT)
Dept: PHARMACY | Facility: HOSPITAL | Age: 86
End: 2021-10-29

## 2021-10-29 DIAGNOSIS — I48.21 PERMANENT ATRIAL FIBRILLATION (HCC): Primary | ICD-10-CM

## 2021-10-29 NOTE — PROGRESS NOTES
Anticoagulation Clinic Progress Note    Anticoagulation Summary  As of 10/29/2021    INR goal:  2.0-3.0   TTR:  64.4 % (11.8 mo)   INR used for dosin.88 (10/28/2021)   Warfarin maintenance plan:  2 mg every Tue, Sat; 4 mg all other days   Weekly warfarin total:  24 mg   Plan last modified:  Mc Zaidi, PharmD (10/29/2021)   Next INR check:  11/10/2021   Priority:  High   Target end date:  Indefinite    Indications    Permanent atrial fibrillation (HCC) [I48.21]             Anticoagulation Episode Summary     INR check location:      Preferred lab:      Send INR reminders to:   MAJO [facility]  POOL    Comments:  tried calling Chaim BARCLAY but no stable warf dosing available from July/Aug 2020 , prior to Eliquis; PRECISION      Anticoagulation Care Providers     Provider Role Specialty Phone number    Lm Mart MD Referring Cardiology [phone]          Clinic Interview:  Patient Findings     Negatives:  Signs/symptoms of thrombosis, Signs/symptoms of bleeding,   Laboratory test error suspected, Change in health, Change in alcohol use,   Change in activity, Upcoming invasive procedure, Emergency department   visit, Upcoming dental procedure, Missed doses, Extra doses, Change in   medications, Change in diet/appetite, Hospital admission, Bruising, Other   complaints      Clinical Outcomes     Negatives:  Major bleeding event, Thromboembolic event,   Anticoagulation-related hospital admission, Anticoagulation-related ED   visit, Anticoagulation-related fatality        INR History:  Anticoagulation Monitoring 2021 10/14/2021 10/29/2021   INR 2.20 2.08 1.88   INR Date 2021 10/13/2021 10/28/2021   INR Goal 2.0-3.0 2.0-3.0 2.0-3.0   Trend Same Same Up   Last Week Total 22 mg 22 mg 22 mg   Next Week Total 22 mg 22 mg 24 mg   Sun 4 mg 4 mg 4 mg   Mon 4 mg 4 mg 4 mg   Tue 2 mg 2 mg 2 mg   Wed 4 mg 4 mg 4 mg   Thu 2 mg 2 mg 4 mg   Fri 4 mg 4 mg 4 mg   Sat 2 mg 2 mg 2 mg   Visit Report - - -    Some recent data might be hidden       Plan:  1. INR was Subtherapeutic yesterday- see above in Anticoagulation Summary.   Will instruct Cristina Lemus to Increase their warfarin regimen- see above in Anticoagulation Summary.  2. Follow up in 2 weeks  3. They have been instructed to call if any changes in medications, doses, concerns, etc. Patient expresses understanding and has no further questions at this time.    Mc Zaidi, PharmD

## 2021-11-10 LAB — INR PPP: 2.11

## 2021-11-11 ENCOUNTER — ANTICOAGULATION VISIT (OUTPATIENT)
Dept: PHARMACY | Facility: HOSPITAL | Age: 86
End: 2021-11-11

## 2021-11-11 DIAGNOSIS — I48.21 PERMANENT ATRIAL FIBRILLATION (HCC): Primary | ICD-10-CM

## 2021-11-11 NOTE — PROGRESS NOTES
Anticoagulation Clinic Progress Note    Anticoagulation Summary  As of 2021    INR goal:  2.0-3.0   TTR:  63.8 % (1 y)   INR used for dosin.11 (11/10/2021)   Warfarin maintenance plan:  2 mg every Tue, Sat; 4 mg all other days   Weekly warfarin total:  24 mg   No change documented:  Tasneem Wilkinson RPH   Plan last modified:  Mc Zaidi, PharmD (10/29/2021)   Next INR check:  2021   Priority:  High   Target end date:  Indefinite    Indications    Permanent atrial fibrillation (HCC) [I48.21]             Anticoagulation Episode Summary     INR check location:      Preferred lab:      Send INR reminders to:   [facility]  POOL    Comments:  tried calling Chaim BARCLAY but no stable warf dosing available from July/Aug 2020 , prior to Eliquis; PRECISION      Anticoagulation Care Providers     Provider Role Specialty Phone number    Lm Mart MD Referring Cardiology [phone]          Clinic Interview:  Patient Findings     Negatives:  Signs/symptoms of thrombosis, Signs/symptoms of bleeding,   Laboratory test error suspected, Change in health, Change in alcohol use,   Change in activity, Upcoming invasive procedure, Emergency department   visit, Upcoming dental procedure, Missed doses, Extra doses, Change in   medications, Change in diet/appetite, Hospital admission, Bruising, Other   complaints      Clinical Outcomes     Negatives:  Major bleeding event, Thromboembolic event,   Anticoagulation-related hospital admission, Anticoagulation-related ED   visit, Anticoagulation-related fatality        INR History:  Anticoagulation Monitoring 10/14/2021 10/29/2021 2021   INR 2.08 1.88 2.11   INR Date 10/13/2021 10/28/2021 11/10/2021   INR Goal 2.0-3.0 2.0-3.0 2.0-3.0   Trend Same Up Same   Last Week Total 22 mg 22 mg 24 mg   Next Week Total 22 mg 24 mg 24 mg   Sun 4 mg 4 mg 4 mg   Mon 4 mg 4 mg 4 mg   Tue 2 mg 2 mg 2 mg   Wed 4 mg 4 mg 4 mg   Thu 2 mg 4 mg 4 mg   Fri 4 mg 4 mg 4 mg    Sat 2 mg 2 mg 2 mg   Visit Report - - -   Some recent data might be hidden       Plan:  1. INR is Therapeutic today- see above in Anticoagulation Summary.   Will instruct Cristina Lemus to Continue their warfarin regimen- see above in Anticoagulation Summary.  2. Follow up in 3 weeks  3. They have been instructed to call if any changes in medications, doses, concerns, etc. Patient expresses understanding and has no further questions at this time.    Tasneem Wilkinson [facility]

## 2021-11-18 ENCOUNTER — OFFICE VISIT (OUTPATIENT)
Dept: INTERNAL MEDICINE | Facility: CLINIC | Age: 86
End: 2021-11-18

## 2021-11-18 VITALS
HEIGHT: 65 IN | RESPIRATION RATE: 16 BRPM | OXYGEN SATURATION: 99 % | TEMPERATURE: 98.6 F | BODY MASS INDEX: 19.29 KG/M2 | HEART RATE: 92 BPM | WEIGHT: 115.8 LBS | SYSTOLIC BLOOD PRESSURE: 140 MMHG | DIASTOLIC BLOOD PRESSURE: 72 MMHG

## 2021-11-18 DIAGNOSIS — I10 ESSENTIAL HYPERTENSION: ICD-10-CM

## 2021-11-18 DIAGNOSIS — N18.4 STAGE 4 CHRONIC KIDNEY DISEASE (HCC): ICD-10-CM

## 2021-11-18 DIAGNOSIS — I50.32 CHRONIC DIASTOLIC (CONGESTIVE) HEART FAILURE (HCC): Primary | ICD-10-CM

## 2021-11-18 PROCEDURE — 99214 OFFICE O/P EST MOD 30 MIN: CPT | Performed by: FAMILY MEDICINE

## 2021-11-18 RX ORDER — FUROSEMIDE 40 MG/1
1 TABLET ORAL DAILY
COMMUNITY
End: 2022-01-26 | Stop reason: HOSPADM

## 2021-11-18 NOTE — PROGRESS NOTES
"Chief Complaint  Chronic Kidney Disease (3 month follow up )    Subjective          Cristina Lemus presents to [facility]  History of Present Illness     Chronic diastolic congestive heart failure -stable.  Takes torsemide 10 mg daily along with the potassium supplement.  No edema.  Weight is stable.     Stage 4 CKD -trying to stay plenty hydrated.  We are monitoring the torsemide make sure that we get proper fluid balance.  No swelling     Takes amlodipine 10mg for essential hypertension which is stable.  No chest pain or shortness of breath.       Objective   Vital Signs:   /72 (BP Location: Left arm, Patient Position: Sitting, Cuff Size: Adult)   Pulse 92   Temp 98.6 °F (37 °C) (Temporal)   Resp 16   Ht 165.1 cm (65\")   Wt 52.5 kg (115 lb 12.8 oz)   SpO2 99%   BMI 19.27 kg/m²     Physical Exam  Vitals and nursing note reviewed.   Constitutional:       General: She is not in acute distress.     Appearance: Normal appearance.   Cardiovascular:      Rate and Rhythm: Normal rate and regular rhythm.      Heart sounds: Normal heart sounds. No murmur heard.      Pulmonary:      Effort: Pulmonary effort is normal.      Breath sounds: Normal breath sounds.   Musculoskeletal:      Right lower leg: No edema.      Left lower leg: No edema.   Neurological:      Mental Status: She is alert.        Result Review :   The following data was reviewed by: Wood Elias MD on 11/18/2021:  Common labs    Common Labsle 7/13/21 7/13/21 8/17/21 9/24/21    1010 1010     Glucose  87 129 (A) 85   BUN  71 (A) 74 (A) 89 (A)   Creatinine  3.14 (A) 3.31 (A) 2.76 (A)   eGFR Non  Am  14 (A) 13 (A) 16 (A)   eGFR  Am   16 (A) 20 (A)   Sodium  142 138 140   Potassium  4.4 4.3 4.2   Chloride  100 102 100   Calcium  9.4 9.4 9.6   Total Protein    7.5   Albumin    4.80   Total Bilirubin    0.4   Alkaline Phosphatase    66   AST (SGOT)    19   ALT (SGPT)    10   WBC 4.85      Hemoglobin 11.4 (A)   "    Hematocrit 36.4      Platelets 212      (A) Abnormal value       Comments are available for some flowsheets but are not being displayed.                     Assessment and Plan    Diagnoses and all orders for this visit:    1. Chronic diastolic (congestive) heart failure (HCC) (Primary)  -     Comprehensive Metabolic Panel  -     CBC & Differential    2. Stage 4 chronic kidney disease (HCC)  -     Comprehensive Metabolic Panel  -     CBC & Differential    3. Essential hypertension  -     Comprehensive Metabolic Panel  -     CBC & Differential      Clinically euvolemic.  We will get labs to check stability of her kidneys.  Continue current medications as above.  Blood pressure is stable.        Follow Up   Return in about 3 months (around 2/18/2022) for Medicare Wellness.  Patient was given instructions and counseling regarding her condition or for health maintenance advice. Please see specific information pulled into the AVS if appropriate.

## 2021-11-19 ENCOUNTER — TELEPHONE (OUTPATIENT)
Dept: INTERNAL MEDICINE | Facility: CLINIC | Age: 86
End: 2021-11-19

## 2021-11-19 DIAGNOSIS — N18.4 STAGE 4 CHRONIC KIDNEY DISEASE (HCC): ICD-10-CM

## 2021-11-19 DIAGNOSIS — N17.9 AKI (ACUTE KIDNEY INJURY) (HCC): Primary | ICD-10-CM

## 2021-11-19 LAB
ALBUMIN SERPL-MCNC: 4.5 G/DL (ref 3.5–4.6)
ALBUMIN/GLOB SERPL: 1.6 {RATIO} (ref 1.2–2.2)
ALP SERPL-CCNC: 109 IU/L (ref 44–121)
ALT SERPL-CCNC: 18 IU/L (ref 0–32)
AST SERPL-CCNC: 27 IU/L (ref 0–40)
BASOPHILS # BLD AUTO: 0 X10E3/UL (ref 0–0.2)
BASOPHILS NFR BLD AUTO: 1 %
BILIRUB SERPL-MCNC: 0.4 MG/DL (ref 0–1.2)
BUN SERPL-MCNC: 76 MG/DL (ref 10–36)
BUN/CREAT SERPL: 22 (ref 12–28)
CALCIUM SERPL-MCNC: 9 MG/DL (ref 8.7–10.3)
CHLORIDE SERPL-SCNC: 103 MMOL/L (ref 96–106)
CO2 SERPL-SCNC: 16 MMOL/L (ref 20–29)
CREAT SERPL-MCNC: 3.4 MG/DL (ref 0.57–1)
EOSINOPHIL # BLD AUTO: 0.2 X10E3/UL (ref 0–0.4)
EOSINOPHIL NFR BLD AUTO: 4 %
ERYTHROCYTE [DISTWIDTH] IN BLOOD BY AUTOMATED COUNT: 14.8 % (ref 11.7–15.4)
GLOBULIN SER CALC-MCNC: 2.8 G/DL (ref 1.5–4.5)
GLUCOSE SERPL-MCNC: 92 MG/DL (ref 65–99)
HCT VFR BLD AUTO: 35.7 % (ref 34–46.6)
HGB BLD-MCNC: 11.6 G/DL (ref 11.1–15.9)
IMM GRANULOCYTES # BLD AUTO: 0 X10E3/UL (ref 0–0.1)
IMM GRANULOCYTES NFR BLD AUTO: 0 %
LYMPHOCYTES # BLD AUTO: 1.2 X10E3/UL (ref 0.7–3.1)
LYMPHOCYTES NFR BLD AUTO: 20 %
MCH RBC QN AUTO: 27.5 PG (ref 26.6–33)
MCHC RBC AUTO-ENTMCNC: 32.5 G/DL (ref 31.5–35.7)
MCV RBC AUTO: 85 FL (ref 79–97)
MONOCYTES # BLD AUTO: 0.6 X10E3/UL (ref 0.1–0.9)
MONOCYTES NFR BLD AUTO: 10 %
NEUTROPHILS # BLD AUTO: 4 X10E3/UL (ref 1.4–7)
NEUTROPHILS NFR BLD AUTO: 65 %
PLATELET # BLD AUTO: 242 X10E3/UL (ref 150–450)
POTASSIUM SERPL-SCNC: 4.2 MMOL/L (ref 3.5–5.2)
PROT SERPL-MCNC: 7.3 G/DL (ref 6–8.5)
RBC # BLD AUTO: 4.22 X10E6/UL (ref 3.77–5.28)
SODIUM SERPL-SCNC: 139 MMOL/L (ref 134–144)
WBC # BLD AUTO: 6 X10E3/UL (ref 3.4–10.8)

## 2021-11-19 NOTE — TELEPHONE ENCOUNTER
Caller: Cristina Lemus    Relationship: Self    Best call back number: [phone]    PATIENT CALLING BECAUSE SHE WAS IN FOR AN APPOINTMENT AND FORGOT TO ASK FOR A FLU SHOT. WANTS TO KNOW IF SHE CAN GET THE REGULAR DOSE.     PLEASE ADVISE.

## 2021-12-01 ENCOUNTER — TELEPHONE (OUTPATIENT)
Dept: INTERNAL MEDICINE | Facility: CLINIC | Age: 86
End: 2021-12-01

## 2021-12-01 LAB — INR PPP: 3.24

## 2021-12-02 ENCOUNTER — ANTICOAGULATION VISIT (OUTPATIENT)
Dept: PHARMACY | Facility: HOSPITAL | Age: 86
End: 2021-12-02

## 2021-12-02 DIAGNOSIS — F51.01 PRIMARY INSOMNIA: ICD-10-CM

## 2021-12-02 DIAGNOSIS — I48.21 PERMANENT ATRIAL FIBRILLATION (HCC): Primary | ICD-10-CM

## 2021-12-02 NOTE — PROGRESS NOTES
Anticoagulation Clinic Progress Note    Anticoagulation Summary  As of 12/2/2021    INR goal:  2.0-3.0   TTR:  64.6 % (1.1 y)   INR used for dosing:  3.24 (12/1/2021)   Warfarin maintenance plan:  2 mg every Tue, Sat; 4 mg all other days   Weekly warfarin total:  24 mg   Plan last modified:  Mc Zaidi, PharmD (10/29/2021)   Next INR check:  12/15/2021   Priority:  High   Target end date:  Indefinite    Indications    Permanent atrial fibrillation (HCC) [I48.21]             Anticoagulation Episode Summary     INR check location:      Preferred lab:      Send INR reminders to:   MAJO [facility]IE  POOL    Comments:  tried calling Chaim BARCLAY but no stable warf dosing available from July/Aug 2020 , prior to Eliquis; PRECISION      Anticoagulation Care Providers     Provider Role Specialty Phone number    Lm Mart MD Referring Cardiology [phone]          Clinic Interview:  Patient Findings     Positives:  Change in medications    Negatives:  Signs/symptoms of thrombosis, Signs/symptoms of bleeding,   Laboratory test error suspected, Change in health, Change in alcohol use,   Change in activity, Upcoming invasive procedure, Emergency department   visit, Upcoming dental procedure, Missed doses, Extra doses, Change in   diet/appetite, Hospital admission, Bruising, Other complaints    Comments:  Patient had discontinued torsemide and yesterday was restarted   on 1/2 tablet daily       Clinical Outcomes     Negatives:  Major bleeding event, Thromboembolic event,   Anticoagulation-related hospital admission, Anticoagulation-related ED   visit, Anticoagulation-related fatality    Comments:  Patient had discontinued torsemide and yesterday was restarted   on 1/2 tablet daily         INR History:  Anticoagulation Monitoring 10/29/2021 11/11/2021 12/2/2021   INR 1.88 2.11 3.24   INR Date 10/28/2021 11/10/2021 12/1/2021   INR Goal 2.0-3.0 2.0-3.0 2.0-3.0   Trend Up Same Same   Last Week Total 22 mg 24 mg 24  mg   Next Week Total 24 mg 24 mg 22 mg   Sun 4 mg 4 mg 4 mg   Mon 4 mg 4 mg 4 mg   Tue 2 mg 2 mg 2 mg   Wed 4 mg 4 mg 4 mg   Thu 4 mg 4 mg 4 mg   Fri 4 mg 4 mg 2 mg (12/3); Otherwise 4 mg   Sat 2 mg 2 mg 2 mg   Visit Report - - -   Some recent data might be hidden       Plan:  1. INR is Supratherapeutic today- see above in Anticoagulation Summary.   Will instruct Cristina Lemus to Change their warfarin regimen- see above in Anticoagulation Summary.  2. Follow up in 2 week  3. They have been instructed to call if any changes in medications, doses, concerns, etc. Patient expresses understanding and has no further questions at this time.    Tasneem Wilkinson [facility]

## 2021-12-03 ENCOUNTER — TELEPHONE (OUTPATIENT)
Dept: INTERNAL MEDICINE | Facility: CLINIC | Age: 86
End: 2021-12-03

## 2021-12-03 RX ORDER — LORAZEPAM 1 MG/1
TABLET ORAL
Qty: 90 TABLET | Refills: 1 | Status: ON HOLD | OUTPATIENT
Start: 2021-12-03 | End: 2022-01-26 | Stop reason: SDUPTHER

## 2021-12-03 NOTE — TELEPHONE ENCOUNTER
Caller: Shannan Lemus    Relationship: Emergency Contact    Best call back number: [phone]    What was the call regarding: PATIENT NEEDS TO RESCHEDULE APPT ON 12/13, PLEASE ADVISE IF SHE JUST NEEDS A LAB APPT OR IF IT NEEDS TO BE AN ACTUAL APPT WITH DR PRADO. PLEASE CALL TO RESCHEDULE ONCE CONFIRMED.     Do you require a callback: YES

## 2021-12-08 NOTE — TELEPHONE ENCOUNTER
Patient notified Dr Elias states to continue medication as directed and he will follow up with her at next scheduled in 03/2022. No need for appointment in December. Patient was very pleased

## 2021-12-13 ENCOUNTER — TELEPHONE (OUTPATIENT)
Dept: CARDIOLOGY | Facility: CLINIC | Age: 86
End: 2021-12-13

## 2021-12-13 ENCOUNTER — OFFICE VISIT (OUTPATIENT)
Dept: CARDIOLOGY | Facility: CLINIC | Age: 86
End: 2021-12-13

## 2021-12-13 VITALS
SYSTOLIC BLOOD PRESSURE: 148 MMHG | HEIGHT: 65 IN | DIASTOLIC BLOOD PRESSURE: 90 MMHG | BODY MASS INDEX: 18.63 KG/M2 | WEIGHT: 111.8 LBS | HEART RATE: 63 BPM

## 2021-12-13 DIAGNOSIS — I50.32 CHRONIC DIASTOLIC CONGESTIVE HEART FAILURE (HCC): Chronic | ICD-10-CM

## 2021-12-13 DIAGNOSIS — R06.09 DYSPNEA ON EXERTION: ICD-10-CM

## 2021-12-13 DIAGNOSIS — I10 ESSENTIAL HYPERTENSION: Chronic | ICD-10-CM

## 2021-12-13 DIAGNOSIS — I48.21 PERMANENT ATRIAL FIBRILLATION (HCC): Primary | Chronic | ICD-10-CM

## 2021-12-13 PROCEDURE — 93000 ELECTROCARDIOGRAM COMPLETE: CPT | Performed by: NURSE PRACTITIONER

## 2021-12-13 PROCEDURE — 99214 OFFICE O/P EST MOD 30 MIN: CPT | Performed by: NURSE PRACTITIONER

## 2021-12-13 NOTE — TELEPHONE ENCOUNTER
Please let her know I discussed the plan of care with Dr. Mart.  We are proceeding with an echocardiogram.  The schedulers will call her to schedule.    Please schedule echocardiogram.

## 2021-12-13 NOTE — PROGRESS NOTES
Date of Office Visit: 2021  Encounter Provider: RAE Gomez  Place of Service: [facility] CARDIOLOGY  Patient Name: Cristina Lemus  :3/13/1930    Chief Complaint   Patient presents with   • Congestive Heart Failure   :     HPI: Cristina Lemus is a 89 y.o. female.  She is a patient of Dr. Mart's whom we follow for the management of hypertension, permanent atrial fibrillation, and chronic diastolic CHF.  Additionally, she has CKD stage III.  In 2020, she was hospitalized at [facility] with a DVT and PE despite being on warfarin.  She was transitioned to Eliquis.  Unfortunately, it appears she was not particularly compliant with her warfarin.  Evidently the patient was concerned it was causing her gout to flare.  Unfortunately, the Eliquis resulted in side effects and she was ultimately transition back to warfarin.  Echocardiogram from 2020 demonstrated normal LV function with apical hypokinesis, mild/moderate mitral regurgitation, and mild tricuspid regurgitation.   She was last seen in the office by Dr. Mart in September at which time she was stable and doing well.  She had recently been transitioned to torsemide by nephrology.  No changes were recommended, and she was advised to follow-up in 3 months.   She is here today with her neighbor who apparently takes her to most of her doctors appointments.  According to the neighbor, the patient is becoming progressively more short of breath.  The patient confirms this as well.  She denies any PND, orthopnea, or edema.  She denies any chest pain, palpitations, dizziness, syncope, bleeding difficulties or melena.  The past few days she has had some swelling in her feet and ankles.  Dr. Elias recently decreased her torsemide from 10 mg to 5 mg.  She also reports back pain.    Past Medical History:   Diagnosis Date   • Atrial fibrillation (HCC)    • CHF (congestive heart failure) (HCC)        Past Surgical History:    Procedure Laterality Date   • BACK SURGERY     • CYST REMOVAL     • OTHER SURGICAL HISTORY      rupture disk       Social History     Socioeconomic History   • Marital status: Single   Tobacco Use   • Smoking status: Never Smoker   • Smokeless tobacco: Never Used   Substance and Sexual Activity   • Alcohol use: No     Comment: caffeine use    • Drug use: No   • Sexual activity: Defer       History reviewed. No pertinent family history.    Review of Systems   Constitutional: Positive for malaise/fatigue.   Cardiovascular: Positive for dyspnea on exertion and leg swelling. Negative for chest pain, orthopnea, paroxysmal nocturnal dyspnea and syncope.   Respiratory: Negative.    Hematologic/Lymphatic: Negative for bleeding problem.   Musculoskeletal: Positive for back pain. Negative for falls.   Gastrointestinal: Negative for melena.   Neurological: Negative for dizziness and light-headedness.       Allergies   Allergen Reactions   • Statins Nausea And Vomiting   • Sulfa Antibiotics Nausea And Vomiting and Other (See Comments)     CHILLS AND FEVER         Current Outpatient Medications:   •  amLODIPine (NORVASC) 10 MG tablet, Take 1 tablet by mouth Daily., Disp: 90 tablet, Rfl: 3  •  Cholecalciferol (VITAMIN D3 PO), Take  by mouth., Disp: , Rfl:   •  furosemide (LASIX) 40 MG tablet, Take 1 tablet by mouth Daily., Disp: , Rfl:   •  LORazepam (ATIVAN) 1 MG tablet, TAKE 1 TABLET NIGHTLY AS   NEEDED FOR SLEEP, Disp: 90 tablet, Rfl: 1  •  potassium chloride (K-DUR,KLOR-CON) 20 MEQ CR tablet, Take 1 tablet by mouth Daily., Disp: 90 tablet, Rfl: 0  •  torsemide (DEMADEX) 10 MG tablet, Take 1 tablet by mouth Daily. (Patient taking differently: Take 5 mg by mouth Daily.), Disp: 90 tablet, Rfl: 3  •  warfarin (COUMADIN) 2 MG tablet, Take 2 mg by mouth See Admin Instructions. Every day BUT Monday, Wednesday and Friday, Disp: , Rfl:   •  warfarin (COUMADIN) 2 MG tablet, Take 4 mg by mouth 3 (Three) Times a Week. Monday,Wednesday  "and Friday, Disp: , Rfl:       Objective:     Vitals:    12/13/21 1318   BP: 148/90   Pulse: 63   Weight: 50.7 kg (111 lb 12.8 oz)   Height: 165.1 cm (65\")     Body mass index is 18.6 kg/m².    PHYSICAL EXAM:    Neck:      Vascular: No JVD.   Pulmonary:      Effort: Pulmonary effort is normal.      Breath sounds: Normal breath sounds.   Cardiovascular:      Normal rate. Irregular rhythm.      Murmurs: There is no murmur.      No gallop. No click. No rub.   Pulses:     Intact distal pulses.           ECG 12 Lead    Date/Time: 12/13/2021 1:29 PM  Performed by: Joelle Pinto APRN  Authorized by: Joelle Pinto APRN   Comparison: compared with previous ECG from 9/13/2021  Similar to previous ECG  Rhythm: atrial fibrillation  Q waves: V2, V1 and V3    T inversion: II, III, aVF, V5 and V6    Clinical impression: abnormal EKG  Comments: Indication: Atrial fibrillation              Assessment:       Diagnosis Plan   1. Permanent atrial fibrillation (HCC)  ECG 12 Lead   2. Chronic diastolic congestive heart failure (HCC)     3. Essential hypertension     4. Dyspnea on exertion  Adult Transthoracic Echo Complete W/ Cont if Necessary Per Protocol     Orders Placed This Encounter   Procedures   • ECG 12 Lead     This order was created via procedure documentation     Order Specific Question:   Release to patient     Answer:   Immediate   • Adult Transthoracic Echo Complete W/ Cont if Necessary Per Protocol     Standing Status:   Future     Standing Expiration Date:   12/13/2022     Order Specific Question:   Reason for exam?     Answer:   Dyspnea          Plan:       1. Atrial fibrillation.  She is anticoagulated on warfarin.  Her INR on 12/1 was 3.2.  She is rate controlled without any rate controlling agents.      2.   Chronic diastolic CHF.  She appears euvolemic on exam.  Continue torsemide at current dose.      3.   Dyspnea on exertion.  I am not entirely sure what to make of her dyspnea.  I did discuss the " plan of care with Dr. Mart.  Per his recommendation, I will order an echocardiogram for assessment.      4.   Hypertension.  Her blood pressure is relatively stable.  Continue current regimen including amlodipine.      Further recommendations will be made pending the results of the echocardiogram.      As always, it has been a pleasure to participate in your patient's care.      Sincerely,         RAE Jane

## 2021-12-15 LAB — INR PPP: 3.37

## 2021-12-16 ENCOUNTER — ANTICOAGULATION VISIT (OUTPATIENT)
Dept: PHARMACY | Facility: HOSPITAL | Age: 86
End: 2021-12-16

## 2021-12-16 DIAGNOSIS — I48.21 PERMANENT ATRIAL FIBRILLATION (HCC): Primary | ICD-10-CM

## 2021-12-16 NOTE — PROGRESS NOTES
Anticoagulation Clinic Progress Note    Anticoagulation Summary  As of 12/16/2021    INR goal:  2.0-3.0   TTR:  62.3 % (1.1 y)   INR used for dosing:  3.37 (12/15/2021)   Warfarin maintenance plan:  2 mg every Tue, Sat; 4 mg all other days   Weekly warfarin total:  24 mg   Plan last modified:  Mc Zaidi, PharmD (10/29/2021)   Next INR check:  12/30/2021   Priority:  High   Target end date:  Indefinite    Indications    Permanent atrial fibrillation (HCC) [I48.21]             Anticoagulation Episode Summary     INR check location:      Preferred lab:      Send INR reminders to:   MAJO [facility]IE  POOL    Comments:  tried calling Chaim BARCLAY but no stable warf dosing available from July/Aug 2020 , prior to Eliquis; PRECISION      Anticoagulation Care Providers     Provider Role Specialty Phone number    Lm Mart MD Referring Cardiology [phone]          Drug interactions: has remained unchanged.  Diet: has remained unchanged.    Clinic Interview:  No pertinent clinical findings have been reported.    INR History:  Anticoagulation Monitoring 11/11/2021 12/2/2021 12/16/2021   INR 2.11 3.24 3.37   INR Date 11/10/2021 12/1/2021 12/15/2021   INR Goal 2.0-3.0 2.0-3.0 2.0-3.0   Trend Same Same Same   Last Week Total 24 mg 24 mg 24 mg   Next Week Total 24 mg 22 mg 22 mg   Sun 4 mg 4 mg 4 mg   Mon 4 mg 4 mg 4 mg   Tue 2 mg 2 mg 2 mg   Wed 4 mg 4 mg 4 mg   Thu 4 mg 4 mg 2 mg (12/16); Otherwise 4 mg   Fri 4 mg 2 mg (12/3); Otherwise 4 mg 4 mg   Sat 2 mg 2 mg 2 mg   Visit Report - - -   Some recent data might be hidden       Plan:  1. INR is Supratherapeutic today- see above in Anticoagulation Summary.   Take partial of 2mg today then cont curr regimen. - see above in Anticoagulation Summary.  2. Follow up in 2 weeks  3. Pt has agreed to only be called if INR out of range. They have been instructed to call if any changes in medications, doses, concerns, etc. Patient expresses understanding and has no  further questions at this time.    Gala Khan, [facility]

## 2021-12-21 ENCOUNTER — HOSPITAL ENCOUNTER (OUTPATIENT)
Dept: CARDIOLOGY | Facility: HOSPITAL | Age: 86
Discharge: HOME | End: 2021-12-21
Admitting: NURSE PRACTITIONER

## 2021-12-21 VITALS
HEART RATE: 64 BPM | BODY MASS INDEX: 18.49 KG/M2 | WEIGHT: 111 LBS | SYSTOLIC BLOOD PRESSURE: 160 MMHG | DIASTOLIC BLOOD PRESSURE: 80 MMHG | HEIGHT: 65 IN | OXYGEN SATURATION: 98 %

## 2021-12-21 DIAGNOSIS — R06.09 DYSPNEA ON EXERTION: ICD-10-CM

## 2021-12-21 PROCEDURE — 93306 TTE W/DOPPLER COMPLETE: CPT

## 2021-12-21 PROCEDURE — 93306 TTE W/DOPPLER COMPLETE: CPT | Performed by: INTERNAL MEDICINE

## 2021-12-22 ENCOUNTER — TELEPHONE (OUTPATIENT)
Dept: CARDIOLOGY | Facility: CLINIC | Age: 86
End: 2021-12-22

## 2021-12-22 LAB
AORTIC ARCH: 2.2 CM
ASCENDING AORTA: 3.1 CM
BH CV ECHO MEAS - ACS: 1.8 CM
BH CV ECHO MEAS - AI DEC SLOPE: 235.4 CM/SEC^2
BH CV ECHO MEAS - AI MAX PG: 94.5 MMHG
BH CV ECHO MEAS - AI MAX VEL: 486.2 CM/SEC
BH CV ECHO MEAS - AI P1/2T: 605 MSEC
BH CV ECHO MEAS - AO ARCH DIAM (PROXIMAL TRANS.): 2.2 CM
BH CV ECHO MEAS - AO MAX PG (FULL): 6.9 MMHG
BH CV ECHO MEAS - AO MAX PG: 9.4 MMHG
BH CV ECHO MEAS - AO MEAN PG (FULL): 4 MMHG
BH CV ECHO MEAS - AO MEAN PG: 5.5 MMHG
BH CV ECHO MEAS - AO ROOT AREA (BSA CORRECTED): 2
BH CV ECHO MEAS - AO ROOT AREA: 7.3 CM^2
BH CV ECHO MEAS - AO ROOT DIAM: 3.1 CM
BH CV ECHO MEAS - AO V2 MAX: 153.7 CM/SEC
BH CV ECHO MEAS - AO V2 MEAN: 110.3 CM/SEC
BH CV ECHO MEAS - AO V2 VTI: 40.8 CM
BH CV ECHO MEAS - ASC AORTA: 3.1 CM
BH CV ECHO MEAS - AVA(I,A): 1.2 CM^2
BH CV ECHO MEAS - AVA(I,D): 1.2 CM^2
BH CV ECHO MEAS - AVA(V,A): 1.6 CM^2
BH CV ECHO MEAS - AVA(V,D): 1.6 CM^2
BH CV ECHO MEAS - BSA(HAYCOCK): 1.5 M^2
BH CV ECHO MEAS - BSA: 1.5 M^2
BH CV ECHO MEAS - BZI_BMI: 18.5 KILOGRAMS/M^2
BH CV ECHO MEAS - BZI_METRIC_HEIGHT: 165.1 CM
BH CV ECHO MEAS - BZI_METRIC_WEIGHT: 50.3 KG
BH CV ECHO MEAS - EDV(CUBED): 176.4 ML
BH CV ECHO MEAS - EDV(MOD-SP2): 61 ML
BH CV ECHO MEAS - EDV(MOD-SP4): 55 ML
BH CV ECHO MEAS - EDV(TEICH): 154.2 ML
BH CV ECHO MEAS - EF(CUBED): 71.5 %
BH CV ECHO MEAS - EF(MOD-BP): 61 %
BH CV ECHO MEAS - EF(MOD-SP2): 52.5 %
BH CV ECHO MEAS - EF(MOD-SP4): 69.1 %
BH CV ECHO MEAS - EF(TEICH): 62.5 %
BH CV ECHO MEAS - ESV(CUBED): 50.3 ML
BH CV ECHO MEAS - ESV(MOD-SP2): 29 ML
BH CV ECHO MEAS - ESV(MOD-SP4): 17 ML
BH CV ECHO MEAS - ESV(TEICH): 57.8 ML
BH CV ECHO MEAS - FS: 34.2 %
BH CV ECHO MEAS - IVS/LVPW: 0.98
BH CV ECHO MEAS - IVSD: 1.1 CM
BH CV ECHO MEAS - LAT PEAK E' VEL: 6.4 CM/SEC
BH CV ECHO MEAS - LV DIASTOLIC VOL/BSA (35-75): 35.7 ML/M^2
BH CV ECHO MEAS - LV MASS(C)D: 239 GRAMS
BH CV ECHO MEAS - LV MASS(C)DI: 155.2 GRAMS/M^2
BH CV ECHO MEAS - LV MAX PG: 2.5 MMHG
BH CV ECHO MEAS - LV MEAN PG: 1.4 MMHG
BH CV ECHO MEAS - LV SYSTOLIC VOL/BSA (12-30): 11 ML/M^2
BH CV ECHO MEAS - LV V1 MAX: 79.3 CM/SEC
BH CV ECHO MEAS - LV V1 MEAN: 54.1 CM/SEC
BH CV ECHO MEAS - LV V1 VTI: 15.4 CM
BH CV ECHO MEAS - LVIDD: 5.6 CM
BH CV ECHO MEAS - LVIDS: 3.7 CM
BH CV ECHO MEAS - LVLD AP2: 6.1 CM
BH CV ECHO MEAS - LVLD AP4: 5.3 CM
BH CV ECHO MEAS - LVLS AP2: 5.3 CM
BH CV ECHO MEAS - LVLS AP4: 3.9 CM
BH CV ECHO MEAS - LVOT AREA (M): 3.1 CM^2
BH CV ECHO MEAS - LVOT AREA: 3.1 CM^2
BH CV ECHO MEAS - LVOT DIAM: 2 CM
BH CV ECHO MEAS - LVPWD: 1.1 CM
BH CV ECHO MEAS - MED PEAK E' VEL: 5.4 CM/SEC
BH CV ECHO MEAS - MV A DUR: 0.1 SEC
BH CV ECHO MEAS - MV A MAX VEL: 39.8 CM/SEC
BH CV ECHO MEAS - MV DEC SLOPE: 522.7 CM/SEC^2
BH CV ECHO MEAS - MV DEC TIME: 0.19 SEC
BH CV ECHO MEAS - MV E MAX VEL: 86.3 CM/SEC
BH CV ECHO MEAS - MV E/A: 2.2
BH CV ECHO MEAS - MV MAX PG: 6.1 MMHG
BH CV ECHO MEAS - MV MEAN PG: 2.7 MMHG
BH CV ECHO MEAS - MV P1/2T MAX VEL: 115.4 CM/SEC
BH CV ECHO MEAS - MV P1/2T: 64.6 MSEC
BH CV ECHO MEAS - MV V2 MAX: 123.5 CM/SEC
BH CV ECHO MEAS - MV V2 MEAN: 76.3 CM/SEC
BH CV ECHO MEAS - MV V2 VTI: 33.9 CM
BH CV ECHO MEAS - MVA P1/2T LCG: 1.9 CM^2
BH CV ECHO MEAS - MVA(P1/2T): 3.4 CM^2
BH CV ECHO MEAS - MVA(VTI): 1.4 CM^2
BH CV ECHO MEAS - PA ACC TIME: 0.08 SEC
BH CV ECHO MEAS - PA MAX PG (FULL): 1.5 MMHG
BH CV ECHO MEAS - PA MAX PG: 4 MMHG
BH CV ECHO MEAS - PA PR(ACCEL): 44.1 MMHG
BH CV ECHO MEAS - PA V2 MAX: 100.2 CM/SEC
BH CV ECHO MEAS - PULM DIAS VEL: 55.3 CM/SEC
BH CV ECHO MEAS - PULM S/D: 0.5
BH CV ECHO MEAS - PULM SYS VEL: 27.4 CM/SEC
BH CV ECHO MEAS - PVA(V,A): 3 CM^2
BH CV ECHO MEAS - PVA(V,D): 3 CM^2
BH CV ECHO MEAS - QP/QS: 1.5
BH CV ECHO MEAS - RAP SYSTOLE: 3 MMHG
BH CV ECHO MEAS - RV MAX PG: 2.5 MMHG
BH CV ECHO MEAS - RV MEAN PG: 1.7 MMHG
BH CV ECHO MEAS - RV V1 MAX: 79.7 CM/SEC
BH CV ECHO MEAS - RV V1 MEAN: 62.3 CM/SEC
BH CV ECHO MEAS - RV V1 VTI: 19.3 CM
BH CV ECHO MEAS - RVOT AREA: 3.7 CM^2
BH CV ECHO MEAS - RVOT DIAM: 2.2 CM
BH CV ECHO MEAS - RVSP: 40 MMHG
BH CV ECHO MEAS - SI(AO): 193.7 ML/M^2
BH CV ECHO MEAS - SI(CUBED): 81.8 ML/M^2
BH CV ECHO MEAS - SI(LVOT): 31.2 ML/M^2
BH CV ECHO MEAS - SI(MOD-SP2): 20.8 ML/M^2
BH CV ECHO MEAS - SI(MOD-SP4): 24.7 ML/M^2
BH CV ECHO MEAS - SI(TEICH): 62.5 ML/M^2
BH CV ECHO MEAS - SUP REN AO DIAM: 2.1 CM
BH CV ECHO MEAS - SV(AO): 298.3 ML
BH CV ECHO MEAS - SV(CUBED): 126 ML
BH CV ECHO MEAS - SV(LVOT): 48 ML
BH CV ECHO MEAS - SV(MOD-SP2): 32 ML
BH CV ECHO MEAS - SV(MOD-SP4): 38 ML
BH CV ECHO MEAS - SV(RVOT): 71.9 ML
BH CV ECHO MEAS - SV(TEICH): 96.3 ML
BH CV ECHO MEAS - TAPSE (>1.6): 2 CM
BH CV ECHO MEAS - TR MAX VEL: 304.1 CM/SEC
BH CV ECHO MEASUREMENTS AVERAGE E/E' RATIO: 14.63
BH CV VAS BP RIGHT ARM: NORMAL MMHG
BH CV XLRA - RV BASE: 2.8 CM
BH CV XLRA - RV LENGTH: 6.3 CM
BH CV XLRA - RV MID: 3.4 CM
BH CV XLRA - TDI S': 11.2 CM/SEC
LEFT ATRIUM VOLUME INDEX: 60 ML/M2
LV EF 2D ECHO EST: 61 %
MAXIMAL PREDICTED HEART RATE: 129 BPM
SINUS: 2.9 CM
STJ: 3.1 CM
STRESS TARGET HR: 110 BPM

## 2021-12-22 NOTE — TELEPHONE ENCOUNTER
Results and recommendations reviewed with the patient. Patient verbalized understanding. Denied further questions at this time.    Astrid Villalpando RN  [place] [facility] [place]

## 2021-12-22 NOTE — TELEPHONE ENCOUNTER
Please let her know her echocardiogram is stable.  Her heart is pumping well.  She has moderate leaking of her tricuspid valve which is not uncommon at her age.  Nothing of any concern.  I will pass along the results to Dr. Mart.  Otherwise, I would not recommend any changes.

## 2021-12-23 NOTE — TELEPHONE ENCOUNTER
Does not look like she has any follow-up appointment with us.  Could you make sure she at least have something in 6 months with us.  Thank you.

## 2021-12-30 LAB — INR PPP: 4.8

## 2022-01-01 ENCOUNTER — TELEPHONE (OUTPATIENT)
Dept: INTERNAL MEDICINE | Facility: CLINIC | Age: 87
End: 2022-01-01

## 2022-01-03 ENCOUNTER — ANTICOAGULATION VISIT (OUTPATIENT)
Dept: PHARMACY | Facility: HOSPITAL | Age: 87
End: 2022-01-03

## 2022-01-03 DIAGNOSIS — I48.21 PERMANENT ATRIAL FIBRILLATION: Primary | ICD-10-CM

## 2022-01-03 NOTE — PROGRESS NOTES
Anticoagulation Clinic Progress Note    Anticoagulation Summary  As of 1/3/2022    INR goal:  2.0-3.0   TTR:  60.1 % (1.1 y)   INR used for dosin.80 (2021)   Warfarin maintenance plan:  2 mg every Tue, Thu, Sat; 4 mg all other days   Weekly warfarin total:  22 mg   Plan last modified:  Tasneem Wilkinson RPH (1/3/2022)   Next INR check:  2022   Priority:  High   Target end date:  Indefinite    Indications    Permanent atrial fibrillation (HCC) [I48.21]             Anticoagulation Episode Summary     INR check location:      Preferred lab:      Send INR reminders to:   MAJO MARIE  POOL    Comments:  tried calling Chaim BARCLAY but no stable warf dosing available from July/Aug 2020 , prior to Eliquis; PRECISION      Anticoagulation Care Providers     Provider Role Specialty Phone number    Lm Mart MD Referring Cardiology [phone]          Clinic Interview:  Patient Findings     Negatives:  Signs/symptoms of thrombosis, Signs/symptoms of bleeding,   Laboratory test error suspected, Change in health, Change in alcohol use,   Change in activity, Upcoming invasive procedure, Emergency department   visit, Upcoming dental procedure, Missed doses, Extra doses, Change in   medications, Change in diet/appetite, Hospital admission, Bruising, Other   complaints      Clinical Outcomes     Negatives:  Major bleeding event, Thromboembolic event,   Anticoagulation-related hospital admission, Anticoagulation-related ED   visit, Anticoagulation-related fatality        INR History:  Anticoagulation Monitoring 2021 2021 1/3/2022   INR 3.24 3.37 4.80   INR Date 2021 12/15/2021 2021   INR Goal 2.0-3.0 2.0-3.0 2.0-3.0   Trend Same Same Down   Last Week Total 24 mg 24 mg 24 mg   Next Week Total 22 mg 22 mg 18 mg   Sun 4 mg 4 mg 4 mg   Mon 4 mg 4 mg Hold (1/3); Otherwise 4 mg   Tue 2 mg 2 mg 2 mg   Wed 4 mg - 4 mg   Thu 4 mg 4 mg 2 mg   Fri 2 mg (12/3); Otherwise 4 mg 2 mg ()  4 mg   Sat 2 mg 2 mg 2 mg   Visit Report - - -   Some recent data might be hidden       Plan:  1. INR is Supratherapeutic today- see above in Anticoagulation Summary.   Will instruct Cristina Lemus to Change their warfarin regimen- see above in Anticoagulation Summary.  2. Follow up in 1 week  3. Pt has agreed to only be called if INR out of range. They have been instructed to call if any changes in medications, doses, concerns, etc. Patient expresses understanding and has no further questions at this time.    Tasneem Wilkinson, [facility]

## 2022-01-10 ENCOUNTER — TELEPHONE (OUTPATIENT)
Dept: INTERNAL MEDICINE | Facility: CLINIC | Age: 87
End: 2022-01-10

## 2022-01-12 LAB — INR PPP: 2.29

## 2022-01-13 ENCOUNTER — ANTICOAGULATION VISIT (OUTPATIENT)
Dept: PHARMACY | Facility: HOSPITAL | Age: 87
End: 2022-01-13

## 2022-01-13 DIAGNOSIS — I48.21 PERMANENT ATRIAL FIBRILLATION: Primary | ICD-10-CM

## 2022-01-13 NOTE — PROGRESS NOTES
Anticoagulation Clinic Progress Note    Anticoagulation Summary  As of 2022    INR goal:  2.0-3.0   TTR:  59.1 % (1.2 y)   INR used for dosin.29 (2022)   Warfarin maintenance plan:  2 mg every Tue, Thu, Sat; 4 mg all other days   Weekly warfarin total:  22 mg   No change documented:  Mc Zaidi, PharmD   Plan last modified:  Tasneem Wilkinson RPH (1/3/2022)   Next INR check:  2022   Priority:  High   Target end date:  Indefinite    Indications    Permanent atrial fibrillation (HCC) [I48.21]             Anticoagulation Episode Summary     INR check location:      Preferred lab:      Send INR reminders to:  [facility]  POOL    Comments:  tried calling Chaim BARCLAY but no stable warf dosing available from July/Aug 2020 , prior to Eliquis; [facility]      Anticoagulation Care Providers     Provider Role Specialty Phone number    Lm Mart MD Referring Cardiology [phone]          Clinic Interview:  Patient Findings     Negatives:  Signs/symptoms of thrombosis, Signs/symptoms of bleeding,   Laboratory test error suspected, Change in health, Change in alcohol use,   Change in activity, Upcoming invasive procedure, Emergency department   visit, Upcoming dental procedure, Missed doses, Extra doses, Change in   medications, Change in diet/appetite, Hospital admission, Bruising, Other   complaints      Clinical Outcomes     Negatives:  Major bleeding event, Thromboembolic event,   Anticoagulation-related hospital admission, Anticoagulation-related ED   visit, Anticoagulation-related fatality        INR History:  Anticoagulation Monitoring 2021 1/3/2022 2022   INR 3.37 4.80 2.29   INR Date 12/15/2021 2021 2022   INR Goal 2.0-3.0 2.0-3.0 2.0-3.0   Trend Same Down Same   Last Week Total 24 mg 24 mg 22 mg   Next Week Total 22 mg 18 mg 22 mg   Sun 4 mg 4 mg 4 mg   Mon 4 mg Hold (1/3); Otherwise 4 mg 4 mg   Tue 2 mg 2 mg 2 mg   Wed - 4 mg 4 mg   Thu 4 mg 2 mg 2 mg    Fri 2 mg (12/17) 4 mg 4 mg   Sat 2 mg 2 mg 2 mg   Visit Report - - -   Some recent data might be hidden       Plan:  1. INR is Therapeutic today- see above in Anticoagulation Summary.   Will instruct Cristina Lemus to Continue their warfarin regimen- see above in Anticoagulation Summary.  2. Follow up in 2 weeks  3. They have been instructed to call if any changes in medications, doses, concerns, etc. Patient expresses understanding and has no further questions at this time.    Mc Zaidi, PharmD

## 2022-01-16 ENCOUNTER — HOSPITAL ENCOUNTER (INPATIENT)
Facility: HOSPITAL | Age: 87
LOS: 8 days | Discharge: NURSING, REHAB OR LONG-TERM CARE | End: 2022-01-26
Attending: EMERGENCY MEDICINE | Admitting: INTERNAL MEDICINE

## 2022-01-16 ENCOUNTER — APPOINTMENT (OUTPATIENT)
Dept: GENERAL RADIOLOGY | Facility: HOSPITAL | Age: 87
End: 2022-01-16

## 2022-01-16 DIAGNOSIS — F51.01 PRIMARY INSOMNIA: ICD-10-CM

## 2022-01-16 DIAGNOSIS — N17.9 ACUTE RENAL FAILURE SUPERIMPOSED ON CHRONIC KIDNEY DISEASE, UNSPECIFIED CKD STAGE, UNSPECIFIED ACUTE RENAL FAILURE TYPE: Primary | ICD-10-CM

## 2022-01-16 DIAGNOSIS — N17.9 ACUTE RENAL FAILURE SUPERIMPOSED ON STAGE 5 CHRONIC KIDNEY DISEASE, NOT ON CHRONIC DIALYSIS, UNSPECIFIED ACUTE RENAL FAILURE TYPE: ICD-10-CM

## 2022-01-16 DIAGNOSIS — N18.9 ACUTE RENAL FAILURE SUPERIMPOSED ON CHRONIC KIDNEY DISEASE, UNSPECIFIED CKD STAGE, UNSPECIFIED ACUTE RENAL FAILURE TYPE: Primary | ICD-10-CM

## 2022-01-16 DIAGNOSIS — N18.5 ACUTE RENAL FAILURE SUPERIMPOSED ON STAGE 5 CHRONIC KIDNEY DISEASE, NOT ON CHRONIC DIALYSIS, UNSPECIFIED ACUTE RENAL FAILURE TYPE: ICD-10-CM

## 2022-01-16 DIAGNOSIS — R53.1 GENERALIZED WEAKNESS: ICD-10-CM

## 2022-01-16 LAB
ALBUMIN SERPL-MCNC: 4.4 G/DL (ref 3.5–5.2)
ALBUMIN/GLOB SERPL: 1.6 G/DL
ALP SERPL-CCNC: 70 U/L (ref 39–117)
ALT SERPL W P-5'-P-CCNC: 9 U/L (ref 1–33)
ANION GAP SERPL CALCULATED.3IONS-SCNC: 14.8 MMOL/L (ref 5–15)
AST SERPL-CCNC: 16 U/L (ref 1–32)
BASOPHILS # BLD AUTO: 0.02 10*3/MM3 (ref 0–0.2)
BASOPHILS NFR BLD AUTO: 0.3 % (ref 0–1.5)
BILIRUB SERPL-MCNC: 0.5 MG/DL (ref 0–1.2)
BUN SERPL-MCNC: 82 MG/DL (ref 8–23)
BUN/CREAT SERPL: 17.2 (ref 7–25)
CALCIUM SPEC-SCNC: 9.2 MG/DL (ref 8.2–9.6)
CHLORIDE SERPL-SCNC: 103 MMOL/L (ref 98–107)
CO2 SERPL-SCNC: 17.2 MMOL/L (ref 22–29)
CREAT SERPL-MCNC: 4.78 MG/DL (ref 0.57–1)
DEPRECATED RDW RBC AUTO: 42.6 FL (ref 37–54)
EOSINOPHIL # BLD AUTO: 0.12 10*3/MM3 (ref 0–0.4)
EOSINOPHIL NFR BLD AUTO: 1.9 % (ref 0.3–6.2)
ERYTHROCYTE [DISTWIDTH] IN BLOOD BY AUTOMATED COUNT: 13.8 % (ref 12.3–15.4)
GFR SERPL CREATININE-BSD FRML MDRD: 9 ML/MIN/1.73
GFR SERPL CREATININE-BSD FRML MDRD: ABNORMAL ML/MIN/{1.73_M2}
GLOBULIN UR ELPH-MCNC: 2.8 GM/DL
GLUCOSE BLDC GLUCOMTR-MCNC: 99 MG/DL (ref 70–130)
GLUCOSE SERPL-MCNC: 118 MG/DL (ref 65–99)
HCT VFR BLD AUTO: 33.7 % (ref 34–46.6)
HGB BLD-MCNC: 11.2 G/DL (ref 12–15.9)
IMM GRANULOCYTES # BLD AUTO: 0.02 10*3/MM3 (ref 0–0.05)
IMM GRANULOCYTES NFR BLD AUTO: 0.3 % (ref 0–0.5)
INR PPP: 1.63 (ref 0.9–1.1)
LYMPHOCYTES # BLD AUTO: 1.14 10*3/MM3 (ref 0.7–3.1)
LYMPHOCYTES NFR BLD AUTO: 17.7 % (ref 19.6–45.3)
MAGNESIUM SERPL-MCNC: 2.4 MG/DL (ref 1.7–2.3)
MCH RBC QN AUTO: 28.4 PG (ref 26.6–33)
MCHC RBC AUTO-ENTMCNC: 33.2 G/DL (ref 31.5–35.7)
MCV RBC AUTO: 85.3 FL (ref 79–97)
MONOCYTES # BLD AUTO: 0.49 10*3/MM3 (ref 0.1–0.9)
MONOCYTES NFR BLD AUTO: 7.6 % (ref 5–12)
NEUTROPHILS NFR BLD AUTO: 4.66 10*3/MM3 (ref 1.7–7)
NEUTROPHILS NFR BLD AUTO: 72.2 % (ref 42.7–76)
NRBC BLD AUTO-RTO: 0 /100 WBC (ref 0–0.2)
NT-PROBNP SERPL-MCNC: ABNORMAL PG/ML (ref 0–1800)
PLATELET # BLD AUTO: 198 10*3/MM3 (ref 140–450)
PMV BLD AUTO: 11 FL (ref 6–12)
POTASSIUM SERPL-SCNC: 5 MMOL/L (ref 3.5–5.2)
PROT SERPL-MCNC: 7.2 G/DL (ref 6–8.5)
PROTHROMBIN TIME: 19.1 SECONDS (ref 11.7–14.2)
QT INTERVAL: 479 MS
RBC # BLD AUTO: 3.95 10*6/MM3 (ref 3.77–5.28)
SARS-COV-2 RNA PNL SPEC NAA+PROBE: NOT DETECTED
SODIUM SERPL-SCNC: 135 MMOL/L (ref 136–145)
TROPONIN T SERPL-MCNC: 0.14 NG/ML (ref 0–0.03)
TROPONIN T SERPL-MCNC: 0.14 NG/ML (ref 0–0.03)
WBC NRBC COR # BLD: 6.45 10*3/MM3 (ref 3.4–10.8)

## 2022-01-16 PROCEDURE — 93005 ELECTROCARDIOGRAM TRACING: CPT | Performed by: EMERGENCY MEDICINE

## 2022-01-16 PROCEDURE — 87086 URINE CULTURE/COLONY COUNT: CPT | Performed by: NURSE PRACTITIONER

## 2022-01-16 PROCEDURE — 85610 PROTHROMBIN TIME: CPT | Performed by: NURSE PRACTITIONER

## 2022-01-16 PROCEDURE — 36415 COLL VENOUS BLD VENIPUNCTURE: CPT

## 2022-01-16 PROCEDURE — 87077 CULTURE AEROBIC IDENTIFY: CPT | Performed by: NURSE PRACTITIONER

## 2022-01-16 PROCEDURE — 84484 ASSAY OF TROPONIN QUANT: CPT | Performed by: PHYSICIAN ASSISTANT

## 2022-01-16 PROCEDURE — 81001 URINALYSIS AUTO W/SCOPE: CPT | Performed by: NURSE PRACTITIONER

## 2022-01-16 PROCEDURE — 82962 GLUCOSE BLOOD TEST: CPT

## 2022-01-16 PROCEDURE — 87186 SC STD MICRODIL/AGAR DIL: CPT | Performed by: NURSE PRACTITIONER

## 2022-01-16 PROCEDURE — 71045 X-RAY EXAM CHEST 1 VIEW: CPT

## 2022-01-16 PROCEDURE — 85025 COMPLETE CBC W/AUTO DIFF WBC: CPT | Performed by: NURSE PRACTITIONER

## 2022-01-16 PROCEDURE — 83880 ASSAY OF NATRIURETIC PEPTIDE: CPT | Performed by: NURSE PRACTITIONER

## 2022-01-16 PROCEDURE — 87635 SARS-COV-2 COVID-19 AMP PRB: CPT | Performed by: NURSE PRACTITIONER

## 2022-01-16 PROCEDURE — 80053 COMPREHEN METABOLIC PANEL: CPT | Performed by: NURSE PRACTITIONER

## 2022-01-16 PROCEDURE — 99285 EMERGENCY DEPT VISIT HI MDM: CPT

## 2022-01-16 PROCEDURE — 83735 ASSAY OF MAGNESIUM: CPT | Performed by: NURSE PRACTITIONER

## 2022-01-16 PROCEDURE — G0378 HOSPITAL OBSERVATION PER HR: HCPCS

## 2022-01-16 PROCEDURE — 84484 ASSAY OF TROPONIN QUANT: CPT | Performed by: NURSE PRACTITIONER

## 2022-01-16 PROCEDURE — 93010 ELECTROCARDIOGRAM REPORT: CPT | Performed by: INTERNAL MEDICINE

## 2022-01-16 RX ORDER — SODIUM CHLORIDE 0.9 % (FLUSH) 0.9 %
10 SYRINGE (ML) INJECTION EVERY 12 HOURS SCHEDULED
Status: DISCONTINUED | OUTPATIENT
Start: 2022-01-16 | End: 2022-01-26 | Stop reason: HOSPADM

## 2022-01-16 RX ORDER — SODIUM CHLORIDE 0.9 % (FLUSH) 0.9 %
10 SYRINGE (ML) INJECTION AS NEEDED
Status: DISCONTINUED | OUTPATIENT
Start: 2022-01-16 | End: 2022-01-26 | Stop reason: HOSPADM

## 2022-01-16 RX ORDER — LORAZEPAM 1 MG/1
1 TABLET ORAL NIGHTLY PRN
Status: DISCONTINUED | OUTPATIENT
Start: 2022-01-16 | End: 2022-01-26 | Stop reason: HOSPADM

## 2022-01-16 RX ORDER — AMLODIPINE BESYLATE 10 MG/1
10 TABLET ORAL DAILY
Status: DISCONTINUED | OUTPATIENT
Start: 2022-01-16 | End: 2022-01-26 | Stop reason: HOSPADM

## 2022-01-16 RX ORDER — SODIUM CHLORIDE 9 MG/ML
75 INJECTION, SOLUTION INTRAVENOUS CONTINUOUS
Status: DISCONTINUED | OUTPATIENT
Start: 2022-01-16 | End: 2022-01-18

## 2022-01-16 RX ADMIN — SODIUM CHLORIDE 75 ML/HR: 9 INJECTION, SOLUTION INTRAVENOUS at 17:58

## 2022-01-16 RX ADMIN — AMLODIPINE BESYLATE 10 MG: 10 TABLET ORAL at 20:24

## 2022-01-16 RX ADMIN — LORAZEPAM 1 MG: 1 TABLET ORAL at 23:36

## 2022-01-16 RX ADMIN — SODIUM CHLORIDE, PRESERVATIVE FREE 10 ML: 5 INJECTION INTRAVENOUS at 20:17

## 2022-01-16 RX ADMIN — SODIUM CHLORIDE, PRESERVATIVE FREE 10 ML: 5 INJECTION INTRAVENOUS at 22:42

## 2022-01-17 PROBLEM — N17.9 ACUTE RENAL FAILURE SUPERIMPOSED ON CHRONIC KIDNEY DISEASE: Status: ACTIVE | Noted: 2022-01-17

## 2022-01-17 PROBLEM — N18.9 ACUTE RENAL FAILURE SUPERIMPOSED ON CHRONIC KIDNEY DISEASE (HCC): Status: ACTIVE | Noted: 2022-01-17

## 2022-01-17 LAB
25(OH)D3 SERPL-MCNC: 66 NG/ML (ref 30–100)
ALBUMIN SERPL-MCNC: 4.1 G/DL (ref 3.5–5.2)
ANION GAP SERPL CALCULATED.3IONS-SCNC: 16.9 MMOL/L (ref 5–15)
BACTERIA UR QL AUTO: ABNORMAL /HPF
BILIRUB UR QL STRIP: NEGATIVE
BUN SERPL-MCNC: 79 MG/DL (ref 8–23)
BUN/CREAT SERPL: 16.8 (ref 7–25)
CALCIUM SPEC-SCNC: 8.7 MG/DL (ref 8.2–9.6)
CALCIUM SPEC-SCNC: 8.8 MG/DL (ref 8.2–9.6)
CHLORIDE SERPL-SCNC: 106 MMOL/L (ref 98–107)
CLARITY UR: CLEAR
CO2 SERPL-SCNC: 16.1 MMOL/L (ref 22–29)
COLOR UR: YELLOW
CREAT SERPL-MCNC: 4.71 MG/DL (ref 0.57–1)
DEPRECATED RDW RBC AUTO: 45.6 FL (ref 37–54)
ERYTHROCYTE [DISTWIDTH] IN BLOOD BY AUTOMATED COUNT: 14.3 % (ref 12.3–15.4)
GFR SERPL CREATININE-BSD FRML MDRD: 9 ML/MIN/1.73
GFR SERPL CREATININE-BSD FRML MDRD: ABNORMAL ML/MIN/{1.73_M2}
GLUCOSE SERPL-MCNC: 87 MG/DL (ref 65–99)
GLUCOSE UR STRIP-MCNC: NEGATIVE MG/DL
HCT VFR BLD AUTO: 31.1 % (ref 34–46.6)
HGB BLD-MCNC: 10 G/DL (ref 12–15.9)
HGB UR QL STRIP.AUTO: ABNORMAL
HYALINE CASTS UR QL AUTO: ABNORMAL /LPF
INR PPP: 1.96 (ref 0.9–1.1)
KETONES UR QL STRIP: NEGATIVE
LEUKOCYTE ESTERASE UR QL STRIP.AUTO: ABNORMAL
MAGNESIUM SERPL-MCNC: 2.4 MG/DL (ref 1.7–2.3)
MCH RBC QN AUTO: 28 PG (ref 26.6–33)
MCHC RBC AUTO-ENTMCNC: 32.2 G/DL (ref 31.5–35.7)
MCV RBC AUTO: 87.1 FL (ref 79–97)
NITRITE UR QL STRIP: NEGATIVE
PH UR STRIP.AUTO: <=5 [PH] (ref 5–8)
PHOSPHATE SERPL-MCNC: 4.9 MG/DL (ref 2.5–4.5)
PLATELET # BLD AUTO: 192 10*3/MM3 (ref 140–450)
PMV BLD AUTO: 11.1 FL (ref 6–12)
POTASSIUM SERPL-SCNC: 4.8 MMOL/L (ref 3.5–5.2)
PROT UR QL STRIP: ABNORMAL
PROTHROMBIN TIME: 22.1 SECONDS (ref 11.7–14.2)
PTH-INTACT SERPL-MCNC: 87.1 PG/ML (ref 15–65)
RBC # BLD AUTO: 3.57 10*6/MM3 (ref 3.77–5.28)
RBC # UR STRIP: ABNORMAL /HPF
REF LAB TEST METHOD: ABNORMAL
SODIUM SERPL-SCNC: 139 MMOL/L (ref 136–145)
SP GR UR STRIP: 1.01 (ref 1–1.03)
SQUAMOUS #/AREA URNS HPF: ABNORMAL /HPF
TROPONIN T SERPL-MCNC: 0.14 NG/ML (ref 0–0.03)
UROBILINOGEN UR QL STRIP: ABNORMAL
WBC # UR STRIP: ABNORMAL /HPF
WBC NRBC COR # BLD: 7.8 10*3/MM3 (ref 3.4–10.8)

## 2022-01-17 PROCEDURE — 84484 ASSAY OF TROPONIN QUANT: CPT | Performed by: NURSE PRACTITIONER

## 2022-01-17 PROCEDURE — 97110 THERAPEUTIC EXERCISES: CPT

## 2022-01-17 PROCEDURE — 85027 COMPLETE CBC AUTOMATED: CPT | Performed by: INTERNAL MEDICINE

## 2022-01-17 PROCEDURE — 85610 PROTHROMBIN TIME: CPT | Performed by: INTERNAL MEDICINE

## 2022-01-17 PROCEDURE — 83735 ASSAY OF MAGNESIUM: CPT | Performed by: INTERNAL MEDICINE

## 2022-01-17 PROCEDURE — 25010000002 CEFTRIAXONE PER 250 MG: Performed by: NURSE PRACTITIONER

## 2022-01-17 PROCEDURE — 80069 RENAL FUNCTION PANEL: CPT | Performed by: INTERNAL MEDICINE

## 2022-01-17 PROCEDURE — 97162 PT EVAL MOD COMPLEX 30 MIN: CPT

## 2022-01-17 PROCEDURE — 82306 VITAMIN D 25 HYDROXY: CPT | Performed by: INTERNAL MEDICINE

## 2022-01-17 PROCEDURE — 83970 ASSAY OF PARATHORMONE: CPT | Performed by: INTERNAL MEDICINE

## 2022-01-17 PROCEDURE — 82310 ASSAY OF CALCIUM: CPT | Performed by: INTERNAL MEDICINE

## 2022-01-17 PROCEDURE — G0378 HOSPITAL OBSERVATION PER HR: HCPCS

## 2022-01-17 RX ORDER — HYDROCODONE BITARTRATE AND ACETAMINOPHEN 5; 325 MG/1; MG/1
1 TABLET ORAL EVERY 6 HOURS PRN
Status: DISCONTINUED | OUTPATIENT
Start: 2022-01-17 | End: 2022-01-26 | Stop reason: HOSPADM

## 2022-01-17 RX ORDER — ONDANSETRON 2 MG/ML
4 INJECTION INTRAMUSCULAR; INTRAVENOUS EVERY 6 HOURS PRN
Status: DISCONTINUED | OUTPATIENT
Start: 2022-01-17 | End: 2022-01-26 | Stop reason: HOSPADM

## 2022-01-17 RX ORDER — ACETAMINOPHEN 325 MG/1
650 TABLET ORAL EVERY 6 HOURS PRN
Status: DISCONTINUED | OUTPATIENT
Start: 2022-01-17 | End: 2022-01-26 | Stop reason: HOSPADM

## 2022-01-17 RX ADMIN — SODIUM CHLORIDE, PRESERVATIVE FREE 10 ML: 5 INJECTION INTRAVENOUS at 20:52

## 2022-01-17 RX ADMIN — SODIUM CHLORIDE, PRESERVATIVE FREE 10 ML: 5 INJECTION INTRAVENOUS at 08:18

## 2022-01-17 RX ADMIN — CEFTRIAXONE 1 G: 1 INJECTION, POWDER, FOR SOLUTION INTRAMUSCULAR; INTRAVENOUS at 02:40

## 2022-01-17 RX ADMIN — AMLODIPINE BESYLATE 10 MG: 10 TABLET ORAL at 08:15

## 2022-01-17 RX ADMIN — ACETAMINOPHEN 650 MG: 325 TABLET ORAL at 12:13

## 2022-01-18 ENCOUNTER — ANESTHESIA (OUTPATIENT)
Dept: PERIOP | Facility: HOSPITAL | Age: 87
End: 2022-01-18

## 2022-01-18 ENCOUNTER — ANESTHESIA EVENT (OUTPATIENT)
Dept: PERIOP | Facility: HOSPITAL | Age: 87
End: 2022-01-18

## 2022-01-18 ENCOUNTER — APPOINTMENT (OUTPATIENT)
Dept: GENERAL RADIOLOGY | Facility: HOSPITAL | Age: 87
End: 2022-01-18

## 2022-01-18 PROBLEM — N39.0 UTI (URINARY TRACT INFECTION): Status: ACTIVE | Noted: 2022-01-18

## 2022-01-18 PROBLEM — Z79.01 ANTICOAGULATED ON WARFARIN: Chronic | Status: ACTIVE | Noted: 2022-01-18

## 2022-01-18 PROBLEM — G93.41 METABOLIC ENCEPHALOPATHY: Status: ACTIVE | Noted: 2022-01-18

## 2022-01-18 LAB
ANION GAP SERPL CALCULATED.3IONS-SCNC: 15.2 MMOL/L (ref 5–15)
BASOPHILS # BLD AUTO: 0.02 10*3/MM3 (ref 0–0.2)
BASOPHILS NFR BLD AUTO: 0.2 % (ref 0–1.5)
BUN SERPL-MCNC: 80 MG/DL (ref 8–23)
BUN/CREAT SERPL: 17.3 (ref 7–25)
CALCIUM SPEC-SCNC: 8 MG/DL (ref 8.2–9.6)
CHLORIDE SERPL-SCNC: 108 MMOL/L (ref 98–107)
CO2 SERPL-SCNC: 15.8 MMOL/L (ref 22–29)
CREAT SERPL-MCNC: 4.63 MG/DL (ref 0.57–1)
DEPRECATED RDW RBC AUTO: 47.6 FL (ref 37–54)
EOSINOPHIL # BLD AUTO: 0.15 10*3/MM3 (ref 0–0.4)
EOSINOPHIL NFR BLD AUTO: 1.8 % (ref 0.3–6.2)
ERYTHROCYTE [DISTWIDTH] IN BLOOD BY AUTOMATED COUNT: 14.6 % (ref 12.3–15.4)
GFR SERPL CREATININE-BSD FRML MDRD: 9 ML/MIN/1.73
GFR SERPL CREATININE-BSD FRML MDRD: ABNORMAL ML/MIN/{1.73_M2}
GLUCOSE SERPL-MCNC: 92 MG/DL (ref 65–99)
HBV SURFACE AG SERPL QL IA: NORMAL
HCT VFR BLD AUTO: 29.8 % (ref 34–46.6)
HGB BLD-MCNC: 9.4 G/DL (ref 12–15.9)
IMM GRANULOCYTES # BLD AUTO: 0.02 10*3/MM3 (ref 0–0.05)
IMM GRANULOCYTES NFR BLD AUTO: 0.2 % (ref 0–0.5)
LYMPHOCYTES # BLD AUTO: 1.44 10*3/MM3 (ref 0.7–3.1)
LYMPHOCYTES NFR BLD AUTO: 17.8 % (ref 19.6–45.3)
MCH RBC QN AUTO: 28.1 PG (ref 26.6–33)
MCHC RBC AUTO-ENTMCNC: 31.5 G/DL (ref 31.5–35.7)
MCV RBC AUTO: 89.2 FL (ref 79–97)
MONOCYTES # BLD AUTO: 0.75 10*3/MM3 (ref 0.1–0.9)
MONOCYTES NFR BLD AUTO: 9.2 % (ref 5–12)
NEUTROPHILS NFR BLD AUTO: 5.73 10*3/MM3 (ref 1.7–7)
NEUTROPHILS NFR BLD AUTO: 70.8 % (ref 42.7–76)
NRBC BLD AUTO-RTO: 0.1 /100 WBC (ref 0–0.2)
PLATELET # BLD AUTO: 173 10*3/MM3 (ref 140–450)
PMV BLD AUTO: 11.8 FL (ref 6–12)
POTASSIUM SERPL-SCNC: 4.5 MMOL/L (ref 3.5–5.2)
RBC # BLD AUTO: 3.34 10*6/MM3 (ref 3.77–5.28)
SODIUM SERPL-SCNC: 139 MMOL/L (ref 136–145)
WBC NRBC COR # BLD: 8.11 10*3/MM3 (ref 3.4–10.8)

## 2022-01-18 PROCEDURE — 0 LIDOCAINE 1 % SOLUTION 20 ML VIAL: Performed by: SURGERY

## 2022-01-18 PROCEDURE — 0 CEFAZOLIN IN DEXTROSE 2-4 GM/100ML-% SOLUTION: Performed by: SURGERY

## 2022-01-18 PROCEDURE — C1894 INTRO/SHEATH, NON-LASER: HCPCS | Performed by: SURGERY

## 2022-01-18 PROCEDURE — 80048 BASIC METABOLIC PNL TOTAL CA: CPT | Performed by: NURSE PRACTITIONER

## 2022-01-18 PROCEDURE — 76000 FLUOROSCOPY <1 HR PHYS/QHP: CPT

## 2022-01-18 PROCEDURE — 0JH60XZ INSERTION OF TUNNELED VASCULAR ACCESS DEVICE INTO CHEST SUBCUTANEOUS TISSUE AND FASCIA, OPEN APPROACH: ICD-10-PCS | Performed by: SURGERY

## 2022-01-18 PROCEDURE — 85025 COMPLETE CBC W/AUTO DIFF WBC: CPT | Performed by: NURSE PRACTITIONER

## 2022-01-18 PROCEDURE — 25010000002 HEPARIN (PORCINE) PER 1000 UNITS: Performed by: SURGERY

## 2022-01-18 PROCEDURE — C1750 CATH, HEMODIALYSIS,LONG-TERM: HCPCS | Performed by: SURGERY

## 2022-01-18 PROCEDURE — 25010000002 CEFTRIAXONE PER 250 MG: Performed by: NURSE PRACTITIONER

## 2022-01-18 PROCEDURE — 87340 HEPATITIS B SURFACE AG IA: CPT | Performed by: HOSPITALIST

## 2022-01-18 PROCEDURE — 76937 US GUIDE VASCULAR ACCESS: CPT

## 2022-01-18 PROCEDURE — 02HV33Z INSERTION OF INFUSION DEVICE INTO SUPERIOR VENA CAVA, PERCUTANEOUS APPROACH: ICD-10-PCS | Performed by: SURGERY

## 2022-01-18 PROCEDURE — 25010000002 PROPOFOL 10 MG/ML EMULSION: Performed by: NURSE ANESTHETIST, CERTIFIED REGISTERED

## 2022-01-18 PROCEDURE — 77001 FLUOROGUIDE FOR VEIN DEVICE: CPT

## 2022-01-18 PROCEDURE — 97116 GAIT TRAINING THERAPY: CPT

## 2022-01-18 RX ORDER — LIDOCAINE HYDROCHLORIDE 20 MG/ML
INJECTION, SOLUTION INFILTRATION; PERINEURAL AS NEEDED
Status: DISCONTINUED | OUTPATIENT
Start: 2022-01-18 | End: 2022-01-18 | Stop reason: SURG

## 2022-01-18 RX ORDER — FLUMAZENIL 0.1 MG/ML
0.2 INJECTION INTRAVENOUS AS NEEDED
Status: DISCONTINUED | OUTPATIENT
Start: 2022-01-18 | End: 2022-01-18 | Stop reason: HOSPADM

## 2022-01-18 RX ORDER — NALOXONE HCL 0.4 MG/ML
0.2 VIAL (ML) INJECTION AS NEEDED
Status: DISCONTINUED | OUTPATIENT
Start: 2022-01-18 | End: 2022-01-18 | Stop reason: HOSPADM

## 2022-01-18 RX ORDER — LIDOCAINE HYDROCHLORIDE 10 MG/ML
0.5 INJECTION, SOLUTION EPIDURAL; INFILTRATION; INTRACAUDAL; PERINEURAL ONCE AS NEEDED
Status: DISCONTINUED | OUTPATIENT
Start: 2022-01-18 | End: 2022-01-18 | Stop reason: HOSPADM

## 2022-01-18 RX ORDER — TRAMADOL HYDROCHLORIDE 50 MG/1
25 TABLET ORAL EVERY 8 HOURS PRN
Status: DISCONTINUED | OUTPATIENT
Start: 2022-01-18 | End: 2022-01-26 | Stop reason: HOSPADM

## 2022-01-18 RX ORDER — DIPHENHYDRAMINE HYDROCHLORIDE 50 MG/ML
12.5 INJECTION INTRAMUSCULAR; INTRAVENOUS
Status: DISCONTINUED | OUTPATIENT
Start: 2022-01-18 | End: 2022-01-18 | Stop reason: HOSPADM

## 2022-01-18 RX ORDER — SODIUM CHLORIDE 0.9 % (FLUSH) 0.9 %
10 SYRINGE (ML) INJECTION AS NEEDED
Status: DISCONTINUED | OUTPATIENT
Start: 2022-01-18 | End: 2022-01-18 | Stop reason: HOSPADM

## 2022-01-18 RX ORDER — PROMETHAZINE HYDROCHLORIDE 25 MG/1
25 SUPPOSITORY RECTAL ONCE AS NEEDED
Status: DISCONTINUED | OUTPATIENT
Start: 2022-01-18 | End: 2022-01-18 | Stop reason: HOSPADM

## 2022-01-18 RX ORDER — HYDRALAZINE HYDROCHLORIDE 20 MG/ML
5 INJECTION INTRAMUSCULAR; INTRAVENOUS
Status: DISCONTINUED | OUTPATIENT
Start: 2022-01-18 | End: 2022-01-18 | Stop reason: HOSPADM

## 2022-01-18 RX ORDER — FENTANYL CITRATE 50 UG/ML
50 INJECTION, SOLUTION INTRAMUSCULAR; INTRAVENOUS
Status: DISCONTINUED | OUTPATIENT
Start: 2022-01-18 | End: 2022-01-18 | Stop reason: HOSPADM

## 2022-01-18 RX ORDER — PROPOFOL 10 MG/ML
VIAL (ML) INTRAVENOUS CONTINUOUS PRN
Status: DISCONTINUED | OUTPATIENT
Start: 2022-01-18 | End: 2022-01-18 | Stop reason: SURG

## 2022-01-18 RX ORDER — HYDROMORPHONE HYDROCHLORIDE 1 MG/ML
0.5 INJECTION, SOLUTION INTRAMUSCULAR; INTRAVENOUS; SUBCUTANEOUS
Status: DISCONTINUED | OUTPATIENT
Start: 2022-01-18 | End: 2022-01-18 | Stop reason: HOSPADM

## 2022-01-18 RX ORDER — PROPOFOL 10 MG/ML
VIAL (ML) INTRAVENOUS AS NEEDED
Status: DISCONTINUED | OUTPATIENT
Start: 2022-01-18 | End: 2022-01-18 | Stop reason: SURG

## 2022-01-18 RX ORDER — OXYCODONE AND ACETAMINOPHEN 7.5; 325 MG/1; MG/1
2 TABLET ORAL EVERY 4 HOURS PRN
Status: DISCONTINUED | OUTPATIENT
Start: 2022-01-18 | End: 2022-01-18 | Stop reason: HOSPADM

## 2022-01-18 RX ORDER — HEPARIN SODIUM 1000 [USP'U]/ML
INJECTION, SOLUTION INTRAVENOUS; SUBCUTANEOUS AS NEEDED
Status: DISCONTINUED | OUTPATIENT
Start: 2022-01-18 | End: 2022-01-18 | Stop reason: HOSPADM

## 2022-01-18 RX ORDER — HYDRALAZINE HYDROCHLORIDE 20 MG/ML
10 INJECTION INTRAMUSCULAR; INTRAVENOUS EVERY 6 HOURS PRN
Status: DISCONTINUED | OUTPATIENT
Start: 2022-01-18 | End: 2022-01-26 | Stop reason: HOSPADM

## 2022-01-18 RX ORDER — SODIUM CHLORIDE 9 MG/ML
9 INJECTION, SOLUTION INTRAVENOUS CONTINUOUS PRN
Status: DISCONTINUED | OUTPATIENT
Start: 2022-01-18 | End: 2022-01-18 | Stop reason: HOSPADM

## 2022-01-18 RX ORDER — CEFAZOLIN SODIUM 2 G/100ML
2 INJECTION, SOLUTION INTRAVENOUS ONCE
Status: COMPLETED | OUTPATIENT
Start: 2022-01-18 | End: 2022-01-18

## 2022-01-18 RX ORDER — ONDANSETRON 2 MG/ML
4 INJECTION INTRAMUSCULAR; INTRAVENOUS ONCE AS NEEDED
Status: DISCONTINUED | OUTPATIENT
Start: 2022-01-18 | End: 2022-01-18 | Stop reason: HOSPADM

## 2022-01-18 RX ORDER — DIPHENHYDRAMINE HCL 25 MG
25 CAPSULE ORAL
Status: DISCONTINUED | OUTPATIENT
Start: 2022-01-18 | End: 2022-01-18 | Stop reason: HOSPADM

## 2022-01-18 RX ORDER — LABETALOL HYDROCHLORIDE 5 MG/ML
5 INJECTION, SOLUTION INTRAVENOUS
Status: DISCONTINUED | OUTPATIENT
Start: 2022-01-18 | End: 2022-01-18 | Stop reason: HOSPADM

## 2022-01-18 RX ORDER — WARFARIN SODIUM 2 MG/1
2 TABLET ORAL ONCE
Status: COMPLETED | OUTPATIENT
Start: 2022-01-18 | End: 2022-01-19

## 2022-01-18 RX ORDER — SODIUM CHLORIDE 0.9 % (FLUSH) 0.9 %
10 SYRINGE (ML) INJECTION EVERY 12 HOURS SCHEDULED
Status: DISCONTINUED | OUTPATIENT
Start: 2022-01-18 | End: 2022-01-18 | Stop reason: HOSPADM

## 2022-01-18 RX ORDER — PROMETHAZINE HYDROCHLORIDE 25 MG/1
25 TABLET ORAL ONCE AS NEEDED
Status: DISCONTINUED | OUTPATIENT
Start: 2022-01-18 | End: 2022-01-18 | Stop reason: HOSPADM

## 2022-01-18 RX ORDER — HYDROCODONE BITARTRATE AND ACETAMINOPHEN 5; 325 MG/1; MG/1
1 TABLET ORAL ONCE AS NEEDED
Status: DISCONTINUED | OUTPATIENT
Start: 2022-01-18 | End: 2022-01-18 | Stop reason: HOSPADM

## 2022-01-18 RX ORDER — EPHEDRINE SULFATE 50 MG/ML
5 INJECTION, SOLUTION INTRAVENOUS ONCE AS NEEDED
Status: DISCONTINUED | OUTPATIENT
Start: 2022-01-18 | End: 2022-01-18 | Stop reason: HOSPADM

## 2022-01-18 RX ADMIN — CEFAZOLIN SODIUM 2 G: 2 INJECTION, SOLUTION INTRAVENOUS at 11:24

## 2022-01-18 RX ADMIN — SODIUM CHLORIDE, PRESERVATIVE FREE 10 ML: 5 INJECTION INTRAVENOUS at 21:00

## 2022-01-18 RX ADMIN — PROPOFOL 20 MG: 10 INJECTION, EMULSION INTRAVENOUS at 11:15

## 2022-01-18 RX ADMIN — SODIUM CHLORIDE: 9 INJECTION, SOLUTION INTRAVENOUS at 11:06

## 2022-01-18 RX ADMIN — LIDOCAINE HYDROCHLORIDE 60 MG: 20 INJECTION, SOLUTION INFILTRATION; PERINEURAL at 11:13

## 2022-01-18 RX ADMIN — SODIUM CHLORIDE, PRESERVATIVE FREE 10 ML: 5 INJECTION INTRAVENOUS at 16:00

## 2022-01-18 RX ADMIN — SODIUM CHLORIDE, PRESERVATIVE FREE 10 ML: 5 INJECTION INTRAVENOUS at 16:10

## 2022-01-18 RX ADMIN — CEFAZOLIN SODIUM 2 G: 2 INJECTION, SOLUTION INTRAVENOUS at 11:01

## 2022-01-18 RX ADMIN — PROPOFOL 20 MG: 10 INJECTION, EMULSION INTRAVENOUS at 11:12

## 2022-01-18 RX ADMIN — LORAZEPAM 1 MG: 1 TABLET ORAL at 00:24

## 2022-01-18 RX ADMIN — Medication 50 MCG/KG/MIN: at 11:13

## 2022-01-18 RX ADMIN — CEFTRIAXONE 1 G: 1 INJECTION, POWDER, FOR SOLUTION INTRAMUSCULAR; INTRAVENOUS at 02:07

## 2022-01-18 RX ADMIN — SODIUM CHLORIDE 75 ML/HR: 9 INJECTION, SOLUTION INTRAVENOUS at 05:28

## 2022-01-18 NOTE — ANESTHESIA POSTPROCEDURE EVALUATION
"Patient: Cristina Lemus    Procedure Summary     Date: 01/18/22 Room / Location: [facility] OR 09 / [facility] MAIN OR    Anesthesia Start: 1106 Anesthesia Stop: 1202    Procedure: HEMODIALYSIS CATHETER INSERTION with C arm (N/A ) Diagnosis:       Acute renal failure superimposed on stage 5 chronic kidney disease, not on chronic dialysis, unspecified acute renal failure type (HCC)      (Acute renal failure superimposed on stage 5 chronic kidney disease, not on chronic dialysis, unspecified acute renal failure type (HCC) [N17.9, N18.5])    Surgeons: King Reynaga MD Provider: King Barcenas MD    Anesthesia Type: MAC ASA Status: 4          Anesthesia Type: MAC    Vitals  Vitals Value Taken Time   /103 01/18/22 1231   Temp 36.6 °C (97.8 °F) 01/18/22 1200   Pulse 66 01/18/22 1244   Resp 14 01/18/22 1217   SpO2 94 % 01/18/22 1244   Vitals shown include unvalidated device data.        Post Anesthesia Care and Evaluation    Patient location during evaluation: bedside  Patient participation: complete - patient participated  Level of consciousness: awake and alert  Pain management: adequate  Airway patency: patent  Anesthetic complications: No anesthetic complications    Cardiovascular status: acceptable  Respiratory status: acceptable  Hydration status: acceptable    Comments: /83   Pulse 71   Temp 36.6 °C (97.8 °F) (Oral)   Resp 14   Ht 165.1 cm (65\")   Wt 50.3 kg (111 lb)   SpO2 94%   BMI 18.47 kg/m²           "

## 2022-01-18 NOTE — ANESTHESIA PREPROCEDURE EVALUATION
Anesthesia Evaluation     Patient summary reviewed and Nursing notes reviewed   NPO Solid Status: > 8 hours  NPO Liquid Status: > 2 hours           Airway   Mallampati: II  TM distance: >3 FB  Neck ROM: full  No difficulty expected  Dental - normal exam   (+) upper dentures        Pulmonary - normal exam   (+) pleural effusion, shortness of breath,   Cardiovascular - normal exam  Exercise tolerance: poor (<4 METS)    ECG reviewed  PT is on anticoagulation therapy    (+) hypertension, dysrhythmias Atrial Fib, CHF ,       Neuro/Psych  (+) psychiatric history Depression,     GI/Hepatic/Renal/Endo    (+)   renal disease,     Musculoskeletal (-) negative ROS    Abdominal    Substance History - negative use     OB/GYN          Other - negative ROS                     Anesthesia Plan    ASA 4     MAC     intravenous induction     Anesthetic plan, all risks, benefits, and alternatives have been provided, discussed and informed consent has been obtained with: patient.    Plan discussed with CRNA.        CODE STATUS:    Code Status (Patient has no pulse and is not breathing): CPR (Attempt to Resuscitate)  Medical Interventions (Patient has pulse or is breathing): Full Support

## 2022-01-19 ENCOUNTER — APPOINTMENT (OUTPATIENT)
Dept: CARDIOLOGY | Facility: HOSPITAL | Age: 87
End: 2022-01-19

## 2022-01-19 LAB
ANION GAP SERPL CALCULATED.3IONS-SCNC: 16.2 MMOL/L (ref 5–15)
BACTERIA SPEC AEROBE CULT: ABNORMAL
BASOPHILS # BLD AUTO: 0.02 10*3/MM3 (ref 0–0.2)
BASOPHILS NFR BLD AUTO: 0.3 % (ref 0–1.5)
BH CV VAS MEAS BASILIC ANTECUBITAL FOSSA LEFT: 0.15 CM
BH CV VAS MEAS BASILIC ANTECUBITAL FOSSA RIGHT: 0.17 CM
BH CV VAS MEAS BASILIC FOREARM LEFT - DIST: 0.08 CM
BH CV VAS MEAS BASILIC FOREARM LEFT - MID: 0.07 CM
BH CV VAS MEAS BASILIC FOREARM LEFT - PROX: 0.06 CM
BH CV VAS MEAS BASILIC FOREARM RIGHT - MID: 0.18 CM
BH CV VAS MEAS BASILIC FOREARM RIGHT - PROX: 0.15 CM
BH CV VAS MEAS BASILIC UPPER ARM LEFT - DIST: 0.18 CM
BH CV VAS MEAS BASILIC UPPER ARM LEFT - MID: 0.16 CM
BH CV VAS MEAS BASILIC UPPER ARM LEFT - PROX: 0.16 CM
BH CV VAS MEAS BASILIC UPPER ARM RIGHT - DIST: 0.24 CM
BH CV VAS MEAS BASILIC UPPER ARM RIGHT - MID: 0.18 CM
BH CV VAS MEAS BASILIC UPPER ARM RIGHT - PROX: 0.21 CM
BH CV VAS MEAS CEPHALIC ANTECUBITAL FOSSA LEFT: 0.34 CM
BH CV VAS MEAS CEPHALIC ANTECUBITAL FOSSA RIGHT: 0.06 CM
BH CV VAS MEAS CEPHALIC FOREARM LEFT - DIST: 0.1 CM
BH CV VAS MEAS CEPHALIC FOREARM LEFT - MID: 0.11 CM
BH CV VAS MEAS CEPHALIC FOREARM LEFT - PROX: 0.12 CM
BH CV VAS MEAS CEPHALIC FOREARM RIGHT - DIST: 0.31 CM
BH CV VAS MEAS CEPHALIC FOREARM RIGHT - MID: 0.25 CM
BH CV VAS MEAS CEPHALIC FOREARM RIGHT - PROX: 0.16 CM
BH CV VAS MEAS CEPHALIC UPPER ARM LEFT - DIST: 0.23 CM
BH CV VAS MEAS CEPHALIC UPPER ARM LEFT - MID: 0.16 CM
BH CV VAS MEAS CEPHALIC UPPER ARM LEFT - PROX: 0.22 CM
BH CV VAS MEAS CEPHALIC UPPER ARM RIGHT - DIST: 0.05 CM
BH CV VAS MEAS CEPHALIC UPPER ARM RIGHT - MID: 0.08 CM
BH CV VAS MEAS RADIAL UPPER ARM LEFT - DIST: 0.25 CM
BH CV VAS MEAS RADIAL UPPER ARM LEFT - MID: 0.22 CM
BH CV VAS MEAS RADIAL UPPER ARM LEFT - PROX: 0.24 CM
BH CV VAS MEAS RADIAL UPPER ARM RIGHT - DIST: 0.21 CM
BH CV VAS MEAS RADIAL UPPER ARM RIGHT - MID: 0.26 CM
BH CV VAS MEAS RADIAL UPPER ARM RIGHT - PROX: 0.26 CM
BUN SERPL-MCNC: 72 MG/DL (ref 8–23)
BUN/CREAT SERPL: 17.1 (ref 7–25)
CALCIUM SPEC-SCNC: 8.4 MG/DL (ref 8.2–9.6)
CHLORIDE SERPL-SCNC: 110 MMOL/L (ref 98–107)
CO2 SERPL-SCNC: 13.8 MMOL/L (ref 22–29)
CREAT SERPL-MCNC: 4.22 MG/DL (ref 0.57–1)
DEPRECATED RDW RBC AUTO: 45.3 FL (ref 37–54)
EOSINOPHIL # BLD AUTO: 0.1 10*3/MM3 (ref 0–0.4)
EOSINOPHIL NFR BLD AUTO: 1.4 % (ref 0.3–6.2)
ERYTHROCYTE [DISTWIDTH] IN BLOOD BY AUTOMATED COUNT: 14.2 % (ref 12.3–15.4)
FERRITIN SERPL-MCNC: 106 NG/ML (ref 13–150)
GFR SERPL CREATININE-BSD FRML MDRD: 10 ML/MIN/1.73
GFR SERPL CREATININE-BSD FRML MDRD: ABNORMAL ML/MIN/{1.73_M2}
GLUCOSE SERPL-MCNC: 81 MG/DL (ref 65–99)
HCT VFR BLD AUTO: 30.8 % (ref 34–46.6)
HGB BLD-MCNC: 10 G/DL (ref 12–15.9)
IMM GRANULOCYTES # BLD AUTO: 0.03 10*3/MM3 (ref 0–0.05)
IMM GRANULOCYTES NFR BLD AUTO: 0.4 % (ref 0–0.5)
INR PPP: 2.04 (ref 0.9–1.1)
IRON 24H UR-MRATE: 30 MCG/DL (ref 37–145)
IRON SATN MFR SERPL: 11 % (ref 20–50)
LYMPHOCYTES # BLD AUTO: 1.08 10*3/MM3 (ref 0.7–3.1)
LYMPHOCYTES NFR BLD AUTO: 14.8 % (ref 19.6–45.3)
MAGNESIUM SERPL-MCNC: 2.4 MG/DL (ref 1.7–2.3)
MCH RBC QN AUTO: 28.5 PG (ref 26.6–33)
MCHC RBC AUTO-ENTMCNC: 32.5 G/DL (ref 31.5–35.7)
MCV RBC AUTO: 87.7 FL (ref 79–97)
MONOCYTES # BLD AUTO: 0.67 10*3/MM3 (ref 0.1–0.9)
MONOCYTES NFR BLD AUTO: 9.2 % (ref 5–12)
NEUTROPHILS NFR BLD AUTO: 5.42 10*3/MM3 (ref 1.7–7)
NEUTROPHILS NFR BLD AUTO: 73.9 % (ref 42.7–76)
NRBC BLD AUTO-RTO: 0 /100 WBC (ref 0–0.2)
PLATELET # BLD AUTO: 173 10*3/MM3 (ref 140–450)
PMV BLD AUTO: 11.6 FL (ref 6–12)
POTASSIUM SERPL-SCNC: 4.2 MMOL/L (ref 3.5–5.2)
PROTHROMBIN TIME: 22.8 SECONDS (ref 11.7–14.2)
RBC # BLD AUTO: 3.51 10*6/MM3 (ref 3.77–5.28)
SODIUM SERPL-SCNC: 140 MMOL/L (ref 136–145)
TIBC SERPL-MCNC: 283 MCG/DL (ref 298–536)
TRANSFERRIN SERPL-MCNC: 190 MG/DL (ref 200–360)
UPPER ARTERIAL LEFT ARM BRACHIAL LENGTH: 0.45 CM
UPPER ARTERIAL RIGHT ARM BRACHIAL LENGTH: 0.51 CM
WBC NRBC COR # BLD: 7.32 10*3/MM3 (ref 3.4–10.8)

## 2022-01-19 PROCEDURE — 83735 ASSAY OF MAGNESIUM: CPT | Performed by: STUDENT IN AN ORGANIZED HEALTH CARE EDUCATION/TRAINING PROGRAM

## 2022-01-19 PROCEDURE — 82728 ASSAY OF FERRITIN: CPT | Performed by: STUDENT IN AN ORGANIZED HEALTH CARE EDUCATION/TRAINING PROGRAM

## 2022-01-19 PROCEDURE — 85025 COMPLETE CBC W/AUTO DIFF WBC: CPT | Performed by: STUDENT IN AN ORGANIZED HEALTH CARE EDUCATION/TRAINING PROGRAM

## 2022-01-19 PROCEDURE — 25010000002 HYDRALAZINE PER 20 MG: Performed by: SURGERY

## 2022-01-19 PROCEDURE — 5A1D70Z PERFORMANCE OF URINARY FILTRATION, INTERMITTENT, LESS THAN 6 HOURS PER DAY: ICD-10-PCS | Performed by: INTERNAL MEDICINE

## 2022-01-19 PROCEDURE — 85610 PROTHROMBIN TIME: CPT | Performed by: STUDENT IN AN ORGANIZED HEALTH CARE EDUCATION/TRAINING PROGRAM

## 2022-01-19 PROCEDURE — 25010000002 CEFTRIAXONE PER 250 MG: Performed by: SURGERY

## 2022-01-19 PROCEDURE — 25010000002 HEPARIN (PORCINE) PER 1000 UNITS: Performed by: STUDENT IN AN ORGANIZED HEALTH CARE EDUCATION/TRAINING PROGRAM

## 2022-01-19 PROCEDURE — 80048 BASIC METABOLIC PNL TOTAL CA: CPT | Performed by: STUDENT IN AN ORGANIZED HEALTH CARE EDUCATION/TRAINING PROGRAM

## 2022-01-19 PROCEDURE — 93986 DUP-SCAN HEMO COMPL UNI STD: CPT

## 2022-01-19 PROCEDURE — 83540 ASSAY OF IRON: CPT | Performed by: STUDENT IN AN ORGANIZED HEALTH CARE EDUCATION/TRAINING PROGRAM

## 2022-01-19 PROCEDURE — 25010000002 NA FERRIC GLUC CPLX PER 12.5 MG: Performed by: INTERNAL MEDICINE

## 2022-01-19 PROCEDURE — 84466 ASSAY OF TRANSFERRIN: CPT | Performed by: STUDENT IN AN ORGANIZED HEALTH CARE EDUCATION/TRAINING PROGRAM

## 2022-01-19 PROCEDURE — 25010000002 HEPARIN (PORCINE) PER 1000 UNITS: Performed by: INTERNAL MEDICINE

## 2022-01-19 RX ORDER — HEPARIN SODIUM 1000 [USP'U]/ML
3800 INJECTION, SOLUTION INTRAVENOUS; SUBCUTANEOUS AS NEEDED
Status: DISCONTINUED | OUTPATIENT
Start: 2022-01-19 | End: 2022-01-26 | Stop reason: HOSPADM

## 2022-01-19 RX ORDER — CEFAZOLIN SODIUM 1 G/3ML
1 INJECTION, POWDER, FOR SOLUTION INTRAMUSCULAR; INTRAVENOUS EVERY 24 HOURS
Status: DISCONTINUED | OUTPATIENT
Start: 2022-01-20 | End: 2022-01-19

## 2022-01-19 RX ORDER — HEPARIN SODIUM 5000 [USP'U]/ML
5000 INJECTION, SOLUTION INTRAVENOUS; SUBCUTANEOUS EVERY 8 HOURS SCHEDULED
Status: DISCONTINUED | OUTPATIENT
Start: 2022-01-19 | End: 2022-01-26 | Stop reason: HOSPADM

## 2022-01-19 RX ORDER — WARFARIN SODIUM 2 MG/1
2 TABLET ORAL
Status: DISCONTINUED | OUTPATIENT
Start: 2022-01-19 | End: 2022-01-19

## 2022-01-19 RX ORDER — CEFAZOLIN SODIUM 1 G/50ML
1 INJECTION, SOLUTION INTRAVENOUS EVERY 24 HOURS
Status: COMPLETED | OUTPATIENT
Start: 2022-01-20 | End: 2022-01-21

## 2022-01-19 RX ADMIN — WARFARIN 2 MG: 2 TABLET ORAL at 00:15

## 2022-01-19 RX ADMIN — HYDRALAZINE HYDROCHLORIDE 10 MG: 20 INJECTION INTRAMUSCULAR; INTRAVENOUS at 20:32

## 2022-01-19 RX ADMIN — AMLODIPINE BESYLATE 10 MG: 10 TABLET ORAL at 12:21

## 2022-01-19 RX ADMIN — LORAZEPAM 1 MG: 1 TABLET ORAL at 21:57

## 2022-01-19 RX ADMIN — CEFTRIAXONE 1 G: 1 INJECTION, POWDER, FOR SOLUTION INTRAMUSCULAR; INTRAVENOUS at 01:08

## 2022-01-19 RX ADMIN — SODIUM CHLORIDE, PRESERVATIVE FREE 10 ML: 5 INJECTION INTRAVENOUS at 12:26

## 2022-01-19 RX ADMIN — SODIUM CHLORIDE, PRESERVATIVE FREE 10 ML: 5 INJECTION INTRAVENOUS at 20:28

## 2022-01-19 RX ADMIN — SODIUM CHLORIDE 250 MG: 9 INJECTION, SOLUTION INTRAVENOUS at 17:43

## 2022-01-19 RX ADMIN — HEPARIN SODIUM 3800 UNITS: 1000 INJECTION INTRAVENOUS; SUBCUTANEOUS at 16:57

## 2022-01-19 RX ADMIN — SODIUM CHLORIDE, PRESERVATIVE FREE 10 ML: 5 INJECTION INTRAVENOUS at 12:21

## 2022-01-19 RX ADMIN — HEPARIN SODIUM 5000 UNITS: 5000 INJECTION INTRAVENOUS; SUBCUTANEOUS at 23:21

## 2022-01-20 LAB
ANION GAP SERPL CALCULATED.3IONS-SCNC: 11.4 MMOL/L (ref 5–15)
BASOPHILS # BLD AUTO: 0.03 10*3/MM3 (ref 0–0.2)
BASOPHILS NFR BLD AUTO: 0.4 % (ref 0–1.5)
BUN SERPL-MCNC: 37 MG/DL (ref 8–23)
BUN/CREAT SERPL: 13.8 (ref 7–25)
CALCIUM SPEC-SCNC: 8.5 MG/DL (ref 8.2–9.6)
CHLORIDE SERPL-SCNC: 107 MMOL/L (ref 98–107)
CO2 SERPL-SCNC: 21.6 MMOL/L (ref 22–29)
CREAT SERPL-MCNC: 2.69 MG/DL (ref 0.57–1)
DEPRECATED RDW RBC AUTO: 46.7 FL (ref 37–54)
EOSINOPHIL # BLD AUTO: 0.15 10*3/MM3 (ref 0–0.4)
EOSINOPHIL NFR BLD AUTO: 1.9 % (ref 0.3–6.2)
ERYTHROCYTE [DISTWIDTH] IN BLOOD BY AUTOMATED COUNT: 14.4 % (ref 12.3–15.4)
GFR SERPL CREATININE-BSD FRML MDRD: 17 ML/MIN/1.73
GLUCOSE SERPL-MCNC: 85 MG/DL (ref 65–99)
HCT VFR BLD AUTO: 31.5 % (ref 34–46.6)
HGB BLD-MCNC: 10.1 G/DL (ref 12–15.9)
IMM GRANULOCYTES # BLD AUTO: 0.02 10*3/MM3 (ref 0–0.05)
IMM GRANULOCYTES NFR BLD AUTO: 0.3 % (ref 0–0.5)
INR PPP: 1.83 (ref 0.9–1.1)
LYMPHOCYTES # BLD AUTO: 1.26 10*3/MM3 (ref 0.7–3.1)
LYMPHOCYTES NFR BLD AUTO: 15.8 % (ref 19.6–45.3)
MAGNESIUM SERPL-MCNC: 2.2 MG/DL (ref 1.7–2.3)
MCH RBC QN AUTO: 28.2 PG (ref 26.6–33)
MCHC RBC AUTO-ENTMCNC: 32.1 G/DL (ref 31.5–35.7)
MCV RBC AUTO: 88 FL (ref 79–97)
MONOCYTES # BLD AUTO: 0.72 10*3/MM3 (ref 0.1–0.9)
MONOCYTES NFR BLD AUTO: 9 % (ref 5–12)
NEUTROPHILS NFR BLD AUTO: 5.81 10*3/MM3 (ref 1.7–7)
NEUTROPHILS NFR BLD AUTO: 72.6 % (ref 42.7–76)
NRBC BLD AUTO-RTO: 0 /100 WBC (ref 0–0.2)
PLATELET # BLD AUTO: 177 10*3/MM3 (ref 140–450)
PMV BLD AUTO: 12.1 FL (ref 6–12)
POTASSIUM SERPL-SCNC: 3.8 MMOL/L (ref 3.5–5.2)
PROTHROMBIN TIME: 20.9 SECONDS (ref 11.7–14.2)
RBC # BLD AUTO: 3.58 10*6/MM3 (ref 3.77–5.28)
SODIUM SERPL-SCNC: 140 MMOL/L (ref 136–145)
WBC NRBC COR # BLD: 7.99 10*3/MM3 (ref 3.4–10.8)

## 2022-01-20 PROCEDURE — 85025 COMPLETE CBC W/AUTO DIFF WBC: CPT | Performed by: STUDENT IN AN ORGANIZED HEALTH CARE EDUCATION/TRAINING PROGRAM

## 2022-01-20 PROCEDURE — 25010000002 CEFAZOLIN 1-4 GM/50ML-% SOLUTION: Performed by: STUDENT IN AN ORGANIZED HEALTH CARE EDUCATION/TRAINING PROGRAM

## 2022-01-20 PROCEDURE — 25010000002 HYDRALAZINE PER 20 MG: Performed by: SURGERY

## 2022-01-20 PROCEDURE — 25010000002 NA FERRIC GLUC CPLX PER 12.5 MG: Performed by: INTERNAL MEDICINE

## 2022-01-20 PROCEDURE — 80048 BASIC METABOLIC PNL TOTAL CA: CPT | Performed by: STUDENT IN AN ORGANIZED HEALTH CARE EDUCATION/TRAINING PROGRAM

## 2022-01-20 PROCEDURE — 25010000002 HEPARIN (PORCINE) PER 1000 UNITS: Performed by: STUDENT IN AN ORGANIZED HEALTH CARE EDUCATION/TRAINING PROGRAM

## 2022-01-20 PROCEDURE — 85610 PROTHROMBIN TIME: CPT | Performed by: STUDENT IN AN ORGANIZED HEALTH CARE EDUCATION/TRAINING PROGRAM

## 2022-01-20 PROCEDURE — 25010000002 HEPARIN (PORCINE) PER 1000 UNITS: Performed by: INTERNAL MEDICINE

## 2022-01-20 PROCEDURE — 83735 ASSAY OF MAGNESIUM: CPT | Performed by: STUDENT IN AN ORGANIZED HEALTH CARE EDUCATION/TRAINING PROGRAM

## 2022-01-20 PROCEDURE — 97110 THERAPEUTIC EXERCISES: CPT

## 2022-01-20 RX ADMIN — CEFAZOLIN SODIUM 1 G: 1 INJECTION, SOLUTION INTRAVENOUS at 13:53

## 2022-01-20 RX ADMIN — HEPARIN SODIUM 3800 UNITS: 1000 INJECTION INTRAVENOUS; SUBCUTANEOUS at 09:42

## 2022-01-20 RX ADMIN — HEPARIN SODIUM 5000 UNITS: 5000 INJECTION INTRAVENOUS; SUBCUTANEOUS at 13:53

## 2022-01-20 RX ADMIN — HYDRALAZINE HYDROCHLORIDE 10 MG: 20 INJECTION INTRAMUSCULAR; INTRAVENOUS at 03:31

## 2022-01-20 RX ADMIN — HEPARIN SODIUM 5000 UNITS: 5000 INJECTION INTRAVENOUS; SUBCUTANEOUS at 07:00

## 2022-01-20 RX ADMIN — AMLODIPINE BESYLATE 10 MG: 10 TABLET ORAL at 13:53

## 2022-01-20 RX ADMIN — SODIUM CHLORIDE, PRESERVATIVE FREE 10 ML: 5 INJECTION INTRAVENOUS at 21:32

## 2022-01-20 RX ADMIN — LORAZEPAM 1 MG: 1 TABLET ORAL at 21:32

## 2022-01-20 RX ADMIN — SODIUM CHLORIDE, PRESERVATIVE FREE 10 ML: 5 INJECTION INTRAVENOUS at 13:54

## 2022-01-20 RX ADMIN — SODIUM CHLORIDE 250 MG: 9 INJECTION, SOLUTION INTRAVENOUS at 13:52

## 2022-01-20 RX ADMIN — HEPARIN SODIUM 5000 UNITS: 5000 INJECTION INTRAVENOUS; SUBCUTANEOUS at 21:32

## 2022-01-21 LAB
ANION GAP SERPL CALCULATED.3IONS-SCNC: 10.8 MMOL/L (ref 5–15)
BASOPHILS # BLD AUTO: 0.02 10*3/MM3 (ref 0–0.2)
BASOPHILS NFR BLD AUTO: 0.3 % (ref 0–1.5)
BUN SERPL-MCNC: 24 MG/DL (ref 8–23)
BUN/CREAT SERPL: 10.3 (ref 7–25)
CALCIUM SPEC-SCNC: 8.1 MG/DL (ref 8.2–9.6)
CHLORIDE SERPL-SCNC: 104 MMOL/L (ref 98–107)
CO2 SERPL-SCNC: 22.2 MMOL/L (ref 22–29)
CREAT SERPL-MCNC: 2.32 MG/DL (ref 0.57–1)
DEPRECATED RDW RBC AUTO: 44.3 FL (ref 37–54)
EOSINOPHIL # BLD AUTO: 0.15 10*3/MM3 (ref 0–0.4)
EOSINOPHIL NFR BLD AUTO: 2.1 % (ref 0.3–6.2)
ERYTHROCYTE [DISTWIDTH] IN BLOOD BY AUTOMATED COUNT: 14 % (ref 12.3–15.4)
GFR SERPL CREATININE-BSD FRML MDRD: 20 ML/MIN/1.73
GLUCOSE SERPL-MCNC: 81 MG/DL (ref 65–99)
HCT VFR BLD AUTO: 29.5 % (ref 34–46.6)
HGB BLD-MCNC: 9.6 G/DL (ref 12–15.9)
IMM GRANULOCYTES # BLD AUTO: 0.01 10*3/MM3 (ref 0–0.05)
IMM GRANULOCYTES NFR BLD AUTO: 0.1 % (ref 0–0.5)
INR PPP: 1.58 (ref 0.9–1.1)
LYMPHOCYTES # BLD AUTO: 1.17 10*3/MM3 (ref 0.7–3.1)
LYMPHOCYTES NFR BLD AUTO: 16 % (ref 19.6–45.3)
MAGNESIUM SERPL-MCNC: 2.1 MG/DL (ref 1.7–2.3)
MCH RBC QN AUTO: 28.5 PG (ref 26.6–33)
MCHC RBC AUTO-ENTMCNC: 32.5 G/DL (ref 31.5–35.7)
MCV RBC AUTO: 87.5 FL (ref 79–97)
MONOCYTES # BLD AUTO: 0.74 10*3/MM3 (ref 0.1–0.9)
MONOCYTES NFR BLD AUTO: 10.1 % (ref 5–12)
NEUTROPHILS NFR BLD AUTO: 5.21 10*3/MM3 (ref 1.7–7)
NEUTROPHILS NFR BLD AUTO: 71.4 % (ref 42.7–76)
NRBC BLD AUTO-RTO: 0 /100 WBC (ref 0–0.2)
PLATELET # BLD AUTO: 165 10*3/MM3 (ref 140–450)
PMV BLD AUTO: 11.6 FL (ref 6–12)
POTASSIUM SERPL-SCNC: 3.3 MMOL/L (ref 3.5–5.2)
PROTHROMBIN TIME: 18.6 SECONDS (ref 11.7–14.2)
RBC # BLD AUTO: 3.37 10*6/MM3 (ref 3.77–5.28)
SODIUM SERPL-SCNC: 137 MMOL/L (ref 136–145)
WBC NRBC COR # BLD: 7.3 10*3/MM3 (ref 3.4–10.8)

## 2022-01-21 PROCEDURE — 97110 THERAPEUTIC EXERCISES: CPT

## 2022-01-21 PROCEDURE — 85610 PROTHROMBIN TIME: CPT | Performed by: STUDENT IN AN ORGANIZED HEALTH CARE EDUCATION/TRAINING PROGRAM

## 2022-01-21 PROCEDURE — 25010000002 NA FERRIC GLUC CPLX PER 12.5 MG: Performed by: INTERNAL MEDICINE

## 2022-01-21 PROCEDURE — 25010000002 HYDRALAZINE PER 20 MG: Performed by: SURGERY

## 2022-01-21 PROCEDURE — 25010000002 CEFAZOLIN 1-4 GM/50ML-% SOLUTION: Performed by: STUDENT IN AN ORGANIZED HEALTH CARE EDUCATION/TRAINING PROGRAM

## 2022-01-21 PROCEDURE — 80048 BASIC METABOLIC PNL TOTAL CA: CPT | Performed by: STUDENT IN AN ORGANIZED HEALTH CARE EDUCATION/TRAINING PROGRAM

## 2022-01-21 PROCEDURE — 85025 COMPLETE CBC W/AUTO DIFF WBC: CPT | Performed by: STUDENT IN AN ORGANIZED HEALTH CARE EDUCATION/TRAINING PROGRAM

## 2022-01-21 PROCEDURE — 83735 ASSAY OF MAGNESIUM: CPT | Performed by: STUDENT IN AN ORGANIZED HEALTH CARE EDUCATION/TRAINING PROGRAM

## 2022-01-21 PROCEDURE — 25010000002 HEPARIN (PORCINE) PER 1000 UNITS: Performed by: STUDENT IN AN ORGANIZED HEALTH CARE EDUCATION/TRAINING PROGRAM

## 2022-01-21 RX ADMIN — HEPARIN SODIUM 5000 UNITS: 5000 INJECTION INTRAVENOUS; SUBCUTANEOUS at 06:32

## 2022-01-21 RX ADMIN — CEFAZOLIN SODIUM 1 G: 1 INJECTION, SOLUTION INTRAVENOUS at 11:45

## 2022-01-21 RX ADMIN — HEPARIN SODIUM 5000 UNITS: 5000 INJECTION INTRAVENOUS; SUBCUTANEOUS at 21:58

## 2022-01-21 RX ADMIN — SODIUM CHLORIDE 250 MG: 9 INJECTION, SOLUTION INTRAVENOUS at 08:55

## 2022-01-21 RX ADMIN — SODIUM CHLORIDE, PRESERVATIVE FREE 10 ML: 5 INJECTION INTRAVENOUS at 08:56

## 2022-01-21 RX ADMIN — SODIUM CHLORIDE, PRESERVATIVE FREE 10 ML: 5 INJECTION INTRAVENOUS at 21:58

## 2022-01-21 RX ADMIN — LORAZEPAM 1 MG: 1 TABLET ORAL at 22:16

## 2022-01-21 RX ADMIN — HYDRALAZINE HYDROCHLORIDE 10 MG: 20 INJECTION INTRAMUSCULAR; INTRAVENOUS at 16:33

## 2022-01-21 RX ADMIN — HEPARIN SODIUM 5000 UNITS: 5000 INJECTION INTRAVENOUS; SUBCUTANEOUS at 15:10

## 2022-01-21 RX ADMIN — AMLODIPINE BESYLATE 10 MG: 10 TABLET ORAL at 08:55

## 2022-01-22 LAB
ANION GAP SERPL CALCULATED.3IONS-SCNC: 13.8 MMOL/L (ref 5–15)
BASOPHILS # BLD AUTO: 0.01 10*3/MM3 (ref 0–0.2)
BASOPHILS NFR BLD AUTO: 0.1 % (ref 0–1.5)
BUN SERPL-MCNC: 29 MG/DL (ref 8–23)
BUN/CREAT SERPL: 9.5 (ref 7–25)
CALCIUM SPEC-SCNC: 8.2 MG/DL (ref 8.2–9.6)
CHLORIDE SERPL-SCNC: 101 MMOL/L (ref 98–107)
CO2 SERPL-SCNC: 20.2 MMOL/L (ref 22–29)
CREAT SERPL-MCNC: 3.04 MG/DL (ref 0.57–1)
DEPRECATED RDW RBC AUTO: 44.9 FL (ref 37–54)
EOSINOPHIL # BLD AUTO: 0.16 10*3/MM3 (ref 0–0.4)
EOSINOPHIL NFR BLD AUTO: 2.1 % (ref 0.3–6.2)
ERYTHROCYTE [DISTWIDTH] IN BLOOD BY AUTOMATED COUNT: 14.1 % (ref 12.3–15.4)
GFR SERPL CREATININE-BSD FRML MDRD: 14 ML/MIN/1.73
GFR SERPL CREATININE-BSD FRML MDRD: ABNORMAL ML/MIN/{1.73_M2}
GLUCOSE SERPL-MCNC: 66 MG/DL (ref 65–99)
HCT VFR BLD AUTO: 29 % (ref 34–46.6)
HGB BLD-MCNC: 9.3 G/DL (ref 12–15.9)
IMM GRANULOCYTES # BLD AUTO: 0.02 10*3/MM3 (ref 0–0.05)
IMM GRANULOCYTES NFR BLD AUTO: 0.3 % (ref 0–0.5)
INR PPP: 1.44 (ref 0.9–1.1)
LYMPHOCYTES # BLD AUTO: 1.01 10*3/MM3 (ref 0.7–3.1)
LYMPHOCYTES NFR BLD AUTO: 13.3 % (ref 19.6–45.3)
MAGNESIUM SERPL-MCNC: 2.1 MG/DL (ref 1.7–2.3)
MCH RBC QN AUTO: 28.1 PG (ref 26.6–33)
MCHC RBC AUTO-ENTMCNC: 32.1 G/DL (ref 31.5–35.7)
MCV RBC AUTO: 87.6 FL (ref 79–97)
MONOCYTES # BLD AUTO: 0.48 10*3/MM3 (ref 0.1–0.9)
MONOCYTES NFR BLD AUTO: 6.3 % (ref 5–12)
NEUTROPHILS NFR BLD AUTO: 5.91 10*3/MM3 (ref 1.7–7)
NEUTROPHILS NFR BLD AUTO: 77.9 % (ref 42.7–76)
NRBC BLD AUTO-RTO: 0 /100 WBC (ref 0–0.2)
PLATELET # BLD AUTO: 170 10*3/MM3 (ref 140–450)
PMV BLD AUTO: 12.2 FL (ref 6–12)
POTASSIUM SERPL-SCNC: 3.2 MMOL/L (ref 3.5–5.2)
PROTHROMBIN TIME: 17.3 SECONDS (ref 11.7–14.2)
RBC # BLD AUTO: 3.31 10*6/MM3 (ref 3.77–5.28)
SODIUM SERPL-SCNC: 135 MMOL/L (ref 136–145)
WBC NRBC COR # BLD: 7.59 10*3/MM3 (ref 3.4–10.8)

## 2022-01-22 PROCEDURE — 80048 BASIC METABOLIC PNL TOTAL CA: CPT | Performed by: STUDENT IN AN ORGANIZED HEALTH CARE EDUCATION/TRAINING PROGRAM

## 2022-01-22 PROCEDURE — 85025 COMPLETE CBC W/AUTO DIFF WBC: CPT | Performed by: STUDENT IN AN ORGANIZED HEALTH CARE EDUCATION/TRAINING PROGRAM

## 2022-01-22 PROCEDURE — 97110 THERAPEUTIC EXERCISES: CPT

## 2022-01-22 PROCEDURE — 25010000002 HEPARIN (PORCINE) PER 1000 UNITS: Performed by: STUDENT IN AN ORGANIZED HEALTH CARE EDUCATION/TRAINING PROGRAM

## 2022-01-22 PROCEDURE — 83735 ASSAY OF MAGNESIUM: CPT | Performed by: STUDENT IN AN ORGANIZED HEALTH CARE EDUCATION/TRAINING PROGRAM

## 2022-01-22 PROCEDURE — 85610 PROTHROMBIN TIME: CPT | Performed by: STUDENT IN AN ORGANIZED HEALTH CARE EDUCATION/TRAINING PROGRAM

## 2022-01-22 RX ADMIN — SODIUM CHLORIDE, PRESERVATIVE FREE 10 ML: 5 INJECTION INTRAVENOUS at 21:37

## 2022-01-22 RX ADMIN — HEPARIN SODIUM 5000 UNITS: 5000 INJECTION INTRAVENOUS; SUBCUTANEOUS at 06:58

## 2022-01-22 RX ADMIN — HEPARIN SODIUM 5000 UNITS: 5000 INJECTION INTRAVENOUS; SUBCUTANEOUS at 21:37

## 2022-01-22 RX ADMIN — SODIUM CHLORIDE, PRESERVATIVE FREE 10 ML: 5 INJECTION INTRAVENOUS at 09:58

## 2022-01-22 RX ADMIN — HEPARIN SODIUM 5000 UNITS: 5000 INJECTION INTRAVENOUS; SUBCUTANEOUS at 15:59

## 2022-01-22 RX ADMIN — AMLODIPINE BESYLATE 10 MG: 10 TABLET ORAL at 09:58

## 2022-01-23 LAB
ALBUMIN SERPL-MCNC: 3.5 G/DL (ref 3.5–5.2)
ANION GAP SERPL CALCULATED.3IONS-SCNC: 13 MMOL/L (ref 5–15)
BASOPHILS # BLD AUTO: 0.02 10*3/MM3 (ref 0–0.2)
BASOPHILS NFR BLD AUTO: 0.3 % (ref 0–1.5)
BUN SERPL-MCNC: 43 MG/DL (ref 8–23)
BUN/CREAT SERPL: 11.9 (ref 7–25)
CALCIUM SPEC-SCNC: 8.4 MG/DL (ref 8.2–9.6)
CHLORIDE SERPL-SCNC: 101 MMOL/L (ref 98–107)
CO2 SERPL-SCNC: 21 MMOL/L (ref 22–29)
CREAT SERPL-MCNC: 3.62 MG/DL (ref 0.57–1)
DEPRECATED RDW RBC AUTO: 44.6 FL (ref 37–54)
EOSINOPHIL # BLD AUTO: 0.62 10*3/MM3 (ref 0–0.4)
EOSINOPHIL NFR BLD AUTO: 9.4 % (ref 0.3–6.2)
ERYTHROCYTE [DISTWIDTH] IN BLOOD BY AUTOMATED COUNT: 13.9 % (ref 12.3–15.4)
GFR SERPL CREATININE-BSD FRML MDRD: 12 ML/MIN/1.73
GFR SERPL CREATININE-BSD FRML MDRD: ABNORMAL ML/MIN/{1.73_M2}
GLUCOSE SERPL-MCNC: 82 MG/DL (ref 65–99)
HCT VFR BLD AUTO: 27.9 % (ref 34–46.6)
HGB BLD-MCNC: 9 G/DL (ref 12–15.9)
IMM GRANULOCYTES # BLD AUTO: 0.02 10*3/MM3 (ref 0–0.05)
IMM GRANULOCYTES NFR BLD AUTO: 0.3 % (ref 0–0.5)
INR PPP: 1.39 (ref 0.9–1.1)
LYMPHOCYTES # BLD AUTO: 1.14 10*3/MM3 (ref 0.7–3.1)
LYMPHOCYTES NFR BLD AUTO: 17.2 % (ref 19.6–45.3)
MAGNESIUM SERPL-MCNC: 2.2 MG/DL (ref 1.7–2.3)
MCH RBC QN AUTO: 28.5 PG (ref 26.6–33)
MCHC RBC AUTO-ENTMCNC: 32.3 G/DL (ref 31.5–35.7)
MCV RBC AUTO: 88.3 FL (ref 79–97)
MONOCYTES # BLD AUTO: 0.67 10*3/MM3 (ref 0.1–0.9)
MONOCYTES NFR BLD AUTO: 10.1 % (ref 5–12)
NEUTROPHILS NFR BLD AUTO: 4.15 10*3/MM3 (ref 1.7–7)
NEUTROPHILS NFR BLD AUTO: 62.7 % (ref 42.7–76)
NRBC BLD AUTO-RTO: 0.2 /100 WBC (ref 0–0.2)
PHOSPHATE SERPL-MCNC: 3 MG/DL (ref 2.5–4.5)
PLATELET # BLD AUTO: 156 10*3/MM3 (ref 140–450)
PMV BLD AUTO: 11.5 FL (ref 6–12)
POTASSIUM SERPL-SCNC: 3.4 MMOL/L (ref 3.5–5.2)
PROTHROMBIN TIME: 16.8 SECONDS (ref 11.7–14.2)
RBC # BLD AUTO: 3.16 10*6/MM3 (ref 3.77–5.28)
SARS-COV-2 RNA RESP QL NAA+PROBE: NOT DETECTED
SODIUM SERPL-SCNC: 135 MMOL/L (ref 136–145)
WBC NRBC COR # BLD: 6.62 10*3/MM3 (ref 3.4–10.8)

## 2022-01-23 PROCEDURE — 80069 RENAL FUNCTION PANEL: CPT | Performed by: INTERNAL MEDICINE

## 2022-01-23 PROCEDURE — U0005 INFEC AGEN DETEC AMPLI PROBE: HCPCS | Performed by: SURGERY

## 2022-01-23 PROCEDURE — 25010000002 HEPARIN (PORCINE) PER 1000 UNITS: Performed by: INTERNAL MEDICINE

## 2022-01-23 PROCEDURE — 25010000002 HEPARIN (PORCINE) PER 1000 UNITS: Performed by: STUDENT IN AN ORGANIZED HEALTH CARE EDUCATION/TRAINING PROGRAM

## 2022-01-23 PROCEDURE — U0003 INFECTIOUS AGENT DETECTION BY NUCLEIC ACID (DNA OR RNA); SEVERE ACUTE RESPIRATORY SYNDROME CORONAVIRUS 2 (SARS-COV-2) (CORONAVIRUS DISEASE [COVID-19]), AMPLIFIED PROBE TECHNIQUE, MAKING USE OF HIGH THROUGHPUT TECHNOLOGIES AS DESCRIBED BY CMS-2020-01-R: HCPCS | Performed by: SURGERY

## 2022-01-23 PROCEDURE — 85025 COMPLETE CBC W/AUTO DIFF WBC: CPT | Performed by: STUDENT IN AN ORGANIZED HEALTH CARE EDUCATION/TRAINING PROGRAM

## 2022-01-23 PROCEDURE — 85610 PROTHROMBIN TIME: CPT | Performed by: STUDENT IN AN ORGANIZED HEALTH CARE EDUCATION/TRAINING PROGRAM

## 2022-01-23 PROCEDURE — 83735 ASSAY OF MAGNESIUM: CPT | Performed by: STUDENT IN AN ORGANIZED HEALTH CARE EDUCATION/TRAINING PROGRAM

## 2022-01-23 RX ORDER — LOSARTAN POTASSIUM 25 MG/1
25 TABLET ORAL
Status: DISCONTINUED | OUTPATIENT
Start: 2022-01-23 | End: 2022-01-26 | Stop reason: HOSPADM

## 2022-01-23 RX ORDER — POTASSIUM CHLORIDE 750 MG/1
20 TABLET, FILM COATED, EXTENDED RELEASE ORAL ONCE
Status: COMPLETED | OUTPATIENT
Start: 2022-01-23 | End: 2022-01-23

## 2022-01-23 RX ADMIN — SODIUM CHLORIDE, PRESERVATIVE FREE 10 ML: 5 INJECTION INTRAVENOUS at 09:40

## 2022-01-23 RX ADMIN — LORAZEPAM 1 MG: 1 TABLET ORAL at 00:03

## 2022-01-23 RX ADMIN — HEPARIN SODIUM 5000 UNITS: 5000 INJECTION INTRAVENOUS; SUBCUTANEOUS at 06:27

## 2022-01-23 RX ADMIN — SODIUM CHLORIDE, PRESERVATIVE FREE 10 ML: 5 INJECTION INTRAVENOUS at 21:14

## 2022-01-23 RX ADMIN — HEPARIN SODIUM 3800 UNITS: 1000 INJECTION INTRAVENOUS; SUBCUTANEOUS at 17:42

## 2022-01-23 RX ADMIN — LOSARTAN POTASSIUM 25 MG: 25 TABLET, FILM COATED ORAL at 12:00

## 2022-01-23 RX ADMIN — HEPARIN SODIUM 5000 UNITS: 5000 INJECTION INTRAVENOUS; SUBCUTANEOUS at 21:14

## 2022-01-23 RX ADMIN — AMLODIPINE BESYLATE 10 MG: 10 TABLET ORAL at 09:40

## 2022-01-23 RX ADMIN — POTASSIUM CHLORIDE 20 MEQ: 10 TABLET, EXTENDED RELEASE ORAL at 09:40

## 2022-01-24 ENCOUNTER — ANESTHESIA EVENT (OUTPATIENT)
Dept: PERIOP | Facility: HOSPITAL | Age: 87
End: 2022-01-24

## 2022-01-24 ENCOUNTER — ANESTHESIA (OUTPATIENT)
Dept: PERIOP | Facility: HOSPITAL | Age: 87
End: 2022-01-24

## 2022-01-24 PROBLEM — N18.6 END STAGE RENAL DISEASE: Status: ACTIVE | Noted: 2022-01-24

## 2022-01-24 LAB
ALBUMIN SERPL-MCNC: 3.4 G/DL (ref 3.5–5.2)
ANION GAP SERPL CALCULATED.3IONS-SCNC: 11.3 MMOL/L (ref 5–15)
BASOPHILS # BLD AUTO: 0.01 10*3/MM3 (ref 0–0.2)
BASOPHILS NFR BLD AUTO: 0.1 % (ref 0–1.5)
BUN SERPL-MCNC: 28 MG/DL (ref 8–23)
BUN/CREAT SERPL: 10.5 (ref 7–25)
CALCIUM SPEC-SCNC: 8 MG/DL (ref 8.2–9.6)
CHLORIDE SERPL-SCNC: 101 MMOL/L (ref 98–107)
CO2 SERPL-SCNC: 20.7 MMOL/L (ref 22–29)
CREAT SERPL-MCNC: 2.66 MG/DL (ref 0.57–1)
DEPRECATED RDW RBC AUTO: 47.8 FL (ref 37–54)
EOSINOPHIL # BLD AUTO: 0.44 10*3/MM3 (ref 0–0.4)
EOSINOPHIL NFR BLD AUTO: 6.6 % (ref 0.3–6.2)
ERYTHROCYTE [DISTWIDTH] IN BLOOD BY AUTOMATED COUNT: 14.3 % (ref 12.3–15.4)
GFR SERPL CREATININE-BSD FRML MDRD: 17 ML/MIN/1.73
GLUCOSE SERPL-MCNC: 76 MG/DL (ref 65–99)
HCT VFR BLD AUTO: 28.6 % (ref 34–46.6)
HGB BLD-MCNC: 9.1 G/DL (ref 12–15.9)
IMM GRANULOCYTES # BLD AUTO: 0.02 10*3/MM3 (ref 0–0.05)
IMM GRANULOCYTES NFR BLD AUTO: 0.3 % (ref 0–0.5)
INR PPP: 1.23 (ref 0.9–1.1)
LYMPHOCYTES # BLD AUTO: 1.29 10*3/MM3 (ref 0.7–3.1)
LYMPHOCYTES NFR BLD AUTO: 19.3 % (ref 19.6–45.3)
MAGNESIUM SERPL-MCNC: 2 MG/DL (ref 1.7–2.3)
MCH RBC QN AUTO: 28.7 PG (ref 26.6–33)
MCHC RBC AUTO-ENTMCNC: 31.8 G/DL (ref 31.5–35.7)
MCV RBC AUTO: 90.2 FL (ref 79–97)
MONOCYTES # BLD AUTO: 0.84 10*3/MM3 (ref 0.1–0.9)
MONOCYTES NFR BLD AUTO: 12.6 % (ref 5–12)
NEUTROPHILS NFR BLD AUTO: 4.08 10*3/MM3 (ref 1.7–7)
NEUTROPHILS NFR BLD AUTO: 61.1 % (ref 42.7–76)
NRBC BLD AUTO-RTO: 0.1 /100 WBC (ref 0–0.2)
PHOSPHATE SERPL-MCNC: 2.4 MG/DL (ref 2.5–4.5)
PLATELET # BLD AUTO: 161 10*3/MM3 (ref 140–450)
PMV BLD AUTO: 11.8 FL (ref 6–12)
POTASSIUM SERPL-SCNC: 4.1 MMOL/L (ref 3.5–5.2)
PROTHROMBIN TIME: 15.3 SECONDS (ref 11.7–14.2)
RBC # BLD AUTO: 3.17 10*6/MM3 (ref 3.77–5.28)
SODIUM SERPL-SCNC: 133 MMOL/L (ref 136–145)
WBC NRBC COR # BLD: 6.68 10*3/MM3 (ref 3.4–10.8)

## 2022-01-24 PROCEDURE — 83735 ASSAY OF MAGNESIUM: CPT | Performed by: STUDENT IN AN ORGANIZED HEALTH CARE EDUCATION/TRAINING PROGRAM

## 2022-01-24 PROCEDURE — 0 CEFAZOLIN IN DEXTROSE 2-4 GM/100ML-% SOLUTION: Performed by: SURGERY

## 2022-01-24 PROCEDURE — 25010000002 HEPARIN (PORCINE) PER 1000 UNITS: Performed by: SURGERY

## 2022-01-24 PROCEDURE — 25010000002 ROPIVACAINE PER 1 MG: Performed by: ANESTHESIOLOGY

## 2022-01-24 PROCEDURE — 25010000002 MIDAZOLAM PER 1 MG: Performed by: ANESTHESIOLOGY

## 2022-01-24 PROCEDURE — 25010000002 HEPARIN (PORCINE) PER 1000 UNITS: Performed by: NURSE ANESTHETIST, CERTIFIED REGISTERED

## 2022-01-24 PROCEDURE — 25010000002 MIDAZOLAM PER 1 MG: Performed by: NURSE ANESTHETIST, CERTIFIED REGISTERED

## 2022-01-24 PROCEDURE — 25010000002 FENTANYL CITRATE (PF) 50 MCG/ML SOLUTION: Performed by: NURSE ANESTHETIST, CERTIFIED REGISTERED

## 2022-01-24 PROCEDURE — C1768 GRAFT, VASCULAR: HCPCS | Performed by: SURGERY

## 2022-01-24 PROCEDURE — 0 CEFAZOLIN PER 500 MG: Performed by: SURGERY

## 2022-01-24 PROCEDURE — 76942 ECHO GUIDE FOR BIOPSY: CPT | Performed by: SURGERY

## 2022-01-24 PROCEDURE — 03180ZD BYPASS LEFT BRACHIAL ARTERY TO UPPER ARM VEIN, OPEN APPROACH: ICD-10-PCS | Performed by: SURGERY

## 2022-01-24 PROCEDURE — 25010000002 PROTAMINE SULFATE PER 10 MG: Performed by: NURSE ANESTHETIST, CERTIFIED REGISTERED

## 2022-01-24 PROCEDURE — 25010000002 FENTANYL CITRATE (PF) 50 MCG/ML SOLUTION: Performed by: ANESTHESIOLOGY

## 2022-01-24 PROCEDURE — C1889 IMPLANT/INSERT DEVICE, NOC: HCPCS | Performed by: SURGERY

## 2022-01-24 PROCEDURE — 85025 COMPLETE CBC W/AUTO DIFF WBC: CPT | Performed by: STUDENT IN AN ORGANIZED HEALTH CARE EDUCATION/TRAINING PROGRAM

## 2022-01-24 PROCEDURE — 80069 RENAL FUNCTION PANEL: CPT | Performed by: INTERNAL MEDICINE

## 2022-01-24 PROCEDURE — 25010000002 PROPOFOL 10 MG/ML EMULSION: Performed by: NURSE ANESTHETIST, CERTIFIED REGISTERED

## 2022-01-24 PROCEDURE — 85610 PROTHROMBIN TIME: CPT | Performed by: STUDENT IN AN ORGANIZED HEALTH CARE EDUCATION/TRAINING PROGRAM

## 2022-01-24 PROCEDURE — 0 LIDOCAINE 1 % SOLUTION 20 ML VIAL: Performed by: SURGERY

## 2022-01-24 DEVICE — LIGACLIP MCA MULTIPLE CLIP APPLIERS, 20 SMALL CLIPS
Type: IMPLANTABLE DEVICE | Site: ARM | Status: FUNCTIONAL
Brand: LIGACLIP

## 2022-01-24 DEVICE — PROPATEN VASCULAR GRAFT SW 4-7MMX45CM TAPERED HEPARIN
Type: IMPLANTABLE DEVICE | Site: ARM | Status: FUNCTIONAL
Brand: GORE PROPATEN VASCULAR GRAFT

## 2022-01-24 RX ORDER — HYDROCODONE BITARTRATE AND ACETAMINOPHEN 5; 325 MG/1; MG/1
1 TABLET ORAL ONCE AS NEEDED
Status: DISCONTINUED | OUTPATIENT
Start: 2022-01-24 | End: 2022-01-24 | Stop reason: HOSPADM

## 2022-01-24 RX ORDER — FENTANYL CITRATE 50 UG/ML
INJECTION, SOLUTION INTRAMUSCULAR; INTRAVENOUS
Status: DISCONTINUED | OUTPATIENT
Start: 2022-01-24 | End: 2022-01-26 | Stop reason: HOSPADM

## 2022-01-24 RX ORDER — FENTANYL CITRATE 50 UG/ML
INJECTION, SOLUTION INTRAMUSCULAR; INTRAVENOUS AS NEEDED
Status: DISCONTINUED | OUTPATIENT
Start: 2022-01-24 | End: 2022-01-24 | Stop reason: SURG

## 2022-01-24 RX ORDER — HYDRALAZINE HYDROCHLORIDE 20 MG/ML
5 INJECTION INTRAMUSCULAR; INTRAVENOUS
Status: DISCONTINUED | OUTPATIENT
Start: 2022-01-24 | End: 2022-01-24 | Stop reason: HOSPADM

## 2022-01-24 RX ORDER — FLUMAZENIL 0.1 MG/ML
0.2 INJECTION INTRAVENOUS AS NEEDED
Status: DISCONTINUED | OUTPATIENT
Start: 2022-01-24 | End: 2022-01-24 | Stop reason: HOSPADM

## 2022-01-24 RX ORDER — HYDROMORPHONE HYDROCHLORIDE 1 MG/ML
0.2 INJECTION, SOLUTION INTRAMUSCULAR; INTRAVENOUS; SUBCUTANEOUS
Status: DISCONTINUED | OUTPATIENT
Start: 2022-01-24 | End: 2022-01-24 | Stop reason: HOSPADM

## 2022-01-24 RX ORDER — PROTAMINE SULFATE 10 MG/ML
INJECTION, SOLUTION INTRAVENOUS AS NEEDED
Status: DISCONTINUED | OUTPATIENT
Start: 2022-01-24 | End: 2022-01-24 | Stop reason: SURG

## 2022-01-24 RX ORDER — ONDANSETRON 2 MG/ML
4 INJECTION INTRAMUSCULAR; INTRAVENOUS ONCE AS NEEDED
Status: DISCONTINUED | OUTPATIENT
Start: 2022-01-24 | End: 2022-01-24 | Stop reason: HOSPADM

## 2022-01-24 RX ORDER — ROPIVACAINE HYDROCHLORIDE 5 MG/ML
INJECTION, SOLUTION EPIDURAL; INFILTRATION; PERINEURAL
Status: COMPLETED | OUTPATIENT
Start: 2022-01-24 | End: 2022-01-24

## 2022-01-24 RX ORDER — SODIUM CHLORIDE 9 MG/ML
100 INJECTION, SOLUTION INTRAVENOUS CONTINUOUS
Status: DISCONTINUED | OUTPATIENT
Start: 2022-01-24 | End: 2022-01-26

## 2022-01-24 RX ORDER — WARFARIN SODIUM 6 MG/1
6 TABLET ORAL
Status: COMPLETED | OUTPATIENT
Start: 2022-01-24 | End: 2022-01-24

## 2022-01-24 RX ORDER — LIDOCAINE HYDROCHLORIDE 10 MG/ML
0.5 INJECTION, SOLUTION EPIDURAL; INFILTRATION; INTRACAUDAL; PERINEURAL ONCE AS NEEDED
Status: DISCONTINUED | OUTPATIENT
Start: 2022-01-24 | End: 2022-01-24 | Stop reason: HOSPADM

## 2022-01-24 RX ORDER — MIDAZOLAM HYDROCHLORIDE 1 MG/ML
0.5 INJECTION INTRAMUSCULAR; INTRAVENOUS
Status: DISCONTINUED | OUTPATIENT
Start: 2022-01-24 | End: 2022-01-24 | Stop reason: HOSPADM

## 2022-01-24 RX ORDER — FENTANYL CITRATE 50 UG/ML
12.5 INJECTION, SOLUTION INTRAMUSCULAR; INTRAVENOUS
Status: DISCONTINUED | OUTPATIENT
Start: 2022-01-24 | End: 2022-01-24 | Stop reason: HOSPADM

## 2022-01-24 RX ORDER — SODIUM CHLORIDE 9 MG/ML
INJECTION, SOLUTION INTRAVENOUS CONTINUOUS PRN
Status: DISCONTINUED | OUTPATIENT
Start: 2022-01-24 | End: 2022-01-24 | Stop reason: SURG

## 2022-01-24 RX ORDER — MIDAZOLAM HYDROCHLORIDE 1 MG/ML
INJECTION INTRAMUSCULAR; INTRAVENOUS
Status: DISCONTINUED | OUTPATIENT
Start: 2022-01-24 | End: 2022-01-26 | Stop reason: HOSPADM

## 2022-01-24 RX ORDER — SODIUM CHLORIDE 9 MG/ML
9 INJECTION, SOLUTION INTRAVENOUS ONCE
Status: COMPLETED | OUTPATIENT
Start: 2022-01-24 | End: 2022-01-24

## 2022-01-24 RX ORDER — FENTANYL CITRATE 50 UG/ML
50 INJECTION, SOLUTION INTRAMUSCULAR; INTRAVENOUS
Status: DISCONTINUED | OUTPATIENT
Start: 2022-01-24 | End: 2022-01-24 | Stop reason: HOSPADM

## 2022-01-24 RX ORDER — SODIUM CHLORIDE 0.9 % (FLUSH) 0.9 %
3 SYRINGE (ML) INJECTION EVERY 12 HOURS SCHEDULED
Status: DISCONTINUED | OUTPATIENT
Start: 2022-01-24 | End: 2022-01-24 | Stop reason: HOSPADM

## 2022-01-24 RX ORDER — DIPHENHYDRAMINE HYDROCHLORIDE 50 MG/ML
6 INJECTION INTRAMUSCULAR; INTRAVENOUS
Status: DISCONTINUED | OUTPATIENT
Start: 2022-01-24 | End: 2022-01-24 | Stop reason: HOSPADM

## 2022-01-24 RX ORDER — PROPOFOL 10 MG/ML
VIAL (ML) INTRAVENOUS CONTINUOUS PRN
Status: DISCONTINUED | OUTPATIENT
Start: 2022-01-24 | End: 2022-01-24 | Stop reason: SURG

## 2022-01-24 RX ORDER — MIDAZOLAM HYDROCHLORIDE 1 MG/ML
INJECTION INTRAMUSCULAR; INTRAVENOUS AS NEEDED
Status: DISCONTINUED | OUTPATIENT
Start: 2022-01-24 | End: 2022-01-24 | Stop reason: SURG

## 2022-01-24 RX ORDER — NALOXONE HCL 0.4 MG/ML
0.2 VIAL (ML) INJECTION AS NEEDED
Status: DISCONTINUED | OUTPATIENT
Start: 2022-01-24 | End: 2022-01-24 | Stop reason: HOSPADM

## 2022-01-24 RX ORDER — LABETALOL HYDROCHLORIDE 5 MG/ML
5 INJECTION, SOLUTION INTRAVENOUS
Status: DISCONTINUED | OUTPATIENT
Start: 2022-01-24 | End: 2022-01-24 | Stop reason: HOSPADM

## 2022-01-24 RX ORDER — SODIUM CHLORIDE 0.9 % (FLUSH) 0.9 %
3-10 SYRINGE (ML) INJECTION AS NEEDED
Status: DISCONTINUED | OUTPATIENT
Start: 2022-01-24 | End: 2022-01-24 | Stop reason: HOSPADM

## 2022-01-24 RX ORDER — FAMOTIDINE 10 MG/ML
20 INJECTION, SOLUTION INTRAVENOUS ONCE
Status: COMPLETED | OUTPATIENT
Start: 2022-01-24 | End: 2022-01-24

## 2022-01-24 RX ORDER — LIDOCAINE HYDROCHLORIDE 20 MG/ML
INJECTION, SOLUTION INFILTRATION; PERINEURAL AS NEEDED
Status: DISCONTINUED | OUTPATIENT
Start: 2022-01-24 | End: 2022-01-24 | Stop reason: SURG

## 2022-01-24 RX ORDER — HEPARIN SODIUM 1000 [USP'U]/ML
INJECTION, SOLUTION INTRAVENOUS; SUBCUTANEOUS AS NEEDED
Status: DISCONTINUED | OUTPATIENT
Start: 2022-01-24 | End: 2022-01-24 | Stop reason: SURG

## 2022-01-24 RX ORDER — SODIUM CHLORIDE, SODIUM LACTATE, POTASSIUM CHLORIDE, CALCIUM CHLORIDE 600; 310; 30; 20 MG/100ML; MG/100ML; MG/100ML; MG/100ML
9 INJECTION, SOLUTION INTRAVENOUS CONTINUOUS
Status: DISCONTINUED | OUTPATIENT
Start: 2022-01-24 | End: 2022-01-24

## 2022-01-24 RX ORDER — EPHEDRINE SULFATE 50 MG/ML
5 INJECTION, SOLUTION INTRAVENOUS ONCE AS NEEDED
Status: DISCONTINUED | OUTPATIENT
Start: 2022-01-24 | End: 2022-01-24 | Stop reason: HOSPADM

## 2022-01-24 RX ORDER — CEFAZOLIN SODIUM 2 G/100ML
2 INJECTION, SOLUTION INTRAVENOUS
Status: COMPLETED | OUTPATIENT
Start: 2022-01-24 | End: 2022-01-24

## 2022-01-24 RX ADMIN — HEPARIN SODIUM 5000 UNITS: 5000 INJECTION INTRAVENOUS; SUBCUTANEOUS at 21:45

## 2022-01-24 RX ADMIN — HEPARIN SODIUM 5000 UNITS: 1000 INJECTION INTRAVENOUS; SUBCUTANEOUS at 07:55

## 2022-01-24 RX ADMIN — FENTANYL CITRATE 12.5 MCG: 0.05 INJECTION, SOLUTION INTRAMUSCULAR; INTRAVENOUS at 07:52

## 2022-01-24 RX ADMIN — CEFAZOLIN SODIUM 2 G: 2 INJECTION, SOLUTION INTRAVENOUS at 07:03

## 2022-01-24 RX ADMIN — MIDAZOLAM 0.5 MG: 1 INJECTION INTRAMUSCULAR; INTRAVENOUS at 07:19

## 2022-01-24 RX ADMIN — PROTAMINE SULFATE 40 MG: 10 INJECTION, SOLUTION INTRAVENOUS at 08:40

## 2022-01-24 RX ADMIN — SODIUM CHLORIDE 9 ML/HR: 9 INJECTION, SOLUTION INTRAVENOUS at 06:52

## 2022-01-24 RX ADMIN — LOSARTAN POTASSIUM 25 MG: 25 TABLET, FILM COATED ORAL at 10:21

## 2022-01-24 RX ADMIN — ROPIVACAINE HYDROCHLORIDE 20 ML: 5 INJECTION, SOLUTION EPIDURAL; INFILTRATION; PERINEURAL at 06:45

## 2022-01-24 RX ADMIN — PROPOFOL 25 MCG/KG/MIN: 10 INJECTION, EMULSION INTRAVENOUS at 07:42

## 2022-01-24 RX ADMIN — LIDOCAINE HYDROCHLORIDE 40 MG: 20 INJECTION, SOLUTION INFILTRATION; PERINEURAL at 07:42

## 2022-01-24 RX ADMIN — AMLODIPINE BESYLATE 10 MG: 10 TABLET ORAL at 10:21

## 2022-01-24 RX ADMIN — SODIUM CHLORIDE: 9 INJECTION, SOLUTION INTRAVENOUS at 07:09

## 2022-01-24 RX ADMIN — FENTANYL CITRATE 25 MCG: 0.05 INJECTION, SOLUTION INTRAMUSCULAR; INTRAVENOUS at 07:28

## 2022-01-24 RX ADMIN — MIDAZOLAM 1 MG: 1 INJECTION INTRAMUSCULAR; INTRAVENOUS at 06:41

## 2022-01-24 RX ADMIN — FAMOTIDINE 20 MG: 10 INJECTION INTRAVENOUS at 06:51

## 2022-01-24 RX ADMIN — SODIUM CHLORIDE 100 ML/HR: 9 INJECTION, SOLUTION INTRAVENOUS at 21:58

## 2022-01-24 RX ADMIN — SODIUM CHLORIDE 100 ML/HR: 9 INJECTION, SOLUTION INTRAVENOUS at 10:21

## 2022-01-24 RX ADMIN — WARFARIN 6 MG: 6 TABLET ORAL at 19:48

## 2022-01-24 RX ADMIN — SODIUM CHLORIDE, PRESERVATIVE FREE 10 ML: 5 INJECTION INTRAVENOUS at 21:45

## 2022-01-24 RX ADMIN — FENTANYL CITRATE 50 MCG: 0.05 INJECTION, SOLUTION INTRAMUSCULAR; INTRAVENOUS at 06:40

## 2022-01-24 NOTE — ANESTHESIA PREPROCEDURE EVALUATION
Anesthesia Evaluation     Patient summary reviewed and Nursing notes reviewed                Airway   Mallampati: II  TM distance: >3 FB  Neck ROM: full  No difficulty expected  Dental    (+) upper dentures and partials    Pulmonary - normal exam   (+) pleural effusion, shortness of breath,   Cardiovascular - normal exam    ECG reviewed    (+) hypertension 2 medications or greater, dysrhythmias Tachycardia, Atrial Fib, CHF ,     ROS comment: AVNRT    Neuro/Psych  (+) psychiatric history Anxiety,     GI/Hepatic/Renal/Endo    (+)   renal disease ESRD and dialysis,     Musculoskeletal     Abdominal   (+) scaphoid   Substance History      OB/GYN          Other                        Anesthesia Plan    ASA 3     MAC and regional   (Supraclavicular block preop/MAC in OR)  intravenous induction     Anesthetic plan, all risks, benefits, and alternatives have been provided, discussed and informed consent has been obtained with: patient.    Plan discussed with CRNA.        CODE STATUS:    Code Status (Patient has no pulse and is not breathing): CPR (Attempt to Resuscitate)  Medical Interventions (Patient has pulse or is breathing): Full Support

## 2022-01-24 NOTE — ANESTHESIA POSTPROCEDURE EVALUATION
Patient: Cristina Lemus    Procedure Summary     Date: 01/24/22 Room / Location: [facility] OR  / [facility] MAIN OR    Anesthesia Start: 0709 Anesthesia Stop: 0920    Procedure: LEFT ARM DIALYSIS GRAFT (Left Head) Diagnosis:     Surgeons: King Reynaga MD Provider: Obinna Egan MD    Anesthesia Type: MAC, regional ASA Status: 3          Anesthesia Type: MAC, regional    Vitals  Vitals Value Taken Time   /62 01/24/22 0931   Temp 36.5 °C (97.7 °F) 01/24/22 0918   Pulse 42 01/24/22 0945   Resp 16 01/24/22 0930   SpO2 95 % 01/24/22 0945   Vitals shown include unvalidated device data.        Post Anesthesia Care and Evaluation    Patient location during evaluation: PACU  Patient participation: complete - patient participated  Level of consciousness: awake  Pain score: 1  Pain management: adequate  Airway patency: patent  Anesthetic complications: No anesthetic complications  PONV Status: none  Cardiovascular status: acceptable  Respiratory status: acceptable  Hydration status: acceptable

## 2022-01-24 NOTE — ANESTHESIA PROCEDURE NOTES
Peripheral Block      Patient reassessed immediately prior to procedure    Patient location during procedure: holding area  Start time: 1/24/2022 6:40 AM  Stop time: 1/24/2022 6:45 AM  Reason for block: at surgeon's request and primary anesthetic  Performed by  Anesthesiologist: Obinna Egan MD  Preanesthetic Checklist  Completed: patient identified, IV checked, site marked, risks and benefits discussed, surgical consent, monitors and equipment checked, pre-op evaluation and timeout performed  Prep:  Pt Position: supine  Sterile barriers:cap, gloves and sterile barriers  Prep: ChloraPrep  Patient monitoring: blood pressure monitoring, continuous pulse oximetry and EKG  Procedure    Sedation: yes  Performed under: MAC  Guidance:ultrasound guided    ULTRASOUND INTERPRETATION. Using ultrasound guidance a gauge needle was placed in close proximity to the brachial plexus nerve, at which point, under ultrasound guidance anesthetic was injected in the area of the nerve and spread of the anesthesia was seen on ultrasound in close proximity thereto.  There were no abnormalities seen on ultrasound; a digital image was taken; and the patient tolerated the procedure with no complications. Images:still images obtained, printed/placed on chart    Laterality:left  Block Type:supraclavicular  Injection Technique:single-shot  Needle Type:echogenic  Needle Gauge:21 G  Resistance on Injection: none    Medications Used: ropivacaine (NAROPIN) 0.5 % injection, 20 mL  Med administered at 1/24/2022 6:45 AM      Post Assessment  Injection Assessment: negative aspiration for heme, no paresthesia on injection and incremental injection  Patient Tolerance:comfortable throughout block  Complications:no            
Ambulatory

## 2022-01-25 LAB
ANION GAP SERPL CALCULATED.3IONS-SCNC: 15.5 MMOL/L (ref 5–15)
BASOPHILS # BLD AUTO: 0.03 10*3/MM3 (ref 0–0.2)
BASOPHILS NFR BLD AUTO: 0.4 % (ref 0–1.5)
BUN SERPL-MCNC: 36 MG/DL (ref 8–23)
BUN/CREAT SERPL: 10.7 (ref 7–25)
C DIFF TOX GENS STL QL NAA+PROBE: POSITIVE
CALCIUM SPEC-SCNC: 8 MG/DL (ref 8.2–9.6)
CHLORIDE SERPL-SCNC: 106 MMOL/L (ref 98–107)
CO2 SERPL-SCNC: 17.5 MMOL/L (ref 22–29)
CREAT SERPL-MCNC: 3.36 MG/DL (ref 0.57–1)
DEPRECATED RDW RBC AUTO: 45.3 FL (ref 37–54)
EOSINOPHIL # BLD AUTO: 0.33 10*3/MM3 (ref 0–0.4)
EOSINOPHIL NFR BLD AUTO: 4.4 % (ref 0.3–6.2)
ERYTHROCYTE [DISTWIDTH] IN BLOOD BY AUTOMATED COUNT: 14.2 % (ref 12.3–15.4)
GFR SERPL CREATININE-BSD FRML MDRD: 13 ML/MIN/1.73
GFR SERPL CREATININE-BSD FRML MDRD: ABNORMAL ML/MIN/{1.73_M2}
GLUCOSE SERPL-MCNC: 83 MG/DL (ref 65–99)
HCT VFR BLD AUTO: 27.6 % (ref 34–46.6)
HGB BLD-MCNC: 9 G/DL (ref 12–15.9)
IMM GRANULOCYTES # BLD AUTO: 0.03 10*3/MM3 (ref 0–0.05)
IMM GRANULOCYTES NFR BLD AUTO: 0.4 % (ref 0–0.5)
INR PPP: 1.24 (ref 0.9–1.1)
LYMPHOCYTES # BLD AUTO: 1.04 10*3/MM3 (ref 0.7–3.1)
LYMPHOCYTES NFR BLD AUTO: 13.9 % (ref 19.6–45.3)
MAGNESIUM SERPL-MCNC: 1.9 MG/DL (ref 1.7–2.3)
MCH RBC QN AUTO: 28.8 PG (ref 26.6–33)
MCHC RBC AUTO-ENTMCNC: 32.6 G/DL (ref 31.5–35.7)
MCV RBC AUTO: 88.2 FL (ref 79–97)
MONOCYTES # BLD AUTO: 0.96 10*3/MM3 (ref 0.1–0.9)
MONOCYTES NFR BLD AUTO: 12.9 % (ref 5–12)
NEUTROPHILS NFR BLD AUTO: 5.07 10*3/MM3 (ref 1.7–7)
NEUTROPHILS NFR BLD AUTO: 68 % (ref 42.7–76)
NRBC BLD AUTO-RTO: 0 /100 WBC (ref 0–0.2)
PLATELET # BLD AUTO: 165 10*3/MM3 (ref 140–450)
PMV BLD AUTO: 11.7 FL (ref 6–12)
POTASSIUM SERPL-SCNC: 3.8 MMOL/L (ref 3.5–5.2)
PROTHROMBIN TIME: 15.4 SECONDS (ref 11.7–14.2)
RBC # BLD AUTO: 3.13 10*6/MM3 (ref 3.77–5.28)
SODIUM SERPL-SCNC: 139 MMOL/L (ref 136–145)
WBC NRBC COR # BLD: 7.46 10*3/MM3 (ref 3.4–10.8)

## 2022-01-25 PROCEDURE — 85610 PROTHROMBIN TIME: CPT | Performed by: SURGERY

## 2022-01-25 PROCEDURE — 25010000002 HEPARIN (PORCINE) PER 1000 UNITS: Performed by: SURGERY

## 2022-01-25 PROCEDURE — 80048 BASIC METABOLIC PNL TOTAL CA: CPT | Performed by: SURGERY

## 2022-01-25 PROCEDURE — 83735 ASSAY OF MAGNESIUM: CPT | Performed by: SURGERY

## 2022-01-25 PROCEDURE — 85025 COMPLETE CBC W/AUTO DIFF WBC: CPT | Performed by: SURGERY

## 2022-01-25 PROCEDURE — 87493 C DIFF AMPLIFIED PROBE: CPT | Performed by: HOSPITALIST

## 2022-01-25 RX ORDER — VANCOMYCIN HYDROCHLORIDE 125 MG/1
125 CAPSULE ORAL EVERY 6 HOURS SCHEDULED
Status: DISCONTINUED | OUTPATIENT
Start: 2022-01-25 | End: 2022-01-25

## 2022-01-25 RX ORDER — VANCOMYCIN HYDROCHLORIDE 125 MG/1
125 CAPSULE ORAL EVERY 24 HOURS
Status: DISCONTINUED | OUTPATIENT
Start: 2022-02-12 | End: 2022-01-25

## 2022-01-25 RX ORDER — VANCOMYCIN HYDROCHLORIDE 125 MG/1
125 CAPSULE ORAL EVERY 12 HOURS
Status: DISCONTINUED | OUTPATIENT
Start: 2022-02-04 | End: 2022-01-25

## 2022-01-25 RX ORDER — VANCOMYCIN HYDROCHLORIDE 125 MG/1
125 CAPSULE ORAL EVERY 6 HOURS SCHEDULED
Status: DISCONTINUED | OUTPATIENT
Start: 2022-01-25 | End: 2022-01-26 | Stop reason: HOSPADM

## 2022-01-25 RX ORDER — WARFARIN SODIUM 4 MG/1
4 TABLET ORAL
Status: COMPLETED | OUTPATIENT
Start: 2022-01-25 | End: 2022-01-25

## 2022-01-25 RX ADMIN — HEPARIN SODIUM 5000 UNITS: 5000 INJECTION INTRAVENOUS; SUBCUTANEOUS at 05:56

## 2022-01-25 RX ADMIN — LOSARTAN POTASSIUM 25 MG: 25 TABLET, FILM COATED ORAL at 08:08

## 2022-01-25 RX ADMIN — WARFARIN 4 MG: 4 TABLET ORAL at 17:54

## 2022-01-25 RX ADMIN — LORAZEPAM 1 MG: 1 TABLET ORAL at 00:10

## 2022-01-25 RX ADMIN — AMLODIPINE BESYLATE 10 MG: 10 TABLET ORAL at 08:08

## 2022-01-25 RX ADMIN — VANCOMYCIN HYDROCHLORIDE 125 MG: 125 CAPSULE ORAL at 21:00

## 2022-01-25 RX ADMIN — VANCOMYCIN HYDROCHLORIDE 125 MG: 125 CAPSULE ORAL at 23:52

## 2022-01-25 RX ADMIN — SODIUM CHLORIDE, PRESERVATIVE FREE 10 ML: 5 INJECTION INTRAVENOUS at 21:00

## 2022-01-25 RX ADMIN — SODIUM CHLORIDE 100 ML/HR: 9 INJECTION, SOLUTION INTRAVENOUS at 23:55

## 2022-01-25 RX ADMIN — SODIUM CHLORIDE, PRESERVATIVE FREE 10 ML: 5 INJECTION INTRAVENOUS at 09:59

## 2022-01-25 RX ADMIN — HEPARIN SODIUM 5000 UNITS: 5000 INJECTION INTRAVENOUS; SUBCUTANEOUS at 15:40

## 2022-01-25 RX ADMIN — HEPARIN SODIUM 5000 UNITS: 5000 INJECTION INTRAVENOUS; SUBCUTANEOUS at 21:00

## 2022-01-26 VITALS
SYSTOLIC BLOOD PRESSURE: 174 MMHG | BODY MASS INDEX: 19.32 KG/M2 | HEIGHT: 65 IN | HEART RATE: 57 BPM | RESPIRATION RATE: 18 BRPM | TEMPERATURE: 97.1 F | OXYGEN SATURATION: 93 % | WEIGHT: 115.96 LBS | DIASTOLIC BLOOD PRESSURE: 92 MMHG

## 2022-01-26 PROBLEM — A04.72 C. DIFFICILE COLITIS: Status: ACTIVE | Noted: 2022-01-26

## 2022-01-26 LAB
ANION GAP SERPL CALCULATED.3IONS-SCNC: 11.9 MMOL/L (ref 5–15)
BASOPHILS # BLD AUTO: 0.02 10*3/MM3 (ref 0–0.2)
BASOPHILS NFR BLD AUTO: 0.3 % (ref 0–1.5)
BUN SERPL-MCNC: 39 MG/DL (ref 8–23)
BUN/CREAT SERPL: 9.6 (ref 7–25)
CALCIUM SPEC-SCNC: 7.9 MG/DL (ref 8.2–9.6)
CHLORIDE SERPL-SCNC: 109 MMOL/L (ref 98–107)
CO2 SERPL-SCNC: 17.1 MMOL/L (ref 22–29)
CREAT SERPL-MCNC: 4.05 MG/DL (ref 0.57–1)
DEPRECATED RDW RBC AUTO: 47.2 FL (ref 37–54)
EOSINOPHIL # BLD AUTO: 0.56 10*3/MM3 (ref 0–0.4)
EOSINOPHIL NFR BLD AUTO: 8.6 % (ref 0.3–6.2)
ERYTHROCYTE [DISTWIDTH] IN BLOOD BY AUTOMATED COUNT: 14.3 % (ref 12.3–15.4)
GFR SERPL CREATININE-BSD FRML MDRD: 10 ML/MIN/1.73
GFR SERPL CREATININE-BSD FRML MDRD: ABNORMAL ML/MIN/{1.73_M2}
GLUCOSE SERPL-MCNC: 83 MG/DL (ref 65–99)
HCT VFR BLD AUTO: 26.8 % (ref 34–46.6)
HGB BLD-MCNC: 8.6 G/DL (ref 12–15.9)
IMM GRANULOCYTES # BLD AUTO: 0.03 10*3/MM3 (ref 0–0.05)
IMM GRANULOCYTES NFR BLD AUTO: 0.5 % (ref 0–0.5)
INR PPP: 1.51 (ref 0.9–1.1)
LYMPHOCYTES # BLD AUTO: 1.46 10*3/MM3 (ref 0.7–3.1)
LYMPHOCYTES NFR BLD AUTO: 22.4 % (ref 19.6–45.3)
MAGNESIUM SERPL-MCNC: 2 MG/DL (ref 1.7–2.3)
MCH RBC QN AUTO: 29.1 PG (ref 26.6–33)
MCHC RBC AUTO-ENTMCNC: 32.1 G/DL (ref 31.5–35.7)
MCV RBC AUTO: 90.5 FL (ref 79–97)
MONOCYTES # BLD AUTO: 0.92 10*3/MM3 (ref 0.1–0.9)
MONOCYTES NFR BLD AUTO: 14.1 % (ref 5–12)
NEUTROPHILS NFR BLD AUTO: 3.54 10*3/MM3 (ref 1.7–7)
NEUTROPHILS NFR BLD AUTO: 54.1 % (ref 42.7–76)
NRBC BLD AUTO-RTO: 0 /100 WBC (ref 0–0.2)
PLATELET # BLD AUTO: 164 10*3/MM3 (ref 140–450)
PMV BLD AUTO: 11.3 FL (ref 6–12)
POTASSIUM SERPL-SCNC: 3.7 MMOL/L (ref 3.5–5.2)
PROTHROMBIN TIME: 17.9 SECONDS (ref 11.7–14.2)
RBC # BLD AUTO: 2.96 10*6/MM3 (ref 3.77–5.28)
SARS-COV-2 RNA RESP QL NAA+PROBE: NOT DETECTED
SODIUM SERPL-SCNC: 138 MMOL/L (ref 136–145)
WBC NRBC COR # BLD: 6.53 10*3/MM3 (ref 3.4–10.8)

## 2022-01-26 PROCEDURE — 25010000002 HEPARIN (PORCINE) PER 1000 UNITS: Performed by: SURGERY

## 2022-01-26 PROCEDURE — 80048 BASIC METABOLIC PNL TOTAL CA: CPT | Performed by: SURGERY

## 2022-01-26 PROCEDURE — 85610 PROTHROMBIN TIME: CPT | Performed by: SURGERY

## 2022-01-26 PROCEDURE — 97110 THERAPEUTIC EXERCISES: CPT

## 2022-01-26 PROCEDURE — U0005 INFEC AGEN DETEC AMPLI PROBE: HCPCS | Performed by: HOSPITALIST

## 2022-01-26 PROCEDURE — 85025 COMPLETE CBC W/AUTO DIFF WBC: CPT | Performed by: SURGERY

## 2022-01-26 PROCEDURE — U0003 INFECTIOUS AGENT DETECTION BY NUCLEIC ACID (DNA OR RNA); SEVERE ACUTE RESPIRATORY SYNDROME CORONAVIRUS 2 (SARS-COV-2) (CORONAVIRUS DISEASE [COVID-19]), AMPLIFIED PROBE TECHNIQUE, MAKING USE OF HIGH THROUGHPUT TECHNOLOGIES AS DESCRIBED BY CMS-2020-01-R: HCPCS | Performed by: HOSPITALIST

## 2022-01-26 PROCEDURE — 83735 ASSAY OF MAGNESIUM: CPT | Performed by: SURGERY

## 2022-01-26 RX ORDER — HYDROCODONE BITARTRATE AND ACETAMINOPHEN 5; 325 MG/1; MG/1
1 TABLET ORAL EVERY 6 HOURS PRN
Qty: 10 TABLET | Refills: 0 | Status: SHIPPED | OUTPATIENT
Start: 2022-01-26 | End: 2022-01-31

## 2022-01-26 RX ORDER — WARFARIN SODIUM 2 MG/1
2 TABLET ORAL
Status: COMPLETED | OUTPATIENT
Start: 2022-01-26 | End: 2022-01-26

## 2022-01-26 RX ORDER — LOSARTAN POTASSIUM 25 MG/1
25 TABLET ORAL
Start: 2022-01-27 | End: 2022-02-25

## 2022-01-26 RX ORDER — LORAZEPAM 1 MG/1
1 TABLET ORAL NIGHTLY PRN
Qty: 5 TABLET | Refills: 0 | Status: ON HOLD | OUTPATIENT
Start: 2022-01-26 | End: 2022-05-15 | Stop reason: SDUPTHER

## 2022-01-26 RX ORDER — VANCOMYCIN HYDROCHLORIDE 125 MG/1
125 CAPSULE ORAL EVERY 6 HOURS SCHEDULED
Qty: 36 CAPSULE | Refills: 0
Start: 2022-01-26 | End: 2022-02-04

## 2022-01-26 RX ORDER — ACETAMINOPHEN 325 MG/1
650 TABLET ORAL EVERY 6 HOURS PRN
Start: 2022-01-26 | End: 2022-02-25

## 2022-01-26 RX ADMIN — WARFARIN 2 MG: 2 TABLET ORAL at 17:54

## 2022-01-26 RX ADMIN — VANCOMYCIN HYDROCHLORIDE 125 MG: 125 CAPSULE ORAL at 17:54

## 2022-01-26 RX ADMIN — SODIUM CHLORIDE, PRESERVATIVE FREE 10 ML: 5 INJECTION INTRAVENOUS at 09:19

## 2022-01-26 RX ADMIN — VANCOMYCIN HYDROCHLORIDE 125 MG: 125 CAPSULE ORAL at 05:18

## 2022-01-26 RX ADMIN — VANCOMYCIN HYDROCHLORIDE 125 MG: 125 CAPSULE ORAL at 12:08

## 2022-01-26 RX ADMIN — HEPARIN SODIUM 3800 UNITS: 1000 INJECTION INTRAVENOUS; SUBCUTANEOUS at 17:55

## 2022-01-26 RX ADMIN — LOSARTAN POTASSIUM 25 MG: 25 TABLET, FILM COATED ORAL at 09:19

## 2022-01-26 RX ADMIN — AMLODIPINE BESYLATE 10 MG: 10 TABLET ORAL at 09:19

## 2022-01-26 RX ADMIN — HEPARIN SODIUM 5000 UNITS: 5000 INJECTION INTRAVENOUS; SUBCUTANEOUS at 05:18

## 2022-02-21 ENCOUNTER — TELEPHONE (OUTPATIENT)
Dept: INTERNAL MEDICINE | Facility: CLINIC | Age: 87
End: 2022-02-21

## 2022-02-21 DIAGNOSIS — N18.4 STAGE 4 CHRONIC KIDNEY DISEASE: ICD-10-CM

## 2022-02-21 DIAGNOSIS — I50.33 ACUTE ON CHRONIC DIASTOLIC CONGESTIVE HEART FAILURE: ICD-10-CM

## 2022-02-21 RX ORDER — POTASSIUM CHLORIDE 1500 MG/1
TABLET, EXTENDED RELEASE ORAL
Qty: 90 TABLET | Refills: 0 | Status: SHIPPED | OUTPATIENT
Start: 2022-02-21 | End: 2022-05-06

## 2022-02-21 NOTE — TELEPHONE ENCOUNTER
Caller: MARICARMEN DELCID    Relationship: Home Health    Best call back number: [phone]    What orders are you requesting (i.e. lab or imaging): 2 TIMES A WEEK FOR 3 WEEKS AND THEN 1 TIME A WEEK FOR 5 WEEKS     In what timeframe would the patient need to come in: ASAP    Where will you receive your lab/imaging services: IN HER HOME     Additional notes: PLEASE CALL WITH VERBAL ORDERS

## 2022-02-25 ENCOUNTER — OFFICE VISIT (OUTPATIENT)
Dept: INTERNAL MEDICINE | Facility: CLINIC | Age: 87
End: 2022-02-25

## 2022-02-25 ENCOUNTER — TELEPHONE (OUTPATIENT)
Dept: PHARMACY | Facility: HOSPITAL | Age: 87
End: 2022-02-25

## 2022-02-25 VITALS
TEMPERATURE: 98.7 F | SYSTOLIC BLOOD PRESSURE: 115 MMHG | HEIGHT: 65 IN | WEIGHT: 112 LBS | DIASTOLIC BLOOD PRESSURE: 78 MMHG | BODY MASS INDEX: 18.66 KG/M2 | HEART RATE: 60 BPM | OXYGEN SATURATION: 100 %

## 2022-02-25 DIAGNOSIS — Z09 HOSPITAL DISCHARGE FOLLOW-UP: Primary | ICD-10-CM

## 2022-02-25 DIAGNOSIS — Z99.2 STAGE 5 CHRONIC KIDNEY DISEASE ON CHRONIC DIALYSIS: ICD-10-CM

## 2022-02-25 DIAGNOSIS — M54.9 CHRONIC MID BACK PAIN: ICD-10-CM

## 2022-02-25 DIAGNOSIS — I10 ESSENTIAL HYPERTENSION: Chronic | ICD-10-CM

## 2022-02-25 DIAGNOSIS — N18.6 STAGE 5 CHRONIC KIDNEY DISEASE ON CHRONIC DIALYSIS: ICD-10-CM

## 2022-02-25 DIAGNOSIS — G89.29 CHRONIC MID BACK PAIN: ICD-10-CM

## 2022-02-25 PROBLEM — E87.6 HYPOKALEMIA: Status: ACTIVE | Noted: 2022-02-11

## 2022-02-25 PROBLEM — N18.9 ANEMIA IN CHRONIC KIDNEY DISEASE: Status: ACTIVE | Noted: 2022-02-19

## 2022-02-25 PROBLEM — D63.1 ANEMIA IN CHRONIC KIDNEY DISEASE: Status: ACTIVE | Noted: 2022-02-19

## 2022-02-25 PROBLEM — N25.81 SECONDARY HYPERPARATHYROIDISM OF RENAL ORIGIN: Status: ACTIVE | Noted: 2022-02-11

## 2022-02-25 PROBLEM — D50.9 IRON DEFICIENCY ANEMIA: Status: ACTIVE | Noted: 2022-02-11

## 2022-02-25 PROBLEM — T78.2XXA ANAPHYLACTIC SHOCK, UNSPECIFIED, INITIAL ENCOUNTER: Status: ACTIVE | Noted: 2022-02-11

## 2022-02-25 PROBLEM — D68.9 COAGULATION DEFECT, UNSPECIFIED: Status: ACTIVE | Noted: 2022-02-11

## 2022-02-25 PROBLEM — Z49.31 AFTERCARE INCLUDING INTERMITTENT DIALYSIS (HCC): Status: ACTIVE | Noted: 2022-02-11

## 2022-02-25 PROBLEM — T78.40XA ALLERGY, UNSPECIFIED, INITIAL ENCOUNTER: Status: ACTIVE | Noted: 2022-02-11

## 2022-02-25 PROCEDURE — 99214 OFFICE O/P EST MOD 30 MIN: CPT | Performed by: FAMILY MEDICINE

## 2022-02-25 RX ORDER — POTASSIUM CHLORIDE 750 MG/1
20 TABLET, FILM COATED, EXTENDED RELEASE ORAL DAILY
COMMUNITY
Start: 2022-01-29 | End: 2022-02-25 | Stop reason: SDUPTHER

## 2022-02-25 RX ORDER — TORSEMIDE 20 MG/1
100 TABLET ORAL DAILY
COMMUNITY
Start: 2022-01-29 | End: 2022-03-08 | Stop reason: SDUPTHER

## 2022-02-25 NOTE — PROGRESS NOTES
Hospital follow-up Visit/not TCM  Subjective     Cristina Lemus is a 89 y.o. female who presents for a hospital follow-up visit    I reviewed and discussed the details of that call along with the discharge summary, hospital problems, inpatient lab results, inpatient diagnostic studies, and consultation reports with Crsitina.     Current outpatient and discharge medications have been reconciled for the patient.  Reviewed by: Wood Elias MD    Admitted to [facility] from January 16, 2022 to January 26, 2022 and then discharged to a skilled nursing facility.        History of Present Illness   Course During Hospital Stay:     This lady has a past medical history of atrial fibrillation, CHF, chronic stage IV kidney disease who initially presented to the ER with generalized weakness and feelings of malaise developing over a few days.  It was noted that she had a elevated creatinine from her baseline and was admitted and seen in consultation with nephrology.  She was hydrated gently with IV fluids.  Unfortunately her kidney function did not spawn and therefore she subsequently went to have a dialysis catheter placed and was started on hemodialysis.  She also developed C. difficile and was started on oral vancomycin.  Plan was for her to go to skilled nursing facility for rehab and will have dialysis there at the [facility].  She also has an AV fistula in place in the left arm with tunnel dialysis catheter.    Upon discharge from the hospital her creatinine was 4.05.  On T, Th, Sat HD for renal failure.  Her chronic mid-back pain is hurting a lot.  Wondering if she could get something for the pain and said that a visiting nurse thought she needed an Xray.         The following portions of the patient's history were reviewed and updated as appropriate: allergies, current medications, past family history, past medical history, past social history, past surgical history and problem list.    Review of Systems      Vitals:    02/25/22 1449   BP: 115/78   Pulse: 60   Temp: 98.7 °F (37.1 °C)   SpO2: 100%         Objective   Physical Exam  Vitals and nursing note reviewed.   Constitutional:       General: She is not in acute distress.     Appearance: Normal appearance.   Cardiovascular:      Rate and Rhythm: Normal rate and regular rhythm.      Heart sounds: Normal heart sounds. No murmur heard.      Pulmonary:      Effort: Pulmonary effort is normal.      Breath sounds: Normal breath sounds.   Musculoskeletal:      Cervical back: Normal.      Thoracic back: Spasms present. No tenderness or bony tenderness.   Neurological:      Mental Status: She is alert.       Common labs    Common Labsle 1/24/22 1/24/22 1/25/22 1/25/22 1/26/22 1/26/22    0510 0510 0648 0648 0603 0603   Glucose  76  83  83   BUN  28 (A)  36 (A)  39 (A)   Creatinine  2.66 (A)  3.36 (A)  4.05 (A)   eGFR Non African Am  17 (A)  13 (A)  10 (A)   eGFR  Am         Sodium  133 (A)  139  138   Potassium  4.1  3.8  3.7   Chloride  101  106  109 (A)   Calcium  8.0 (A)  8.0 (A)  7.9 (A)   Albumin  3.40 (A)       WBC 6.68  7.46  6.53    Hemoglobin 9.1 (A)  9.0 (A)  8.6 (A)    Hematocrit 28.6 (A)  27.6 (A)  26.8 (A)    Platelets 161  165  164    (A) Abnormal value       Comments are available for some flowsheets but are not being displayed.               Assessment/Plan   Diagnoses and all orders for this visit:    1. Hospital discharge follow-up (Primary)    2. Chronic mid back pain  -     XR spine thoracic 3 vw; Future    3. Stage 5 chronic kidney disease on chronic dialysis (HCC)      Continue medications as of now.  Continue HD per renal.  I will recommend lidocaine gel for likely MSK pain.  I will have her get a Xray just to be on the safe side of concern for fracture is not high.  If topical analgesic not helpful, will reach out to renal and see what the doctor would be okay with for pain control.  HTN is controlled.

## 2022-02-25 NOTE — TELEPHONE ENCOUNTER
Called in home health orders for an INR order. Unclear what day but patient will get an inr on the next visit and will be called into the medication management clinic.

## 2022-03-04 ENCOUNTER — TELEPHONE (OUTPATIENT)
Dept: INTERNAL MEDICINE | Facility: CLINIC | Age: 87
End: 2022-03-04

## 2022-03-08 NOTE — TELEPHONE ENCOUNTER
Caller: Cristina Lemus    Relationship: Self    Requested Prescriptions:   Requested Prescriptions     Pending Prescriptions Disp Refills   • torsemide (DEMADEX) 20 MG tablet       Sig: Take 5 tablets by mouth Daily.        Pharmacy where request should be sent: [facility] - [place], [place] - [address] AT PORTAL TO [facility] - [phone]  - [phone]

## 2022-03-09 RX ORDER — TORSEMIDE 20 MG/1
100 TABLET ORAL DAILY
Qty: 30 TABLET | Refills: 1 | Status: SHIPPED | OUTPATIENT
Start: 2022-03-09 | End: 2022-12-23 | Stop reason: SDUPTHER

## 2022-03-16 ENCOUNTER — TELEPHONE (OUTPATIENT)
Dept: PHARMACY | Facility: HOSPITAL | Age: 87
End: 2022-03-16

## 2022-03-16 NOTE — TELEPHONE ENCOUNTER
Called in home health orders for INR. Spoke with [place] and requested to have it done this week on the earliest day that someone is going to see the patient next. Provided fax number for the [facility] to have the results faxed for further management.

## 2022-03-17 NOTE — TELEPHONE ENCOUNTER
[place] called back to report Ms. Lemus no longer has nursing visiting through [facility]. Contacted daughter (Shannan), as she helps manage Ms. Lemus's medications and fills her pill box. Ms. Lemus is presently at dialysis (Tues/Thurs/Sat). Shannan reports that Ms. Lemus is taking warfarin 2 mg Tues/Thurs/Sat, 4 mg all other days as we had last had documented on 1/13/22. Advised to continue this same dose for now. Recheck INR through [facility] on 3/21/22. Will fax lab order to [facility].

## 2022-03-18 ENCOUNTER — TELEPHONE (OUTPATIENT)
Dept: INTERNAL MEDICINE | Facility: CLINIC | Age: 87
End: 2022-03-18

## 2022-03-18 NOTE — TELEPHONE ENCOUNTER
"PATIENT FOUND A \"STICKY NOTE\" THAT SAID TO STOP TAKING POTASSIUM, BUT SHE CANNOT REMEMBER WHO WROTE THAT FOR HER    CAN YOU CALL HOME HEALTH WORK[facility] AND CONFIRM WHETHER OR NOT DR PRADO WANTS HER TO CONTINUE OR DISCONTINUE THIS MEDICATION     [facility] (Home Health) [phone]       "

## 2022-03-20 LAB — INR PPP: 1.78

## 2022-03-21 NOTE — TELEPHONE ENCOUNTER
It would not have been me.  She is on dialysis and sees [facility].  They would be managing electrolytes such as potassium.

## 2022-03-22 ENCOUNTER — ANTICOAGULATION VISIT (OUTPATIENT)
Dept: PHARMACY | Facility: HOSPITAL | Age: 87
End: 2022-03-22

## 2022-03-22 DIAGNOSIS — I48.21 PERMANENT ATRIAL FIBRILLATION: Primary | ICD-10-CM

## 2022-03-22 NOTE — PROGRESS NOTES
Anticoagulation Clinic Progress Note    Anticoagulation Summary  As of 3/22/2022    INR goal:  2.0-3.0   TTR:  58.7 % (1.2 y)   INR used for dosin.78 (3/20/2022)   Warfarin maintenance plan:  2 mg every Tue, Thu, Sat; 4 mg all other days   Weekly warfarin total:  22 mg   Plan last modified:  Tasneem Wilkinson [facility] (1/3/2022)   Next INR check:  3/28/2022   Priority:  High   Target end date:  Indefinite    Indications    Permanent atrial fibrillation (HCC) [I48.21]             Anticoagulation Episode Summary     INR check location:      Preferred lab:      Send INR reminders to:   [facility]  POOL    Comments:  tried calling Chaim BARCLAY but no stable warf dosing available from July/Aug 2020 , prior to Eliquis; [facility]      Anticoagulation Care Providers     Provider Role Specialty Phone number    Lm Mart MD Referring Cardiology [phone]          Clinic Interview:  No pertinent clinical findings have been reported.    INR History:  Anticoagulation Monitoring 1/3/2022 2022 3/22/2022   INR 4.80 2.29 1.78   INR Date 2021 2022 3/20/2022   INR Goal 2.0-3.0 2.0-3.0 2.0-3.0   Trend Down Same Same   Last Week Total 24 mg 22 mg 22 mg   Next Week Total 18 mg 22 mg 24 mg   Sun 4 mg 4 mg 4 mg   Mon Hold (1/3); Otherwise 4 mg 4 mg -   Tue 2 mg 2 mg 4 mg (3/22)   Wed 4 mg 4 mg 4 mg   Thu 2 mg 2 mg 2 mg   Fri 4 mg 4 mg 4 mg   Sat 2 mg 2 mg 2 mg   Visit Report - - -   Some recent data might be hidden       Plan:  1. INR is subtherapeutic today- see above in Anticoagulation Summary. Spoke with Cristina Lemus's daughter Shannan. Instructed to take a booster dose today of 4mg, then resume her current warfarin dosage regimen - see above in Anticoagulation Summary.  2. Follow up in 1 week  3. Pt has agreed to only be called if INR out of range. They have been instructed to call if any changes in medications, doses, concerns, etc.     Jaki Castro [facility]

## 2022-03-28 LAB — INR PPP: 1.53

## 2022-03-29 ENCOUNTER — ANTICOAGULATION VISIT (OUTPATIENT)
Dept: PHARMACY | Facility: HOSPITAL | Age: 87
End: 2022-03-29

## 2022-03-29 DIAGNOSIS — I48.21 PERMANENT ATRIAL FIBRILLATION: Primary | ICD-10-CM

## 2022-03-29 NOTE — PROGRESS NOTES
Anticoagulation Clinic Progress Note    Anticoagulation Summary  As of 3/29/2022    INR goal:  2.0-3.0   TTR:  57.7 % (1.2 y)   INR used for dosin.53 (3/28/2022)   Warfarin maintenance plan:  2 mg every Tue, Thu, Sat; 4 mg all other days   Weekly warfarin total:  22 mg   Plan last modified:  Tasneem Wilkinson RPH (1/3/2022)   Next INR check:  2022   Priority:  High   Target end date:  Indefinite    Indications    Permanent atrial fibrillation (HCC) [I48.21]             Anticoagulation Episode Summary     INR check location:      Preferred lab:      Send INR reminders to:   [facility]  POOL    Comments:  tried calling Chaim BARCLAY but no stable warf dosing available from July/Aug 2020 , prior to Eliquis; [facility]      Anticoagulation Care Providers     Provider Role Specialty Phone number    Lm Mart MD Referring Cardiology [phone]          Clinic Interview:  Patient Findings     Negatives:  Signs/symptoms of thrombosis, Signs/symptoms of bleeding,   Laboratory test error suspected, Change in health, Change in alcohol use,   Change in activity, Upcoming invasive procedure, Emergency department   visit, Upcoming dental procedure, Missed doses, Extra doses, Change in   medications, Change in diet/appetite, Hospital admission, Bruising, Other   complaints      Clinical Outcomes     Negatives:  Major bleeding event, Thromboembolic event,   Anticoagulation-related hospital admission, Anticoagulation-related ED   visit, Anticoagulation-related fatality        INR History:  Anticoagulation Monitoring 2022 3/22/2022 3/29/2022   INR 2.29 1.78 1.53   INR Date 2022 3/20/2022 3/28/2022   INR Goal 2.0-3.0 2.0-3.0 2.0-3.0   Trend Same Same Same   Last Week Total 22 mg 22 mg 24 mg   Next Week Total 22 mg 24 mg 26 mg   Sun 4 mg 4 mg 4 mg   Mon 4 mg - -   Tue 2 mg 4 mg (3/22) 4 mg (3/29)   Wed 4 mg 4 mg 4 mg   Thu 2 mg 2 mg 4 mg (3/31)   Fri 4 mg 4 mg 4 mg   Sat 2 mg 2 mg 2 mg   Visit  Report - - -   Some recent data might be hidden       Plan:  1. INR is Subtherapeutic today- see above in Anticoagulation Summary.   Will instruct Cristina Lemus to Increase their warfarin regimen- see above in Anticoagulation Summary.  2. Follow up in 1 weeks  3.  They have been instructed to call if any changes in medications, doses, concerns, etc. Patient expresses understanding and has no further questions at this time.    Tasneem Wilkinson, [facility]

## 2022-04-01 ENCOUNTER — TELEPHONE (OUTPATIENT)
Dept: INTERNAL MEDICINE | Facility: CLINIC | Age: 87
End: 2022-04-01

## 2022-04-01 RX ORDER — WARFARIN SODIUM 2 MG/1
TABLET ORAL
Qty: 150 TABLET | Refills: 1 | Status: SHIPPED | OUTPATIENT
Start: 2022-04-01 | End: 2022-10-24

## 2022-04-01 NOTE — TELEPHONE ENCOUNTER
Caller: Cristina Lemus    Relationship: Self    Best call back number:[phone]    What was the call regarding: PATIENT STATES THAT DR. PRADO GAVE HER AN ORDER FOR A BACK XRAY OVER A MONTH AGO AND SHE NEVER WENT. SHE WOULD LIKE TO KNOW WHERE TO GO AND SEE IF THE ORDER IS STILL GOOD. PLEASE CALL.     Do you require a callback:YES

## 2022-04-04 ENCOUNTER — HOSPITAL ENCOUNTER (OUTPATIENT)
Dept: GENERAL RADIOLOGY | Facility: HOSPITAL | Age: 87
Discharge: HOME | End: 2022-04-04
Admitting: FAMILY MEDICINE

## 2022-04-04 DIAGNOSIS — M54.9 CHRONIC MID BACK PAIN: ICD-10-CM

## 2022-04-04 DIAGNOSIS — G89.29 CHRONIC MID BACK PAIN: ICD-10-CM

## 2022-04-04 PROCEDURE — 72072 X-RAY EXAM THORAC SPINE 3VWS: CPT

## 2022-04-11 ENCOUNTER — TELEPHONE (OUTPATIENT)
Dept: INTERNAL MEDICINE | Facility: CLINIC | Age: 87
End: 2022-04-11

## 2022-04-11 LAB — INR PPP: 1.89

## 2022-04-11 NOTE — TELEPHONE ENCOUNTER
Caller: Cristina Lemus    Relationship: Home Health    Best call back number: [phone]    MARICARMEN DELCID WITH [facility] CALLED, STATING THIS PATIENT IS BEING DISCHARGED FROM HOME HEALTH WITH THEM FOR PHYSICAL THERAPY TODAY 04/11/22      SHE DID WANT TO MENTION THAT THE PATIENT'S BLOODPRESSURE, BOTTOM NUMBER WAS RUNNING  ALITTLE LOW AT 56 TODAY. 140/56      PLEAS ADVISE PATIENT

## 2022-04-11 NOTE — TELEPHONE ENCOUNTER
Nurse advised but today was their last visit with her-she will see if the pt has  BP cuff and advise to move up f/u appt

## 2022-04-12 ENCOUNTER — ANTICOAGULATION VISIT (OUTPATIENT)
Dept: PHARMACY | Facility: HOSPITAL | Age: 87
End: 2022-04-12

## 2022-04-12 DIAGNOSIS — I48.21 PERMANENT ATRIAL FIBRILLATION: Primary | ICD-10-CM

## 2022-04-12 NOTE — PROGRESS NOTES
Anticoagulation Clinic Progress Note    Anticoagulation Summary  As of 2022    INR goal:  2.0-3.0   TTR:  55.9 % (1.3 y)   INR used for dosin.89 (2022)   Warfarin maintenance plan:  2 mg every Tue, Thu, Sat; 4 mg all other days   Weekly warfarin total:  22 mg   Plan last modified:  Tasneem Wilkinson RPH (1/3/2022)   Next INR check:  2022   Priority:  High   Target end date:  Indefinite    Indications    Permanent atrial fibrillation (HCC) [I48.21]             Anticoagulation Episode Summary     INR check location:      Preferred lab:      Send INR reminders to:   [facility]  POOL    Comments:  tried calling Chaim BARCLAY but no stable warf dosing available from July/Aug 2020 , prior to Eliquis; [facility]      Anticoagulation Care Providers     Provider Role Specialty Phone number    Lm Mart MD Referring Cardiology [phone]          Clinic Interview:  Patient Findings     Positives:  Change in diet/appetite    Negatives:  Signs/symptoms of thrombosis, Signs/symptoms of bleeding,   Laboratory test error suspected, Change in health, Change in alcohol use,   Change in activity, Upcoming invasive procedure, Emergency department   visit, Upcoming dental procedure, Missed doses, Extra doses, Change in   medications, Hospital admission, Bruising, Other complaints    Comments:  Per Shannan, patient had spinach a couple days ago. No other   changes.      Clinical Outcomes     Negatives:  Major bleeding event, Thromboembolic event,   Anticoagulation-related hospital admission, Anticoagulation-related ED   visit, Anticoagulation-related fatality    Comments:  Per Shannan, patient had spinach a couple days ago. No other   changes.        INR History:  Anticoagulation Monitoring 3/22/2022 3/29/2022 2022   INR 1.78 1.53 1.89   INR Date 3/20/2022 3/28/2022 2022   INR Goal 2.0-3.0 2.0-3.0 2.0-3.0   Trend Same Same Same   Last Week Total 22 mg 24 mg 22 mg   Next Week Total 24 mg  26 mg 24 mg   Sun 4 mg 4 mg 4 mg   Mon - - -   Tue 4 mg (3/22) 4 mg (3/29) 4 mg (4/12)   Wed 4 mg 4 mg 4 mg   Thu 2 mg 4 mg (3/31) 2 mg   Fri 4 mg 4 mg 4 mg   Sat 2 mg 2 mg 2 mg   Visit Report - - -   Some recent data might be hidden       Plan:  1. INR is Subtherapeutic today- see above in Anticoagulation Summary.   Will instruct Cristina Lemus to Change their warfarin regimen to include a boost dose today of 4 mg, then resume normal- see above in Anticoagulation Summary.  2. Follow up in 1 week  3. They have been instructed to call if any changes in medications, doses, concerns, etc. Patient expresses understanding and has no further questions at this time.    Elizabeth Mendoza, PharmD

## 2022-04-18 LAB — INR PPP: 2.08

## 2022-04-19 ENCOUNTER — ANTICOAGULATION VISIT (OUTPATIENT)
Dept: PHARMACY | Facility: HOSPITAL | Age: 87
End: 2022-04-19

## 2022-04-19 DIAGNOSIS — I48.21 PERMANENT ATRIAL FIBRILLATION: Primary | ICD-10-CM

## 2022-04-19 NOTE — PROGRESS NOTES
Anticoagulation Clinic Progress Note    Anticoagulation Summary  As of 2022    INR goal:  2.0-3.0   TTR:  55.8 % (1.3 y)   INR used for dosin.08 (2022)   Warfarin maintenance plan:  2 mg every Tue, Thu, Sat; 4 mg all other days   Weekly warfarin total:  22 mg   No change documented:  Arlette Gutierrez RPH   Plan last modified:  Tasneem Wilkinson RPH (1/3/2022)   Next INR check:  2022   Priority:  High   Target end date:  Indefinite    Indications    Permanent atrial fibrillation (HCC) [I48.21]             Anticoagulation Episode Summary     INR check location:      Preferred lab:      Send INR reminders to:   MA[facility] VASQUE[facility]  POOL    Comments:  tried calling Chaim BARCLAY but no stable warf dosing available from July/Aug 2020 , prior to Eliquis; PRECISION      Anticoagulation Care Providers     Provider Role Specialty Phone number    Lm Mart MD Referring Cardiology [phone]          Clinic Interview:  Patient Findings     Negatives:  Signs/symptoms of thrombosis, Signs/symptoms of bleeding,   Laboratory test error suspected, Change in health, Change in alcohol use,   Change in activity, Upcoming invasive procedure, Emergency department   visit, Upcoming dental procedure, Missed doses, Extra doses, Change in   medications, Change in diet/appetite, Hospital admission, Bruising, Other   complaints      Clinical Outcomes     Negatives:  Major bleeding event, Thromboembolic event,   Anticoagulation-related hospital admission, Anticoagulation-related ED   visit, Anticoagulation-related fatality        INR History:  Anticoagulation Monitoring 3/29/2022 2022 2022   INR 1.53 1.89 2.08   INR Date 3/28/2022 2022 2022   INR Goal 2.0-3.0 2.0-3.0 2.0-3.0   Trend Same Same Same   Last Week Total 24 mg 22 mg 24 mg   Next Week Total 26 mg 24 mg 22 mg   Sun 4 mg 4 mg 4 mg   Mon - - -   Tue 4 mg (3/29) 4 mg () 2 mg   Wed 4 mg 4 mg 4 mg   Thu 4 mg (3/31) 2 mg 2 mg   Fri 4 mg  4 mg 4 mg   Sat 2 mg 2 mg 2 mg   Visit Report - - -   Some recent data might be hidden       Plan:  1. INR is Therapeutic today- see above in Anticoagulation Summary.   Will instruct Cristina Lemus to Continue their warfarin regimen- see above in Anticoagulation Summary.  2. Follow up in 1 weeks  3. They have been instructed to call if any changes in medications, doses, concerns, etc. Patient expresses understanding and has no further questions at this time.    Arlette Gutierrez RP

## 2022-04-29 LAB — INR PPP: 1.78

## 2022-05-02 ENCOUNTER — ANTICOAGULATION VISIT (OUTPATIENT)
Dept: PHARMACY | Facility: HOSPITAL | Age: 87
End: 2022-05-02

## 2022-05-02 DIAGNOSIS — I48.21 PERMANENT ATRIAL FIBRILLATION: Primary | ICD-10-CM

## 2022-05-03 NOTE — PROGRESS NOTES
Anticoagulation Clinic Progress Note    Anticoagulation Summary  As of 2022    INR goal:  2.0-3.0   TTR:  55.1 % (1.3 y)   INR used for dosin.78 (2022)   Warfarin maintenance plan:  4 mg every Sun; 2 mg all other days   Weekly warfarin total:  16 mg   Plan last modified:  Arlette Gutierrez RPH (5/3/2022)   Next INR check:  2022   Priority:  High   Target end date:  Indefinite    Indications    Permanent atrial fibrillation (HCC) [I48.21]             Anticoagulation Episode Summary     INR check location:      Preferred lab:      Send INR reminders to:  AURELIANO MARIE  POOL    Comments:  tried calling Chaim BARCLAY but no stable warf dosing available from July/Aug 2020 , prior to Eliquis; PRECISION      Anticoagulation Care Providers     Provider Role Specialty Phone number    Lm Mart MD Referring Cardiology [phone]          Clinic Interview:  Patient Findings     Negatives:  Signs/symptoms of thrombosis, Signs/symptoms of bleeding,   Laboratory test error suspected, Change in health, Change in alcohol use,   Change in activity, Upcoming invasive procedure, Emergency department   visit, Upcoming dental procedure, Missed doses, Extra doses, Change in   medications, Change in diet/appetite, Hospital admission, Bruising, Other   complaints    Comments:  S/w Shannan regarding INR and dosing changes. She reports Vera   is only taking 2 tablets (4MG) on Sundays--not 4 times a week as   instructed; with 2mg  AOD.      Clinical Outcomes     Negatives:  Major bleeding event, Thromboembolic event,   Anticoagulation-related hospital admission, Anticoagulation-related ED   visit, Anticoagulation-related fatality    Comments:  S/w Shannan regarding INR and dosing changes. She reports Vera   is only taking 2 tablets (4MG) on Sundays--not 4 times a week as   instructed; with 2mg  AOD.        INR History:  Anticoagulation Monitoring 2022   INR 1.89 2.08 1.78   INR Date  4/11/2022 4/18/2022 4/29/2022   INR Goal 2.0-3.0 2.0-3.0 2.0-3.0   Trend Same Same Down   Last Week Total 22 mg 24 mg 20 mg   Next Week Total 24 mg 22 mg 18 mg   Sun 4 mg 4 mg -   Mon - - 2 mg   Tue 4 mg (4/12) 2 mg 4 mg (5/3)   Wed 4 mg 4 mg 2 mg   Thu 2 mg 2 mg 2 mg   Fri 4 mg 4 mg -   Sat 2 mg 2 mg -   Visit Report - - -   Some recent data might be hidden       Plan:  1. INR is Subtherapeutic today- see above in Anticoagulation Summary.   Will instruct Cristina Lemus to Change their warfarin regimen- see above in Anticoagulation Summary.  2. Follow up in 4 days   3.They have been instructed to call if any changes in medications, doses, concerns, etc. Patient expresses understanding and has no further questions at this time.    Arlette Gutierrez [facility]

## 2022-05-03 NOTE — PROGRESS NOTES
Shannan called back and gave the corrected warfarin dosing instructions.  Since INR has been historically low, increased dose to 4mg on all days (according to Shannan, was taking 2mg on Sat only and 4mg all other days).  Check in 1 week

## 2022-05-06 DIAGNOSIS — N18.4 STAGE 4 CHRONIC KIDNEY DISEASE: ICD-10-CM

## 2022-05-06 DIAGNOSIS — I50.33 ACUTE ON CHRONIC DIASTOLIC CONGESTIVE HEART FAILURE: ICD-10-CM

## 2022-05-06 RX ORDER — POTASSIUM CHLORIDE 1500 MG/1
TABLET, EXTENDED RELEASE ORAL
Qty: 90 TABLET | Refills: 1 | Status: SHIPPED | OUTPATIENT
Start: 2022-05-06 | End: 2022-05-16 | Stop reason: HOSPADM

## 2022-05-11 LAB — INR PPP: 2.35

## 2022-05-12 ENCOUNTER — HOSPITAL ENCOUNTER (OUTPATIENT)
Facility: HOSPITAL | Age: 87
Setting detail: OBSERVATION
Discharge: NURSING, REHAB OR LONG-TERM CARE | End: 2022-05-16
Attending: EMERGENCY MEDICINE | Admitting: STUDENT IN AN ORGANIZED HEALTH CARE EDUCATION/TRAINING PROGRAM

## 2022-05-12 ENCOUNTER — APPOINTMENT (OUTPATIENT)
Dept: CT IMAGING | Facility: HOSPITAL | Age: 87
End: 2022-05-12

## 2022-05-12 ENCOUNTER — APPOINTMENT (OUTPATIENT)
Dept: GENERAL RADIOLOGY | Facility: HOSPITAL | Age: 87
End: 2022-05-12

## 2022-05-12 DIAGNOSIS — F51.01 PRIMARY INSOMNIA: ICD-10-CM

## 2022-05-12 DIAGNOSIS — S32.591A CLOSED FRACTURE OF MULTIPLE PUBIC RAMI, RIGHT, INITIAL ENCOUNTER: Primary | ICD-10-CM

## 2022-05-12 DIAGNOSIS — S32.10XA CLOSED FRACTURE OF SACRUM, UNSPECIFIED FRACTURE MORPHOLOGY, INITIAL ENCOUNTER: ICD-10-CM

## 2022-05-12 LAB
ANION GAP SERPL CALCULATED.3IONS-SCNC: 14 MMOL/L (ref 5–15)
BASOPHILS # BLD AUTO: 0.03 10*3/MM3 (ref 0–0.2)
BASOPHILS NFR BLD AUTO: 0.5 % (ref 0–1.5)
BUN SERPL-MCNC: 52 MG/DL (ref 8–23)
BUN/CREAT SERPL: 13.3 (ref 7–25)
CALCIUM SPEC-SCNC: 8.5 MG/DL (ref 8.2–9.6)
CHLORIDE SERPL-SCNC: 100 MMOL/L (ref 98–107)
CO2 SERPL-SCNC: 21 MMOL/L (ref 22–29)
CREAT SERPL-MCNC: 3.9 MG/DL (ref 0.57–1)
DEPRECATED RDW RBC AUTO: 46.7 FL (ref 37–54)
EGFRCR SERPLBLD CKD-EPI 2021: 10.3 ML/MIN/1.73
EOSINOPHIL # BLD AUTO: 0.04 10*3/MM3 (ref 0–0.4)
EOSINOPHIL NFR BLD AUTO: 0.6 % (ref 0.3–6.2)
ERYTHROCYTE [DISTWIDTH] IN BLOOD BY AUTOMATED COUNT: 14.4 % (ref 12.3–15.4)
GLUCOSE SERPL-MCNC: 90 MG/DL (ref 65–99)
HCT VFR BLD AUTO: 31.9 % (ref 34–46.6)
HGB BLD-MCNC: 10.9 G/DL (ref 12–15.9)
IMM GRANULOCYTES # BLD AUTO: 0.03 10*3/MM3 (ref 0–0.05)
IMM GRANULOCYTES NFR BLD AUTO: 0.5 % (ref 0–0.5)
INR PPP: 2.64 (ref 0.9–1.1)
LYMPHOCYTES # BLD AUTO: 0.76 10*3/MM3 (ref 0.7–3.1)
LYMPHOCYTES NFR BLD AUTO: 12.2 % (ref 19.6–45.3)
MCH RBC QN AUTO: 30.6 PG (ref 26.6–33)
MCHC RBC AUTO-ENTMCNC: 34.2 G/DL (ref 31.5–35.7)
MCV RBC AUTO: 89.6 FL (ref 79–97)
MONOCYTES # BLD AUTO: 0.46 10*3/MM3 (ref 0.1–0.9)
MONOCYTES NFR BLD AUTO: 7.4 % (ref 5–12)
NEUTROPHILS NFR BLD AUTO: 4.91 10*3/MM3 (ref 1.7–7)
NEUTROPHILS NFR BLD AUTO: 78.8 % (ref 42.7–76)
NRBC BLD AUTO-RTO: 0 /100 WBC (ref 0–0.2)
PLATELET # BLD AUTO: 155 10*3/MM3 (ref 140–450)
PMV BLD AUTO: 9.9 FL (ref 6–12)
POTASSIUM SERPL-SCNC: 4.6 MMOL/L (ref 3.5–5.2)
PROTHROMBIN TIME: 28.3 SECONDS (ref 11.7–14.2)
RBC # BLD AUTO: 3.56 10*6/MM3 (ref 3.77–5.28)
SARS-COV-2 RNA RESP QL NAA+PROBE: NOT DETECTED
SODIUM SERPL-SCNC: 135 MMOL/L (ref 136–145)
WBC NRBC COR # BLD: 6.23 10*3/MM3 (ref 3.4–10.8)

## 2022-05-12 PROCEDURE — C9803 HOPD COVID-19 SPEC COLLECT: HCPCS

## 2022-05-12 PROCEDURE — 85025 COMPLETE CBC W/AUTO DIFF WBC: CPT | Performed by: EMERGENCY MEDICINE

## 2022-05-12 PROCEDURE — 80048 BASIC METABOLIC PNL TOTAL CA: CPT | Performed by: EMERGENCY MEDICINE

## 2022-05-12 PROCEDURE — G0378 HOSPITAL OBSERVATION PER HR: HCPCS

## 2022-05-12 PROCEDURE — 85610 PROTHROMBIN TIME: CPT | Performed by: EMERGENCY MEDICINE

## 2022-05-12 PROCEDURE — U0005 INFEC AGEN DETEC AMPLI PROBE: HCPCS | Performed by: EMERGENCY MEDICINE

## 2022-05-12 PROCEDURE — 70450 CT HEAD/BRAIN W/O DYE: CPT

## 2022-05-12 PROCEDURE — 99284 EMERGENCY DEPT VISIT MOD MDM: CPT

## 2022-05-12 PROCEDURE — 73502 X-RAY EXAM HIP UNI 2-3 VIEWS: CPT

## 2022-05-12 PROCEDURE — U0003 INFECTIOUS AGENT DETECTION BY NUCLEIC ACID (DNA OR RNA); SEVERE ACUTE RESPIRATORY SYNDROME CORONAVIRUS 2 (SARS-COV-2) (CORONAVIRUS DISEASE [COVID-19]), AMPLIFIED PROBE TECHNIQUE, MAKING USE OF HIGH THROUGHPUT TECHNOLOGIES AS DESCRIBED BY CMS-2020-01-R: HCPCS | Performed by: EMERGENCY MEDICINE

## 2022-05-12 PROCEDURE — 72192 CT PELVIS W/O DYE: CPT

## 2022-05-12 PROCEDURE — 71045 X-RAY EXAM CHEST 1 VIEW: CPT

## 2022-05-12 RX ORDER — CHOLECALCIFEROL (VITAMIN D3) 125 MCG
5 CAPSULE ORAL NIGHTLY PRN
Status: DISCONTINUED | OUTPATIENT
Start: 2022-05-12 | End: 2022-05-16 | Stop reason: HOSPADM

## 2022-05-12 RX ORDER — SODIUM CHLORIDE 0.9 % (FLUSH) 0.9 %
10 SYRINGE (ML) INJECTION AS NEEDED
Status: DISCONTINUED | OUTPATIENT
Start: 2022-05-12 | End: 2022-05-16 | Stop reason: HOSPADM

## 2022-05-12 RX ORDER — TORSEMIDE 100 MG/1
100 TABLET ORAL DAILY
Status: DISCONTINUED | OUTPATIENT
Start: 2022-05-13 | End: 2022-05-16 | Stop reason: HOSPADM

## 2022-05-12 RX ORDER — POTASSIUM CHLORIDE 750 MG/1
20 TABLET, FILM COATED, EXTENDED RELEASE ORAL DAILY
Refills: 1 | Status: DISCONTINUED | OUTPATIENT
Start: 2022-05-13 | End: 2022-05-14

## 2022-05-12 RX ORDER — BISACODYL 10 MG
10 SUPPOSITORY, RECTAL RECTAL DAILY PRN
Status: DISCONTINUED | OUTPATIENT
Start: 2022-05-12 | End: 2022-05-16 | Stop reason: HOSPADM

## 2022-05-12 RX ORDER — ACETAMINOPHEN 650 MG/1
650 SUPPOSITORY RECTAL EVERY 4 HOURS PRN
Status: DISCONTINUED | OUTPATIENT
Start: 2022-05-12 | End: 2022-05-16 | Stop reason: HOSPADM

## 2022-05-12 RX ORDER — ACETAMINOPHEN 325 MG/1
650 TABLET ORAL EVERY 4 HOURS PRN
Status: DISCONTINUED | OUTPATIENT
Start: 2022-05-12 | End: 2022-05-16 | Stop reason: HOSPADM

## 2022-05-12 RX ORDER — BISACODYL 5 MG/1
5 TABLET, DELAYED RELEASE ORAL DAILY PRN
Status: DISCONTINUED | OUTPATIENT
Start: 2022-05-12 | End: 2022-05-16 | Stop reason: HOSPADM

## 2022-05-12 RX ORDER — AMOXICILLIN 250 MG
2 CAPSULE ORAL 2 TIMES DAILY
Status: DISCONTINUED | OUTPATIENT
Start: 2022-05-12 | End: 2022-05-16 | Stop reason: HOSPADM

## 2022-05-12 RX ORDER — ONDANSETRON 2 MG/ML
4 INJECTION INTRAMUSCULAR; INTRAVENOUS EVERY 6 HOURS PRN
Status: DISCONTINUED | OUTPATIENT
Start: 2022-05-12 | End: 2022-05-16 | Stop reason: HOSPADM

## 2022-05-12 RX ORDER — AMLODIPINE BESYLATE 10 MG/1
10 TABLET ORAL DAILY
Status: DISCONTINUED | OUTPATIENT
Start: 2022-05-13 | End: 2022-05-14

## 2022-05-12 RX ORDER — POLYETHYLENE GLYCOL 3350 17 G/17G
17 POWDER, FOR SOLUTION ORAL DAILY PRN
Status: DISCONTINUED | OUTPATIENT
Start: 2022-05-12 | End: 2022-05-16 | Stop reason: HOSPADM

## 2022-05-12 RX ORDER — ONDANSETRON 4 MG/1
4 TABLET, FILM COATED ORAL EVERY 6 HOURS PRN
Status: DISCONTINUED | OUTPATIENT
Start: 2022-05-12 | End: 2022-05-16 | Stop reason: HOSPADM

## 2022-05-12 RX ORDER — LORAZEPAM 1 MG/1
1 TABLET ORAL NIGHTLY PRN
Status: DISCONTINUED | OUTPATIENT
Start: 2022-05-12 | End: 2022-05-16 | Stop reason: HOSPADM

## 2022-05-12 RX ORDER — ACETAMINOPHEN 160 MG/5ML
650 SOLUTION ORAL EVERY 4 HOURS PRN
Status: DISCONTINUED | OUTPATIENT
Start: 2022-05-12 | End: 2022-05-16 | Stop reason: HOSPADM

## 2022-05-12 RX ORDER — SODIUM CHLORIDE 0.9 % (FLUSH) 0.9 %
10 SYRINGE (ML) INJECTION EVERY 12 HOURS SCHEDULED
Status: DISCONTINUED | OUTPATIENT
Start: 2022-05-12 | End: 2022-05-16 | Stop reason: HOSPADM

## 2022-05-12 RX ORDER — MELATONIN
1000 DAILY
Status: DISCONTINUED | OUTPATIENT
Start: 2022-05-13 | End: 2022-05-16 | Stop reason: HOSPADM

## 2022-05-12 RX ORDER — HYDROCODONE BITARTRATE AND ACETAMINOPHEN 5; 325 MG/1; MG/1
1 TABLET ORAL EVERY 4 HOURS PRN
Status: DISCONTINUED | OUTPATIENT
Start: 2022-05-12 | End: 2022-05-16 | Stop reason: HOSPADM

## 2022-05-12 RX ADMIN — DOCUSATE SODIUM 50MG AND SENNOSIDES 8.6MG 2 TABLET: 8.6; 5 TABLET, FILM COATED ORAL at 21:38

## 2022-05-12 RX ADMIN — HYDROCODONE BITARTRATE AND ACETAMINOPHEN 1 TABLET: 5; 325 TABLET ORAL at 21:38

## 2022-05-12 RX ADMIN — Medication 10 ML: at 14:29

## 2022-05-12 RX ADMIN — HYDROCODONE BITARTRATE AND ACETAMINOPHEN 1 TABLET: 5; 325 TABLET ORAL at 14:27

## 2022-05-12 RX ADMIN — Medication 10 ML: at 21:41

## 2022-05-12 NOTE — PLAN OF CARE
"Goal Outcome Evaluation:              Outcome Evaluation: Pt admitted from ED today d/t fall at home resulting in closed fx of pubic bone. AxO x4. VSS. NVI. [place], \"cannot hear in Right ear\" per pt. SCDs in place and accumax pump. PW and brief on. Unable to place mepilex on bottom d/t pt refusing to turn d/t to pain. PRN pain meds given. Ortho and nephrology consulted. Pt on dialysis, nephro scheduled pt for dialysis tomorrow morning. CXR completed today. Plans to d/c pending. All needs met at this time.  "

## 2022-05-12 NOTE — H&P
Patient Name:  Cristina Lemus  YOB: 1930  MRN:  [id number]  Admit Date:  5/12/2022  Patient Care Team:  Wood Elias MD as PCP - General (Family Medicine)      Subjective   History Present Illness     Chief Complaint   Patient presents with   • Fall   • Hip Pain       Ms. Lemus is a 89 y.o. female with a history of PE/afib on AC, ESRD on HD, HTN, anemia, CHF that presents to [facility] complaining of fall. She is not a good historian in regards to medical history. She reports bending forward then falling backward and landing on her rt side. She was unable to get herself up. She complains of rt low back/sacral pain with any movement. Denies hitting her head. She is anticoagulated with Warfarin for afib and hx of PE. Denies recent fever, chest pain, abd pain, n/v/d, dysuria. She endorses shortness of breath w/exertion. She is independent with mobility at baseline inside the home, uses walker for longer distances.     Afebrile. HR controlled. BP stable. On room air. INR 2.64. Covid neg. WBC 6.23, Hgb 10.9. BUN/Cr 52/3.90, Na 135, CO2 21. Rt hip/pelvis: rt sup/inf pubic rami fx. CTH: no acute findings; mild-mod changes chronic SV ischemic phenomena & old lacunar disease. CT pelvis: acute fx's rt sup/inf pubic rami and lateral rt sacral fracture; no femur fx; small intramuscular hematoma rt gluteus ginger      Review of Systems   Constitutional: Negative for chills and fever.   HENT: Negative for congestion.    Respiratory: Positive for shortness of breath.    Cardiovascular: Negative for chest pain.   Gastrointestinal: Negative for diarrhea, nausea and vomiting.   Genitourinary: Negative for dysuria.   Musculoskeletal: Positive for arthralgias and gait problem.   Skin: Negative for rash.   Neurological: Negative for headaches.   Psychiatric/Behavioral: Negative for sleep disturbance.        Personal History     Past Medical History:   Diagnosis Date   • Atrial fibrillation (HCC)    •  CHF (congestive heart failure) (HCC)      Past Surgical History:   Procedure Laterality Date   • ARTERIOVENOUS FISTULA/SHUNT SURGERY Left 1/24/2022    Procedure: LEFT ARM DIALYSIS GRAFT;  Surgeon: King Reynaga MD;  [facility];  Service: Vascular;  Laterality: Left;   • BACK SURGERY     • CYST REMOVAL     • INSERTION HEMODIALYSIS CATHETER N/A 1/18/2022    Procedure: HEMODIALYSIS CATHETER INSERTION with C arm;  Surgeon: King Reynaga MD;  Location: [facility];  Service: Vascular;  Laterality: N/A;   • OTHER SURGICAL HISTORY      rupture disk     History reviewed. No pertinent family history.  Social History     Tobacco Use   • Smoking status: Never Smoker   • Smokeless tobacco: Never Used   Substance Use Topics   • Alcohol use: No     Comment: caffeine use    • Drug use: No     No current facility-administered medications on file prior to encounter.     Current Outpatient Medications on File Prior to Encounter   Medication Sig Dispense Refill   • amLODIPine (NORVASC) 10 MG tablet Take 1 tablet by mouth Daily. 90 tablet 3   • Cholecalciferol (VITAMIN D3 PO) Take 1,000 Units by mouth Daily.     • KLOR-CON 20 MEQ CR tablet TAKE 1 TABLET DAILY (Patient taking differently: Take 20 mEq by mouth Daily.) 90 tablet 1   • LORazepam (ATIVAN) 1 MG tablet Take 1 tablet by mouth At Night As Needed for Sleep. 5 tablet 0   • torsemide (DEMADEX) 20 MG tablet Take 5 tablets by mouth Daily. 30 tablet 1   • warfarin (Jantoven) 2 MG tablet Take one tablet (2 mg) by mouth on Tuesday, Thursday, and Saturday, then take 2 tablets (4 mg) all other days or as directed (Patient taking differently: Take 2 mg by mouth Daily.) 150 tablet 1     Allergies   Allergen Reactions   • Statins Nausea And Vomiting   • Sulfa Antibiotics Nausea And Vomiting and Other (See Comments)     CHILLS AND FEVER       Objective    Objective     Vital Signs  Temp:  [96.5 °F (35.8 °C)-98.8 °F (37.1 °C)] 96.5 °F (35.8 °C)  Heart Rate:   [64-81] 81  Resp:  [16] 16  BP: (134-167)/(63-77) 162/77  SpO2:  [90 %-95 %] 90 %  on   ;   Device (Oxygen Therapy): room air  Body mass index is 19.47 kg/m².    Physical Exam  Vitals and nursing note reviewed.   Constitutional:       General: She is not in acute distress.     Comments: Frail, elderly   HENT:      Head: Normocephalic.      Mouth/Throat:      Mouth: Mucous membranes are moist.   Eyes:      Conjunctiva/sclera: Conjunctivae normal.   Cardiovascular:      Rate and Rhythm: Normal rate. Rhythm irregular.   Pulmonary:      Effort: Pulmonary effort is normal. No respiratory distress.      Breath sounds: Normal breath sounds.   Abdominal:      General: Bowel sounds are normal.      Palpations: Abdomen is soft.   Musculoskeletal:      Cervical back: Neck supple.      Right lower leg: No edema.      Left lower leg: No edema.   Skin:     General: Skin is warm and dry.   Neurological:      General: No focal deficit present.      Mental Status: She is alert.   Psychiatric:         Cognition and Memory: Memory is impaired.         Results Review:  I reviewed the patient's new clinical results.  I reviewed the patient's new imaging results and agree with the interpretation.  I reviewed the patient's other test results and agree with the interpretation  I personally viewed and interpreted the patient's EKG/Telemetry data  Discussed with ED provider.    Lab Results (last 24 hours)     Procedure Component Value Units Date/Time    Protime-INR [[id number]]  (Abnormal) Collected: 05/12/22 0927    Specimen: Blood Updated: 05/12/22 1001     Protime 28.3 Seconds      INR 2.64    CBC & Differential [[id number]]  (Abnormal) Collected: 05/12/22 1129    Specimen: Blood Updated: 05/12/22 1139    Narrative:      The following orders were created for panel order CBC & Differential.  Procedure                               Abnormality         Status                     ---------                               -----------          ------                     CBC Auto Differential[[id number]]        Abnormal            Final result                 Please view results for these tests on the individual orders.    Basic Metabolic Panel [[id number]]  (Abnormal) Collected: 05/12/22 1129    Specimen: Blood Updated: 05/12/22 1222     Glucose 90 mg/dL      BUN 52 mg/dL      Creatinine 3.90 mg/dL      Sodium 135 mmol/L      Potassium 4.6 mmol/L      Chloride 100 mmol/L      CO2 21.0 mmol/L      Calcium 8.5 mg/dL      BUN/Creatinine Ratio 13.3     Anion Gap 14.0 mmol/L      eGFR 10.3 mL/min/1.73      Comment: <15 Indicative of kidney failure       Narrative:      GFR Normal >60  Chronic Kidney Disease <60  Kidney Failure <15      COVID PRE-OP / PRE-PROCEDURE SCREENING ORDER (NO ISOLATION) - Swab, Nasopharynx [[id number]]  (Normal) Collected: 05/12/22 1129    Specimen: Swab from Nasopharynx Updated: 05/12/22 1216    Narrative:      The following orders were created for panel order COVID PRE-OP / PRE-PROCEDURE SCREENING ORDER (NO ISOLATION) - Swab, Nasopharynx.  Procedure                               Abnormality         Status                     ---------                               -----------         ------                     COVID-19, [facility] IN-HOUSE...[[id number]]  Normal              Final result                 Please view results for these tests on the individual orders.    CBC Auto Differential [[id number]]  (Abnormal) Collected: 05/12/22 1129    Specimen: Blood Updated: 05/12/22 1139     WBC 6.23 10*3/mm3      RBC 3.56 10*6/mm3      Hemoglobin 10.9 g/dL      Hematocrit 31.9 %      MCV 89.6 fL      MCH 30.6 pg      MCHC 34.2 g/dL      RDW 14.4 %      RDW-SD 46.7 fl      MPV 9.9 fL      Platelets 155 10*3/mm3      Neutrophil % 78.8 %      Lymphocyte % 12.2 %      Monocyte % 7.4 %      Eosinophil % 0.6 %      Basophil % 0.5 %      Immature Grans % 0.5 %      Neutrophils, Absolute 4.91 10*3/mm3      Lymphocytes, Absolute 0.76 10*3/mm3      Monocytes,  Absolute 0.46 10*3/mm3      Eosinophils, Absolute 0.04 10*3/mm3      Basophils, Absolute 0.03 10*3/mm3      Immature Grans, Absolute 0.03 10*3/mm3      nRBC 0.0 /100 WBC     COVID-19,[facility] IN-HOUSE CEPHEID/JACIEL NP SWAB IN TRANSPORT MEDIA 8-12 HR TAT - Swab, Nasopharynx [[id number]]  (Normal) Collected: 05/12/22 1129    Specimen: Swab from Nasopharynx Updated: 05/12/22 1216     COVID19 Not Detected    Narrative:      Fact sheet for providers: https://www.fda.gov/media/827458/download     Fact sheet for patients: https://www.fda.gov/media/926481/download          Imaging Results (Last 24 Hours)     Procedure Component Value Units Date/Time    CT Pelvis Without Contrast [[id number]] Collected: 05/12/22 1117     Updated: 05/12/22 1127    Narrative:      PELVIS CT WITHOUT CONTRAST     HISTORY: Fall. Right hip and sacral pain with difficulty bearing weight.  Pelvic fractures were observed on x-ray.     TECHNIQUE: Axial pelvis CT with multiplanar reformatted images is  correlated with pelvis and right hip x-rays from earlier this morning.     Radiation dose reduction techniques were utilized, including automated  exposure control and exposure modulation based on body size.     FINDINGS: There is a sagittally oriented fracture along the right  lateral sacrum (zone one), nondisplaced. This extends along the S1 and  S2 levels. No transverse fracture is identified across the midline nor  is any fracture identified on the left.     There is an oblique transverse fracture along the right superior  superior pubic ramus laterally without articular cortex disruption of  the acetabulum. There is also a nondisplaced mid right inferior tissue  pubic ramus fracture. The bones of the left hemipelvis are intact. The  proximal femurs are intact. A 27 x 17 mm hematoma is observed in the  right gluteus ginger muscle posterior to the hamstring origin.     The lower lumbar spine demonstrates some degenerative change but  vertebral height at  L3, L4, and L5 is preserved. There is nonsurgical  ankylosis across the L4-5 level and there appears to been previous  posterior decompression at L5-S1. The bones are demineralized and  musculature is diffusely deconditioned, typical for the age group.. The  aorta is densely calcified but normal in caliber. There is extensive  colonic diverticulosis.       Impression:      Acute fractures of the right superior and inferior ratio  pubic rami as demonstrated on x-rays earlier today. There is also a  sagittally oriented fracture along the lateral right sacrum. There is no  evidence of femur fracture. An approximately 27 x 17 mm area of mildly  increased density is observed within the otherwise deconditioned right  gluteus ginger muscle superficial to the right inferior issue pubic  ramus, system with a small intramuscular hematoma.     This report was finalized on 5/12/2022 11:24 AM by Dr. Bryan Dodson M.D.       CT Head Without Contrast [[id number]] Collected: 05/12/22 1047     Updated: 05/12/22 1050    Narrative:      CT HEAD WITHOUT CONTRAST     CLINICAL HISTORY: Fall. Anticoagulated.     TECHNIQUE: CT scan of the head was obtained with 3 mm axial soft tissue  algorithm and 2 mm bone algorithm images. No intravenous contrast was  administered. Sagittal and coronal reconstructions were obtained.     COMPARISON: No previous similar studies are available for comparison.     FINDINGS:       There is no evidence for a calvarial fracture. There is no evidence for  an acute extra-axial hemorrhage.     The ventricles, sulci, and cisterns are age appropriate.  Mild-to-moderate changes of chronic small vessel ischemic phenomena are  noted. Old lacunar disease is seen within the posterior limb of the left  internal capsule and the right caudate head. The posterior fossa  structures are within normal limits. Atherosclerotic changes are  identified within the intracranial vasculature.       Impression:         No CT evidence  for acute intracranial pathology.     Incidental note is made of mild-to-moderate changes of chronic small  vessel ischemic phenomena and old lacunar disease within the posterior  limb of the left internal capsule and the right caudate head.           Radiation dose reduction techniques were utilized, including automated  exposure control and exposure modulation based on body size.     This report was finalized on 5/12/2022 10:47 AM by Dr. Nahum Bose M.D.       XR Hip With or Without Pelvis 2 - 3 View Right [[id number]] Collected: 05/12/22 0946     Updated: 05/12/22 0950    Narrative:      XR HIP W OR WO PELVIS 2-3 VIEW RIGHT-  05/12/2022     HISTORY: Fell, pelvis and right hip pain.     There is a mildly displaced fracture of the lateral aspect of the right  superior pubic ramus. There also is mild deformity of the right inferior  pubic ramus probably related to mild displaced fracture.     Right hip joint space appears well-maintained. Proximal right femur  appears intact. Left hip appears unremarkable.       Impression:      . Right superior and inferior pubic rami fractures.     This report was finalized on 5/12/2022 9:47 AM by Dr. Robb Lizama M.D.             Results for orders placed during the hospital encounter of 12/21/21    Adult Transthoracic Echo Complete W/ Cont if Necessary Per Protocol    Interpretation Summary  · Left ventricular wall thickness is consistent with borderline concentric hypertrophy.  · Estimated left ventricular EF = 61% Left ventricular systolic function is normal.  · Left ventricular diastolic function is consistent with age.  · The left atrial cavity is severely dilated.  · The right atrial cavity is severely dilated.  · There is moderate, bileaflet mitral valve thickening present.  · Moderate tricuspid valve regurgitation is present.  · Estimated right ventricular systolic pressure from tricuspid regurgitation is mildly elevated (35-45 mmHg). Calculated right ventricular  systolic pressure from tricuspid regurgitation is 40 mmHg.      No orders to display        Assessment/Plan     Active Hospital Problems    Diagnosis  POA   • **Closed fracture of multiple pubic rami, right, initial encounter (HCC) [S32.591A]  Yes   • Sacral fracture (HCC) [S32.10XA]  Yes   • Anemia in chronic kidney disease [N18.9, D63.1]  Yes   • End stage renal disease (HCC) [N18.6]  Yes   • Anticoagulated on warfarin [Z79.01]  Not Applicable   • Essential hypertension [I10]  Yes   • Chronic diastolic congestive heart failure (HCC) [I50.32]  Yes   • History of pulmonary embolus (PE) [Z86.711]  Yes   • Permanent atrial fibrillation (HCC) [I48.21]  Yes      Resolved Hospital Problems   No resolved problems to display.       Ms. Lemus is a 89 y.o. female with a history of PE/afib on AC, ESRD on HD, HTN, anemia, CHF who is admitted for fall resulting in pubic rami and sacral fractures    -Ortho consult. PT eval. Pain control. Hold AC until Ortho evaluates  -Nephrology consult for ESRD/HD management  -HR controlled. Denies chest pain. Check CXR for shortness of breath w/hx CHF. On room air. Continue torsemide.  Followed by Dr. Mart Cardiology. Echo 12/21. AC held as above, daily INR   -BP stable  -Hgb stable, monitor    I discussed the patient's findings and my recommendations with patient and Dr. Flowers.    VTE Prophylaxis - Warfarin (home med).  Code Status - Full code unless otherwise specified.       RAE Iglesias  [facility]  05/12/22  15:34 EDT

## 2022-05-12 NOTE — ED PROVIDER NOTES
EMERGENCY DEPARTMENT ENCOUNTER    Room Number:  25/25  Date of encounter:  5/12/2022  PCP: Wood lEias MD  Historian: Patient      HPI:  Chief Complaint: Fall  A complete HPI/ROS/PMH/PSH/SH/FH are unobtainable due to: None    Context: Cristina Lemus is a 89 y.o. female who presents to the ED c/o fall.  She states that she was bending over and lost her balance.  She is complaining of mild pain to her right sacral region that is constant and worse with movement.  She is confident that she did not hit her head.  She is on warfarin, however.  She denies having pain elsewhere including no pain to her neck, back, arms, left leg.      PAST MEDICAL HISTORY  Active Ambulatory Problems     Diagnosis Date Noted   • AVNRT (AV joy re-entry tachycardia) ([facility]) 10/29/2014   • History of pulmonary embolus (PE) 06/30/2020   • HTN, goal below 140/80 09/01/2016   • Permanent atrial fibrillation ([facility]) 07/07/2017   • Stage 4 chronic kidney disease ([facility]) 06/30/2020   • Chronic diastolic congestive heart failure ([facility]) 10/01/2020   • Anxiety disorder 04/23/2021   • Chronic gout without tophus 04/23/2021   • KIM (acute kidney injury) ([facility]) 06/09/2021   • Essential hypertension 06/25/2021   • Dyspnea 06/25/2021   • Pleural effusion 06/25/2021   • Dyspnea on exertion 06/25/2021   • Anemia 07/12/2021   • Acute on chronic renal failure ([facility]) 01/16/2022   • Acute renal failure superimposed on chronic kidney disease ([facility]) 01/17/2022   • Anticoagulated on warfarin 01/18/2022   • UTI (urinary tract infection) 01/18/2022   • Metabolic encephalopathy 01/18/2022   • End stage renal disease ([facility]) 01/24/2022   • C. difficile colitis 01/26/2022   • Aftercare including intermittent dialysis ([facility]) 02/11/2022   • Allergy, unspecified, initial encounter 02/11/2022   • Anaphylactic shock, unspecified, initial encounter 02/11/2022   • Anemia in chronic kidney disease 02/19/2022   • Coagulation defect, unspecified ([facility]) 02/11/2022   • Conjunctivochalasis  01/07/2013   • Epiretinal membrane 01/05/2016   • Hypokalemia 02/11/2022   • Iron deficiency anemia 02/11/2022   • Secondary hyperparathyroidism of renal origin (HCC) 02/11/2022   • Chronic mid back pain 02/25/2022     Resolved Ambulatory Problems     Diagnosis Date Noted   • No Resolved Ambulatory Problems     Past Medical History:   Diagnosis Date   • Atrial fibrillation (HCC)    • CHF (congestive heart failure) (HCC)          PAST SURGICAL HISTORY  Past Surgical History:   Procedure Laterality Date   • ARTERIOVENOUS FISTULA/SHUNT SURGERY Left 1/24/2022    Procedure: LEFT ARM DIALYSIS GRAFT;  Surgeon: King Reynaga MD;  Location: [facility];  Service: Vascular;  Laterality: Left;   • BACK SURGERY     • CYST REMOVAL     • INSERTION HEMODIALYSIS CATHETER N/A 1/18/2022    Procedure: HEMODIALYSIS CATHETER INSERTION with C arm;  Surgeon: King Reynaga MD;  Location: [facility];  Service: Vascular;  Laterality: N/A;   • OTHER SURGICAL HISTORY      rupture disk         FAMILY HISTORY  No family history on file.      SOCIAL HISTORY  Social History     Socioeconomic History   • Marital status: Single   Tobacco Use   • Smoking status: Never Smoker   • Smokeless tobacco: Never Used   Substance and Sexual Activity   • Alcohol use: No     Comment: caffeine use    • Drug use: No   • Sexual activity: Defer         ALLERGIES  Statins and Sulfa antibiotics        REVIEW OF SYSTEMS  Review of Systems     All systems reviewed and negative except for those discussed in HPI.       PHYSICAL EXAM    I have reviewed the triage vital signs and nursing notes.    ED Triage Vitals [05/12/22 0903]   Temp Heart Rate Resp BP SpO2   98.8 °F (37.1 °C) 76 16 167/71 95 %      Temp src Heart Rate Source Patient Position BP Location FiO2 (%)   Tympanic Monitor Lying -- --       Physical Exam  GENERAL: not distressed  HENT: nares patent  EYES: no scleral icterus  CV: regular rhythm, regular rate  RESPIRATORY: normal  effort  ABDOMEN: soft, nontender  MUSCULOSKELETAL: no deformity, no C/T/L-spine tenderness, tenderness to the right inferior pelvis.  Minimal pain with passive range of motion of the right hip  NEURO: alert, moves all extremities, follows commands  SKIN: warm, dry        LAB RESULTS  No results found for this or any previous visit (from the past 24 hour(s)).    Ordered the above labs and independently reviewed the results.        RADIOLOGY  No Radiology Exams Resulted Within Past 24 Hours    I ordered the above noted radiological studies. Reviewed by me and discussed with radiologist.  See dictation for official radiology interpretation.      PROCEDURES    Procedures      MEDICATIONS GIVEN IN ER    Medications   sodium chloride 0.9 % flush 10 mL (has no administration in time range)         PROGRESS, DATA ANALYSIS, CONSULTS, AND MEDICAL DECISION MAKING    All labs have been independently reviewed by me.  All radiology studies have been reviewed by me and discussed with radiologist dictating the report.   EKG's independently viewed and interpreted by me.  Discussion below represents my analysis of pertinent findings related to patient's condition, differential diagnosis, treatment plan and final disposition.        ED Course as of 05/13/22 2344   u May 12, 2022   1110 INR(!): 2.64 [TD]   1111 X-ray of the pelvis interpreted by myself.  Patient has right superior and inferior pubic rami fractures [TD]   1205 Discussed the case with Dr. Flowers, hospitalist for [facility].  We reviewed the patient's imaging and history.  Given that the patient 89 years old and lives with her daughter with MS for whom she cares, I believe the patient would best served in the hospital given her difficulty ambulating at this time. [TD]      ED Course User Index  [TD] Drew Barker II, MD           PPE: The patient wore a surgical mask throughout the entire patient encounter. I wore an N95.    AS OF 09:23 EDT VITALS:    BP - 167/71  HR -  76  TEMP - 98.8 °F (37.1 °C) (Tympanic)  O2 SATS - 95%        DIAGNOSIS  Final diagnoses:   Closed fracture of multiple pubic rami, right, initial encounter ([facility])         DISPOSITION  Admit           Drew Barker II, MD  05/13/22 8214

## 2022-05-12 NOTE — PROGRESS NOTES
The patient had a fall today and sustained pelvic fracture, she has history of end-stage renal disease on maintenance hemodialysis every Tuesday, Thursday and Saturday, reviewed her electrolytes and volume assessment plan to do dialysis tomorrow, full note to follow

## 2022-05-12 NOTE — ED TRIAGE NOTES
Pt arrives via EMS from home. States that she was squatting down and lost her balance. Complains of right hip pain. EMS reports no shortening or rotation. States that she did not hit her head or have LOC. Is on Warfarin. Has not been able to walk since the fall.     Patient masked at arrival and triage staff wore all appropriate PPE during entire encounter with patient.

## 2022-05-12 NOTE — PROGRESS NOTES
Clinical Pharmacy Services: Medication History    Cristina Lemus is a 89 y.o. female presenting to [facility] for   Chief Complaint   Patient presents with   • Fall   • Hip Pain       She  has a past medical history of Atrial fibrillation (HCC) and CHF (congestive heart failure) (HCC).    Allergies as of 05/12/2022 - Reviewed 05/12/2022   Allergen Reaction Noted   • Statins Nausea And Vomiting 04/04/2017   • Sulfa antibiotics Nausea And Vomiting and Other (See Comments) 04/04/2017       Medication information was obtained from: Patient's Daughter   Pharmacy and Phone Number:     Prior to Admission Medications     Prescriptions Last Dose Informant Patient Reported? Taking?    amLODIPine (NORVASC) 10 MG tablet  Child No Yes    Take 1 tablet by mouth Daily.    Cholecalciferol (VITAMIN D3 PO)  Child Yes Yes    Take 1,000 Units by mouth Daily.    KLOR-CON 20 MEQ CR tablet  Child No Yes    TAKE 1 TABLET DAILY    Patient taking differently:  Take 20 mEq by mouth Daily.    LORazepam (ATIVAN) 1 MG tablet  Child No Yes    Take 1 tablet by mouth At Night As Needed for Sleep.    torsemide (DEMADEX) 20 MG tablet  Child No Yes    Take 5 tablets by mouth Daily.    warfarin (Jantoven) 2 MG tablet  Child No Yes    Take one tablet (2 mg) by mouth on Tuesday, Thursday, and Saturday, then take 2 tablets (4 mg) all other days or as directed    Patient taking differently:  Take 2 mg by mouth Daily.            Medication notes: Patient's daughter was able to confirm medications and doses but is unsure of the patient's last dose.     This medication list is complete to the best of my knowledge as of 5/12/2022    Please call if questions.    Prince Long  Medication History Technician  [phone]    5/12/2022 11:55 EDT

## 2022-05-13 LAB
ALBUMIN SERPL-MCNC: 3.3 G/DL (ref 3.5–5.2)
ANION GAP SERPL CALCULATED.3IONS-SCNC: 13 MMOL/L (ref 5–15)
BASOPHILS # BLD AUTO: 0.02 10*3/MM3 (ref 0–0.2)
BASOPHILS NFR BLD AUTO: 0.3 % (ref 0–1.5)
BUN SERPL-MCNC: 56 MG/DL (ref 8–23)
BUN/CREAT SERPL: 12.8 (ref 7–25)
CALCIUM SPEC-SCNC: 8.1 MG/DL (ref 8.2–9.6)
CHLORIDE SERPL-SCNC: 103 MMOL/L (ref 98–107)
CO2 SERPL-SCNC: 21 MMOL/L (ref 22–29)
CREAT SERPL-MCNC: 4.36 MG/DL (ref 0.57–1)
DEPRECATED RDW RBC AUTO: 51.4 FL (ref 37–54)
EGFRCR SERPLBLD CKD-EPI 2021: 9 ML/MIN/1.73
EOSINOPHIL # BLD AUTO: 0.14 10*3/MM3 (ref 0–0.4)
EOSINOPHIL NFR BLD AUTO: 2.3 % (ref 0.3–6.2)
ERYTHROCYTE [DISTWIDTH] IN BLOOD BY AUTOMATED COUNT: 14.9 % (ref 12.3–15.4)
GLUCOSE SERPL-MCNC: 78 MG/DL (ref 65–99)
HBV SURFACE AG SERPL QL IA: NORMAL
HCT VFR BLD AUTO: 30.6 % (ref 34–46.6)
HGB BLD-MCNC: 10 G/DL (ref 12–15.9)
IMM GRANULOCYTES # BLD AUTO: 0.02 10*3/MM3 (ref 0–0.05)
IMM GRANULOCYTES NFR BLD AUTO: 0.3 % (ref 0–0.5)
INR PPP: 3.75 (ref 0.9–1.1)
LYMPHOCYTES # BLD AUTO: 1.38 10*3/MM3 (ref 0.7–3.1)
LYMPHOCYTES NFR BLD AUTO: 22.3 % (ref 19.6–45.3)
MAGNESIUM SERPL-MCNC: 2.3 MG/DL (ref 1.7–2.3)
MCH RBC QN AUTO: 30.7 PG (ref 26.6–33)
MCHC RBC AUTO-ENTMCNC: 32.7 G/DL (ref 31.5–35.7)
MCV RBC AUTO: 93.9 FL (ref 79–97)
MONOCYTES # BLD AUTO: 0.73 10*3/MM3 (ref 0.1–0.9)
MONOCYTES NFR BLD AUTO: 11.8 % (ref 5–12)
NEUTROPHILS NFR BLD AUTO: 3.9 10*3/MM3 (ref 1.7–7)
NEUTROPHILS NFR BLD AUTO: 63 % (ref 42.7–76)
NRBC BLD AUTO-RTO: 0 /100 WBC (ref 0–0.2)
PHOSPHATE SERPL-MCNC: 3.6 MG/DL (ref 2.5–4.5)
PLATELET # BLD AUTO: 180 10*3/MM3 (ref 140–450)
PMV BLD AUTO: 11.8 FL (ref 6–12)
POTASSIUM SERPL-SCNC: 4.7 MMOL/L (ref 3.5–5.2)
PROTHROMBIN TIME: 37.4 SECONDS (ref 11.7–14.2)
RBC # BLD AUTO: 3.26 10*6/MM3 (ref 3.77–5.28)
SODIUM SERPL-SCNC: 137 MMOL/L (ref 136–145)
WBC NRBC COR # BLD: 6.19 10*3/MM3 (ref 3.4–10.8)

## 2022-05-13 PROCEDURE — G0378 HOSPITAL OBSERVATION PER HR: HCPCS

## 2022-05-13 PROCEDURE — G0257 UNSCHED DIALYSIS ESRD PT HOS: HCPCS

## 2022-05-13 PROCEDURE — 87340 HEPATITIS B SURFACE AG IA: CPT | Performed by: INTERNAL MEDICINE

## 2022-05-13 PROCEDURE — 97162 PT EVAL MOD COMPLEX 30 MIN: CPT

## 2022-05-13 PROCEDURE — 97110 THERAPEUTIC EXERCISES: CPT

## 2022-05-13 PROCEDURE — 85025 COMPLETE CBC W/AUTO DIFF WBC: CPT | Performed by: INTERNAL MEDICINE

## 2022-05-13 PROCEDURE — 85610 PROTHROMBIN TIME: CPT | Performed by: NURSE PRACTITIONER

## 2022-05-13 PROCEDURE — 80069 RENAL FUNCTION PANEL: CPT | Performed by: INTERNAL MEDICINE

## 2022-05-13 PROCEDURE — 83735 ASSAY OF MAGNESIUM: CPT | Performed by: STUDENT IN AN ORGANIZED HEALTH CARE EDUCATION/TRAINING PROGRAM

## 2022-05-13 PROCEDURE — 36415 COLL VENOUS BLD VENIPUNCTURE: CPT | Performed by: STUDENT IN AN ORGANIZED HEALTH CARE EDUCATION/TRAINING PROGRAM

## 2022-05-13 RX ADMIN — TORSEMIDE 100 MG: 100 TABLET ORAL at 08:05

## 2022-05-13 RX ADMIN — Medication 1000 UNITS: at 08:05

## 2022-05-13 RX ADMIN — LORAZEPAM 1 MG: 1 TABLET ORAL at 23:11

## 2022-05-13 RX ADMIN — DOCUSATE SODIUM 50MG AND SENNOSIDES 8.6MG 2 TABLET: 8.6; 5 TABLET, FILM COATED ORAL at 19:58

## 2022-05-13 RX ADMIN — Medication 10 ML: at 08:08

## 2022-05-13 RX ADMIN — POTASSIUM CHLORIDE 20 MEQ: 10 TABLET, EXTENDED RELEASE ORAL at 08:05

## 2022-05-13 RX ADMIN — LORAZEPAM 1 MG: 1 TABLET ORAL at 00:20

## 2022-05-13 RX ADMIN — HYDROCODONE BITARTRATE AND ACETAMINOPHEN 1 TABLET: 5; 325 TABLET ORAL at 11:58

## 2022-05-13 RX ADMIN — HYDROCODONE BITARTRATE AND ACETAMINOPHEN 1 TABLET: 5; 325 TABLET ORAL at 08:07

## 2022-05-13 RX ADMIN — AMLODIPINE BESYLATE 10 MG: 10 TABLET ORAL at 08:05

## 2022-05-13 RX ADMIN — DOCUSATE SODIUM 50MG AND SENNOSIDES 8.6MG 2 TABLET: 8.6; 5 TABLET, FILM COATED ORAL at 08:05

## 2022-05-13 NOTE — DISCHARGE PLACEMENT REQUEST
"Steven Lemus (89 y.o. Female)             Date of Birth   03/13/1930    Social Security Number       Address   [address] [facility] [address]    Home Phone   [phone]    MRN   [id number]       [employer]   None    Marital Status   Single                            Admission Date   5/12/22    Admission Type   Emergency    Admitting Provider   Margot Flowers MD    Attending Provider   Margot Flowers MD    Department, Room/Bed   [address], [address]796/1       Discharge Date       Discharge Disposition       Discharge Destination                               Attending Provider: Margot Flowers MD    Allergies: Statins, Sulfa Antibiotics    Isolation: None   Infection: None   Code Status: CPR   Advance Care Planning Activity    Ht: 165 cm (64.96\")   Wt: 53 kg (116 lb 13.5 oz)    Admission Cmt: None   Principal Problem: Closed fracture of multiple pubic rami, right, initial encounter ([facility]) [S32.591A]                 Active Insurance as of 5/12/2022     Primary Coverage     Payor Plan Insurance Group Employer/Plan Group    MEDICARE MEDICARE A & B      Payor Plan Address Payor Plan Phone Number Payor Plan Fax Number Effective Dates    [address] [phone]  3/1/1995 - None Entered    [facility] [facility] [id number]       Subscriber Name Subscriber Birth Date Member ID       STEVEN LEMUS 3/13/1930 [id number]           Secondary Coverage     Payor Plan Insurance Group Employer/Plan Group    TOBI [facility] SUPP [id number]     Payor Plan Address Payor Plan Phone Number Payor Plan Fax Number Effective Dates    [address]   12/1/2016 - None Entered    [facility] [id number]       Subscriber Name Subscriber Birth Date Member ID       STEVEN LEMUS 3/13/1930 [id number]                 Emergency Contacts      (Rel.) Home Phone Work Phone Mobile Phone    Shannan Lemus (Daughter) [phone] -- [phone]    ShawnJese aldridge (Son) [phone] -- [phone]    "

## 2022-05-13 NOTE — PROGRESS NOTES
[facility] Clinical Pharmacy Services: Warfarin Dosing/Monitoring Consult    Cristina Lemus is a 89 y.o. female, estimated creatinine clearance is 6.9 mL/min (A) (by C-G formula based on SCr of 4.36 mg/dL (H)). weighing 53 kg (116 lb 13.5 oz).    Results from last 7 days   Lab Units 05/13/22  0536 05/12/22  1129 05/12/22  0927   INR  3.75*  --  2.64*   HEMOGLOBIN g/dL 10.0* 10.9*  --    HEMATOCRIT % 30.6* 31.9*  --    PLATELETS 10*3/mm3 180 155  --      Prior to admission anticoagulation: warfarin 2 mg on Tue, Thur, Sat; 4 mg all other days    Hospital Anticoagulation:  Consulting provider: Dr. Flowers  Start date: 5/13  Indication: AFib  Target INR: 2 - 3  Expected duration: tbd   Bridge Therapy: No      Potential food or drug interactions: tbd    Education complete?/Date: No; plan for follow up TBD    Assessment/Plan:  Dose: INR is supratherapeutic - no dose needed today; will reassess tomorrow  Monitor for any signs or symptoms of bleeding  Follow up daily INRs and dose adjustments    Pharmacy will continue to follow until discharge or discontinuation of warfarin.     MACY KUMAR [facility]  Clinical Pharmacist

## 2022-05-13 NOTE — CASE MANAGEMENT/SOCIAL WORK
Continued Stay Note  [facility]     Patient Name: Cristina Lemus  MRN: [id number]  Today's Date: 5/13/2022    Admit Date: 5/12/2022     Discharge Plan     Row Name 05/13/22 1640       Plan    Plan [facility] -- Accepted.    Patient/Family in Agreement with Plan yes    Plan Comments Received a call from [facility] who has accepted the patient and asked CCP to f/u with her over the weekend to discuss SNF bed & HD chair availability.               Discharge Codes    No documentation.               Expected Discharge Date and Time     Expected Discharge Date Expected Discharge Time    May 15, 2022             Kathy TRAN RN

## 2022-05-13 NOTE — CONSULTS
[facility] Consult Note      Patient Name: Cristina Lemus  : 3/13/1930  MRN: [id number]  Primary Care Physician:  Wood Elias MD  Referring Physician: Margot Flowers MD  Date of admission: 2022    Subjective     Reason for Consult: End-stage renal disease    HPI:   Cristina Lemus is a 89 y.o. female was admitted yesterday after she sustained a fall and she had fractured pelvis she normally gets dialysis every  and Saturday she was not dialyzed yesterday because of the distress and her chemistry imbalances were within acceptable range.  She has end-stage renal disease on maintenance hemodialysis every  and Saturday, she is hard of hearing, history of chronic A. fib and prior history of congestive heart failure also history of hypertension, chronically anticoagulated.    Other than her pelvic pain when moving she denies any chest pain or shortness of air, no orthopnea or PND, no nausea or vomiting, having difficulty ambulating, no dysuria or gross hematuria, and she is very hard of hearing    Review of Systems:   14 point review of systems is otherwise negative except for mentioned above on HPI    Personal History     Past Medical History:   Diagnosis Date   • Atrial fibrillation (HCC)    • CHF (congestive heart failure) (HCC)        Past Surgical History:   Procedure Laterality Date   • ARTERIOVENOUS FISTULA/SHUNT SURGERY Left 2022    Procedure: LEFT ARM DIALYSIS GRAFT;  Surgeon: King Reynaga MD;  Location: [facility];  Service: Vascular;  Laterality: Left;   • BACK SURGERY     • CYST REMOVAL     • INSERTION HEMODIALYSIS CATHETER N/A 2022    Procedure: HEMODIALYSIS CATHETER INSERTION with C arm;  Surgeon: King Reynaga MD;  Location: [facility];  Service: Vascular;  Laterality: N/A;   • OTHER SURGICAL HISTORY      rupture disk       Family History: family history is not on file.    Social History:  reports that  she has never smoked. She has never used smokeless tobacco. She reports that she does not drink alcohol and does not use drugs.    Home Medications:  Prior to Admission medications    Medication Sig Start Date End Date Taking? Authorizing Provider   amLODIPine (NORVASC) 10 MG tablet Take 1 tablet by mouth Daily. 8/17/21  Yes Wood Elias MD   Cholecalciferol (VITAMIN D3 PO) Take 1,000 Units by mouth Daily.   Yes ProviderOswald MD   KLOR-CON 20 MEQ CR tablet TAKE 1 TABLET DAILY  Patient taking differently: Take 20 mEq by mouth Daily. 5/6/22  Yes Wood Elias MD   LORazepam (ATIVAN) 1 MG tablet Take 1 tablet by mouth At Night As Needed for Sleep. 1/26/22  Yes Adama Manning MD   torsemide (DEMADEX) 20 MG tablet Take 5 tablets by mouth Daily. 3/9/22  Yes Wood Elias MD   warfarin (Jantoven) 2 MG tablet Take one tablet (2 mg) by mouth on Tuesday, Thursday, and Saturday, then take 2 tablets (4 mg) all other days or as directed  Patient taking differently: Take 2 mg by mouth Daily. 4/1/22  Yes Lm Mart MD       Allergies:  Allergies   Allergen Reactions   • Statins Nausea And Vomiting   • Sulfa Antibiotics Nausea And Vomiting and Other (See Comments)     CHILLS AND FEVER       Objective     Vitals:   Temp:  [96.5 °F (35.8 °C)-98.9 °F (37.2 °C)] 97.2 °F (36.2 °C)  Heart Rate:  [64-81] 69  Resp:  [14-18] 16  BP: (134-162)/(63-81) 145/75  Flow (L/min):  [2] 2    Intake/Output Summary (Last 24 hours) at 5/13/2022 0930  Last data filed at 5/13/2022 0900  Gross per 24 hour   Intake 240 ml   Output 200 ml   Net 40 ml       Physical Exam:   Constitutional: Awake, alert, no acute distress.  Frail and chronically  HEENT: Sclera anicteric, no conjunctival injection  Neck: Supple, no thyromegaly, no lymphadenopathy, trachea at midline, no JVD  Respiratory: Clear to auscultation bilaterally, nonlabored respiration  Cardiovascular: Irregularly irregular, no murmurs, no rubs or gallops, no carotid  bruit  Gastrointestinal: Positive bowel sounds, abdomen is soft, nontender and nondistended  : No palpable bladder  Musculoskeletal: No edema, no clubbing or cyanosis, functional AV fistula in the left upper arm with good thrill and bruit  Psychiatric: Flat affect, cooperative  Neurologic: Oriented x3, moving all extremities, normal speech and mental status, hard of hearing  Skin: Warm and dry       Scheduled Meds:     amLODIPine, 10 mg, Oral, Daily  cholecalciferol, 1,000 Units, Oral, Daily  potassium chloride, 20 mEq, Oral, Daily  senna-docusate sodium, 2 tablet, Oral, BID  sodium chloride, 10 mL, Intravenous, Q12H  torsemide, 100 mg, Oral, Daily      IV Meds:        Results Reviewed:   I have personally reviewed the results from the time of this admission to 5/13/2022 09:30 EDT     Lab Results   Component Value Date    GLUCOSE 78 05/13/2022    CALCIUM 8.1 (L) 05/13/2022     05/13/2022    K 4.7 05/13/2022    CO2 21.0 (L) 05/13/2022     05/13/2022    BUN 56 (H) 05/13/2022    CREATININE 4.36 (H) 05/13/2022    EGFRIFAFRI  01/26/2022      Comment:      <15 Indicative of kidney failure.    EGFRIFNONA 10 (L) 01/26/2022    BCR 12.8 05/13/2022    ANIONGAP 13.0 05/13/2022      Lab Results   Component Value Date    MG 2.3 05/13/2022    PHOS 3.6 05/13/2022    ALBUMIN 3.30 (L) 05/13/2022           Assessment / Plan     ASSESSMENT:  -End-stage renal disease on maintenance hemodialysis every Tuesday, Thursday and Saturday, dialysis was not done after she had a fall and fractured pelvis, her volume status and electrolyte within acceptable range  -Hypertension controlled  -Anemia of chronic kidney disease treated with long-acting KENNEDI as an outpatient her hemoglobin today is 10 meeting goal  -Mechanical fall with fractured pelvis  -Chronic A. Fib    PLAN:  -Hemodialysis today  -Continue the same treatment  -Surveillance labs  -We will try to get her back on her outpatient schedule as soon as possible    Thank you for  involving us in the care of Cristina Lemus.  Please feel free to call with any questions.    Saul Dowling MD  05/13/22  09:30 EDT    [facility]  [phone]      Much of this encounter note is an electronic transcription/translation of spoken language to printed text. The electronic translation of spoken language may permit erroneous, or at times, nonsensical words or phrases to be inadvertently transcribed; Although I have reviewed the note for such errors, some may still exist.

## 2022-05-13 NOTE — PROGRESS NOTES
Name: Cristina Lemus ADMIT: 2022   : 3/13/1930  PCP: Wood Elias MD    MRN: [id number] LOS: 0 days   AGE/SEX: 89 y.o. female  ROOM: [facility]     Subjective   Subjective   Patient evaluated in the dialysis unit. She reports right hip pain when working with physical therpay but is ok when she is resting.     Review of Systems   Constitutional: Negative for fatigue and fever.   Respiratory: Negative for cough and shortness of breath.    Cardiovascular: Negative for chest pain, palpitations and leg swelling.   Gastrointestinal: Negative for abdominal pain, nausea and vomiting.   Musculoskeletal:        Right hip pain   Neurological: Negative for weakness.        Objective   Objective   Vital Signs  Temp:  [97.2 °F (36.2 °C)-98.9 °F (37.2 °C)] 97.5 °F (36.4 °C)  Heart Rate:  [66-75] 72  Resp:  [14-18] 16  BP: (145-161)/(66-81) 161/68  SpO2:  [91 %-94 %] 94 %  on  Flow (L/min):  [2] 2;   Device (Oxygen Therapy): nasal cannula  Body mass index is 19.47 kg/m².  Physical Exam  Constitutional:       General: She is not in acute distress.     Appearance: Normal appearance. She is ill-appearing (chronically).      Comments: Hard of hearing   Cardiovascular:      Rate and Rhythm: Normal rate and regular rhythm.      Pulses: Normal pulses.      Heart sounds: No murmur heard.  Pulmonary:      Effort: Pulmonary effort is normal. No respiratory distress.      Breath sounds: No wheezing.      Comments: Diminished  Abdominal:      General: Abdomen is flat. Bowel sounds are normal. There is no distension.      Palpations: Abdomen is soft.   Musculoskeletal:         General: No swelling or tenderness.      Right lower leg: No edema.      Left lower leg: No edema.      Comments: LUE AV fistula, currently on hemodialysis   Skin:     General: Skin is warm and dry.   Neurological:      General: No focal deficit present.      Mental Status: She is alert and oriented to person, place, and time. Mental status is at baseline.          Results Review     I reviewed the patient's new clinical results.  Results from last 7 days   Lab Units 05/13/22  0536 05/12/22  1129   WBC 10*3/mm3 6.19 6.23   HEMOGLOBIN g/dL 10.0* 10.9*   PLATELETS 10*3/mm3 180 155     Results from last 7 days   Lab Units 05/13/22  0536 05/12/22  1129   SODIUM mmol/L 137 135*   POTASSIUM mmol/L 4.7 4.6   CHLORIDE mmol/L 103 100   CO2 mmol/L 21.0* 21.0*   BUN mg/dL 56* 52*   CREATININE mg/dL 4.36* 3.90*   GLUCOSE mg/dL 78 90   Estimated Creatinine Clearance: 6.9 mL/min (A) (by C-G formula based on SCr of 4.36 mg/dL (H)).  Results from last 7 days   Lab Units 05/13/22  0536   ALBUMIN g/dL 3.30*     Results from last 7 days   Lab Units 05/13/22  0536 05/12/22  1129   CALCIUM mg/dL 8.1* 8.5   ALBUMIN g/dL 3.30*  --    MAGNESIUM mg/dL 2.3  --    PHOSPHORUS mg/dL 3.6  --        COVID19   Date Value Ref Range Status   05/12/2022 Not Detected Not Detected - Ref. Range Final   01/26/2022 Not Detected Not Detected - Ref. Range Final     No results found for: HGBA1C, POCGLU    XR Chest 1 View  PORTABLE CHEST 05/12/2022 AT 5:21 PM     CLINICAL HISTORY: Dyspnea     Compared to a previous chest x-ray dated 01/18/2022.     A right internal jugular dialysis catheter has been removed in the  interval. The lungs are well-expanded. There is abnormal increased  density containing air bronchograms in the retrocardiac region on the  left that is consistent with left lower lobe atelectasis and/or  infiltrate. The right lung is well expanded and clear. The heart is  mildly enlarged and unchanged. The pulmonary vasculature is within  normal limits.     IMPRESSIONS: Mild cardiomegaly. Focal consolidation in the left lower  lobe consistent with atelectasis and/or pneumonia. Otherwise  unremarkable chest x-ray.     This report was finalized on 5/12/2022 5:54 PM by Dr. King Rice M.D.     CT Pelvis Without Contrast  Narrative: PELVIS CT WITHOUT CONTRAST     HISTORY: Fall. Right hip and sacral  pain with difficulty bearing weight.  Pelvic fractures were observed on x-ray.     TECHNIQUE: Axial pelvis CT with multiplanar reformatted images is  correlated with pelvis and right hip x-rays from earlier this morning.     Radiation dose reduction techniques were utilized, including automated  exposure control and exposure modulation based on body size.     FINDINGS: There is a sagittally oriented fracture along the right  lateral sacrum (zone one), nondisplaced. This extends along the S1 and  S2 levels. No transverse fracture is identified across the midline nor  is any fracture identified on the left.     There is an oblique transverse fracture along the right superior  superior pubic ramus laterally without articular cortex disruption of  the acetabulum. There is also a nondisplaced mid right inferior tissue  pubic ramus fracture. The bones of the left hemipelvis are intact. The  proximal femurs are intact. A 27 x 17 mm hematoma is observed in the  right gluteus ginger muscle posterior to the hamstring origin.     The lower lumbar spine demonstrates some degenerative change but  vertebral height at L3, L4, and L5 is preserved. There is nonsurgical  ankylosis across the L4-5 level and there appears to been previous  posterior decompression at L5-S1. The bones are demineralized and  musculature is diffusely deconditioned, typical for the age group.. The  aorta is densely calcified but normal in caliber. There is extensive  colonic diverticulosis.     Impression: Acute fractures of the right superior and inferior ratio  pubic rami as demonstrated on x-rays earlier today. There is also a  sagittally oriented fracture along the lateral right sacrum. There is no  evidence of femur fracture. An approximately 27 x 17 mm area of mildly  increased density is observed within the otherwise deconditioned right  gluteus ginger muscle superficial to the right inferior issue pubic  ramus, system with a small intramuscular  hematoma.     This report was finalized on 5/12/2022 11:24 AM by Dr. Bryan Dodson M.D.     CT Head Without Contrast  Narrative: CT HEAD WITHOUT CONTRAST     CLINICAL HISTORY: Fall. Anticoagulated.     TECHNIQUE: CT scan of the head was obtained with 3 mm axial soft tissue  algorithm and 2 mm bone algorithm images. No intravenous contrast was  administered. Sagittal and coronal reconstructions were obtained.     COMPARISON: No previous similar studies are available for comparison.     FINDINGS:       There is no evidence for a calvarial fracture. There is no evidence for  an acute extra-axial hemorrhage.     The ventricles, sulci, and cisterns are age appropriate.  Mild-to-moderate changes of chronic small vessel ischemic phenomena are  noted. Old lacunar disease is seen within the posterior limb of the left  internal capsule and the right caudate head. The posterior fossa  structures are within normal limits. Atherosclerotic changes are  identified within the intracranial vasculature.     Impression:    No CT evidence for acute intracranial pathology.     Incidental note is made of mild-to-moderate changes of chronic small  vessel ischemic phenomena and old lacunar disease within the posterior  limb of the left internal capsule and the right caudate head.           Radiation dose reduction techniques were utilized, including automated  exposure control and exposure modulation based on body size.     This report was finalized on 5/12/2022 10:47 AM by Dr. Nahum Bose M.D.     XR Hip With or Without Pelvis 2 - 3 View Right  Narrative: XR HIP W OR WO PELVIS 2-3 VIEW RIGHT-  05/12/2022     HISTORY: Fell, pelvis and right hip pain.     There is a mildly displaced fracture of the lateral aspect of the right  superior pubic ramus. There also is mild deformity of the right inferior  pubic ramus probably related to mild displaced fracture.     Right hip joint space appears well-maintained. Proximal right femur  appears  intact. Left hip appears unremarkable.     Impression: . Right superior and inferior pubic rami fractures.     This report was finalized on 2022 9:47 AM by Dr. Robb Lizama M.D.     XR Shoulder Comp Min 2 Vw RT  Narrative: RADIOLOGY REPORT    FACILITY:  [facility]  UNIT/AGE/GENDER: M.ED  ER      AGE:83 Y          SEX:F  PATIENT NAME/:  STEVEN PALMA    1930  UNIT NUMBER:  [id number]  ACCOUNT NUMBER:  [id number]  ACCESSION NUMBER:  [id number]    Exam: 2 view right shoulder.    HISTORY: Shoulder pain. Fall.    FINDINGS: There is osteopenia/osteoporosis. Hypertrophic degenerative involves the acromioclavicular joint.    There is a multipartite fracture of the right humerus, with creation of a mildly displaced greater tuberosity fragment measuring approximately 3.3 cm in length x 1.2 cm transverse dimension and a thin crescentic fracture fragment from the nonarticular   surface of the humeral head measuring 2.6 cm transverse x 0.7 cm cortical dimension. The bony glenoid appears intact. Seen only on one view, there is a irregular vertically oriented lucent line measuring 2.8 cm involving the proximal humerus. This does   not project beyond the confines of the bone and is not seen on the other view, such that a linear nondisplaced fracture cannot be excluded on the basis of this exam.  Impression: IMPRESSION:  1. Humeral fracture involving the nonarticular surface of the humeral head and the greater tuberosity.  2. Indeterminate 2.6 cm vertically oriented linear lucency of the proximal right humerus. A nondisplaced fracture cannot be confirmed/excluded on the basis of the obtained views. If surgical management would be impacted, a repeat series would be   recommended.    Dictated by: Devante Hayden M.D.    Images and Report reviewed and interpreted by: Devante Hayedn M.D.    <PS><Electronically signed by: Devante Hayden M.D.>  2014 1829    D: 2014  T:  2014 1823  CT Angiogram Chest  Narrative: RADIOLOGY REPORT    FACILITY:  [facility]  UNIT/AGE/GENDER: M.ED  ER      AGE:84 Y          SEX:F  PATIENT NAME/:  STEVEN PALMA    1930  UNIT NUMBER:  [id number]  ACCOUNT NUMBER:  [id number]  ACCESSION NUMBER:  [id number]    EXAMINATION: CT angiography of the chest with contrast.    DATE: 2014    HISTORY: Dyspnea and palpitations    TECHNIQUE:   CT angiography of the chest was performed without and with the administration of intravenous contrast media in order to evaluate the pulmonary arteries for pulmonary embolus.  An 85 mL bolus injection of Optiray 300 was administered   intravenously followed by thin section cuts through the chest.  This was followed by imaging post-processing with three-dimensional reconstruction of the pulmonary arteries in the coronal and sagittal planes with images stored in the patient's permanent   medical record.    COMPARISON:  None    FINDING(S): The pulmonary arteries are well opacified with no filling defects seen through the third order branches. The aorta demonstrates smooth contour with no sign of dissection or aneurysm. There are scattered calcifications with luminal plaque   noted along the posterior and lateral aspect of the descending segment of the aorta. The heart size is unremarkable. There is no pericardial or pleural effusion. No focal areas of consolidation are seen within the lungs. There is a densely calcified   granuloma within the posterior basilar segment of the right lower lobe. There is no mediastinal or hilar adenopathy and no pulmonary edema.  Impression: IMPRESSION:   1. No evidence of thromboembolic disease involving the pulmonary arteries.  2. No sign of aortic dissection.  3. No infiltrate or other acute cardiopulmonary abnormality.    Dictated by: LADAN Ley M.D.    Images and Report reviewed and interpreted by: LADAN Ley M.D.    <PS><Electronically signed by: LADAN  King Ley M.D.>  2014 1458    D: 2014 1457  T: 2014 1457  Duplex US Carotid, Bilateral  PROCEDURE/STUDY NOTE    FACILITY:         [facility]  UNIT/AGE/GENDER:  4B   IN     84Y    F  PATIENT NAME/: STEVEN PALMA         1930  UNIT NUMBER:      [id number]  ACCOUNT NUMBER: [id number]  ACCESSION NUMBER: [id number]             DATE: 2014     STUDY PERFORMED: Carotid duplex.     INDICATION(S): Syncope.     FINDING(S): Duplex scan: In the right carotid system, there is intimal   thickening in the common carotid artery extending into the bulb. There are   a small amount of hard echoes noted at the bulb and origins of the   internal and external carotid arteries.   There are no increased velocities and only minimal spectral broadening.   Stenosis in these vessels is estimated to be less than 40%. The right   vertebral artery is patent with antegrade flow. In the left carotid   system, essentially the same findings are   noted as that on the right. The left vertebral artery is patent with   antegrade flow.    IMPRESSION: Grade 1 disease bilaterally (less than 40% stenosis) of no   hemodynamic significance. The vertebral arteries are patent bilaterally.    Legend: Duplex Scan: Grade of stenotic disease.    0: Normal  1: 1-39%  2: 40-59%  3: 60-79%  4: 80-99%  5: Occluded    GISELLE CORTES JR., M.D.    D: 2014 20:38:33  T: 2014 21:04:13/poli Molina I.DDavid [id number]    COPY TO:    PRELIMINARY - SIGNED COPY IS FINAL  MAMMOGRAM - EXTERNAL RESULT SCAN  This result has an attachment that is not available.  MRI/MRA - EXTERNAL RESULT SCAN  This result has an attachment that is not available.  CT SCAN - EXTERNAL RESULT SCAN  This result has an attachment that is not available.  CT SCAN - EXTERNAL RESULT SCAN  This result has an attachment that is not available.  CT SCAN - EXTERNAL RESULT SCAN  This result has an attachment that is not available.  X-RAY - EXTERNAL RESULT  SCAN  This result has an attachment that is not available.  XR Chest 2Vw  Narrative: RADIOLOGY REPORT    FACILITY:  [facility]  UNIT/AGE/GENDER: Amadou  OP      AGE:84 Y          SEX:F  PATIENT NAME/:  STEVEN PALMA    1930  UNIT NUMBER:  [id number]  ACCOUNT NUMBER:  [id number]  ACCESSION NUMBER:  [id number]    EXAMINATION: 2 view chest.    DATE: 2015.    HISTORY: Cough. Flu.    COMPARISON: 2011.    FINDINGS: Two views of the chest demonstrates no pneumonic consolidation, pleural effusion, or pneumothorax. Heart size and pulmonary vascularity are normal. A 2 cm densely calcified granuloma involves the right lower lobe. The aorta is atherosclerotic.   There is mild left basilar scarring.  Impression: IMPRESSION:   1. No infiltrate.  2. Old healed granulomatous disease    Dictated by: Donell Jacobson M.D.    Images and Report reviewed and interpreted by: Donell Jacobson M.D.    <PS><Electronically signed by: Donell Jacobson M.D.>  2015 0643    D: 2015 0642  T: 2015 0642  XR Chest 1 Vw  RADIOLOGY REPORT    FACILITY:  [facility]  UNIT/AGE/GENDER: JEFFERY.ED  ER      AGE:86 Y          SEX:F  PATIENT NAME/:  STEVEN PALMA    1930  UNIT NUMBER:  [id number]  ACCOUNT NUMBER:  [id number]  ACCESSION NUMBER:  [id number]    EXAMINATION: XR CHEST 1 VW    DATE: 2016    COMPARISON: Two-view chest 2015    HISTORY: dyspnea. Fever    FINDINGS: There is some retrocardiac infiltrate. The heart size and pulmonary vascularity are within normal limits. There is tortuosity and atherosclerotic disease of the aorta. There is a large right lower lobe granuloma    IMPRESSION:   1. Patchy left lower lobe airspace disease, possible pneumonia    Dictated by: Carlos Pradhan M.D.    Images and Report reviewed and interpreted by: Carlos Pradhan M.D.    <PS><Electronically signed by: Carlos Pradhan  M.D.>  2016    D: 2016  T: 2016  CT Head Wo Contrast  RADIOLOGY REPORT    FACILITY:  [facility]  UNIT/AGE/GENDER: LADANED  ER      AGE:86 Y          SEX:F  PATIENT NAME/:  STEVEN PALMA    1930  UNIT NUMBER:  [id number]  ACCOUNT NUMBER:  [id number]  ACCESSION NUMBER:  [id number]    EXAMINATION: CT HEAD WO CONTRAST    DATE: 2016    HISTORY: Syncope.    TECHNIQUE: Standard transaxial CT evaluation of the head was performed within 24 hours of admission.    CT scans at this facility use dose modulation, iterative reconstruction and/or weight based dosing when appropriate to reduce radiation dose to as low as reasonably achievable.    COMPARISON: 2011    FINDINGS: There is no acute intracranial hemorrhage, mass effect or midline shift. No extra axial fluid collections are noted. The basal cisterns are patent. No hydrocephalus. Bilateral periventricular and subcortical white matter hypodensities are   nonspecific most likely represent chronic microangiopathic ischemic changes. The gray white matter differentiation is preserved. There is moderate intracranial atherosclerosis. The calvarium is intact.    IMPRESSION: No CT evidence of acute ischemic infarction, hemorrhage or unenhanced mass lesion.    Dictated by: Doug Arellano M.D.    Images and Report reviewed and interpreted by: Doug Arellano M.D.    <PS><Electronically signed by: Doug Arellano M.D.>  2016    D: 2016  T: 2016  XR Chest 2Vw  RADIOLOGY REPORT    FACILITY:  [facility]  UNIT/AGE/GENDER: P.ICC-LY  OP      AGE:86 Y          SEX:F  PATIENT NAME/:  STEVEN PALMA    1930  UNIT NUMBER:  [id number]  ACCOUNT NUMBER:  [id number]  ACCESSION NUMBER:  [id number]    XR CHEST 2VW    DATE: 2017    COMPARISON: 2016    HISTORY: cough for 2 days - feels loose; PT had a pneumonia on CXR in August  2016, no repeat CXR after that.        FINDINGS: Lungs are clear. There is no pleural effusion. Heart size is normal. There is a large right pulmonary granuloma. Degenerative changes are present in the acromioclavicular joints bilaterally.    IMPRESSION: No acute cardiopulmonary findings    Dictated by: Mayela Quiroz M.D.    Images and Report reviewed and interpreted by: Mayela Quiroz M.D.    <PS><Electronically signed by: Mayela Quiroz M.D.>  2017 1600    D: 2017 1558  T: 2017 1558  XR Chest 1 Vw  RADIOLOGY REPORT    FACILITY:  [facility]  UNIT/AGE/GENDER: MDavidED  ER      AGE:86 Y          SEX:F  PATIENT NAME/:  STEVEN PALMA    1930  UNIT NUMBER:  [id number]  ACCOUNT NUMBER:  [id number]  ACCESSION NUMBER:  [id number]    EXAMINATION: XR CHEST 1 VW    DATE: 2017    HISTORY: Shortness of breath.        COMPARISON: 2017    FINDINGS: A single view of the chest demonstrates clear lungs. Old granulomatous disease is present. There is no pneumothorax or pleural effusion. The heart size and mediastinal contour are normal. Atherosclerotic calcification is noted within the aorta.    IMPRESSION: No acute cardiopulmonary radiographic abnormality.    Dictated by: Bryan Nickerson M.D.    Images and Report reviewed and interpreted by: Bryan Nickerson M.D.    <PS><Electronically signed by: Bryan Nickerson M.D.>  2017 1818    D: 2017 1818  T: 2017 1818  NM Lung Ventilation & Perfusion  NUCLEAR MEDICINE REPORT    FACILITY:  [facility]  UNIT/AGE/GENDER: LADAN4B  IN      AGE:86 Y          SEX:F  PATIENT NAME/:  STEVEN PALMA    1930  UNIT NUMBER:  [id number]  ACCOUNT NUMBER:  [id number]  ACCESSION NUMBER:  [id number]    DATE: 2017    EXAMINATION: Ventilation/perfusion scintigraphy study    COMPARISON: None    RADIOPHARMACEUTICAL: 36 mCi [id number] by ventilation and 4.8 mCi Tc-99m MAA IV.    HISTORY: Shortness  of air    FINDINGS:  The exam is read in correlation with chest x-ray dated 5 hours prior    The wash-in ventilatory images show homogenous distribution of activity in both lungs.    The perfusion images show no unmatched defects of the MAA distribution.    IMPRESSION: Normal VQ scan    Dictated by: Devante Hayden M.D.    Images and Report reviewed and interpreted by: Devante Hayden M.D.    <PS><Electronically signed by: Devante Hayden M.D.>  [id number]    D: 2017  T: 2017  US Renal Bilateral  RADIOLOGY REPORT    FACILITY:  [facility]  UNIT/AGE/GENDER: MDavid4B  IN      AGE:86 Y          SEX:F  PATIENT NAME/:  STEVEN PALMA    1930  UNIT NUMBER:  [id number]  ACCOUNT NUMBER:  [id number]  ACCESSION NUMBER:  [id number]    DATE: 2017    EXAM: Bilateral renal ultrasound.    HISTORY: Urinary tract infection    COMPARISON: No prior    FINDINGS: Real-time grayscale color sonography of the kidneys and urinary bladder was performed.  Right kidney measures 8.7 x 4.6 x 4.6 cm.  Left kidney measures 8.9 x 4 x 4.2 cm.  Both kidneys demonstrate moderate cortical thinning. There is no evidence for nephrolithiasis or obstructive uropathy..  The bladder is unremarkable.    IMPRESSION:  1. Mild cortical thinning but no evidence of obstructive uropathy    Dictated by: Devante Hayden M.D.    Images and Report reviewed and interpreted by: Devante Hayden M.D.    <PS><Electronically signed by: Devante Hayden M.D.>  2017 0000    D: 2017  T: 2017 235  XR Chest 1 Vw  RADIOLOGY REPORT    FACILITY:  [facility]  UNIT/AGE/GENDER: M.ED  ER      AGE:89 Y          SEX:F  PATIENT NAME/:  STEVEN PALMA M    1930  UNIT NUMBER:  [id number]  ACCOUNT NUMBER:  [id number]  ACCESSION NUMBER:  [id number]    EXAM: XR CHEST 1 VW 2020 3:20 AM    HISTORY: chest pain     COMPARISON:   2017    TECHNIQUE:  Single portable AP radiograph of the chest    FINDINGS:     Mild cardiomegaly. Tortuosity of the thoracic aorta. Calcified granuloma in the right lung similar to prior. Stable chronic interstitial pattern with no focal consolidation. No pleural effusion or visible pneumothorax.    IMPRESSION:     No acute radiographic abnormality of the chest.    Dictated by: Marcus López M.D.    Images and Report reviewed and interpreted by: Marcus López M.D.    <PS><Electronically signed by: Marcus López M.D.>  2020 0745    D: 2020  T: 202044  CT Chest Wo Contrast  RADIOLOGY REPORT    FACILITY:  [facility]  UNIT/AGE/GENDER: M.ED  ER      AGE:89 Y          SEX:F  PATIENT NAME/:  STEVEN PALMA M    1930  UNIT NUMBER:  [id number]  ACCOUNT NUMBER:  [id number]  ACCESSION NUMBER:  [id number]    EXAMINATION: CT of the chest without contrast.    DATE: 2020    HISTORY: Back pain, chest pain, hypertension and acute kidney injury..    TECHNIQUE:    Helical CT of the chest was performed without the administration of intravenous contrast media.     CT scans at this facility use dose modulation, iterative reconstruction and/or weight based dosing when appropriate to reduce radiation dose to as low as reasonably achievable.    COMPARISON: 2014.    FINDINGS: Evaluation of the mediastinum demonstrates moderate atherosclerosis of the thoracic aorta. Atheromatous plaque involving the posterior aspect of the descending thoracic aorta was better demonstrated by prior contrast-enhanced CT imaging. There   is chronic mild enlargement of the distal descending thoracic aorta measuring 3.4 cm, previously 3.1 cm. The heart remains enlarged.. Coronary arterial disease is present.    There is suggestion of subtle low-density filling defects within the right lower lobe pulmonary arterial tree. This is poorly evaluated in the absence of contrast media.    A  calcified mediastinal lymph nodes characteristic of old granulomatous disease.    Lung windows demonstrate chronic lung disease with mild changes of pulmonary emphysema. There is no acute consolidation. There is mild chronic basilar subpleural interstitial lung disease and bronchiectasis. A calcified right lower lobe granuloma is   unchanged.    A small hiatal hernia is present. Imaging of the upper abdomen demonstrates diverticulosis of the colon.    There are chronic degenerative changes of the shoulders and thoracic spine.    IMPRESSION:   1. Moderate thoracic aortic atherosclerosis with regions of atherosclerotic calcification and atheromatous plaque involving the descending thoracic aorta. Slight interval enlargement of the descending thoracic aorta measuring 3.4 cm, previously 3.1 cm.  2. Moderate cardiomegaly and coronary arterial disease.  3. Noncontrast CT suggest low-density filling defects within the right lower lobe pulmonary arterial tree. Pulmonary arterial emboli cannot be excluded on noncontrast examination. Recommend risk factor stratification Consider lower extremity deep venous   ultrasound if the patient cannot undergo diagnostic CT pulmonary angiography.  4. Old granulomatous disease.  5. Small hiatal hernia.  6. Diverticulosis.    Dictated by: Devante Hayden M.D.    Images and Report reviewed and interpreted by: Devante Hayden M.D.    <PS><Electronically signed by: Devante Hayden M.D.>  2020 0921    D: 2020 0829  T: 2020 0829  Duplex US Venous Ext Low Bilateral  RADIOLOGY REPORT  FAC[facility]  AGE/GENDER:  AGE: 89 Y            GENDER: F  PATIENT NAME/: STEVEN PALMA M    : 1930  UNIT NUMBER: [id number]  ACCESSION NUMBER: [id number]  ACCOUNT:     PROCEDURE PERFORMED: DUPLEX US VENOUS EXT LOW BILATERAL    Conclusions: Right lower extremity with no evidence of deep or superficial thrombus.  Left lower extremity with  evidence of acute occlusive deep vein thrombus in the soleal vein. No evidence for superficial vein thrombus.    No prior examination for comparison.   Preliminary technologist findings called to the patient's nurse Jennifer at 1045.    THIS DOCUMENT HAS BEEN ELECTRONICALLY SIGNED BY: Abena Guerin MD  US Abdomen Limited  RADIOLOGY REPORT    FACILITY:  [facility]  UNIT/AGE/GENDER: M.4B  IN      AGE:89 Y          SEX:F  PATIENT NAME/:  STEVEN PALMA M    1930  UNIT NUMBER:  [id number]  ACCOUNT NUMBER:  [id number]  ACCESSION NUMBER:  [id number]    EXAMINATION: US ABDOMEN LIMITED    DATE: 2020    HISTORY: Abdominal pain.        COMPARISON: None.    FINDINGS: The liver is normal in size and echogenicity. The gallbladder is normal. There are no gallstones, wall thickening, or pericholecystic fluid. The sonographic Sahu's sign was reportedly negative. The common bile duct is mildly dilated measuring   8 mm in diameter. This may be normal for the patient's age, although a distally obstructing stone or lesion cannot be excluded. Correlate with patient exam and laboratory data. There are no abnormalities of the imaged portions of the pancreas. The right   kidney measures 8.9 cm in length and exhibits increased renal echogenicity suggesting chronic renal disease.    IMPRESSION:  1. No evidence of cholecystitis. The common bile duct is mildly dilated measuring 8 mm. This may be normal for the patient's age, although distally obstructing stone or lesion cannot be excluded. Correlate with patient exam and laboratory data.  2. Increased right renal echogenicity suggesting chronic renal disease.    Dictated by: Bryan Nickerson M.D.    Images and Report reviewed and interpreted by: Bryan Nickerson M.D.    <PS><Electronically signed by: Bryan Nickerson M.D.>  2020    D: 2020  T: 2020  CT Chest Wo Contrast  RADIOLOGY REPORT    FACILITY:  [facility]  [facility]  UNIT/AGE/GENDER: M.ED  ER      AGE:89 Y          SEX:F  PATIENT NAME/:  STEVEN PALMA M    1930  UNIT NUMBER:  [id number]  ACCOUNT NUMBER:  [id number]  ACCESSION NUMBER:  [id number]    EXAMINATION: CT of the chest without contrast.    DATE : 2020    HISTORY: Chest pain.    TECHNIQUE: Helical CT of the chest was performed without the administration of intravenous contrast media. CT examinations at this facility use dose modulation, iterative reconstruction and/or weight based dosing when appropriate to reduce radiation dose   to as low as reasonably achievable.    COMPARISON: 2020. 2014.    FINDINGS: There is a stable, heavily calcified, 14 mm granuloma along the posterior pleural surface of the right lower lobe. There is mild biapical pleural and parenchymal scarring. There is mild dependent atelectasis and/or scarring. There is mild   bibasilar bronchiectasis, most severe in the left lower lobe.    There are no acute pulmonary infiltrates or pleural effusions. No noncalcified pulmonary nodules are identified.    The images of the mediastinum demonstrate upper normal caliber of the ascending aorta. There is tortuosity of the descending aorta along with aortic atherosclerosis and mural thrombus. There is dilatation of the descending aorta reaching 3.8 cm in   caliber, perpendicular to the axis of blood flow superior to the diaphragm. The dilatation has remained stable since the previous exam. There is mild dilatation of the main pulmonary artery reaching 3.4 cm in caliber which is compatible with pulmonary   arterial hypertension. There is coronary artery disease.    The images of the upper abdomen demonstrate no abnormalities of the liver, pancreas, spleen, adrenal glands or upper pole of the left kidney. There is a 9 mm hyperdense nodule arising from the upper pole of the right kidney which is without change and   most likely represents a proteinaceous or  hemorrhagic cyst.     IMPRESSION:   1.No acute thoracic abnormalities. Specifically, no pulmonary infiltrates or pleural effusions.  2.Severe aortic atherosclerosis with stable dilatation of the descending aorta. The aorta may be further evaluated with a CT angiography study.  3.Dilatation of the main pulmonary artery which is compatible with pulmonary arterial hypertension.  4.Coronary artery disease.    Dictated by: Wade Hummel M.D.    Images and Report reviewed and interpreted by: Wade Hummel M.D.    <PS><Electronically signed by: Wade Hummel M.D.>  2020 1511    D: 2020 1458  T: 2020 1458  CT Head Wo Contrast  RADIOLOGY REPORT    [facility]  UNIT/AGE/GENDER: M.ED  ER      AGE:89 Y          SEX:F  PATIENT NAME/:  STEVEN PALMA M    1930  UNIT NUMBER:  [id number]  ACCOUNT NUMBER:  [id number]  ACCESSION NUMBER:  [id number]    EXAMINATION: CT head without contrast.    DATE: 2020  HISTORY: weakness, HTN initial encounter    COMPARISON: .  TECHNIQUE: Standard CT scan of the head without contrast.  All CT scans at this facility use dose modulation, iterative reconstruction, and/or weight based dosing when appropriate to reduce radiation dose to as low as reasonably achievable.    FINDINGS: In the interval from , a chronic appearing lacunar infarct has developed in the left basal ganglia.    IMPRESSION:    Chronic appearing lacunar infarct in the left basal ganglia.    Dictated by: Mayela Quiroz M.D.    Images and Report reviewed and interpreted by: Mayela Quiroz M.D.    <PS><Electronically signed by: Mayela Quiroz M.D.>  2020 1501    D: 2020 1458  T: 2020 1458  XR Foot Comp Min 3 Vws RT  RADIOLOGY REPORT    FACILITY:  [facility]  UNIT/AGE/GENDER: RUSTY  OP      AGE:89 Y          SEX:F  PATIENT NAME/:  STEVEN PALMA M    1930  UNIT NUMBER:  [id number]  ACCOUNT NUMBER:   [id number]  ACCESSION NUMBER:  [id number]    EXAM: XR FOOT COMP MIN 3 VWS RT    DATE: 03/01/2021    HISTORY: right foot injury, hit it against the nigthstand a week ago and pain along the 5th metatarsal area.        COMPARISON: None.    FINDINGS:  3 views of the right foot demonstrate no evidence of a fracture, dislocation, or other acute osseous abnormality. A small well-corticated accessory ossicle is seen adjacent to the cuboid bone. Soft tissue swelling is noted along the dorsum of   the foot.    IMPRESSION:     1. Right foot soft tissue swelling without acute fracture or dislocation.    Dictated by: Dwayne Lemus M.D.    Images and Report reviewed and interpreted by: Dwayne Lemus M.D.    <PS><Electronically signed by: Dwayne Lemus M.D.>  03/01/2021 1441    D: 03/01/2021 1438  T: 03/01/2021 1438    Scheduled Medications  amLODIPine, 10 mg, Oral, Daily  cholecalciferol, 1,000 Units, Oral, Daily  potassium chloride, 20 mEq, Oral, Daily  senna-docusate sodium, 2 tablet, Oral, BID  sodium chloride, 10 mL, Intravenous, Q12H  torsemide, 100 mg, Oral, Daily    Infusions  Pharmacy to dose warfarin,     Diet  Diet Regular; Cardiac         I have personally reviewed:  [x]  Laboratory   []  Microbiology   []  Radiology   [x]  EKG/Telemetry   []  Cardiology/Vascular   []  Pathology   []  Records    Assessment/Plan     Active Hospital Problems    Diagnosis  POA   • **Closed fracture of multiple pubic rami, right, initial encounter ([facility]) [S32.591A]  Yes   • Sacral fracture ([facility]) [S32.10XA]  Yes   • Anemia in chronic kidney disease [N18.9, D63.1]  Yes   • End stage renal disease ([facility]) [N18.6]  Yes   • Anticoagulated on warfarin [Z79.01]  Not Applicable   • Essential hypertension [I10]  Yes   • Chronic diastolic congestive heart failure ([facility]) [I50.32]  Yes   • History of pulmonary embolus (PE) [Z86.711]  Yes   • Permanent atrial fibrillation ([facility]) [I48.21]  Yes      Resolved Hospital Problems   No resolved problems to  display.       89 y.o. female admitted with Closed fracture of multiple pubic rami, right, initial encounter ([facility]).    Right-sided superior/inferior pubic rami fracture, right sided lateral sacral fracture   -Likely nonoperable but will consult Ortho, appreciate their attention to this patient- pending   -Hold warfarin until their evaluation/elevated INR today, pain control with Tylenol and Norco as needed   -Likely will need PT/OT/CCP consult for placement       ESRD on HD   -Nephrology consulted, left upper extremity AV fistula, HD today  -Continue Demadex       Atrial fibrillation on warfarin   -Warfarin held as above, likely can resume if no operation planned, also INR supratherapeutic today  -Patient not on beta-blocker at home, monitor       Hypertension   -Continue Demadex and amlodipine     · Warfarin (home med) for DVT prophylaxis.  · Full code.  · Discussed with patient and nursing staff.  · Anticipate discharge to SNU facility timing yet to be determined.      Margot Flowers MD  [facility]  05/13/22  16:50 EDT    I wore protective equipment throughout this patient encounter including a face mask, gloves and protective eyewear.  Hand hygiene was performed before donning protective equipment and after removal when leaving the room.

## 2022-05-13 NOTE — PLAN OF CARE
Goal Outcome Evaluation:  Plan of Care Reviewed With: patient        Progress: no change  Outcome Evaluation: PATIENT HERE WITH A CLOSED FX OF MULTIPLE PUBIC RAMI FX ON THE RIGHT. VSS. PO PAIN MEDICATION HELPING WITH PAIN. VOIDING PER [place]. PATIENT ON DIALYSIS. EDUCATION PROVIDED ON PAIN CONTROL AND BP MONITORING.  WILL CONTINUE TO MONITOR.

## 2022-05-13 NOTE — THERAPY EVALUATION
Patient Name: Cristina Lemus  : 3/13/1930    MRN: [id number]                              Today's Date: 2022       Admit Date: 2022    Visit Dx:     ICD-10-CM ICD-9-CM   1. Closed fracture of multiple pubic rami, right, initial encounter ([facility])  S32.591A 808.2     Patient Active Problem List   Diagnosis   • AVNRT (AV joy re-entry tachycardia) ([facility])   • History of pulmonary embolus (PE)   • HTN, goal below 140/80   • Permanent atrial fibrillation ([facility])   • Stage 4 chronic kidney disease ([facility])   • Chronic diastolic congestive heart failure ([facility])   • Anxiety disorder   • Chronic gout without tophus   • KIM (acute kidney injury) ([facility])   • Essential hypertension   • Dyspnea   • Pleural effusion   • Dyspnea on exertion   • Anemia   • Acute on chronic renal failure ([facility])   • Acute renal failure superimposed on chronic kidney disease ([facility])   • Anticoagulated on warfarin   • UTI (urinary tract infection)   • Metabolic encephalopathy   • End stage renal disease ([facility])   • C. difficile colitis   • Aftercare including intermittent dialysis ([facility])   • Allergy, unspecified, initial encounter   • Anaphylactic shock, unspecified, initial encounter   • Anemia in chronic kidney disease   • Coagulation defect, unspecified ([facility])   • Conjunctivochalasis   • Epiretinal membrane   • Hypokalemia   • Iron deficiency anemia   • Secondary hyperparathyroidism of renal origin ([facility])   • Chronic mid back pain   • Closed fracture of multiple pubic rami, right, initial encounter ([facility])   • Sacral fracture ([facility])     Past Medical History:   Diagnosis Date   • Atrial fibrillation ([facility])    • CHF (congestive heart failure) ([facility])      Past Surgical History:   Procedure Laterality Date   • ARTERIOVENOUS FISTULA/SHUNT SURGERY Left 2022    Procedure: LEFT ARM DIALYSIS GRAFT;  Surgeon: King Reynaga MD;  Location: [facility] OR;  Service: Vascular;  Laterality: Left;   • BACK SURGERY     • CYST REMOVAL     • INSERTION HEMODIALYSIS  CATHETER N/A 1/18/2022    Procedure: HEMODIALYSIS CATHETER INSERTION with C arm;  Surgeon: King Reynaga MD;  Location: [facility] MAIN OR;  Service: Vascular;  Laterality: N/A;   • OTHER SURGICAL HISTORY      rupture disk      General Information     Row Name 05/13/22 1101          Physical Therapy Time and Intention    Document Type evaluation  -EJ     Mode of Treatment physical therapy  -EJ     Row Name 05/13/22 1101          General Information    Patient Profile Reviewed yes  -EJ     Prior Level of Function independent:;ADL's;all household mobility;community mobility  uses walker  -EJ     Existing Precautions/Restrictions fall  -EJ     Barriers to Rehab none identified  -EJ     Row Name 05/13/22 1101          Living Environment    People in Home child(liana), adult  daughter  -EJ     Row Name 05/13/22 1101          Home Main Entrance    Number of Stairs, Main Entrance none  -EJ     Row Name 05/13/22 1101          Stairs Within Home, Primary    Number of Stairs, Within Home, Primary none  -EJ     Row Name 05/13/22 1101          Cognition    Orientation Status (Cognition) oriented x 3  -     Row Name 05/13/22 1101          Safety Issues, Functional Mobility    Impairments Affecting Function (Mobility) balance;pain;strength;range of motion (ROM);postural/trunk control  -EJ           User Key  (r) = Recorded By, (t) = Taken By, (c) = Cosigned By    Initials Name Provider Type    EJ Sheri Martines, PT Physical Therapist               Mobility     Row Name 05/13/22 1102          Bed Mobility    Bed Mobility supine-sit  -EJ     Supine-Sit [place] (Bed Mobility) verbal cues;nonverbal cues (demo/gesture);moderate assist (50% patient effort);maximum assist (25% patient effort);2 person assist  -EJ     Assistive Device (Bed Mobility) head of bed elevated;bed rails  -     Row Name 05/13/22 1102          Sit-Stand Transfer    Sit-Stand [place] (Transfers) verbal cues;nonverbal cues  (demo/gesture);moderate assist (50% patient effort);2 person assist  -EJ     Assistive Device (Sit-Stand Transfers) walker, front-wheeled  -EJ     Row Name 05/13/22 1102          Gait/Stairs (Locomotion)    [place] Level (Gait) verbal cues;nonverbal cues (demo/gesture);moderate assist (50% patient effort);2 person assist  -EJ     Assistive Device (Gait) walker, front-wheeled  -EJ     Distance in Feet (Gait) 3  -EJ     Deviations/Abnormal Patterns (Gait) jeremie decreased;antalgic;festinating/shuffling;stride length decreased  -EJ     Bilateral Gait Deviations forward flexed posture;heel strike decreased  -EJ     Comment, (Gait/Stairs) several steps from bed to chair, limited by pain, pt having difficulty weight bearing thru RLE  -EJ           User Key  (r) = Recorded By, (t) = Taken By, (c) = Cosigned By    Initials Name Provider Type    EJ Sheri Martines, PT Physical Therapist               Obj/Interventions     Row Name 05/13/22 1103          Range of Motion Comprehensive    General Range of Motion no range of motion deficits identified  -EJ     Comment, General Range of Motion x R hip  -EJ     Row Name 05/13/22 1103          Strength Comprehensive (MMT)    Comment, General Manual Muscle Testing (MMT) Assessment generalized weakness, difficult to assess due to pain  -EJ     Row Name 05/13/22 1103          Balance    Balance Assessment sitting static balance;standing static balance;standing dynamic balance  -EJ     Static Sitting Balance standby assist;contact guard  -EJ     Position, Sitting Balance sitting edge of bed  -EJ     Static Standing Balance minimal assist  -EJ     Dynamic Standing Balance moderate assist  -EJ     Position/Device Used, Standing Balance walker, rolling  -EJ           User Key  (r) = Recorded By, (t) = Taken By, (c) = Cosigned By    Initials Name Provider Type    EJ Sheri Martines, PT Physical Therapist               Goals/Plan     Row Name 05/13/22 1108          Bed Mobility  Goal 1 (PT)    Activity/Assistive Device (Bed Mobility Goal 1, PT) bed mobility activities, all  -EJ     [place] Level/Cues Needed (Bed Mobility Goal 1, PT) minimum assist (75% or more patient effort)  -EJ     Time Frame (Bed Mobility Goal 1, PT) 1 week  -EJ     Row Name 05/13/22 1108          Transfer Goal 1 (PT)    Activity/Assistive Device (Transfer Goal 1, PT) transfers, all;walker, rolling  -EJ     [place] Level/Cues Needed (Transfer Goal 1, PT) minimum assist (75% or more patient effort)  -EJ     Time Frame (Transfer Goal 1, PT) 1 week  -EJ     Row Name 05/13/22 1108          Gait Training Goal 1 (PT)    Activity/Assistive Device (Gait Training Goal 1, PT) gait (walking locomotion);walker, rolling  -EJ     [place] Level (Gait Training Goal 1, PT) minimum assist (75% or more patient effort)  -EJ     Distance (Gait Training Goal 1, PT) 50  -EJ     Time Frame (Gait Training Goal 1, PT) 1 week  -EJ     Row Name 05/13/22 1108          Therapy Assessment/Plan (PT)    Planned Therapy Interventions (PT) balance training;bed mobility training;gait training;home exercise program;patient/family education;stretching;strengthening;stair training;ROM (range of motion);transfer training  -EJ           User Key  (r) = Recorded By, (t) = Taken By, (c) = Cosigned By    Initials Name Provider Type    Sheri Clayton, PT Physical Therapist               Clinical Impression     Row Name 05/13/22 1104          Pain    Posttreatment Pain Rating 8/10  -EJ     Pain Location - Side/Orientation Right  -EJ     Pain Location lower  -EJ     Pain Location - extremity;hip  -EJ     Pain Intervention(s) Repositioned;Ambulation/increased activity  -EJ     Row Name 05/13/22 1100          Plan of Care Review    Plan of Care Reviewed With patient  -EJ     Progress improving  -EJ     Outcome Evaluation Pt seen for PT gabo this am. She had a fall resulting in closed fracture of multiple pubic rami on R. SHe presents w increased  pain, weakness, decreased activity tolerance, and overall decreased functional mobility. At baseline, pt lives with her daughter and uses walker for ambulation. SHe reports no other recent falls. Today, pt requiring mod/max A to sit EOB. She was able to stand w mod A x 2 and took several small steps from bed to chair using Rwx. She is limited by pain and having difficulty putting weigth through RLE. Pt will most likely need SNU at [place]. She will continue to benefit from skilled PT to maximize safety, strength, and independence w mobility.  -EJ     Row Name 05/13/22 1104          Therapy Assessment/Plan (PT)    Patient/Family Therapy Goals Statement (PT) feel better  -EJ     Rehab Potential (PT) good, to achieve stated therapy goals  -EJ     Criteria for Skilled Interventions Met (PT) yes  -EJ     Therapy Frequency (PT) 6 times/wk  -EJ     Row Name 05/13/22 1104          Positioning and Restraints    Pre-Treatment Position in bed  -EJ     Post Treatment Position chair  -EJ     In Chair notified nsg;reclined;call light within reach;encouraged to call for assist;exit alarm on  -EJ           User Key  (r) = Recorded By, (t) = Taken By, (c) = Cosigned By    Initials Name Provider Type    Sheri Clayton, PT Physical Therapist               Outcome Measures     Row Name 05/13/22 1108 05/13/22 0807       How much help from another person do you currently need...    Turning from your back to your side while in flat bed without using bedrails? 2  -EJ 1  -NH    Moving from lying on back to sitting on the side of a flat bed without bedrails? 2  -EJ 1  -NH    Moving to and from a bed to a chair (including a wheelchair)? 2  -EJ 1  -NH    Standing up from a chair using your arms (e.g., wheelchair, bedside chair)? 2  -EJ 1  -NH    Climbing 3-5 steps with a railing? 1  -EJ 1  -NH    To walk in hospital room? 2  -EJ 1  -NH    AM-PAC 6 Clicks Score (PT) 11  -EJ 6  -NH          User Key  (r) = Recorded By, (t) = Taken By, (c) =  Cosigned By    Initials Name Provider Type    EJ Sheri Martines, PT Physical Therapist    Gee Davalos RN Registered Nurse                             Physical Therapy Education                 Title: PT OT SLP Therapies (In Progress)     Topic: Physical Therapy (In Progress)     Point: Mobility training (Done)     Learning Progress Summary           Patient Acceptance, E,TB,D, VU,NR by CADEN at 5/13/2022 1109                   Point: Home exercise program (Not Started)     Learner Progress:  Not documented in this visit.          Point: Body mechanics (Not Started)     Learner Progress:  Not documented in this visit.          Point: Precautions (Not Started)     Learner Progress:  Not documented in this visit.                      User Key     Initials Effective Dates Name Provider Type Discipline     06/16/21 -  Sheri Martines, PT Physical Therapist PT              PT Recommendation and Plan  Planned Therapy Interventions (PT): balance training, bed mobility training, gait training, home exercise program, patient/family education, stretching, strengthening, stair training, ROM (range of motion), transfer training  Plan of Care Reviewed With: patient  Progress: improving  Outcome Evaluation: Pt seen for PT eval this am. She had a fall resulting in closed fracture of multiple pubic rami on R. SHe presents w increased pain, weakness, decreased activity tolerance, and overall decreased functional mobility. At baseline, pt lives with her daughter and uses walker for ambulation. SHe reports no other recent falls. Today, pt requiring mod/max A to sit EOB. She was able to stand w mod A x 2 and took several small steps from bed to chair using Rwx. She is limited by pain and having difficulty putting weigth through RLE. Pt will most likely need SNU at [place]. She will continue to benefit from skilled PT to maximize safety, strength, and independence w mobility.     Time Calculation:    PT Charges     Row Name  05/13/22 1110             Time Calculation    Start Time 1015  -EJ      Stop Time 1030  -EJ      Time Calculation (min) 15 min  -EJ      PT Received On 05/13/22  -EJ      PT - Next Appointment 05/14/22  -EJ      PT Goal Re-Cert Due Date 05/20/22  -EJ              Time Calculation- PT    Total Timed Code Minutes- PT 10 minute(s)  -EJ            User Key  (r) = Recorded By, (t) = Taken By, (c) = Cosigned By    Initials Name Provider Type     Sheri Martines, PT Physical Therapist              Therapy Charges for Today     Code Description Service Date Service Provider Modifiers Qty    48504879140 HC PT THER PROC EA 15 MIN 5/13/2022 Sheri Martines, PT GP 1    71874584532 HC PT EVAL MOD COMPLEXITY 2 5/13/2022 Sheri Martines, PT GP 1    68696677206 HC PT THER SUPP EA 15 MIN 5/13/2022 Sheri Martines, PT GP 1          PT G-Codes  AM-PAC 6 Clicks Score (PT): 11    Sheri Martines, PT  5/13/2022

## 2022-05-13 NOTE — PLAN OF CARE
Goal Outcome Evaluation:  Plan of Care Reviewed With: patient        Progress: improving  Outcome Evaluation: Pt seen for PT eval this am. She had a fall resulting in closed fracture of multiple pubic rami on R. SHe presents w increased pain, weakness, decreased activity tolerance, and overall decreased functional mobility. At baseline, pt lives with her daughter and uses walker for ambulation. SHe reports no other recent falls. Today, pt requiring mod/max A to sit EOB. She was able to stand w mod A x 2 and took several small steps from bed to chair using Rwx. She is limited by pain and having difficulty putting weigth through RLE. Pt will most likely need SNU at [facility]. She will continue to benefit from skilled PT to maximize safety, strength, and independence w mobility.

## 2022-05-13 NOTE — PLAN OF CARE
Goal Outcome Evaluation:              Outcome Evaluation: Pt JustinoO x4. VSS. NVI. Pain controlled with PO pain meds. [place]. Up with assist x2. Worked with PT today, see note. Refuses to turn at times d/t increased pain. SCDs in place, accumax pump. Pt currently in dialysis. Educated pt on fall prevention, verbalized understanding. Plans to d/c to rehab, pending. All needs met at this time.

## 2022-05-13 NOTE — CASE MANAGEMENT/SOCIAL WORK
Discharge Planning Assessment  [facility]     Patient Name: Cristina Lemus  MRN: [id number]  Today's Date: 5/13/2022    Admit Date: 5/12/2022     Discharge Needs Assessment     Row Name 05/13/22 1302       Living Environment    People in Home child(liana), adult    Name(s) of People in Home Daughter/Shannan.    Current Living Arrangements home    Primary Care Provided by self    Provides Primary Care For no one    Family Caregiver if Needed child(liana), adult    Quality of Family Relationships helpful;involved;supportive    Able to Return to Prior Arrangements other (see comments)  Plans SNF.       Transition Planning    Patient/Family Anticipates Transition to inpatient rehabilitation facility    Patient/Family Anticipated Services at Transition     Transportation Anticipated family or friend will provide;health plan transportation       Discharge Needs Assessment    Readmission Within the Last 30 Days no previous admission in last 30 days    Equipment Currently Used at Home walker, rolling;rollator;shower chair;grab bar    Discharge Facility/Level of Care Needs nursing facility, skilled    Provided Post Acute Provider List? Yes    Post Acute Provider List Nursing Home               Discharge Plan     Row Name 05/13/22 9011       Plan    Plan SNF -- Referrals Pending.    Patient/Family in Agreement with Plan yes    Plan Comments Spoke with the patient who asked CCP to discuss d/c planning with her daughter/Shannan. Spoke with Shannan, verified current information and explained the role of the CCP. Patient lives with Shannan and has family support. She's overall IADL and owns a walker, rollator, shower chair and grab bars. She's been to [facility] and is current with [facility] . Physical Therapy eval/rec noted. Discussed with both the patient & Shannan who plan on SNF and request referrals to [facility] & [facility]. Referrals sent in Epic. Spoke with Abbey/Jie &  Julio/[facility] who will review the case and f/u with CCP. CCP will follow.              Continued Care and Services - Admitted Since 5/12/2022     Destination     Service Provider Request Status Selected Services Address Phone Fax Patient Preferred    [facility]  [facility] [id number] [id number] [phone] --    [facility]  Pending - Request Sent N/A [id number] [facility] [id number] [phone] --              Expected Discharge Date and Time     Expected Discharge Date Expected Discharge Time    May 15, 2022          Demographic Summary     Row Name 05/13/22 1301       General Information    Admission Type observation    Reason for Consult discharge planning    Preferred Language English       Contact Information    Permission Granted to Share Info With ;family/designee               Functional Status     Row Name 05/13/22 1301       Functional Status    Usual Activity Tolerance moderate       Functional Status, IADL    Medications independent    Meal Preparation assistive equipment    Housekeeping assistive equipment    Laundry assistive equipment    Shopping assistive equipment;assistive equipment and person       Mental Status Summary    Recent Changes in Mental Status/Cognitive Functioning no changes               Psychosocial     Row Name 05/13/22 1302       Intellectual Performance WDL    Level of Consciousness Alert       Coping/Stress    Patient Personal Strengths able to adapt    Sources of Support adult child(liana)    Reaction to Health Status accepting    Understanding of Condition and Treatment adequate understanding of medical condition;adequate understanding of treatment       Developmental Stage (Eriksson's)    Developmental Stage Stage 8 (65 years-death/Late Adulthood) Integrity vs. Despair                Kathy TRAN, RN

## 2022-05-14 LAB
ALBUMIN SERPL-MCNC: 3.4 G/DL (ref 3.5–5.2)
ANION GAP SERPL CALCULATED.3IONS-SCNC: 11.5 MMOL/L (ref 5–15)
BASOPHILS # BLD AUTO: 0.02 10*3/MM3 (ref 0–0.2)
BASOPHILS NFR BLD AUTO: 0.3 % (ref 0–1.5)
BUN SERPL-MCNC: 30 MG/DL (ref 8–23)
BUN/CREAT SERPL: 9.9 (ref 7–25)
CALCIUM SPEC-SCNC: 8.1 MG/DL (ref 8.2–9.6)
CHLORIDE SERPL-SCNC: 101 MMOL/L (ref 98–107)
CO2 SERPL-SCNC: 21.5 MMOL/L (ref 22–29)
CREAT SERPL-MCNC: 3.02 MG/DL (ref 0.57–1)
DEPRECATED RDW RBC AUTO: 46.7 FL (ref 37–54)
EGFRCR SERPLBLD CKD-EPI 2021: 14.1 ML/MIN/1.73
EOSINOPHIL # BLD AUTO: 0.09 10*3/MM3 (ref 0–0.4)
EOSINOPHIL NFR BLD AUTO: 1.2 % (ref 0.3–6.2)
ERYTHROCYTE [DISTWIDTH] IN BLOOD BY AUTOMATED COUNT: 14.4 % (ref 12.3–15.4)
GLUCOSE SERPL-MCNC: 98 MG/DL (ref 65–99)
HCT VFR BLD AUTO: 29.7 % (ref 34–46.6)
HGB BLD-MCNC: 10.2 G/DL (ref 12–15.9)
IMM GRANULOCYTES # BLD AUTO: 0.07 10*3/MM3 (ref 0–0.05)
IMM GRANULOCYTES NFR BLD AUTO: 0.9 % (ref 0–0.5)
INR PPP: 3.49 (ref 0.9–1.1)
LYMPHOCYTES # BLD AUTO: 1.28 10*3/MM3 (ref 0.7–3.1)
LYMPHOCYTES NFR BLD AUTO: 16.7 % (ref 19.6–45.3)
MCH RBC QN AUTO: 30.8 PG (ref 26.6–33)
MCHC RBC AUTO-ENTMCNC: 34.3 G/DL (ref 31.5–35.7)
MCV RBC AUTO: 89.7 FL (ref 79–97)
MONOCYTES # BLD AUTO: 0.86 10*3/MM3 (ref 0.1–0.9)
MONOCYTES NFR BLD AUTO: 11.2 % (ref 5–12)
NEUTROPHILS NFR BLD AUTO: 5.36 10*3/MM3 (ref 1.7–7)
NEUTROPHILS NFR BLD AUTO: 69.7 % (ref 42.7–76)
NRBC BLD AUTO-RTO: 0 /100 WBC (ref 0–0.2)
PHOSPHATE SERPL-MCNC: 3.2 MG/DL (ref 2.5–4.5)
PLATELET # BLD AUTO: 139 10*3/MM3 (ref 140–450)
PMV BLD AUTO: 11 FL (ref 6–12)
POTASSIUM SERPL-SCNC: 4.2 MMOL/L (ref 3.5–5.2)
PROTHROMBIN TIME: 35.4 SECONDS (ref 11.7–14.2)
RBC # BLD AUTO: 3.31 10*6/MM3 (ref 3.77–5.28)
SODIUM SERPL-SCNC: 134 MMOL/L (ref 136–145)
WBC NRBC COR # BLD: 7.68 10*3/MM3 (ref 3.4–10.8)

## 2022-05-14 PROCEDURE — 85025 COMPLETE CBC W/AUTO DIFF WBC: CPT | Performed by: INTERNAL MEDICINE

## 2022-05-14 PROCEDURE — 85610 PROTHROMBIN TIME: CPT | Performed by: NURSE PRACTITIONER

## 2022-05-14 PROCEDURE — 80069 RENAL FUNCTION PANEL: CPT | Performed by: INTERNAL MEDICINE

## 2022-05-14 PROCEDURE — G0378 HOSPITAL OBSERVATION PER HR: HCPCS

## 2022-05-14 RX ADMIN — POTASSIUM CHLORIDE 20 MEQ: 10 TABLET, EXTENDED RELEASE ORAL at 09:36

## 2022-05-14 RX ADMIN — DOCUSATE SODIUM 50MG AND SENNOSIDES 8.6MG 2 TABLET: 8.6; 5 TABLET, FILM COATED ORAL at 20:19

## 2022-05-14 RX ADMIN — BISACODYL 5 MG: 5 TABLET ORAL at 20:19

## 2022-05-14 RX ADMIN — LORAZEPAM 1 MG: 1 TABLET ORAL at 20:19

## 2022-05-14 RX ADMIN — TORSEMIDE 100 MG: 100 TABLET ORAL at 09:36

## 2022-05-14 RX ADMIN — POLYETHYLENE GLYCOL 3350 17 G: 17 POWDER, FOR SOLUTION ORAL at 20:18

## 2022-05-14 RX ADMIN — HYDROCODONE BITARTRATE AND ACETAMINOPHEN 1 TABLET: 5; 325 TABLET ORAL at 16:40

## 2022-05-14 RX ADMIN — DOCUSATE SODIUM 50MG AND SENNOSIDES 8.6MG 2 TABLET: 8.6; 5 TABLET, FILM COATED ORAL at 09:36

## 2022-05-14 RX ADMIN — HYDROCODONE BITARTRATE AND ACETAMINOPHEN 1 TABLET: 5; 325 TABLET ORAL at 09:36

## 2022-05-14 RX ADMIN — Medication 1000 UNITS: at 09:36

## 2022-05-14 RX ADMIN — Medication 10 ML: at 09:37

## 2022-05-14 RX ADMIN — AMLODIPINE BESYLATE 10 MG: 10 TABLET ORAL at 09:36

## 2022-05-14 NOTE — PROGRESS NOTES
Name: Cristina Lemus ADMIT: 2022   : 3/13/1930  PCP: Wood Elias MD    MRN: [id number] LOS: 0 days   AGE/SEX: 89 y.o. female  ROOM: [facility]     Subjective   Subjective   Patient resting in bed, she has no complaints when still however she says she gets significant right-sided hip pain when working with physical therapy/any kind of movement.    Review of Systems   Constitutional: Negative for fatigue and fever.   Respiratory: Negative for cough and shortness of breath.    Cardiovascular: Negative for chest pain, palpitations and leg swelling.   Gastrointestinal: Negative for abdominal pain, nausea and vomiting.   Musculoskeletal:        Right hip pain   Neurological: Negative for weakness.        Objective   Objective   Vital Signs  Temp:  [98 °F (36.7 °C)-99.2 °F (37.3 °C)] 99.2 °F (37.3 °C)  Heart Rate:  [74-85] 85  Resp:  [14-18] 14  BP: (155-173)/(65-86) 161/76  SpO2:  [93 %-96 %] 96 %  on  Flow (L/min):  [2] 2;   Device (Oxygen Therapy): nasal cannula  Body mass index is 18.37 kg/m².  Physical Exam  Constitutional:       General: She is not in acute distress.     Appearance: Normal appearance. She is ill-appearing (chronically).      Comments: Hard of hearing   Cardiovascular:      Rate and Rhythm: Normal rate and regular rhythm.      Pulses: Normal pulses.      Heart sounds: No murmur heard.  Pulmonary:      Effort: Pulmonary effort is normal. No respiratory distress.      Breath sounds: No wheezing.      Comments: Diminished  Abdominal:      General: Abdomen is flat. Bowel sounds are normal. There is no distension.      Palpations: Abdomen is soft.   Musculoskeletal:         General: No swelling or tenderness.      Right lower leg: No edema.      Left lower leg: No edema.      Comments: LUE AV fistula, + thrill.   Skin:     General: Skin is warm and dry.   Neurological:      General: No focal deficit present.      Mental Status: She is alert and oriented to person, place, and time. Mental status is  at baseline.         Results Review     I reviewed the patient's new clinical results.  Results from last 7 days   Lab Units 05/14/22  0329 05/13/22  0536 05/12/22  1129   WBC 10*3/mm3 7.68 6.19 6.23   HEMOGLOBIN g/dL 10.2* 10.0* 10.9*   PLATELETS 10*3/mm3 139* 180 155     Results from last 7 days   Lab Units 05/14/22  0329 05/13/22  0536 05/12/22  1129   SODIUM mmol/L 134* 137 135*   POTASSIUM mmol/L 4.2 4.7 4.6   CHLORIDE mmol/L 101 103 100   CO2 mmol/L 21.5* 21.0* 21.0*   BUN mg/dL 30* 56* 52*   CREATININE mg/dL 3.02* 4.36* 3.90*   GLUCOSE mg/dL 98 78 90   Estimated Creatinine Clearance: 9.4 mL/min (A) (by C-G formula based on SCr of 3.02 mg/dL (H)).  Results from last 7 days   Lab Units 05/14/22  0329 05/13/22  0536   ALBUMIN g/dL 3.40* 3.30*     Results from last 7 days   Lab Units 05/14/22 0329 05/13/22  0536 05/12/22  1129   CALCIUM mg/dL 8.1* 8.1* 8.5   ALBUMIN g/dL 3.40* 3.30*  --    MAGNESIUM mg/dL  --  2.3  --    PHOSPHORUS mg/dL 3.2 3.6  --        COVID19   Date Value Ref Range Status   05/12/2022 Not Detected Not Detected - Ref. Range Final   01/26/2022 Not Detected Not Detected - Ref. Range Final     No results found for: HGBA1C, POCGLU    XR Chest 1 View  PORTABLE CHEST 05/12/2022 AT 5:21 PM     CLINICAL HISTORY: Dyspnea     Compared to a previous chest x-ray dated 01/18/2022.     A right internal jugular dialysis catheter has been removed in the  interval. The lungs are well-expanded. There is abnormal increased  density containing air bronchograms in the retrocardiac region on the  left that is consistent with left lower lobe atelectasis and/or  infiltrate. The right lung is well expanded and clear. The heart is  mildly enlarged and unchanged. The pulmonary vasculature is within  normal limits.     IMPRESSIONS: Mild cardiomegaly. Focal consolidation in the left lower  lobe consistent with atelectasis and/or pneumonia. Otherwise  unremarkable chest x-ray.     This report was finalized on 5/12/2022  5:54 PM by Dr. King Rice M.D.     CT Pelvis Without Contrast  Narrative: PELVIS CT WITHOUT CONTRAST     HISTORY: Fall. Right hip and sacral pain with difficulty bearing weight.  Pelvic fractures were observed on x-ray.     TECHNIQUE: Axial pelvis CT with multiplanar reformatted images is  correlated with pelvis and right hip x-rays from earlier this morning.     Radiation dose reduction techniques were utilized, including automated  exposure control and exposure modulation based on body size.     FINDINGS: There is a sagittally oriented fracture along the right  lateral sacrum (zone one), nondisplaced. This extends along the S1 and  S2 levels. No transverse fracture is identified across the midline nor  is any fracture identified on the left.     There is an oblique transverse fracture along the right superior  superior pubic ramus laterally without articular cortex disruption of  the acetabulum. There is also a nondisplaced mid right inferior tissue  pubic ramus fracture. The bones of the left hemipelvis are intact. The  proximal femurs are intact. A 27 x 17 mm hematoma is observed in the  right gluteus ginger muscle posterior to the hamstring origin.     The lower lumbar spine demonstrates some degenerative change but  vertebral height at L3, L4, and L5 is preserved. There is nonsurgical  ankylosis across the L4-5 level and there appears to been previous  posterior decompression at L5-S1. The bones are demineralized and  musculature is diffusely deconditioned, typical for the age group.. The  aorta is densely calcified but normal in caliber. There is extensive  colonic diverticulosis.     Impression: Acute fractures of the right superior and inferior ratio  pubic rami as demonstrated on x-rays earlier today. There is also a  sagittally oriented fracture along the lateral right sacrum. There is no  evidence of femur fracture. An approximately 27 x 17 mm area of mildly  increased density is observed within  the otherwise deconditioned right  gluteus ginger muscle superficial to the right inferior issue pubic  ramus, system with a small intramuscular hematoma.     This report was finalized on 5/12/2022 11:24 AM by Dr. Bryan Dodson M.D.     CT Head Without Contrast  Narrative: CT HEAD WITHOUT CONTRAST     CLINICAL HISTORY: Fall. Anticoagulated.     TECHNIQUE: CT scan of the head was obtained with 3 mm axial soft tissue  algorithm and 2 mm bone algorithm images. No intravenous contrast was  administered. Sagittal and coronal reconstructions were obtained.     COMPARISON: No previous similar studies are available for comparison.     FINDINGS:       There is no evidence for a calvarial fracture. There is no evidence for  an acute extra-axial hemorrhage.     The ventricles, sulci, and cisterns are age appropriate.  Mild-to-moderate changes of chronic small vessel ischemic phenomena are  noted. Old lacunar disease is seen within the posterior limb of the left  internal capsule and the right caudate head. The posterior fossa  structures are within normal limits. Atherosclerotic changes are  identified within the intracranial vasculature.     Impression:    No CT evidence for acute intracranial pathology.     Incidental note is made of mild-to-moderate changes of chronic small  vessel ischemic phenomena and old lacunar disease within the posterior  limb of the left internal capsule and the right caudate head.           Radiation dose reduction techniques were utilized, including automated  exposure control and exposure modulation based on body size.     This report was finalized on 5/12/2022 10:47 AM by Dr. Nahum Bose M.D.     XR Hip With or Without Pelvis 2 - 3 View Right  Narrative: XR HIP W OR WO PELVIS 2-3 VIEW RIGHT-  05/12/2022     HISTORY: Fell, pelvis and right hip pain.     There is a mildly displaced fracture of the lateral aspect of the right  superior pubic ramus. There also is mild deformity of the right  inferior  pubic ramus probably related to mild displaced fracture.     Right hip joint space appears well-maintained. Proximal right femur  appears intact. Left hip appears unremarkable.     Impression: . Right superior and inferior pubic rami fractures.     This report was finalized on 2022 9:47 AM by Dr. Robb Lizama M.D.     XR Shoulder Comp Min 2 Vw RT  Narrative: RADIOLOGY REPORT    FACILITY:  [facility]  UNIT/AGE/GENDER: M.ED  ER      AGE:83 Y          SEX:F  PATIENT NAME/:  STEVEN PALMA    1930  UNIT NUMBER:  [id number]  ACCOUNT NUMBER:  [id number]  ACCESSION NUMBER:  [id number]    Exam: 2 view right shoulder.    HISTORY: Shoulder pain. Fall.    FINDINGS: There is osteopenia/osteoporosis. Hypertrophic degenerative involves the acromioclavicular joint.    There is a multipartite fracture of the right humerus, with creation of a mildly displaced greater tuberosity fragment measuring approximately 3.3 cm in length x 1.2 cm transverse dimension and a thin crescentic fracture fragment from the nonarticular   surface of the humeral head measuring 2.6 cm transverse x 0.7 cm cortical dimension. The bony glenoid appears intact. Seen only on one view, there is a irregular vertically oriented lucent line measuring 2.8 cm involving the proximal humerus. This does   not project beyond the confines of the bone and is not seen on the other view, such that a linear nondisplaced fracture cannot be excluded on the basis of this exam.  Impression: IMPRESSION:  1. Humeral fracture involving the nonarticular surface of the humeral head and the greater tuberosity.  2. Indeterminate 2.6 cm vertically oriented linear lucency of the proximal right humerus. A nondisplaced fracture cannot be confirmed/excluded on the basis of the obtained views. If surgical management would be impacted, a repeat series would be   recommended.    Dictated by: Devante Hayden M.D.    Images and Report reviewed and  interpreted by: Devante Hayden M.D.    <PS><Electronically signed by: Devante Hayden M.D.>  2014    D: 2014  T: 2014  CT Angiogram Chest  Narrative: RADIOLOGY REPORT    FACILITY:  [facility]  UNIT/AGE/GENDER: JEFFERY.ED  ER      AGE:84 Y          SEX:F  PATIENT NAME/:  STEVEN PALMA    1930  UNIT NUMBER:  [id number]  ACCOUNT NUMBER:  [id number]  ACCESSION NUMBER:  [id number]    EXAMINATION: CT angiography of the chest with contrast.    DATE: 2014    HISTORY: Dyspnea and palpitations    TECHNIQUE:   CT angiography of the chest was performed without and with the administration of intravenous contrast media in order to evaluate the pulmonary arteries for pulmonary embolus.  An 85 mL bolus injection of Optiray 300 was administered   intravenously followed by thin section cuts through the chest.  This was followed by imaging post-processing with three-dimensional reconstruction of the pulmonary arteries in the coronal and sagittal planes with images stored in the patient's permanent   medical record.    COMPARISON:  None    FINDING(S): The pulmonary arteries are well opacified with no filling defects seen through the third order branches. The aorta demonstrates smooth contour with no sign of dissection or aneurysm. There are scattered calcifications with luminal plaque   noted along the posterior and lateral aspect of the descending segment of the aorta. The heart size is unremarkable. There is no pericardial or pleural effusion. No focal areas of consolidation are seen within the lungs. There is a densely calcified   granuloma within the posterior basilar segment of the right lower lobe. There is no mediastinal or hilar adenopathy and no pulmonary edema.  Impression: IMPRESSION:   1. No evidence of thromboembolic disease involving the pulmonary arteries.  2. No sign of aortic dissection.  3. No infiltrate or other acute cardiopulmonary  abnormality.    Dictated by: LADAN Ley M.D.    Images and Report reviewed and interpreted by: LADAN Ley M.D.    <PS><Electronically signed by: LADAN Ley M.D.>  2014 1458    D: 2014 1457  T: 2014  Duplex US Carotid, Bilateral  PROCEDURE/STUDY NOTE    FACILITY:         [facility]  UNIT/AGE/GENDER:  4B   IN     84Y    F  PATIENT NAME/: STEVEN PALMA         1930  UNIT NUMBER:      [id number]  ACCOUNT NUMBER: [id number]  ACCESSION NUMBER: [id number]             DATE: 2014     STUDY PERFORMED: Carotid duplex.     INDICATION(S): Syncope.     FINDING(S): Duplex scan: In the right carotid system, there is intimal   thickening in the common carotid artery extending into the bulb. There are   a small amount of hard echoes noted at the bulb and origins of the   internal and external carotid arteries.   There are no increased velocities and only minimal spectral broadening.   Stenosis in these vessels is estimated to be less than 40%. The right   vertebral artery is patent with antegrade flow. In the left carotid   system, essentially the same findings are   noted as that on the right. The left vertebral artery is patent with   antegrade flow.    IMPRESSION: Grade 1 disease bilaterally (less than 40% stenosis) of no   hemodynamic significance. The vertebral arteries are patent bilaterally.    Legend: Duplex Scan: Grade of stenotic disease.    0: Normal  1: 1-39%  2: 40-59%  3: 60-79%  4: 80-99%  5: Occluded    GISELLE CORTES JR., M.D.    D: 2014 20:38:33  T: 2014 21:04:13/poli Molina I.CHELSEA [id number]    COPY TO:    PRELIMINARY - SIGNED COPY IS FINAL  MAMMOGRAM - EXTERNAL RESULT SCAN  This result has an attachment that is not available.  MRI/MRA - EXTERNAL RESULT SCAN  This result has an attachment that is not available.  CT SCAN - EXTERNAL RESULT SCAN  This result has an attachment that is not available.  CT SCAN - EXTERNAL RESULT SCAN  This  result has an attachment that is not available.  CT SCAN - EXTERNAL RESULT SCAN  This result has an attachment that is not available.  X-RAY - EXTERNAL RESULT SCAN  This result has an attachment that is not available.  XR Chest 2Vw  Narrative: RADIOLOGY REPORT    FACILITY:  [facility]  UNIT/AGE/GENDER: Amadou  OP      AGE:84 Y          SEX:F  PATIENT NAME/:  STEVEN PALMA    1930  UNIT NUMBER:  [id number]  ACCOUNT NUMBER:  [id number]  ACCESSION NUMBER:  [id number]    EXAMINATION: 2 view chest.    DATE: 2015.    HISTORY: Cough. Flu.    COMPARISON: 2011.    FINDINGS: Two views of the chest demonstrates no pneumonic consolidation, pleural effusion, or pneumothorax. Heart size and pulmonary vascularity are normal. A 2 cm densely calcified granuloma involves the right lower lobe. The aorta is atherosclerotic.   There is mild left basilar scarring.  Impression: IMPRESSION:   1. No infiltrate.  2. Old healed granulomatous disease    Dictated by: Donell Jacobson M.D.    Images and Report reviewed and interpreted by: Donell Jacobson M.D.    <PS><Electronically signed by: Donell Jacobson M.D.>  2015 0643    D: 2015 0642  T: 2015 0642  XR Chest 1 Vw  RADIOLOGY REPORT    FACILITY:  [facility]  UNIT/AGE/GENDER: JEFFERY.ED  ER      AGE:86 Y          SEX:F  PATIENT NAME/:  STEVEN PALMA    1930  UNIT NUMBER:  [id number]  ACCOUNT NUMBER:  [id number]  ACCESSION NUMBER:  [id number]    EXAMINATION: XR CHEST 1 VW    DATE: 2016    COMPARISON: Two-view chest 2015    HISTORY: dyspnea. Fever    FINDINGS: There is some retrocardiac infiltrate. The heart size and pulmonary vascularity are within normal limits. There is tortuosity and atherosclerotic disease of the aorta. There is a large right lower lobe granuloma    IMPRESSION:   1. Patchy left lower lobe airspace disease, possible  pneumonia    Dictated by: Carlos Pradhan M.D.    Images and Report reviewed and interpreted by: Carlos Pradhan M.D.    <PS><Electronically signed by: Carlos Pradhan M.D.>  2016    D: 2016  T: 2016  CT Head Wo Contrast  RADIOLOGY REPORT    FACILITY:  [facility]  UNIT/AGE/GENDER: M.ED  ER      AGE:86 Y          SEX:F  PATIENT NAME/:  STEVEN PALMA    1930  UNIT NUMBER:  [id number]  ACCOUNT NUMBER:  [id number]  ACCESSION NUMBER:  [id number]    EXAMINATION: CT HEAD WO CONTRAST    DATE: 2016    HISTORY: Syncope.    TECHNIQUE: Standard transaxial CT evaluation of the head was performed within 24 hours of admission.    CT scans at this facility use dose modulation, iterative reconstruction and/or weight based dosing when appropriate to reduce radiation dose to as low as reasonably achievable.    COMPARISON: 2011    FINDINGS: There is no acute intracranial hemorrhage, mass effect or midline shift. No extra axial fluid collections are noted. The basal cisterns are patent. No hydrocephalus. Bilateral periventricular and subcortical white matter hypodensities are   nonspecific most likely represent chronic microangiopathic ischemic changes. The gray white matter differentiation is preserved. There is moderate intracranial atherosclerosis. The calvarium is intact.    IMPRESSION: No CT evidence of acute ischemic infarction, hemorrhage or unenhanced mass lesion.    Dictated by: Doug Arellano M.D.    Images and Report reviewed and interpreted by: Doug Arellano M.D.    <PS><Electronically signed by: Doug Arellano M.D.>  2016    D: 2016  T: 2016  XR Chest 2Vw  RADIOLOGY REPORT    FACILITY:  [facility]  UNIT/AGE/GENDER: P.ICC-LY  OP      AGE:86 Y          SEX:F  PATIENT NAME/:  STEVEN PALMA    1930  UNIT NUMBER:  [id number]  ACCOUNT NUMBER:  [id number]  ACCESSION NUMBER:   [id number]    XR CHEST 2VW    DATE: 2017    COMPARISON: 2016    HISTORY: cough for 2 days - feels loose; PT had a pneumonia on CXR in 2016, no repeat CXR after that.        FINDINGS: Lungs are clear. There is no pleural effusion. Heart size is normal. There is a large right pulmonary granuloma. Degenerative changes are present in the acromioclavicular joints bilaterally.    IMPRESSION: No acute cardiopulmonary findings    Dictated by: Mayela Quiroz M.D.    Images and Report reviewed and interpreted by: Mayela Quiroz M.D.    <PS><Electronically signed by: Mayela Quiroz M.D.>  2017 1600    D: 2017 1558  T: 2017 1558  XR Chest 1 Vw  RADIOLOGY REPORT    FACILITY:  [facility]  UNIT/AGE/GENDER: LADANED  ER      AGE:86 Y          SEX:F  PATIENT NAME/:  STEVEN PALMA    1930  UNIT NUMBER:  [id number]  ACCOUNT NUMBER:  [id number]  ACCESSION NUMBER:  [id number]    EXAMINATION: XR CHEST 1 VW    DATE: 2017    HISTORY: Shortness of breath.        COMPARISON: 2017    FINDINGS: A single view of the chest demonstrates clear lungs. Old granulomatous disease is present. There is no pneumothorax or pleural effusion. The heart size and mediastinal contour are normal. Atherosclerotic calcification is noted within the aorta.    IMPRESSION: No acute cardiopulmonary radiographic abnormality.    Dictated by: Bryan Nickerson M.D.    Images and Report reviewed and interpreted by: Bryan Nickerson M.D.    <PS><Electronically signed by: Bryan Nickerson M.D.>  2017 1818    D: 2017 1818  T: 2017 1818  NM Lung Ventilation & Perfusion  NUCLEAR MEDICINE REPORT    FACILITY:  [facility]  UNIT/AGE/GENDER: LADAN4B  IN      AGE:86 Y          SEX:F  PATIENT NAME/:  STEVEN PALMA    1930  UNIT NUMBER:  [id number]  ACCOUNT NUMBER:  [id number]  ACCESSION NUMBER:  [id number]    DATE: 2017    EXAMINATION:  Ventilation/perfusion scintigraphy study    COMPARISON: None    RADIOPHARMACEUTICAL: 36 mCi [id number] by ventilation and 4.8 mCi Tc-99m MAA IV.    HISTORY: Shortness of air    FINDINGS:  The exam is read in correlation with chest x-ray dated 5 hours prior    The wash-in ventilatory images show homogenous distribution of activity in both lungs.    The perfusion images show no unmatched defects of the MAA distribution.    IMPRESSION: Normal VQ scan    Dictated by: Devante Hayden M.D.    Images and Report reviewed and interpreted by: Devante Hayden M.D.    <PS><Electronically signed by: Devante Hayden M.D.>  [id number]    D: 2017  T: 2017  US Renal Bilateral  RADIOLOGY REPORT    FACILITY:  [facility]  UNIT/AGE/GENDER: MDavid4B  IN      AGE:86 Y          SEX:F  PATIENT NAME/:  STEVNE PALMA    1930  UNIT NUMBER:  [id number]  ACCOUNT NUMBER:  [id number]  ACCESSION NUMBER:  [id number]    DATE: 2017    EXAM: Bilateral renal ultrasound.    HISTORY: Urinary tract infection    COMPARISON: No prior    FINDINGS: Real-time grayscale color sonography of the kidneys and urinary bladder was performed.  Right kidney measures 8.7 x 4.6 x 4.6 cm.  Left kidney measures 8.9 x 4 x 4.2 cm.  Both kidneys demonstrate moderate cortical thinning. There is no evidence for nephrolithiasis or obstructive uropathy..  The bladder is unremarkable.    IMPRESSION:  1. Mild cortical thinning but no evidence of obstructive uropathy    Dictated by: Devante Hayden M.D.    Images and Report reviewed and interpreted by: Devante Hayden M.D.    <PS><Electronically signed by: Devante Hayden M.D.>  2017 0000    D: 2017  T: 2017  XR Chest 1 Vw  RADIOLOGY REPORT    FACILITY:  [facility]  UNIT/AGE/GENDER: M.ED  ER      AGE:89 Y          SEX:F  PATIENT NAME/:  STEVEN PALMA M    1930  UNIT NUMBER:   [id number]  ACCOUNT NUMBER:  [id number]  ACCESSION NUMBER:  [id number]    EXAM: XR CHEST 1 VW 2020 3:20 AM    HISTORY: chest pain     COMPARISON: 2017    TECHNIQUE:  Single portable AP radiograph of the chest    FINDINGS:     Mild cardiomegaly. Tortuosity of the thoracic aorta. Calcified granuloma in the right lung similar to prior. Stable chronic interstitial pattern with no focal consolidation. No pleural effusion or visible pneumothorax.    IMPRESSION:     No acute radiographic abnormality of the chest.    Dictated by: Marcus López M.D.    Images and Report reviewed and interpreted by: Marcus López M.D.    <PS><Electronically signed by: Marcus López M.D.>  2020 0745    D: 202044  T: 202044  CT Chest Wo Contrast  RADIOLOGY REPORT    FACILITY:  [facility]  UNIT/AGE/GENDER: M.ED  ER      AGE:89 Y          SEX:F  PATIENT NAME/:  STEVEN PALMA M    1930  UNIT NUMBER:  [id number]  ACCOUNT NUMBER:  [id number]  ACCESSION NUMBER:  [id number]    EXAMINATION: CT of the chest without contrast.    DATE: 2020    HISTORY: Back pain, chest pain, hypertension and acute kidney injury..    TECHNIQUE:    Helical CT of the chest was performed without the administration of intravenous contrast media.     CT scans at this facility use dose modulation, iterative reconstruction and/or weight based dosing when appropriate to reduce radiation dose to as low as reasonably achievable.    COMPARISON: 2014.    FINDINGS: Evaluation of the mediastinum demonstrates moderate atherosclerosis of the thoracic aorta. Atheromatous plaque involving the posterior aspect of the descending thoracic aorta was better demonstrated by prior contrast-enhanced CT imaging. There   is chronic mild enlargement of the distal descending thoracic aorta measuring 3.4 cm, previously 3.1 cm. The heart remains enlarged.. Coronary arterial disease is present.    There is  suggestion of subtle low-density filling defects within the right lower lobe pulmonary arterial tree. This is poorly evaluated in the absence of contrast media.    A calcified mediastinal lymph nodes characteristic of old granulomatous disease.    Lung windows demonstrate chronic lung disease with mild changes of pulmonary emphysema. There is no acute consolidation. There is mild chronic basilar subpleural interstitial lung disease and bronchiectasis. A calcified right lower lobe granuloma is   unchanged.    A small hiatal hernia is present. Imaging of the upper abdomen demonstrates diverticulosis of the colon.    There are chronic degenerative changes of the shoulders and thoracic spine.    IMPRESSION:   1. Moderate thoracic aortic atherosclerosis with regions of atherosclerotic calcification and atheromatous plaque involving the descending thoracic aorta. Slight interval enlargement of the descending thoracic aorta measuring 3.4 cm, previously 3.1 cm.  2. Moderate cardiomegaly and coronary arterial disease.  3. Noncontrast CT suggest low-density filling defects within the right lower lobe pulmonary arterial tree. Pulmonary arterial emboli cannot be excluded on noncontrast examination. Recommend risk factor stratification Consider lower extremity deep venous   ultrasound if the patient cannot undergo diagnostic CT pulmonary angiography.  4. Old granulomatous disease.  5. Small hiatal hernia.  6. Diverticulosis.    Dictated by: Devante Hayden M.D.    Images and Report reviewed and interpreted by: Devante Hayden M.D.    <PS><Electronically signed by: Devante Hayden M.D.>  202021    D: 2020  T: 2020  Duplex US Venous Ext Low Bilateral  RADIOLOGY REPORT  FACILITY[facility]  AGE/GENDER:  AGE: 89 Y            GENDER: F  PATIENT NAME/: STEVEN PALMA M    : 1930  UNIT NUMBER: [id number]  ACCESSION NUMBER: [id number]  ACCOUNT:      PROCEDURE PERFORMED: DUPLEX US VENOUS EXT LOW BILATERAL    Conclusions: Right lower extremity with no evidence of deep or superficial thrombus.  Left lower extremity with evidence of acute occlusive deep vein thrombus in the soleal vein. No evidence for superficial vein thrombus.    No prior examination for comparison.   Preliminary technologist findings called to the patient's nurse Jennifer at 1045.    THIS DOCUMENT HAS BEEN ELECTRONICALLY SIGNED BY: Abena Guerin MD  US Abdomen Limited  RADIOLOGY REPORT    FACILITY:  [facility]  UNIT/AGE/GENDER: M.4B  IN      AGE:89 Y          SEX:F  PATIENT NAME/:  STEVEN PALMA M    1930  UNIT NUMBER:  [id number]  ACCOUNT NUMBER:  [id number]  ACCESSION NUMBER:  [id number]    EXAMINATION: US ABDOMEN LIMITED    DATE: 2020    HISTORY: Abdominal pain.        COMPARISON: None.    FINDINGS: The liver is normal in size and echogenicity. The gallbladder is normal. There are no gallstones, wall thickening, or pericholecystic fluid. The sonographic Sahu's sign was reportedly negative. The common bile duct is mildly dilated measuring   8 mm in diameter. This may be normal for the patient's age, although a distally obstructing stone or lesion cannot be excluded. Correlate with patient exam and laboratory data. There are no abnormalities of the imaged portions of the pancreas. The right   kidney measures 8.9 cm in length and exhibits increased renal echogenicity suggesting chronic renal disease.    IMPRESSION:  1. No evidence of cholecystitis. The common bile duct is mildly dilated measuring 8 mm. This may be normal for the patient's age, although distally obstructing stone or lesion cannot be excluded. Correlate with patient exam and laboratory data.  2. Increased right renal echogenicity suggesting chronic renal disease.    Dictated by: Bryan Nickerson M.D.    Images and Report reviewed and interpreted by: Bryan Nickerson  M.D.    <PS><Electronically signed by: Bryan Nickerson M.D.>  2020    D: 2020  T: 2020  CT Chest Wo Contrast  RADIOLOGY REPORT    FACILITY:  [facility] [place] [facility]""  UNIT/AGE/GENDER: JEFFERY.ED  ER      AGE:89 Y          SEX:F  PATIENT NAME/:  STEVEN PALMA M    1930  UNIT NUMBER:  [id number]  ACCOUNT NUMBER:  [id number]  ACCESSION NUMBER:  [id number]    EXAMINATION: CT of the chest without contrast.    DATE : 2020    HISTORY: Chest pain.    TECHNIQUE: Helical CT of the chest was performed without the administration of intravenous contrast media. CT examinations at this facility use dose modulation, iterative reconstruction and/or weight based dosing when appropriate to reduce radiation dose   to as low as reasonably achievable.    COMPARISON: 2020. 2014.    FINDINGS: There is a stable, heavily calcified, 14 mm granuloma along the posterior pleural surface of the right lower lobe. There is mild biapical pleural and parenchymal scarring. There is mild dependent atelectasis and/or scarring. There is mild   bibasilar bronchiectasis, most severe in the left lower lobe.    There are no acute pulmonary infiltrates or pleural effusions. No noncalcified pulmonary nodules are identified.    The images of the mediastinum demonstrate upper normal caliber of the ascending aorta. There is tortuosity of the descending aorta along with aortic atherosclerosis and mural thrombus. There is dilatation of the descending aorta reaching 3.8 cm in   caliber, perpendicular to the axis of blood flow superior to the diaphragm. The dilatation has remained stable since the previous exam. There is mild dilatation of the main pulmonary artery reaching 3.4 cm in caliber which is compatible with pulmonary   arterial hypertension. There is coronary artery disease.    The images of the upper abdomen demonstrate no abnormalities of the liver, pancreas, spleen, adrenal glands  or upper pole of the left kidney. There is a 9 mm hyperdense nodule arising from the upper pole of the right kidney which is without change and   most likely represents a proteinaceous or hemorrhagic cyst.     IMPRESSION:   1.No acute thoracic abnormalities. Specifically, no pulmonary infiltrates or pleural effusions.  2.Severe aortic atherosclerosis with stable dilatation of the descending aorta. The aorta may be further evaluated with a CT angiography study.  3.Dilatation of the main pulmonary artery which is compatible with pulmonary arterial hypertension.  4.Coronary artery disease.    Dictated by: Wade Hummel M.D.    Images and Report reviewed and interpreted by: Wade Hummel M.D.    <PS><Electronically signed by: Wade Hummel M.D.>  2020 1511    D: 2020 1458  T: 2020  CT Head Wo Contrast  RADIOLOGY REPORT    [facility] [place] [facility]""  UNIT/AGE/GENDER: M.ED  ER      AGE:89 Y          SEX:F  PATIENT NAME/:  STEVEN PALMA M    1930  UNIT NUMBER:  [id number]  ACCOUNT NUMBER:  [id number]  ACCESSION NUMBER:  [id number]    EXAMINATION: CT head without contrast.    DATE: 2020  HISTORY: weakness, HTN initial encounter    COMPARISON: .  TECHNIQUE: Standard CT scan of the head without contrast.  All CT scans at this facility use dose modulation, iterative reconstruction, and/or weight based dosing when appropriate to reduce radiation dose to as low as reasonably achievable.    FINDINGS: In the interval from 2016, a chronic appearing lacunar infarct has developed in the left basal ganglia.    IMPRESSION:    Chronic appearing lacunar infarct in the left basal ganglia.    Dictated by: Mayela Quiroz M.D.    Images and Report reviewed and interpreted by: Mayela Quiroz M.D.    <PS><Electronically signed by: Mayela Quiroz M.D.>  2020 1501    D: 2020 1458  T: 2020 1458  XR Foot Comp Min 3 Vws RT  RADIOLOGY  REPORT    FACILITY:  [facility]  UNIT/AGE/GENDER: RUSTY  OP      AGE:89 Y          SEX:F  PATIENT NAME/:  STEVEN PALMA M    1930  UNIT NUMBER:  [id number]  ACCOUNT NUMBER:  [id number]  ACCESSION NUMBER:  [id number]    EXAM: XR FOOT COMP MIN 3 VWS RT    DATE: 2021    HISTORY: right foot injury, hit it against the nigthstand a week ago and pain along the 5th metatarsal area.        COMPARISON: None.    FINDINGS:  3 views of the right foot demonstrate no evidence of a fracture, dislocation, or other acute osseous abnormality. A small well-corticated accessory ossicle is seen adjacent to the cuboid bone. Soft tissue swelling is noted along the dorsum of   the foot.    IMPRESSION:     1. Right foot soft tissue swelling without acute fracture or dislocation.    Dictated by: Dwayne Lemus M.D.    Images and Report reviewed and interpreted by: Dwayne Lemus M.D.    <PS><Electronically signed by: Dwayne Lemus M.D.>  2021 1441    D: 2021 1438  T: 2021 1438    Scheduled Medications  amLODIPine, 10 mg, Oral, Daily  cholecalciferol, 1,000 Units, Oral, Daily  potassium chloride, 20 mEq, Oral, Daily  senna-docusate sodium, 2 tablet, Oral, BID  sodium chloride, 10 mL, Intravenous, Q12H  torsemide, 100 mg, Oral, Daily    Infusions  Pharmacy to dose warfarin,     Diet  Diet Regular; Cardiac         I have personally reviewed:  [x]  Laboratory   []  Microbiology   []  Radiology   []  EKG/Telemetry   []  Cardiology/Vascular   []  Pathology   []  Records    Assessment/Plan     Active Hospital Problems    Diagnosis  POA   • **Closed fracture of multiple pubic rami, right, initial encounter ([facility]) [S32.591A]  Yes   • Sacral fracture ([facility]) [S32.10XA]  Yes   • Anemia in chronic kidney disease [N18.9, D63.1]  Yes   • End stage renal disease (HCC) [N18.6]  Yes   • Anticoagulated on warfarin [Z79.01]  Not Applicable   • Essential hypertension [I10]  Yes   • Chronic diastolic congestive heart  failure ([facility]) [I50.32]  Yes   • History of pulmonary embolus (PE) [Z86.711]  Yes   • Permanent atrial fibrillation ([facility]) [I48.21]  Yes      Resolved Hospital Problems   No resolved problems to display.       89 y.o. female admitted with Closed fracture of multiple pubic rami, right, initial encounter ([facility]).    Right-sided superior/inferior pubic rami fracture, right sided lateral sacral fracture   -Likely nonoperable but will consult Ortho, appreciate their attention to this patient-will see tomorrow  -Resume warfarin, pain control with Tylenol and Norco as needed   -Likely will need PT/OT/CCP consult for placement; discussed with CCP today, likely will not be able to go to facility until Tuesday given her HD needs       ESRD on HD   -Nephrology consulted, left upper extremity AV fistula, HD 5/13  -Continue Demadex       Atrial fibrillation on warfarin   -Warfarin resumed, INR supratherapeutic so dose held today  -Patient not on beta-blocker at home, monitor       Hypertension   -Continue Demadex and amlodipine     · Warfarin (home med) for DVT prophylaxis.  · Full code.  · Discussed with patient and nursing staff.  · Anticipate discharge to SNU facility timing yet to be determined.      Margot Flowers MD  [facility]  05/14/22  13:40 EDT    I wore protective equipment throughout this patient encounter including a face mask, gloves and protective eyewear.  Hand hygiene was performed before donning protective equipment and after removal when leaving the room.

## 2022-05-14 NOTE — PROGRESS NOTES
[facility] Progress Note      Patient Name: Cristina Lemus  : 3/13/1930  MRN: [id number]  Primary Care Physician:  Wood Elias MD  Date of admission: 2022    Subjective     Interval History:   Reports that her pelvic pain is much less today  Still having great difficulty walking  Had HD yesterday; 2.7 L removed  Has had 400 mL UOP today      Review of Systems:   As noted above    Objective     Vitals:   Temp:  [98 °F (36.7 °C)-99.2 °F (37.3 °C)] 98.8 °F (37.1 °C)  Heart Rate:  [74-85] 84  Resp:  [14-18] 14  BP: (155-173)/(65-86) 156/78  Flow (L/min):  [2] 2    Intake/Output Summary (Last 24 hours) at 2022 1813  Last data filed at 2022 1700  Gross per 24 hour   Intake 1130 ml   Output 3550 ml   Net -2420 ml       Physical Exam:    General Appearance: alert, oriented x 3, NAD, hard of hearing  Skin: warm and dry  HEENT: oral mucosa normal, nonicteric sclera, temporal wasting  Neck: supple, no JVD  Lungs: CTA; not labored on 2 L/min  Heart: Irregularly irregular, normal S1 and S2  Abdomen: soft, nontender, nondistended, BS +  : no palpable bladder  Extremities: no edema, cyanosis or clubbing  Vascular: Left arm AV fistula patent  Neuro: normal speech and mental status     Scheduled Meds:     amLODIPine, 10 mg, Oral, Daily  cholecalciferol, 1,000 Units, Oral, Daily  potassium chloride, 20 mEq, Oral, Daily  senna-docusate sodium, 2 tablet, Oral, BID  sodium chloride, 10 mL, Intravenous, Q12H  torsemide, 100 mg, Oral, Daily      IV Meds:   Pharmacy to dose warfarin,         Results Reviewed:   I have personally reviewed the results from the time of this admission to 2022 18:13 EDT     Results from last 7 days   Lab Units 22  0329 22  0536 22  1129   SODIUM mmol/L 134* 137 135*   POTASSIUM mmol/L 4.2 4.7 4.6   CHLORIDE mmol/L 101 103 100   CO2 mmol/L 21.5* 21.0* 21.0*   BUN mg/dL 30* 56* 52*   CREATININE mg/dL 3.02* 4.36* 3.90*   CALCIUM mg/dL 8.1*  8.1* 8.5   GLUCOSE mg/dL 98 78 90       Estimated Creatinine Clearance: 9.4 mL/min (A) (by C-G formula based on SCr of 3.02 mg/dL (H)).    Results from last 7 days   Lab Units 05/14/22 0329 05/13/22  0536   MAGNESIUM mg/dL  --  2.3   PHOSPHORUS mg/dL 3.2 3.6             Results from last 7 days   Lab Units 05/14/22 0329 05/13/22  0536 05/12/22  1129   WBC 10*3/mm3 7.68 6.19 6.23   HEMOGLOBIN g/dL 10.2* 10.0* 10.9*   PLATELETS 10*3/mm3 139* 180 155       Results from last 7 days   Lab Units 05/14/22 0329 05/13/22 0536 05/12/22  0927   INR  3.49* 3.75* 2.64*       Assessment / Plan     ASSESSMENT:  -End-stage renal disease on maintenance hemodialysis every Tuesday, Thursday and Saturday.  Last HD was yesterday; stable volume by exam; potassium and anion gap are normal  -Hypertension, acceptable control for now  -Anemia of chronic kidney disease treated with long-acting KENNEDI as an outpatient; hemoglobin is at goal-Mechanical fall with fractured pelvis  -Chronic A. Fib    PLAN:  1.  HD tomorrow as bridge to her usual TTS schedule  2.  Discontinue amlodipine for now, though likely will resume it tomorrow after HD  3.  Discontinue scheduled potassium  4.  Rehab anytime from renal view    Thank you for involving us in the care of Cristina Lemus.  Please feel free to call with any questions.    Alex Alcazar MD  05/14/22  18:13 EDT    [facility]  [phone]      Much of this encounter note is an electronic transcription/translation of spoken language to printed text. The electronic translation of spoken language may permit erroneous, or at times, nonsensical words or phrases to be inadvertently transcribed; Although I have reviewed the note for such errors, some may still exist.

## 2022-05-14 NOTE — PLAN OF CARE
Goal Outcome Evaluation:               ER admit on 5/12, in thru the ER with pubic fracture, ortho consult, but no one has seen, recalled ortho consult this shift, spoke with Dr. Espino, Kenzie will see patient tomorrow, norco or tylenol for pain

## 2022-05-14 NOTE — PROGRESS NOTES
[facility] Clinical Pharmacy Services: Warfarin Dosing/Monitoring Consult    Cristina Lemus is a 89 y.o. female, estimated creatinine clearance is 9.4 mL/min (A) (by C-G formula based on SCr of 3.02 mg/dL (H)). weighing 50 kg (110 lb 3.7 oz).    Results from last 7 days   Lab Units 05/14/22  0329 05/13/22  0536 05/12/22  1129 05/12/22  0927   INR  3.49* 3.75*  --  2.64*   HEMOGLOBIN g/dL 10.2* 10.0* 10.9*  --    HEMATOCRIT % 29.7* 30.6* 31.9*  --    PLATELETS 10*3/mm3 139* 180 155  --      Prior to admission anticoagulation: per anticoag clinic note 5/2/22-- pt daughter reported she was taking 2mg on Saturday and 4mg all other days-- dose was increased to 4mg daily. Was due for re-check in 1 week, but I do not see that follow-up.     Hospital Anticoagulation:  Consulting provider: Dr. Flowers  Start date: PTA  Indication: AFib  Target INR: 2 - 3  Expected duration: tbd   Bridge Therapy: No      Potential food or drug interactions: no new interacting medications    Education complete?/Date: No; plan for follow up TBD    Assessment/Plan:  Dose: INR remains above goal today, will hold warfarin again today.  Monitor for any signs or symptoms of bleeding  Follow up daily INRs and dose adjustments    Pharmacy will continue to follow until discharge or discontinuation of warfarin.     Guera Villa, PharmD  Clinical Pharmacist

## 2022-05-14 NOTE — PLAN OF CARE
Goal Outcome Evaluation:  Plan of Care Reviewed With: patient        Progress: improving  Outcome Evaluation: patient resting comfortably between care, voiding without difficulty, minimal complaints of pain, educated on medication management

## 2022-05-15 LAB
ALBUMIN SERPL-MCNC: 3.6 G/DL (ref 3.5–5.2)
ANION GAP SERPL CALCULATED.3IONS-SCNC: 15 MMOL/L (ref 5–15)
BUN SERPL-MCNC: 43 MG/DL (ref 8–23)
BUN/CREAT SERPL: 11.1 (ref 7–25)
CALCIUM SPEC-SCNC: 8.1 MG/DL (ref 8.2–9.6)
CHLORIDE SERPL-SCNC: 97 MMOL/L (ref 98–107)
CO2 SERPL-SCNC: 21 MMOL/L (ref 22–29)
CREAT SERPL-MCNC: 3.89 MG/DL (ref 0.57–1)
EGFRCR SERPLBLD CKD-EPI 2021: 10.4 ML/MIN/1.73
GLUCOSE SERPL-MCNC: 75 MG/DL (ref 65–99)
INR PPP: 3.06 (ref 0.9–1.1)
PHOSPHATE SERPL-MCNC: 4.8 MG/DL (ref 2.5–4.5)
POTASSIUM SERPL-SCNC: 3.9 MMOL/L (ref 3.5–5.2)
PROTHROMBIN TIME: 31.9 SECONDS (ref 11.7–14.2)
SODIUM SERPL-SCNC: 133 MMOL/L (ref 136–145)

## 2022-05-15 PROCEDURE — G0257 UNSCHED DIALYSIS ESRD PT HOS: HCPCS

## 2022-05-15 PROCEDURE — G0378 HOSPITAL OBSERVATION PER HR: HCPCS

## 2022-05-15 PROCEDURE — 99204 OFFICE O/P NEW MOD 45 MIN: CPT | Performed by: ORTHOPAEDIC SURGERY

## 2022-05-15 PROCEDURE — 27197 CLSD TX PELVIC RING FX: CPT | Performed by: ORTHOPAEDIC SURGERY

## 2022-05-15 PROCEDURE — 80069 RENAL FUNCTION PANEL: CPT | Performed by: INTERNAL MEDICINE

## 2022-05-15 PROCEDURE — 85610 PROTHROMBIN TIME: CPT | Performed by: NURSE PRACTITIONER

## 2022-05-15 RX ORDER — ALBUMIN (HUMAN) 12.5 G/50ML
12.5 SOLUTION INTRAVENOUS AS NEEDED
Status: ACTIVE | OUTPATIENT
Start: 2022-05-15 | End: 2022-05-15

## 2022-05-15 RX ORDER — AMLODIPINE BESYLATE 5 MG/1
5 TABLET ORAL
Status: DISCONTINUED | OUTPATIENT
Start: 2022-05-16 | End: 2022-05-16 | Stop reason: HOSPADM

## 2022-05-15 RX ORDER — WARFARIN SODIUM 2 MG/1
2 TABLET ORAL
Status: COMPLETED | OUTPATIENT
Start: 2022-05-15 | End: 2022-05-15

## 2022-05-15 RX ORDER — ACETAMINOPHEN 325 MG/1
650 TABLET ORAL EVERY 4 HOURS PRN
Start: 2022-05-15

## 2022-05-15 RX ORDER — LORAZEPAM 1 MG/1
1 TABLET ORAL NIGHTLY
Qty: 3 TABLET | Refills: 0 | Status: SHIPPED | OUTPATIENT
Start: 2022-05-15 | End: 2022-07-28

## 2022-05-15 RX ORDER — LIDOCAINE AND PRILOCAINE 25; 25 MG/G; MG/G
1 CREAM TOPICAL ONCE
Status: COMPLETED | OUTPATIENT
Start: 2022-05-15 | End: 2022-05-15

## 2022-05-15 RX ORDER — HYDROCODONE BITARTRATE AND ACETAMINOPHEN 5; 325 MG/1; MG/1
1 TABLET ORAL EVERY 4 HOURS PRN
Qty: 18 TABLET | Refills: 0 | Status: SHIPPED | OUTPATIENT
Start: 2022-05-15 | End: 2022-05-18

## 2022-05-15 RX ORDER — AMOXICILLIN 250 MG
2 CAPSULE ORAL 2 TIMES DAILY
Start: 2022-05-15 | End: 2022-12-23

## 2022-05-15 RX ADMIN — LIDOCAINE AND PRILOCAINE 1 APPLICATION: 25; 25 CREAM TOPICAL at 13:47

## 2022-05-15 RX ADMIN — Medication 5 MG: at 20:55

## 2022-05-15 RX ADMIN — ACETAMINOPHEN 650 MG: 325 TABLET, FILM COATED ORAL at 20:55

## 2022-05-15 RX ADMIN — DOCUSATE SODIUM 50MG AND SENNOSIDES 8.6MG 2 TABLET: 8.6; 5 TABLET, FILM COATED ORAL at 13:45

## 2022-05-15 RX ADMIN — Medication 10 ML: at 13:46

## 2022-05-15 RX ADMIN — Medication 1000 UNITS: at 13:46

## 2022-05-15 RX ADMIN — Medication 10 ML: at 20:56

## 2022-05-15 RX ADMIN — TORSEMIDE 100 MG: 100 TABLET ORAL at 13:46

## 2022-05-15 RX ADMIN — WARFARIN 2 MG: 2 TABLET ORAL at 17:16

## 2022-05-15 RX ADMIN — DOCUSATE SODIUM 50MG AND SENNOSIDES 8.6MG 2 TABLET: 8.6; 5 TABLET, FILM COATED ORAL at 20:55

## 2022-05-15 NOTE — NURSING NOTE
Patient tolerated hd tx, removed 1.5L.  Patient started cramping towards end of treatment.  Blood returned and patient was okay      Renny Abel RN  [facility]

## 2022-05-15 NOTE — PROGRESS NOTES
[facility] Clinical Pharmacy Services: Warfarin Dosing/Monitoring Consult    Cristina Lemus is a 89 y.o. female, estimated creatinine clearance is 7.3 mL/min (A) (by C-G formula based on SCr of 3.89 mg/dL (H)). weighing 50 kg (110 lb 3.7 oz).    Results from last 7 days   Lab Units 05/15/22  0325 05/14/22  0329 05/13/22  0536 05/12/22  1129 05/12/22  0927   INR  3.06* 3.49* 3.75*  --  2.64*   HEMOGLOBIN g/dL  --  10.2* 10.0* 10.9*  --    HEMATOCRIT %  --  29.7* 30.6* 31.9*  --    PLATELETS 10*3/mm3  --  139* 180 155  --      Prior to admission anticoagulation: per anticoag clinic note 5/2/22-- pt daughter reported she was taking 2mg on Saturday and 4mg all other days-- dose was increased to 4mg daily. Was due for re-check in 1 week, but I do not see that follow-up.     Hospital Anticoagulation:  Consulting provider: Dr. Flowers  Start date: PTA  Indication: AFib  Target INR: 2 - 3  Expected duration: tbd   Bridge Therapy: No      Potential food or drug interactions: no new interacting medications    Education complete?/Date: No; plan for follow up TBD    Assessment/Plan:  Note that pt has pelvic fracture-- per ortho eval, plan to manage non-surgically.   Dose: INR just above goal today and trending down. Will give low dose warfarin 2mg today to avoid dropping below goal.   Monitor for any signs or symptoms of bleeding  Follow up daily INRs and dose adjustments    Pharmacy will continue to follow until discharge or discontinuation of warfarin.     Guera Villa, PharmD  Clinical Pharmacist

## 2022-05-15 NOTE — CASE MANAGEMENT/SOCIAL WORK
Continued Stay Note  [facility]     Patient Name: Cristina Lemus  MRN: [id number]  Today's Date: 5/15/2022    Admit Date: 5/12/2022     Discharge Plan     Row Name 05/15/22 1708       Plan    Plan DC plan is to [facility]    Plan Comments CCP called [facility] [phone] [facility] for [facility] and pt will have a bed ready tomorrow at [facility].  CCP secure chat with MD who put in DC orders for tomorrow. CCP called EMS spoke to [facility] and they can take pt at 9am tomorrow-Monday- 5/16.  CCP informed MD and pts nurse and called pts daughter Shannan of pt transfer tomorrow.  CCP will continue to follow. Thanks, Sheri HARRISNO               Discharge Codes    No documentation.               Expected Discharge Date and Time     Expected Discharge Date Expected Discharge Time    May 16, 2022             Sheri Boateng

## 2022-05-15 NOTE — CONSULTS
Orthopedic Consult      Patient: Cristina Lemus    Date of Admission: 5/12/2022  9:07 AM    YOB: 1930    Medical Record Number: [id number]    Chief Complaints: Pelvic injury    History of Present Illness: 89 y.o. female seen for evaluation of a pelvic fracture.  She reports the injury happened 3 days ago.  She slipped and fell, landing on her right side.  She localizes the pain to her right groin.  Describes the pain as moderate, aching and constant.  The pain is worse with any movement and alleviated by rest.  She does not recall having hit her head and denies loss of consciousness.  She denies any other injuries or complaints.  She typically walks with a walker outside of the home.  Around the home she seldom uses the walker.  She lives with her daughter.  Her son and daughter in law do most of her driving and shopping.    Allergies:   Allergies   Allergen Reactions   • Statins Nausea And Vomiting   • Sulfa Antibiotics Nausea And Vomiting and Other (See Comments)     CHILLS AND FEVER       Home Medications:  No current facility-administered medications on file prior to encounter.     Current Outpatient Medications on File Prior to Encounter   Medication Sig   • amLODIPine (NORVASC) 10 MG tablet Take 1 tablet by mouth Daily.   • Cholecalciferol (VITAMIN D3 PO) Take 1,000 Units by mouth Daily.   • KLOR-CON 20 MEQ CR tablet TAKE 1 TABLET DAILY (Patient taking differently: Take 20 mEq by mouth Daily.)   • LORazepam (ATIVAN) 1 MG tablet Take 1 tablet by mouth At Night As Needed for Sleep.   • torsemide (DEMADEX) 20 MG tablet Take 5 tablets by mouth Daily.   • warfarin (Jantoven) 2 MG tablet Take one tablet (2 mg) by mouth on Tuesday, Thursday, and Saturday, then take 2 tablets (4 mg) all other days or as directed (Patient taking differently: Take 2 mg by mouth Daily.)     Current Medications:  Scheduled Meds:cholecalciferol, 1,000 Units, Oral, Daily  senna-docusate sodium, 2 tablet, Oral, BID  sodium  chloride, 10 mL, Intravenous, Q12H  torsemide, 100 mg, Oral, Daily      Continuous Infusions:Pharmacy to dose warfarin,       PRN Meds:.•  acetaminophen **OR** acetaminophen **OR** acetaminophen  •  senna-docusate sodium **AND** polyethylene glycol **AND** bisacodyl **AND** bisacodyl  •  HYDROcodone-acetaminophen  •  LORazepam  •  melatonin  •  ondansetron **OR** ondansetron  •  Pharmacy to dose warfarin  •  [COMPLETED] Insert peripheral IV **AND** sodium chloride  •  sodium chloride    Past Medical History:   Diagnosis Date   • Atrial fibrillation (HCC)    • CHF (congestive heart failure) (HCC)      Past Surgical History:   Procedure Laterality Date   • ARTERIOVENOUS FISTULA/SHUNT SURGERY Left 1/24/2022    Procedure: LEFT ARM DIALYSIS GRAFT;  Surgeon: King Reynaga MD;  Location: [facility];  Service: Vascular;  Laterality: Left;   • BACK SURGERY     • CYST REMOVAL     • INSERTION HEMODIALYSIS CATHETER N/A 1/18/2022    Procedure: HEMODIALYSIS CATHETER INSERTION with C arm;  Surgeon: King Reynaga MD;  Location: [facility];  Service: Vascular;  Laterality: N/A;   • OTHER SURGICAL HISTORY      rupture disk     Social History     Occupational History   • Not on file   Tobacco Use   • Smoking status: Never Smoker   • Smokeless tobacco: Never Used   Substance and Sexual Activity   • Alcohol use: No     Comment: caffeine use    • Drug use: No   • Sexual activity: Defer      Social History     Social History Narrative   • Not on file     History reviewed. No pertinent family history.    Review of Systems:     14 point review of systems reviewed with the patient.  Pertinent positives listed above.  All others negative.    Physical Exam:   89 y.o.  female.  Vitals:    05/14/22 1913 05/14/22 2249 05/15/22 0247 05/15/22 0700   BP: 149/70 141/64 146/79 131/77   BP Location: Right arm Right arm Right arm Left arm   Patient Position: Lying Lying Lying Lying   Pulse: 76 70 71 72   Resp: 16 16 16  16   Temp: 97.4 °F (36.3 °C) 97.5 °F (36.4 °C) 96.9 °F (36.1 °C) 98.5 °F (36.9 °C)   TempSrc: Oral Oral Oral Oral   SpO2: 98% 96% 95% 90%   Weight:       Height:         General:  Awake, alert. No acute distress.      Head/Neck:  Normocephalic, atraumatic.  Conjunctivae and sclerae clear.  Hearing is poor but adequate for the exam.  Neck is supple with normal ROM.    Psych:  Affect and demeanor appropriate.    CV:  Regular rate and rhythm.  Hemodynamically stable.    Lungs:  Good chest expansion, breathing unlabored.    Abdomen:  Soft.  Nontender, nondistended.    Extremities:  Right lower extremity: Skin appears benign without obvious lacerations, ulcerations or lesions.  No evident malrotation of the leg.  Compartments soft without evidence for DVT or compartment syndrome.  No atrophy.  No palpable masses or adenopathy.  No focal tenderness at the hip.  Active range of motion of the hip is limited and uncomfortable.  No pain with passive motion of knee, ankle or gentle log roll of hip.  Strength well-preserved distally with plantarflexion and dorsiflexion of the ankle and toes.  Sensation to light touch grossly intact distally.  Palpable pedal pulses.  Brisk capillary refill.  Good skin turgor.    All other extremities atraumatic without gross abnormality.  Contralateral leg demonstrates no bony tenderness.  No pain with hip or knee motion in the contralateral leg.  Good motor and sensory function distally with brisk cap refill.    Diagnostic Tests:    Admission on 05/12/2022   Component Date Value Ref Range Status   • Protime 05/12/2022 28.3 (A) 11.7 - 14.2 Seconds Final   • INR 05/12/2022 2.64 (A) 0.90 - 1.10 Final   • Glucose 05/12/2022 90  65 - 99 mg/dL Final   • BUN 05/12/2022 52 (A) 8 - 23 mg/dL Final   • Creatinine 05/12/2022 3.90 (A) 0.57 - 1.00 mg/dL Final   • Sodium 05/12/2022 135 (A) 136 - 145 mmol/L Final   • Potassium 05/12/2022 4.6  3.5 - 5.2 mmol/L Final   • Chloride 05/12/2022 100  98 - 107 mmol/L  Final   • CO2 05/12/2022 21.0 (A) 22.0 - 29.0 mmol/L Final   • Calcium 05/12/2022 8.5  8.2 - 9.6 mg/dL Final   • BUN/Creatinine Ratio 05/12/2022 13.3  7.0 - 25.0 Final   • Anion Gap 05/12/2022 14.0  5.0 - 15.0 mmol/L Final   • eGFR 05/12/2022 10.3 (A) >60.0 mL/min/1.73 Final    <15 Indicative of kidney failure   • WBC 05/12/2022 6.23  3.40 - 10.80 10*3/mm3 Final   • RBC 05/12/2022 3.56 (A) 3.77 - 5.28 10*6/mm3 Final   • Hemoglobin 05/12/2022 10.9 (A) 12.0 - 15.9 g/dL Final   • Hematocrit 05/12/2022 31.9 (A) 34.0 - 46.6 % Final   • MCV 05/12/2022 89.6  79.0 - 97.0 fL Final   • MCH 05/12/2022 30.6  26.6 - 33.0 pg Final   • MCHC 05/12/2022 34.2  31.5 - 35.7 g/dL Final   • RDW 05/12/2022 14.4  12.3 - 15.4 % Final   • RDW-SD 05/12/2022 46.7  37.0 - 54.0 fl Final   • MPV 05/12/2022 9.9  6.0 - 12.0 fL Final   • Platelets 05/12/2022 155  140 - 450 10*3/mm3 Final   • Neutrophil % 05/12/2022 78.8 (A) 42.7 - 76.0 % Final   • Lymphocyte % 05/12/2022 12.2 (A) 19.6 - 45.3 % Final   • Monocyte % 05/12/2022 7.4  5.0 - 12.0 % Final   • Eosinophil % 05/12/2022 0.6  0.3 - 6.2 % Final   • Basophil % 05/12/2022 0.5  0.0 - 1.5 % Final   • Immature Grans % 05/12/2022 0.5  0.0 - 0.5 % Final   • Neutrophils, Absolute 05/12/2022 4.91  1.70 - 7.00 10*3/mm3 Final   • Lymphocytes, Absolute 05/12/2022 0.76  0.70 - 3.10 10*3/mm3 Final   • Monocytes, Absolute 05/12/2022 0.46  0.10 - 0.90 10*3/mm3 Final   • Eosinophils, Absolute 05/12/2022 0.04  0.00 - 0.40 10*3/mm3 Final   • Basophils, Absolute 05/12/2022 0.03  0.00 - 0.20 10*3/mm3 Final   • Immature Grans, Absolute 05/12/2022 0.03  0.00 - 0.05 10*3/mm3 Final   • nRBC 05/12/2022 0.0  0.0 - 0.2 /100 WBC Final   • COVID19 05/12/2022 Not Detected  Not Detected - Ref. Range Final   • Glucose 05/13/2022 78  65 - 99 mg/dL Final   • BUN 05/13/2022 56 (A) 8 - 23 mg/dL Final   • Creatinine 05/13/2022 4.36 (A) 0.57 - 1.00 mg/dL Final   • Sodium 05/13/2022 137  136 - 145 mmol/L Final   • Potassium 05/13/2022  4.7  3.5 - 5.2 mmol/L Final   • Chloride 05/13/2022 103  98 - 107 mmol/L Final   • CO2 05/13/2022 21.0 (A) 22.0 - 29.0 mmol/L Final   • Calcium 05/13/2022 8.1 (A) 8.2 - 9.6 mg/dL Final   • Albumin 05/13/2022 3.30 (A) 3.50 - 5.20 g/dL Final   • Phosphorus 05/13/2022 3.6  2.5 - 4.5 mg/dL Final   • Anion Gap 05/13/2022 13.0  5.0 - 15.0 mmol/L Final   • BUN/Creatinine Ratio 05/13/2022 12.8  7.0 - 25.0 Final   • eGFR 05/13/2022 9.0 (A) >60.0 mL/min/1.73 Final    <15 Indicative of kidney failure   • WBC 05/13/2022 6.19  3.40 - 10.80 10*3/mm3 Final   • RBC 05/13/2022 3.26 (A) 3.77 - 5.28 10*6/mm3 Final   • Hemoglobin 05/13/2022 10.0 (A) 12.0 - 15.9 g/dL Final   • Hematocrit 05/13/2022 30.6 (A) 34.0 - 46.6 % Final   • MCV 05/13/2022 93.9  79.0 - 97.0 fL Final   • MCH 05/13/2022 30.7  26.6 - 33.0 pg Final   • MCHC 05/13/2022 32.7  31.5 - 35.7 g/dL Final   • RDW 05/13/2022 14.9  12.3 - 15.4 % Final   • RDW-SD 05/13/2022 51.4  37.0 - 54.0 fl Final   • MPV 05/13/2022 11.8  6.0 - 12.0 fL Final   • Platelets 05/13/2022 180  140 - 450 10*3/mm3 Final   • Neutrophil % 05/13/2022 63.0  42.7 - 76.0 % Final   • Lymphocyte % 05/13/2022 22.3  19.6 - 45.3 % Final   • Monocyte % 05/13/2022 11.8  5.0 - 12.0 % Final   • Eosinophil % 05/13/2022 2.3  0.3 - 6.2 % Final   • Basophil % 05/13/2022 0.3  0.0 - 1.5 % Final   • Immature Grans % 05/13/2022 0.3  0.0 - 0.5 % Final   • Neutrophils, Absolute 05/13/2022 3.90  1.70 - 7.00 10*3/mm3 Final   • Lymphocytes, Absolute 05/13/2022 1.38  0.70 - 3.10 10*3/mm3 Final   • Monocytes, Absolute 05/13/2022 0.73  0.10 - 0.90 10*3/mm3 Final   • Eosinophils, Absolute 05/13/2022 0.14  0.00 - 0.40 10*3/mm3 Final   • Basophils, Absolute 05/13/2022 0.02  0.00 - 0.20 10*3/mm3 Final   • Immature Grans, Absolute 05/13/2022 0.02  0.00 - 0.05 10*3/mm3 Final   • nRBC 05/13/2022 0.0  0.0 - 0.2 /100 WBC Final   • Magnesium 05/13/2022 2.3  1.7 - 2.3 mg/dL Final   • Protime 05/13/2022 37.4 (A) 11.7 - 14.2 Seconds Final   •  INR 05/13/2022 3.75 (A) 0.90 - 1.10 Final   • Hepatitis B Surface Ag 05/13/2022 Non-Reactive  Non-Reactive Final   • Glucose 05/14/2022 98  65 - 99 mg/dL Final   • BUN 05/14/2022 30 (A) 8 - 23 mg/dL Final   • Creatinine 05/14/2022 3.02 (A) 0.57 - 1.00 mg/dL Final   • Sodium 05/14/2022 134 (A) 136 - 145 mmol/L Final   • Potassium 05/14/2022 4.2  3.5 - 5.2 mmol/L Final   • Chloride 05/14/2022 101  98 - 107 mmol/L Final   • CO2 05/14/2022 21.5 (A) 22.0 - 29.0 mmol/L Final   • Calcium 05/14/2022 8.1 (A) 8.2 - 9.6 mg/dL Final   • Albumin 05/14/2022 3.40 (A) 3.50 - 5.20 g/dL Final   • Phosphorus 05/14/2022 3.2  2.5 - 4.5 mg/dL Final   • Anion Gap 05/14/2022 11.5  5.0 - 15.0 mmol/L Final   • BUN/Creatinine Ratio 05/14/2022 9.9  7.0 - 25.0 Final   • eGFR 05/14/2022 14.1 (A) >60.0 mL/min/1.73 Final    <15 Indicative of kidney failure   • WBC 05/14/2022 7.68  3.40 - 10.80 10*3/mm3 Final   • RBC 05/14/2022 3.31 (A) 3.77 - 5.28 10*6/mm3 Final   • Hemoglobin 05/14/2022 10.2 (A) 12.0 - 15.9 g/dL Final   • Hematocrit 05/14/2022 29.7 (A) 34.0 - 46.6 % Final   • MCV 05/14/2022 89.7  79.0 - 97.0 fL Final   • MCH 05/14/2022 30.8  26.6 - 33.0 pg Final   • MCHC 05/14/2022 34.3  31.5 - 35.7 g/dL Final   • RDW 05/14/2022 14.4  12.3 - 15.4 % Final   • RDW-SD 05/14/2022 46.7  37.0 - 54.0 fl Final   • MPV 05/14/2022 11.0  6.0 - 12.0 fL Final   • Platelets 05/14/2022 139 (A) 140 - 450 10*3/mm3 Final   • Neutrophil % 05/14/2022 69.7  42.7 - 76.0 % Final   • Lymphocyte % 05/14/2022 16.7 (A) 19.6 - 45.3 % Final   • Monocyte % 05/14/2022 11.2  5.0 - 12.0 % Final   • Eosinophil % 05/14/2022 1.2  0.3 - 6.2 % Final   • Basophil % 05/14/2022 0.3  0.0 - 1.5 % Final   • Immature Grans % 05/14/2022 0.9 (A) 0.0 - 0.5 % Final   • Neutrophils, Absolute 05/14/2022 5.36  1.70 - 7.00 10*3/mm3 Final   • Lymphocytes, Absolute 05/14/2022 1.28  0.70 - 3.10 10*3/mm3 Final   • Monocytes, Absolute 05/14/2022 0.86  0.10 - 0.90 10*3/mm3 Final   • Eosinophils, Absolute  05/14/2022 0.09  0.00 - 0.40 10*3/mm3 Final   • Basophils, Absolute 05/14/2022 0.02  0.00 - 0.20 10*3/mm3 Final   • Immature Grans, Absolute 05/14/2022 0.07 (A) 0.00 - 0.05 10*3/mm3 Final   • nRBC 05/14/2022 0.0  0.0 - 0.2 /100 WBC Final   • Protime 05/14/2022 35.4 (A) 11.7 - 14.2 Seconds Final   • INR 05/14/2022 3.49 (A) 0.90 - 1.10 Final   • Protime 05/15/2022 31.9 (A) 11.7 - 14.2 Seconds Final   • INR 05/15/2022 3.06 (A) 0.90 - 1.10 Final   • Glucose 05/15/2022 75  65 - 99 mg/dL Final   • BUN 05/15/2022 43 (A) 8 - 23 mg/dL Final   • Creatinine 05/15/2022 3.89 (A) 0.57 - 1.00 mg/dL Final   • Sodium 05/15/2022 133 (A) 136 - 145 mmol/L Final   • Potassium 05/15/2022 3.9  3.5 - 5.2 mmol/L Final   • Chloride 05/15/2022 97 (A) 98 - 107 mmol/L Final   • CO2 05/15/2022 21.0 (A) 22.0 - 29.0 mmol/L Final   • Calcium 05/15/2022 8.1 (A) 8.2 - 9.6 mg/dL Final   • Albumin 05/15/2022 3.60  3.50 - 5.20 g/dL Final   • Phosphorus 05/15/2022 4.8 (A) 2.5 - 4.5 mg/dL Final   • Anion Gap 05/15/2022 15.0  5.0 - 15.0 mmol/L Final   • BUN/Creatinine Ratio 05/15/2022 11.1  7.0 - 25.0 Final   • eGFR 05/15/2022 10.4 (A) >60.0 mL/min/1.73 Final    <15 Indicative of kidney failure     Lab Results (last 24 hours)     Procedure Component Value Units Date/Time    Renal Function Panel [[id number]]  (Abnormal) Collected: 05/15/22 0325    Specimen: Blood Updated: 05/15/22 0500     Glucose 75 mg/dL      BUN 43 mg/dL      Creatinine 3.89 mg/dL      Sodium 133 mmol/L      Potassium 3.9 mmol/L      Chloride 97 mmol/L      CO2 21.0 mmol/L      Calcium 8.1 mg/dL      Albumin 3.60 g/dL      Phosphorus 4.8 mg/dL      Anion Gap 15.0 mmol/L      BUN/Creatinine Ratio 11.1     eGFR 10.4 mL/min/1.73      Comment: <15 Indicative of kidney failure       Narrative:      GFR Normal >60  Chronic Kidney Disease <60  Kidney Failure <15      Protime-INR [[id number]]  (Abnormal) Collected: 05/15/22 0325    Specimen: Blood from Arm, Right Updated: 05/15/22 0457     Protime  31.9 Seconds      INR 3.06          Imaging:  AP pelvis and lateral views of the right hip are available for review on the [employer] system along with the associated report.  Both are reviewed.  The films show minimally displaced fractures of the right superior and inferior pubic rami.  CT pelvis is reviewed along with the associated report.  The right-sided rami fractures are again visualized.  She also has a nondisplaced sacral fracture    Assessment:  Lateral compression type pelvic fracture included right-sided superior and inferior pubic rami fractures and nondisplaced ipsilateral sacral fracture    Plan:  We had a thorough discussion regarding the injury itself and the treatment options available.  I expect this will heal with non-surgical management.  The injury is stable to allow weight bearing as tolerated.  I will consult case management for potential rehab placement.  She will need repeat x-rays in approximately 2 weeks.  She can follow-up as an outpatient for that purpose if she is discharged.  Otherwise, I will continue to follow her while she remains in the hospital.  Thank you for the consultation.  Please call with any questions or concerns.    Date: 5/15/2022    Mati Espino MD    CC: Margot Flowers MD

## 2022-05-15 NOTE — DISCHARGE SUMMARY
Patient Name: Cristina Lemus  : 3/13/1930  MRN: [id number]    Date of Admission: 2022  Date of Discharge:  2022  Primary Care Physician: Wood Elias MD      Chief Complaint:   Fall and Hip Pain      Discharge Diagnoses     Active Hospital Problems    Diagnosis  POA   • **Closed fracture of multiple pubic rami, right, initial encounter ([facility]) [S32.591A]  Yes   • Sacral fracture ([facility]) [S32.10XA]  Yes   • Anemia in chronic kidney disease [N18.9, D63.1]  Yes   • End stage renal disease (HCC) [N18.6]  Yes   • Anticoagulated on warfarin [Z79.01]  Not Applicable   • Essential hypertension [I10]  Yes   • Chronic diastolic congestive heart failure ([facility]) [I50.32]  Yes   • History of pulmonary embolus (PE) [Z86.711]  Yes   • Permanent atrial fibrillation ([facility]) [I48.21]  Yes      Resolved Hospital Problems   No resolved problems to display.        Hospital Course     Ms. Lemus is a 89 y.o. female with a history of pulmonary embolism, atrial fibrillation on warfarin, ESRD on dialysis, hypertension and CHF who presented to [facility] initially complaining of right hip pain after mechanical fall.  Please see the admitting history and physical for further details.  She was found to have right sided superior and inferior pubic rami fractures with right-sided sacral fracture and was admitted to the hospital for further evaluation and treatment.  Orthopedic surgery was consulted on admission, the fracture was determined to be nonoperable and should heal with conservative management.  Orthopedic surgery would like to see the patient in 2 weeks with repeat x-rays.  Nephrology also consulted for continued management of patient's ESRD with hemodialysis.  The patient is on warfarin and will need her INR checked in 1 to 2 days.  The patient worked with physical therapy, mobility limited secondary to pain.  It has been recommended that she discharge to subacute rehab for strengthening.  Other chronic medical  conditions stable throughout hospitalization.  Discharge delayed by 1 day for transportation.    Day of Discharge     Subjective:  Resting in bed, no complaints.  Per RN had large bowel movement today while sitting on bedside commode.    Review of Systems   Constitutional: Negative for fatigue and fever.   Respiratory: Negative for cough and shortness of breath.    Cardiovascular: Negative for chest pain, palpitations and leg swelling.   Gastrointestinal: Negative for abdominal pain, nausea and vomiting.   Musculoskeletal:        Right hip pain with movement.   Neurological: Negative for weakness.       Physical Exam:  Temp:  [96.5 °F (35.8 °C)-98.5 °F (36.9 °C)] 97.6 °F (36.4 °C)  Heart Rate:  [70-85] 78  Resp:  [16] 16  BP: (131-151)/(64-79) 151/65  Body mass index is 18.37 kg/m².  Physical Exam  Constitutional:       General: She is not in acute distress.     Appearance: Normal appearance. She is ill-appearing (chronically).      Comments: Hard of hearing   Cardiovascular:      Rate and Rhythm: Normal rate and regular rhythm.      Pulses: Normal pulses.      Heart sounds: No murmur heard.  Pulmonary:      Effort: Pulmonary effort is normal. No respiratory distress.      Breath sounds: No wheezing.      Comments: Diminished  Abdominal:      General: Abdomen is flat. Bowel sounds are normal. There is no distension.      Palpations: Abdomen is soft.   Musculoskeletal:         General: No swelling or tenderness.      Right lower leg: No edema.      Left lower leg: No edema.      Comments: LUE AV fistula, + thrill.   Skin:     General: Skin is warm and dry.   Neurological:      General: No focal deficit present.      Mental Status: She is alert and oriented to person, place, and time. Mental status is at baseline.         Consultants     Consult Orders (all) (From admission, onward)     Start     Ordered    05/12/22 1517  Inpatient Nephrology Consult  Once        Specialty:  Nephrology  Provider:  Saul Dowling  MD Antonio    05/12/22 1516    05/12/22 1212  Inpatient Orthopedic Surgery Consult  Once        Specialty:  Orthopedic Surgery  Provider:  Doug Comer MD    05/12/22 1212    05/12/22 1123  [facility] (on-call MD unless specified) Details  Once        Specialty:  Hospitalist  Provider:  (Not yet assigned)    05/12/22 1122              Procedures     Imaging Results (All)     Procedure Component Value Units Date/Time    XR Chest 1 View [[id number]] Collected: 05/12/22 1751     Updated: 05/12/22 1757    Narrative:      PORTABLE CHEST 05/12/2022 AT 5:21 PM     CLINICAL HISTORY: Dyspnea     Compared to a previous chest x-ray dated 01/18/2022.     A right internal jugular dialysis catheter has been removed in the  interval. The lungs are well-expanded. There is abnormal increased  density containing air bronchograms in the retrocardiac region on the  left that is consistent with left lower lobe atelectasis and/or  infiltrate. The right lung is well expanded and clear. The heart is  mildly enlarged and unchanged. The pulmonary vasculature is within  normal limits.     IMPRESSIONS: Mild cardiomegaly. Focal consolidation in the left lower  lobe consistent with atelectasis and/or pneumonia. Otherwise  unremarkable chest x-ray.     This report was finalized on 5/12/2022 5:54 PM by Dr. King Rice M.D.       CT Pelvis Without Contrast [[id number]] Collected: 05/12/22 1117     Updated: 05/12/22 1127    Narrative:      PELVIS CT WITHOUT CONTRAST     HISTORY: Fall. Right hip and sacral pain with difficulty bearing weight.  Pelvic fractures were observed on x-ray.     TECHNIQUE: Axial pelvis CT with multiplanar reformatted images is  correlated with pelvis and right hip x-rays from earlier this morning.     Radiation dose reduction techniques were utilized, including automated  exposure control and exposure modulation based on body size.     FINDINGS: There is a sagittally oriented fracture along the right  lateral sacrum (zone one),  nondisplaced. This extends along the S1 and  S2 levels. No transverse fracture is identified across the midline nor  is any fracture identified on the left.     There is an oblique transverse fracture along the right superior  superior pubic ramus laterally without articular cortex disruption of  the acetabulum. There is also a nondisplaced mid right inferior tissue  pubic ramus fracture. The bones of the left hemipelvis are intact. The  proximal femurs are intact. A 27 x 17 mm hematoma is observed in the  right gluteus ginger muscle posterior to the hamstring origin.     The lower lumbar spine demonstrates some degenerative change but  vertebral height at L3, L4, and L5 is preserved. There is nonsurgical  ankylosis across the L4-5 level and there appears to been previous  posterior decompression at L5-S1. The bones are demineralized and  musculature is diffusely deconditioned, typical for the age group.. The  aorta is densely calcified but normal in caliber. There is extensive  colonic diverticulosis.       Impression:      Acute fractures of the right superior and inferior ratio  pubic rami as demonstrated on x-rays earlier today. There is also a  sagittally oriented fracture along the lateral right sacrum. There is no  evidence of femur fracture. An approximately 27 x 17 mm area of mildly  increased density is observed within the otherwise deconditioned right  gluteus ginger muscle superficial to the right inferior issue pubic  ramus, system with a small intramuscular hematoma.     This report was finalized on 5/12/2022 11:24 AM by Dr. Bryan Dodson M.D.       CT Head Without Contrast [[id number]] Collected: 05/12/22 1047     Updated: 05/12/22 1050    Narrative:      CT HEAD WITHOUT CONTRAST     CLINICAL HISTORY: Fall. Anticoagulated.     TECHNIQUE: CT scan of the head was obtained with 3 mm axial soft tissue  algorithm and 2 mm bone algorithm images. No intravenous contrast was  administered. Sagittal and  coronal reconstructions were obtained.     COMPARISON: No previous similar studies are available for comparison.     FINDINGS:       There is no evidence for a calvarial fracture. There is no evidence for  an acute extra-axial hemorrhage.     The ventricles, sulci, and cisterns are age appropriate.  Mild-to-moderate changes of chronic small vessel ischemic phenomena are  noted. Old lacunar disease is seen within the posterior limb of the left  internal capsule and the right caudate head. The posterior fossa  structures are within normal limits. Atherosclerotic changes are  identified within the intracranial vasculature.       Impression:         No CT evidence for acute intracranial pathology.     Incidental note is made of mild-to-moderate changes of chronic small  vessel ischemic phenomena and old lacunar disease within the posterior  limb of the left internal capsule and the right caudate head.           Radiation dose reduction techniques were utilized, including automated  exposure control and exposure modulation based on body size.     This report was finalized on 5/12/2022 10:47 AM by Dr. Nahum Bose M.D.       XR Hip With or Without Pelvis 2 - 3 View Right [[id number]] Collected: 05/12/22 0946     Updated: 05/12/22 0950    Narrative:      XR HIP W OR WO PELVIS 2-3 VIEW RIGHT-  05/12/2022     HISTORY: Fell, pelvis and right hip pain.     There is a mildly displaced fracture of the lateral aspect of the right  superior pubic ramus. There also is mild deformity of the right inferior  pubic ramus probably related to mild displaced fracture.     Right hip joint space appears well-maintained. Proximal right femur  appears intact. Left hip appears unremarkable.       Impression:      . Right superior and inferior pubic rami fractures.     This report was finalized on 5/12/2022 9:47 AM by Dr. Robb Lizama M.D.             Pertinent Labs     Results from last 7 days   Lab Units 05/14/22  0329 05/13/22  0536  05/12/22  1129   WBC 10*3/mm3 7.68 6.19 6.23   HEMOGLOBIN g/dL 10.2* 10.0* 10.9*   PLATELETS 10*3/mm3 139* 180 155     Results from last 7 days   Lab Units 05/15/22  0325 05/14/22  0329 05/13/22  0536 05/12/22  1129   SODIUM mmol/L 133* 134* 137 135*   POTASSIUM mmol/L 3.9 4.2 4.7 4.6   CHLORIDE mmol/L 97* 101 103 100   CO2 mmol/L 21.0* 21.5* 21.0* 21.0*   BUN mg/dL 43* 30* 56* 52*   CREATININE mg/dL 3.89* 3.02* 4.36* 3.90*   GLUCOSE mg/dL 75 98 78 90   Estimated Creatinine Clearance: 7.3 mL/min (A) (by C-G formula based on SCr of 3.89 mg/dL (H)).  Results from last 7 days   Lab Units 05/15/22  0325 05/14/22  0329 05/13/22  0536   ALBUMIN g/dL 3.60 3.40* 3.30*     Results from last 7 days   Lab Units 05/15/22  0325 05/14/22  0329 05/13/22  0536 05/12/22  1129   CALCIUM mg/dL 8.1* 8.1* 8.1* 8.5   ALBUMIN g/dL 3.60 3.40* 3.30*  --    MAGNESIUM mg/dL  --   --  2.3  --    PHOSPHORUS mg/dL 4.8* 3.2 3.6  --                Invalid input(s): LDLCALC        Test Results Pending at Discharge       Discharge Details        Discharge Medications      New Medications      Instructions Start Date   acetaminophen 325 MG tablet  Commonly known as: TYLENOL   650 mg, Oral, Every 4 Hours PRN      HYDROcodone-acetaminophen 5-325 MG per tablet  Commonly known as: NORCO   1 tablet, Oral, Every 4 Hours PRN      sennosides-docusate 8.6-50 MG per tablet  Commonly known as: PERICOLACE   2 tablets, Oral, 2 Times Daily, Hold for loose stool.         Changes to Medications      Instructions Start Date   LORazepam 1 MG tablet  Commonly known as: ATIVAN  What changed:   when to take this  reasons to take this   1 mg, Oral, Nightly      warfarin 2 MG tablet  Commonly known as: Jantoven  What changed:   how much to take  how to take this  when to take this  additional instructions   Take one tablet (2 mg) by mouth on Tuesday, Thursday, and Saturday, then take 2 tablets (4 mg) all other days or as directed         Continue These Medications       Instructions Start Date   torsemide 20 MG tablet  Commonly known as: DEMADEX   100 mg, Oral, Daily      VITAMIN D3 PO   1,000 Units, Oral, Daily         Stop These Medications    amLODIPine 10 MG tablet  Commonly known as: NORVASC     KLOR-CON 20 MEQ CR tablet  Generic drug: potassium chloride            Allergies   Allergen Reactions   • Statins Nausea And Vomiting   • Sulfa Antibiotics Nausea And Vomiting and Other (See Comments)     CHILLS AND FEVER         Discharge Disposition:  Rehab Facility or Unit (DC - External)    Discharge Diet:  Diet Order   Procedures   • Diet Regular; Cardiac       Discharge Activity:   Activity Instructions     Activity as Tolerated            CODE STATUS:    Code Status and Medical Interventions:   Ordered at: 05/12/22 1517     Code Status (Patient has no pulse and is not breathing):    CPR (Attempt to Resuscitate)     Medical Interventions (Patient has pulse or is breathing):    Full Support       Future Appointments   Date Time Provider Department Center   5/31/2022  1:30 PM Wood Elias MD [facility]   6/20/2022  2:30 PM Lm Mart MD [facility]     Additional Instructions for the Follow-ups that You Need to Schedule     Discharge Follow-up with PCP   As directed       Currently Documented PCP:    Wood Elias MD    PCP Phone Number:    [phone]     Follow Up Details: within 1-2 weeks of discharge         Discharge Follow-up with Specialty: Dr. Espino with orthopedic surgery.; 2 Weeks   As directed      Specialty: Dr. Espino with orthopedic surgery.    Follow Up: 2 Weeks    Follow Up Details: 2 week repeat X-rays.            Follow-up Information     Wood Elias MD .    Specialties: Family Medicine, Emergency Medicine  Why: within 1-2 weeks of discharge  Contact information:  [address] [place] [address]  [phone]             Mati Espino MD. Schedule an appointment as soon as possible for a visit in 2 week(s).     Specialty: Orthopedic Surgery  Why: For repeat X-rays  Contact information:  [facility] [address] [place] [address]  [phone]                         Additional Instructions for the Follow-ups that You Need to Schedule     Discharge Follow-up with PCP   As directed       Currently Documented PCP:    Wood Elias MD    PCP Phone Number:    [phone]     Follow Up Details: within 1-2 weeks of discharge         Discharge Follow-up with Specialty: Dr. Espino with orthopedic surgery.; 2 Weeks   As directed      Specialty: Dr. Espino with orthopedic surgery.    Follow Up: 2 Weeks    Follow Up Details: 2 week repeat X-rays.           Time Spent on Discharge:  Greater than 30 minutes      Margot Flowers MD  [facility]  05/15/22  16:51 EDT

## 2022-05-15 NOTE — PLAN OF CARE
Goal Outcome Evaluation:  Plan of Care Reviewed With: patient        Progress: improving  Outcome Evaluation: patient resting comfortably between care, voiding per purewick, pain controlled at this time, dialysis scheduled for 5/15/22, educated on medication management

## 2022-05-15 NOTE — PLAN OF CARE
Goal Outcome Evaluation:  Plan of Care Reviewed With: patient        Progress: improving  Outcome Evaluation: 89 year old female, alert and oriented x 4.  Vital Signs stable this shift.  Diaylsis this am.  BM today up to [facility] with assistance.  Appetite good.  DC scheduled for tomorrow to [facility].  WCTM

## 2022-05-16 VITALS
BODY MASS INDEX: 18.37 KG/M2 | SYSTOLIC BLOOD PRESSURE: 149 MMHG | DIASTOLIC BLOOD PRESSURE: 68 MMHG | HEART RATE: 68 BPM | OXYGEN SATURATION: 94 % | WEIGHT: 110.23 LBS | TEMPERATURE: 97.6 F | HEIGHT: 65 IN | RESPIRATION RATE: 18 BRPM

## 2022-05-16 LAB
INR PPP: 2.65 (ref 0.9–1.1)
PROTHROMBIN TIME: 28.4 SECONDS (ref 11.7–14.2)
SARS-COV-2 RNA RESP QL NAA+PROBE: NOT DETECTED

## 2022-05-16 PROCEDURE — U0005 INFEC AGEN DETEC AMPLI PROBE: HCPCS | Performed by: INTERNAL MEDICINE

## 2022-05-16 PROCEDURE — U0003 INFECTIOUS AGENT DETECTION BY NUCLEIC ACID (DNA OR RNA); SEVERE ACUTE RESPIRATORY SYNDROME CORONAVIRUS 2 (SARS-COV-2) (CORONAVIRUS DISEASE [COVID-19]), AMPLIFIED PROBE TECHNIQUE, MAKING USE OF HIGH THROUGHPUT TECHNOLOGIES AS DESCRIBED BY CMS-2020-01-R: HCPCS | Performed by: INTERNAL MEDICINE

## 2022-05-16 PROCEDURE — 85610 PROTHROMBIN TIME: CPT | Performed by: NURSE PRACTITIONER

## 2022-05-16 PROCEDURE — G0378 HOSPITAL OBSERVATION PER HR: HCPCS

## 2022-05-16 RX ORDER — WARFARIN SODIUM 2 MG/1
2 TABLET ORAL
Status: DISCONTINUED | OUTPATIENT
Start: 2022-05-16 | End: 2022-05-16 | Stop reason: HOSPADM

## 2022-05-16 RX ADMIN — TORSEMIDE 100 MG: 100 TABLET ORAL at 08:19

## 2022-05-16 RX ADMIN — ACETAMINOPHEN 650 MG: 325 TABLET, FILM COATED ORAL at 08:19

## 2022-05-16 RX ADMIN — Medication 1000 UNITS: at 08:19

## 2022-05-16 RX ADMIN — AMLODIPINE BESYLATE 5 MG: 5 TABLET ORAL at 08:19

## 2022-05-16 NOTE — PROGRESS NOTES
[facility] Progress Note      Patient Name: Cristina Lemus  : 3/13/1930  MRN: [id number]  Primary Care Physician:  Wood Elias MD  Date of admission: 2022    Subjective     Interval History:   No pelvic pain when at rest, but quite intense when attempting to bear weight  HD earlier today; 1.5 L removed, with cramping towards end of treatment  Appetite mediocre; no N/V      Review of Systems:   As noted above    Objective     Vitals:   Temp:  [96.5 °F (35.8 °C)-98.7 °F (37.1 °C)] 98.7 °F (37.1 °C)  Heart Rate:  [53-85] 53  Resp:  [16] 16  BP: (131-164)/(64-79) 164/74  Flow (L/min):  [2] 2    Intake/Output Summary (Last 24 hours) at 5/15/2022 2112  Last data filed at 5/15/2022 1700  Gross per 24 hour   Intake 600 ml   Output 500 ml   Net 100 ml       Physical Exam:    General Appearance: alert, oriented x 3, NAD, hard of hearing  Skin: warm and dry  HEENT: oral mucosa normal, nonicteric sclera, temporal wasting  Neck: supple, no JVD  Lungs: CTA; not labored on 2 L/min  Heart: Irregularly irregular, normal S1 and S2  Abdomen: soft, nontender, nondistended, BS +  : no palpable bladder  Extremities: no edema, cyanosis or clubbing  Vascular: Left arm AV fistula patent  Neuro: normal speech and mental status     Scheduled Meds:     cholecalciferol, 1,000 Units, Oral, Daily  senna-docusate sodium, 2 tablet, Oral, BID  sodium chloride, 10 mL, Intravenous, Q12H  torsemide, 100 mg, Oral, Daily      IV Meds:   Pharmacy to dose warfarin,         Results Reviewed:   I have personally reviewed the results from the time of this admission to 5/15/2022 21:12 EDT     Results from last 7 days   Lab Units 05/15/22  0325 22  0329 22  0536   SODIUM mmol/L 133* 134* 137   POTASSIUM mmol/L 3.9 4.2 4.7   CHLORIDE mmol/L 97* 101 103   CO2 mmol/L 21.0* 21.5* 21.0*   BUN mg/dL 43* 30* 56*   CREATININE mg/dL 3.89* 3.02* 4.36*   CALCIUM mg/dL 8.1* 8.1* 8.1*   GLUCOSE mg/dL 75 98 78        Estimated Creatinine Clearance: 7.3 mL/min (A) (by C-G formula based on SCr of 3.89 mg/dL (H)).    Results from last 7 days   Lab Units 05/15/22  0325 05/14/22  0329 05/13/22  0536   MAGNESIUM mg/dL  --   --  2.3   PHOSPHORUS mg/dL 4.8* 3.2 3.6             Results from last 7 days   Lab Units 05/14/22 0329 05/13/22 0536 05/12/22  1129   WBC 10*3/mm3 7.68 6.19 6.23   HEMOGLOBIN g/dL 10.2* 10.0* 10.9*   PLATELETS 10*3/mm3 139* 180 155       Results from last 7 days   Lab Units 05/15/22  0325 05/14/22  0329 05/13/22  0536 05/12/22  0927   INR  3.06* 3.49* 3.75* 2.64*       Assessment / Plan     ASSESSMENT:  -End-stage renal disease on maintenance hemodialysis TTS.  S/p HD today as transition back to her usual regimen.  Stable volume by exam; potassium and anion gap are normal  -Hypertension  -Anemia of chronic kidney disease treated with long-acting KENNEDI as an outpatient; hemoglobin is at goal  -Thrombocytopenia  -Mechanical fall with fractured pelvis  -Chronic A. Fib    PLAN:  1.  Will resume usual TTS schedule this week  2.  Resume amlodipine  3.  Add Boost to supplement nutrition  4.  Rehab anytime from [facility]    Thank you for involving us in the care of Cristina Lemus.  Please feel free to call with any questions.    Alex Alcazar MD  05/15/22  21:12 EDT    [facility]  [phone]      Much of this encounter note is an electronic transcription/translation of spoken language to printed text. The electronic translation of spoken language may permit erroneous, or at times, nonsensical words or phrases to be inadvertently transcribed; Although I have reviewed the note for such errors, some may still exist.

## 2022-05-16 NOTE — CASE MANAGEMENT/SOCIAL WORK
"Physicians Statement of Medical Necessity for  Ambulance Transportation    GENERAL INFORMATION     Name: Cristina Lemus  YOB: 1930  Medicare #: [id number] (See Facesheet)  Transport Date: 5/16/2022 (Valid for round trips this date, or for scheduled repetitive trips for 60 days from the date signed below.)  Origin: [facility]  Destination: [facility].  Is the Patient's stay covered under Medicare Part A (PPS/DRG?)Yes  Closest appropriate facility? Yes  If this a hosp-hosp transfer? No  Is this a hospice patient? No    MEDICAL NECESSITY QUESTIONAIRE    Ambulance Transportation is medically necessary only if other means of transportation are contraindicated or would be potentially harmful to the patient.  To meet this requirement, the patient must be either \"bed confined\" or suffer from a condition such that transport by means other than an ambulance is contraindicated by the patient's condition.  The following questions must be answered by the healthcare professional signing below for this form to be valid:     1) Describe the MEDICAL CONDITION (physical and/or mental) of this patient AT THE TIME OF AMBULANCE TRANSPORT that requires the patient to be transported in an ambulance, and why transport by other means is contraindicated by the patient's condition:   Past Medical History:   Diagnosis Date   • Atrial fibrillation (HCC)    • CHF (congestive heart failure) (HCC)       Past Surgical History:   Procedure Laterality Date   • ARTERIOVENOUS FISTULA/SHUNT SURGERY Left 1/24/2022    Procedure: LEFT ARM DIALYSIS GRAFT;  Surgeon: King Reynaga MD;  Location: [facility] OR;  Service: Vascular;  Laterality: Left;   • BACK SURGERY     • CYST REMOVAL     • INSERTION HEMODIALYSIS CATHETER N/A 1/18/2022    Procedure: HEMODIALYSIS CATHETER INSERTION with C arm;  Surgeon: King Reynaga MD;  Location: [facility] OR;  Service: Vascular;  Laterality: N/A;   • OTHER " "SURGICAL HISTORY      rupture disk      2) Is this patient \"bed confined\" as defined below?Yes   To be \"bed confined\" the patient must satisfy all three of the following criteria:  (1) unable to get up from bed without assistance; AND (2) unable to ambulate;  AND (3) unable to sit in a chair or wheelchair.  3) Can this patient safely be transported by car or wheelchair van (I.e., may safely sit during transport, without an attendant or monitoring?)No   4. In addition to completing questions 1-3 above, please check any of the following conditions that apply*:          *Note: supporting documentation for any boxes checked must be maintained in the patient's medical records Unable to tolerate seated position for time needed to transport      SIGNATURE OF PHYSICIAN OR OTHER AUTHORIZED HEALTHCARE PROFESSIONAL    I certify that the above information is true and correct based on my evaluation of this patient, and represent that the patient requires transport by ambulance and that other forms of transport are contraindicated.  I understand that this information will be used by the Centers for Medicare and Medicaid Services (CMS) to support the determiniation of medical necessity for ambulance services, and I represent that I have personal knowledge of the patient's condition at the time of transport.       If this box is checked, I also certify that the patient is physically or mentally incapable of signing the ambulance service's claim form and that the institution with which I am affiliated has furnished care, services or assistance to the patient.  My signature below is made on behalf of the patient pursuant to 42 .36(b)(4). In accordance with 42 .37, the specific reason(s) that the patient is physically or mentally incapable of signing the claim for is as follows:     Signature of Physician or Healthcare Professional  Date/Time:        (For Scheduled repetitive transport, this form is not valid for " transports performed more than 60 days after this date).                                                                                                                                            --------------------------------------------------------------------------------------------  Printed Name and Credentials of Physician or Authorized Healthcare Professional     *Form must be signed by patient's attending physician for scheduled, repetitive transports,.  For non-repetitive ambulance transports, if unable to obtain the signature of the attending physician, any of the following may sign (please select below):     Physician  Clinical Nurse Specialist  Registered Nurse     Physician Assistant  Discharge Planner  Licensed Practical Nurse     Nurse Practitioner

## 2022-05-16 NOTE — CASE MANAGEMENT/SOCIAL WORK
Case Management Discharge Note      Final Note: [facility] SNF.    Provided Post Acute Provider List?: Yes  Post Acute Provider List: Nursing Home    Selected Continued Care - Discharged on 5/16/2022 Admission date: 5/12/2022 - Discharge disposition: Rehab Facility or Unit (DC - External)    Destination Coordination complete.    Service Provider Selected Services Address Phone Fax Patient Preferred    [facility]  Skilled Nursing [address] [address] [phone] --          Durable Medical Equipment    No services have been selected for the patient.              Dialysis/Infusion    No services have been selected for the patient.              Home Medical Care    No services have been selected for the patient.              Therapy    No services have been selected for the patient.              Community Resources    No services have been selected for the patient.              Community & DME    No services have been selected for the patient.                       Final Discharge Disposition Code: 03 - skilled nursing facility (SNF)

## 2022-05-16 NOTE — PROGRESS NOTES
[facility] Clinical Pharmacy Services: Warfarin Dosing/Monitoring Consult    Cristina Lemus is a 89 y.o. female, estimated creatinine clearance is 7.3 mL/min (A) (by C-G formula based on SCr of 3.89 mg/dL (H)). weighing 50 kg (110 lb 3.7 oz).    Results from last 7 days   Lab Units 05/16/22  0434 05/15/22  0325 05/14/22  0329 05/13/22  0536 05/12/22  1129 05/12/22  0927   INR  2.65* 3.06* 3.49* 3.75*  --  2.64*   HEMOGLOBIN g/dL  --   --  10.2* 10.0* 10.9*  --    HEMATOCRIT %  --   --  29.7* 30.6* 31.9*  --    PLATELETS 10*3/mm3  --   --  139* 180 155  --      Prior to admission anticoagulation: per anticoag clinic note 5/2/22-- pt daughter reported she was taking 2mg on Saturday and 4mg all other days-- dose was increased to 4mg daily. Was due for re-check in 1 week, but I do not see that follow-up.     Hospital Anticoagulation:  Consulting provider: Dr. Flowers  Start date: PTA  Indication: AFib  Target INR: 2 - 3  Expected duration: tbd   Bridge Therapy: No      Potential food or drug interactions: no new interacting medications    Education complete?/Date: No; plan for follow up TBD    Assessment/Plan:  Note that pt has pelvic fracture-- per ortho eval, plan to manage non-surgically.   Dose: INR now therapeutic at 2.65 (Goal 2-3). Will continue with 2 mg today.   Monitor for any signs or symptoms of bleeding  Follow up daily INRs and dose adjustments    Pharmacy will continue to follow until discharge or discontinuation of warfarin.     Verónica Hinojosa [facility]  Clinical Pharmacist

## 2022-05-16 NOTE — PROGRESS NOTES
[facility] Progress Note      Patient Name: Cristina Lemus  : 3/13/1930  MRN: [id number]  Primary Care Physician:  Wood Elias MD  Date of admission: 2022    Subjective     Interval History:   Follow-up ESRD   Patient is feeling the same have difficulty moving in bed because of pelvic pain when she moves, currently is pain-free but she is staying still, denies any chest pain or shortness of air, no orthopnea or PND, no nausea or vomiting.      Review of Systems:   As noted above    Objective     Vitals:   Temp:  [96.5 °F (35.8 °C)-98.7 °F (37.1 °C)] 97.8 °F (36.6 °C)  Heart Rate:  [53-85] 72  Resp:  [16] 16  BP: (135-164)/(65-74) 158/73  Flow (L/min):  [2] 2    Intake/Output Summary (Last 24 hours) at 2022 0904  Last data filed at 2022 0819  Gross per 24 hour   Intake 820 ml   Output 200 ml   Net 620 ml       Physical Exam:    General Appearance: alert, oriented x 3, NAD, hard of hearing  Skin: warm and dry  HEENT: oral mucosa normal, nonicteric sclera, temporal wasting  Neck: supple, no JVD  Lungs: CTA; not labored on 2 L/min  Heart: Irregularly irregular, normal S1 and S2  Abdomen: soft, nontender, nondistended, normoactive bowel  : no palpable bladder  Extremities: no edema, cyanosis or clubbing  Vascular: Left arm AV fistula patent  Neuro: normal speech and mental status     Scheduled Meds:     amLODIPine, 5 mg, Oral, Q24H  cholecalciferol, 1,000 Units, Oral, Daily  senna-docusate sodium, 2 tablet, Oral, BID  sodium chloride, 10 mL, Intravenous, Q12H  torsemide, 100 mg, Oral, Daily      IV Meds:   Pharmacy to dose warfarin,         Results Reviewed:   I have personally reviewed the results from the time of this admission to 2022 09:04 EDT     Results from last 7 days   Lab Units 05/15/22  0325 22  0329 22  0536   SODIUM mmol/L 133* 134* 137   POTASSIUM mmol/L 3.9 4.2 4.7   CHLORIDE mmol/L 97* 101 103   CO2 mmol/L 21.0* 21.5* 21.0*   BUN mg/dL  43* 30* 56*   CREATININE mg/dL 3.89* 3.02* 4.36*   CALCIUM mg/dL 8.1* 8.1* 8.1*   GLUCOSE mg/dL 75 98 78       Estimated Creatinine Clearance: 7.3 mL/min (A) (by C-G formula based on SCr of 3.89 mg/dL (H)).    Results from last 7 days   Lab Units 05/15/22  0325 05/14/22  0329 05/13/22  0536   MAGNESIUM mg/dL  --   --  2.3   PHOSPHORUS mg/dL 4.8* 3.2 3.6             Results from last 7 days   Lab Units 05/14/22 0329 05/13/22  0536 05/12/22  1129   WBC 10*3/mm3 7.68 6.19 6.23   HEMOGLOBIN g/dL 10.2* 10.0* 10.9*   PLATELETS 10*3/mm3 139* 180 155       Results from last 7 days   Lab Units 05/16/22  0434 05/15/22  0325 05/14/22  0329 05/13/22  0536 05/12/22  0927   INR  2.65* 3.06* 3.49* 3.75* 2.64*       Assessment / Plan     ASSESSMENT:  -End-stage renal disease on maintenance hemodialysis TTS.  She had dialysis yesterday and her dialysis will be resumed on Tuesday hopefully at the [place]  -Hypertension  -Anemia of chronic kidney disease treated with long-acting KENNEDI as an outpatient; hemoglobin is at goal  -Thrombocytopenia  -Mechanical fall with fractured pelvis  -Chronic A. Fib    PLAN:  1.  Hemodialysis tomorrow  2.  Patient is cleared to be transferred to [place] and she will have her dialysis there tomorrow    Thank you for involving us in the care of Cristina Lemus.  Please feel free to call with any questions.    Saul Dowling MD  05/16/22  09:04 EDT    [facility]  [phone]      Much of this encounter note is an electronic transcription/translation of spoken language to printed text. The electronic translation of spoken language may permit erroneous, or at times, nonsensical words or phrases to be inadvertently transcribed; Although I have reviewed the note for such errors, some may still exist.

## 2022-05-16 NOTE — CASE MANAGEMENT/SOCIAL WORK
Continued Stay Note  [facility]     Patient Name: Cristina Lemus  MRN: [id number]  Today's Date: 5/16/2022    Admit Date: 5/12/2022     Discharge Plan     Row Name 05/16/22 0853       Plan    Plan [facility] -- Accepted.    Patient/Family in Agreement with Plan yes    Plan Comments Discharge orders noted. A [facility] was scheduled to take the pt to [facility] today at 0900. Spoke with Corina who said they were not ready for the pt to transfer at 0900. Spoke with FELISHA Ferreira who said [facility] also was not ready to take report this morning. Spoke with [facility] and rescheduled the ambulance transport for 1300 today. Updated Corina who's agreeable and said she has a SNF bed for the pt today & a HD Chair for the pt tomorrow. Joelle is requesting a new rapid Covid-19 Test. Updated FELISHA Menard who will discuss with A. Updated FELISHA Ferreira and the patient's daughter/Shannan who are agreeable. [facility] gave Shannan the disclaimer that [facility] cannot guarantee insurance will cover the cost of the ambulance transport. Shannan is agreeable, acknowledges understanding and wants to continue with the ambulance transport. No other needs identified. Packet is on the chart.               Discharge Codes    No documentation.               Expected Discharge Date and Time     Expected Discharge Date Expected Discharge Time    May 16, 2022             Kathy TRAN RN

## 2022-05-16 NOTE — PLAN OF CARE
Goal Outcome Evaluation:  Plan of Care Reviewed With: patient        Progress: improving  Outcome Evaluation: 89 year old female,admitted w/ pubic rami fx and c/o right leg pain. a/ox4, vss, [employer], 2L 02 NC. voids via bsc and [facility] at night. L arm shunt for dialysis tues, thurs, saturday. Encouraged to turn in bed and use IS. DC 5/16 to [facility] via ambulance. WCTM.

## 2022-05-17 ENCOUNTER — ANTICOAGULATION VISIT (OUTPATIENT)
Dept: PHARMACY | Facility: HOSPITAL | Age: 87
End: 2022-05-17

## 2022-05-17 DIAGNOSIS — I48.21 PERMANENT ATRIAL FIBRILLATION: Primary | ICD-10-CM

## 2022-05-17 NOTE — PROGRESS NOTES
This visit is for documentation purposes only. Received INR result from [facility], patient is now at SNF. Will resume warfarin management upon discharge.

## 2022-05-18 ENCOUNTER — TELEPHONE (OUTPATIENT)
Dept: ORTHOPEDIC SURGERY | Facility: CLINIC | Age: 87
End: 2022-05-18

## 2022-05-18 NOTE — TELEPHONE ENCOUNTER
INCOMING REFERRAL CALLED IN BY [facility] [phone], DX CLOSED FRACTURE OF MULTIPLE PUBIC RAMI, RIGHT, PATIENT' CHART MEDIA 05/16/22 DISCHARGE SUMMARY FROM [facility] YANET BERNARD CONFIRMS DX AND RECOMMENDS 2 WEEK FOLLOW UP WITH DR JENNINGS- WHEN [place] [facility] [place]IENT?

## 2022-05-31 ENCOUNTER — TELEPHONE (OUTPATIENT)
Dept: PHARMACY | Facility: HOSPITAL | Age: 87
End: 2022-05-31

## 2022-05-31 NOTE — TELEPHONE ENCOUNTER
Violette Aggarwal, Pharmacy Technician  [facility]  [facility] Clinical Pool    Pt's daughter, Shannan, called to inform that her mother is still in the nursing home. If we need to contact Shannan back: [phone]

## 2022-05-31 NOTE — TELEPHONE ENCOUNTER
Called patients daughter to determine if she was still at the SNF or if we needed to take back over her warfarin management. Left voicemail requesting a return phone call.

## 2022-06-03 ENCOUNTER — OFFICE VISIT (OUTPATIENT)
Dept: ORTHOPEDIC SURGERY | Facility: CLINIC | Age: 87
End: 2022-06-03

## 2022-06-03 VITALS — HEIGHT: 63 IN | TEMPERATURE: 97.8 F | BODY MASS INDEX: 19.49 KG/M2 | WEIGHT: 110 LBS

## 2022-06-03 DIAGNOSIS — S32.591A CLOSED FRACTURE OF MULTIPLE PUBIC RAMI, RIGHT, INITIAL ENCOUNTER: Primary | ICD-10-CM

## 2022-06-03 PROCEDURE — 72170 X-RAY EXAM OF PELVIS: CPT | Performed by: ORTHOPAEDIC SURGERY

## 2022-06-03 PROCEDURE — 99214 OFFICE O/P EST MOD 30 MIN: CPT | Performed by: ORTHOPAEDIC SURGERY

## 2022-06-06 NOTE — PROGRESS NOTES
Patient: Cristina Lemus    YOB: 1930    Medical Record Number: [id number]    Chief Complaints: Right hip injury    History of Present Illness:     89 y.o. female patient who presents for evaluation of a right hip injury.  She fell in early May, injuring her pelvis.  She was admitted to the hospital and diagnosed with multiple rami fractures.  She says the pain has gotten a little better over the past few weeks.  She is starting to mobilize with a walker.  Prior to the injury, she was walking with a walker.  Current pain is described as mild and aching.  Localizes the pain to the right groin.  Denies any other injuries or complaints.    Allergies:   Allergies   Allergen Reactions   • Statins Nausea And Vomiting   • Sulfa Antibiotics Nausea And Vomiting and Other (See Comments)     CHILLS AND FEVER       Home Medications:      Current Outpatient Medications:   •  acetaminophen (TYLENOL) 325 MG tablet, Take 2 tablets by mouth Every 4 (Four) Hours As Needed for Mild Pain ., Disp: , Rfl:   •  Cholecalciferol (VITAMIN D3 PO), Take 1,000 Units by mouth Daily., Disp: , Rfl:   •  LORazepam (ATIVAN) 1 MG tablet, Take 1 tablet by mouth Every Night., Disp: 3 tablet, Rfl: 0  •  sennosides-docusate (PERICOLACE) 8.6-50 MG per tablet, Take 2 tablets by mouth 2 (Two) Times a Day. Hold for loose stool., Disp: , Rfl:   •  torsemide (DEMADEX) 20 MG tablet, Take 5 tablets by mouth Daily., Disp: 30 tablet, Rfl: 1  •  warfarin (Jantoven) 2 MG tablet, Take one tablet (2 mg) by mouth on Tuesday, Thursday, and Saturday, then take 2 tablets (4 mg) all other days or as directed (Patient taking differently: Take 2 mg by mouth Daily.), Disp: 150 tablet, Rfl: 1    Past Medical History:   Diagnosis Date   • Atrial fibrillation (HCC)    • CHF (congestive heart failure) (HCC)        Past Surgical History:   Procedure Laterality Date   • ARTERIOVENOUS FISTULA/SHUNT SURGERY Left 1/24/2022    Procedure: LEFT ARM DIALYSIS GRAFT;  Surgeon:  "King Reynaga MD;  Location: [facility] MAIN OR;  Service: Vascular;  Laterality: Left;   • BACK SURGERY     • CYST REMOVAL     • INSERTION HEMODIALYSIS CATHETER N/A 1/18/2022    Procedure: HEMODIALYSIS CATHETER INSERTION with C arm;  Surgeon: King Reynaga MD;  Location: [facility] MAIN OR;  Service: Vascular;  Laterality: N/A;   • OTHER SURGICAL HISTORY      rupture disk       Social History     Occupational History   • Not on file   Tobacco Use   • Smoking status: Never Smoker   • Smokeless tobacco: Never Used   Substance and Sexual Activity   • Alcohol use: No     Comment: caffeine use    • Drug use: No   • Sexual activity: Defer      Social History     Social History Narrative   • Not on file       History reviewed. No pertinent family history.    Review of Systems:      Constitutional: Denies fever, shaking or chills   Eyes: Denies change in visual acuity   HEENT: Denies nasal congestion or sore throat   Respiratory: Denies cough or shortness of breath   Cardiovascular: Denies chest pain or edema  Endocrine: Denies tremors, palpitations, intolerance of heat or cold, polyuria, polydipsia.  GI: Denies abdominal pain, nausea, vomiting, bloody stools or diarrhea  : Denies frequency, urgency, incontinence, retention, or nocturia.  Musculoskeletal: Denies numbness, tingling or loss of motor function except as above  Integument: Denies rash, lesion or ulceration   Neurologic: Denies headache or focal weakness, deficits  Heme: Denies spontaneous or excessive bleeding, epistaxis, hematuria, melena, fatigue, enlarged or tender lymph nodes.      All other pertinent positives and negatives as noted above in HPI.    Physical Exam: 89 y.o. female    Vitals:    06/03/22 1137   Temp: 97.8 °F (36.6 °C)   TempSrc: Temporal   Weight: 49.9 kg (110 lb)   Height: 160 cm (63\")       General:  Patient is awake and alert.  Appears in no acute distress or discomfort.    Psych:  Affect and demeanor are appropriate.    Eyes:  " Conjunctiva and sclera appear grossly normal.  Eyes track well and EOM seem to be intact.    Ears:  No gross abnormalities.  Hearing adequate for the exam.    Cardiovascular:  Regular rate and rhythm.    Lungs:  Good chest expansion.  Breathing unlabored.    Lymph:  No palpable masses or adenopathy in the affected extremity    Extremities: Right hip and pelvis are examined.  Skin is benign.  She is thin but there is no disproportionate atrophy.  She is tender over the anterior aspect of her right hemipelvis, mildly so.  No step-offs or palpable defects.  I can flex her hip up to about 90 degrees before she has any discomfort.  Logrolling her hip does not cause her any pain.  Her thigh and calf are soft.  She can flex her hip, extend her knee and plantarflex and dorsiflex her ankle and toes with good strength.  Sensation is intact.  Brisk capillary refill.         Radiology:   AP, inlet and outlet views of the pelvis are ordered and reviewed to evaluate her fractures.  These are compared to previous x-rays and CT.  Her repeat x-rays today show right-sided superior and inferior pubic rami fractures.  The superior ramus fracture is slightly displaced.  The displacement appears unchanged relative to previous films.  CT findings are listed below.  No significant changes relative to the CT images either.     IMPRESSION:  Acute fractures of the right superior and inferior ratio  pubic rami as demonstrated on x-rays earlier today. There is also a  sagittally oriented fracture along the lateral right sacrum. There is no  evidence of femur fracture. An approximately 27 x 17 mm area of mildly  increased density is observed within the otherwise deconditioned right  gluteus ginger muscle superficial to the right inferior issue pubic  ramus, system with a small intramuscular hematoma.    Assessment/Plan: Right superior and inferior pubic rami fractures with nondisplaced sacral fracture    I showed her the x-rays.  I expect these  fractures will heal with conservative treatment.  It is okay for her to mobilize and weight-bear as tolerated.  I need to see her back in 1 month for repeat x-rays and reevaluation.    Mati Espino MD    06/03/2022    CC to Wood Elias MD

## 2022-06-08 ENCOUNTER — TELEPHONE (OUTPATIENT)
Dept: PHARMACY | Facility: HOSPITAL | Age: 87
End: 2022-06-08

## 2022-06-08 ENCOUNTER — TELEPHONE (OUTPATIENT)
Dept: INTERNAL MEDICINE | Facility: CLINIC | Age: 87
End: 2022-06-08

## 2022-06-08 NOTE — TELEPHONE ENCOUNTER
FELISHA OLSON WITH [facility] CALLED TO REPORT INR: 2.8  PATIENT IS TAKING WARFARIN 2 MG DAILY    PLEASE CALL BACK IF THERE'S ANY QUESTIONS OR CONCERNS    [phone]

## 2022-06-10 ENCOUNTER — TELEPHONE (OUTPATIENT)
Dept: INTERNAL MEDICINE | Facility: CLINIC | Age: 87
End: 2022-06-10

## 2022-06-10 LAB — INR PPP: 2.8

## 2022-06-10 NOTE — TELEPHONE ENCOUNTER
Caller: MIKE WITH [facility]     Relationship: Home Health    Best call back number: [phone]    What orders are you requesting (i.e. lab or imaging): PT 2X A WEEK FOR 4 WEEKS, 1 A WEEK FOR 4 WEEKS     In what timeframe would the patient need to come in: NEXT WEEK    Where will you receive your lab/imaging services: IN HOME WITH [facility]     Additional notes:

## 2022-06-13 ENCOUNTER — ANTICOAGULATION VISIT (OUTPATIENT)
Dept: PHARMACY | Facility: HOSPITAL | Age: 87
End: 2022-06-13

## 2022-06-13 DIAGNOSIS — I48.21 PERMANENT ATRIAL FIBRILLATION: Primary | ICD-10-CM

## 2022-06-13 LAB — INR PPP: 3.19

## 2022-06-13 NOTE — PROGRESS NOTES
Anticoagulation Clinic Progress Note    Anticoagulation Summary  As of 2022    INR goal:  2.0-3.0   TTR:  57.3 % (1.4 y)   INR used for dosin.80 (6/10/2022)   Warfarin maintenance plan:  4 mg every day   Weekly warfarin total:  28 mg   No change documented:  Mc Zaidi, Manjinder   Plan last modified:  Arlette Gutierrez PharmD (5/3/2022)   Next INR check:  6/15/2022   Priority:  High   Target end date:  Indefinite    Indications    Permanent atrial fibrillation (HCC) [I48.21]             Anticoagulation Episode Summary     INR check location:      Preferred lab:      Send INR reminders to:   MAJO MARIE  POOL    Comments:  tried calling [facility] BARCLAY but no stable warf dosing available from July/Aug 2020 , prior to Eliquis; [facility]      Anticoagulation Care Providers     Provider Role Specialty Phone number    Lm Mart MD Referring Cardiology [phone]        Findings:  Recent hospital admission r/t closed fracture of multiple pubic rami. Was discharged to [facility]. Patient confirmed she has been taking 4 mg daily since discharge home.     INR History:  Anticoagulation Monitoring 2022   INR 1.78 2.35 2.80   INR Date 2022 2022 6/10/2022   INR Goal 2.0-3.0 2.0-3.0 2.0-3.0   Trend Up Same Same   Last Week Total 20 mg 28 mg 28 mg   Next Week Total 28 mg 28 mg 28 mg   Sun - 4 mg -   Mon 4 mg 4 mg 4 mg   Tue 4 mg (5/3) 4 mg 4 mg   Wed 4 mg 4 mg -   Thu 4 mg 4 mg -   Fri - 4 mg -   Sat - 4 mg -   Visit Report - - -   Some recent data might be hidden       Plan:  1. INR was Therapeutic 6/10/22 - see above in Anticoagulation Summary.   Provided instructions to Mrs. Lemus as well as Anabel with [facility] to Continue their warfarin regimen- see above in Anticoagulation Summary.  2. Follow up in 2 days      Mc Zaidi PharmD

## 2022-06-14 ENCOUNTER — ANTICOAGULATION VISIT (OUTPATIENT)
Dept: PHARMACY | Facility: HOSPITAL | Age: 87
End: 2022-06-14

## 2022-06-14 DIAGNOSIS — I48.21 PERMANENT ATRIAL FIBRILLATION: Primary | ICD-10-CM

## 2022-06-14 NOTE — PROGRESS NOTES
Anticoagulation Clinic Progress Note    Anticoagulation Summary  As of 6/14/2022    INR goal:  2.0-3.0   TTR:  57.2 % (1.4 y)   INR used for dosing:  3.19 (6/13/2022)   Warfarin maintenance plan:  4 mg every day   Weekly warfarin total:  28 mg   Plan last modified:  Elizabeth Mendoza, PharmD (6/14/2022)   Next INR check:  6/15/2022   Priority:  High   Target end date:  Indefinite    Indications    Permanent atrial fibrillation (HCC) [I48.21]             Anticoagulation Episode Summary     INR check location:      Preferred lab:      Send INR reminders to:   MAJO MARIE  POOL    Comments:  tried calling [facility] DENY but no stable warf dosing available from July/Aug 2020 , prior to Eliquis; [facility]      Anticoagulation Care Providers     Provider Role Specialty Phone number    Lm Mart MD Referring Cardiology [phone]          Clinic Interview:  Patient Findings     Negatives:  Signs/symptoms of thrombosis, Signs/symptoms of bleeding,   Laboratory test error suspected, Change in health, Change in alcohol use,   Change in activity, Upcoming invasive procedure, Emergency department   visit, Upcoming dental procedure, Missed doses, Extra doses, Change in   medications, Change in diet/appetite, Hospital admission, Bruising, Other   complaints      Clinical Outcomes     Negatives:  Major bleeding event, Thromboembolic event,   Anticoagulation-related hospital admission, Anticoagulation-related ED   visit, Anticoagulation-related fatality        INR History:  Anticoagulation Monitoring 5/17/2022 6/13/2022 6/14/2022   INR 2.35 2.80 3.19   INR Date 5/11/2022 6/10/2022 6/13/2022   INR Goal 2.0-3.0 2.0-3.0 2.0-3.0   Trend Same Same Same   Last Week Total 28 mg 28 mg 28 mg   Next Week Total 28 mg 28 mg 27 mg   Sun 4 mg - -   Mon 4 mg 4 mg -   Tue 4 mg 4 mg 3 mg (6/14)   Wed 4 mg - -   Thu 4 mg - -   Fri 4 mg - -   Sat 4 mg - -   Visit Report - - -   Some recent data might be hidden       Plan:  1. INR is  Supratherapeutic today- see above in Anticoagulation Summary.   Will instruct Cristina Lemus to Change their warfarin regimen to include 3 mg today, then resume normal dose of 4 mg daily- see above in Anticoagulation Summary.  2. Follow up tomorrow based on [facility] schedule to draw labs.  3. Pt has agreed to only be called if INR out of range. They have been instructed to call if any changes in medications, doses, concerns, etc. Patient expresses understanding and has no further questions at this time.    Elizabeth Mendoza, PharmD

## 2022-06-20 ENCOUNTER — OFFICE VISIT (OUTPATIENT)
Dept: CARDIOLOGY | Facility: CLINIC | Age: 87
End: 2022-06-20

## 2022-06-20 VITALS
DIASTOLIC BLOOD PRESSURE: 62 MMHG | BODY MASS INDEX: 19.6 KG/M2 | HEIGHT: 63 IN | SYSTOLIC BLOOD PRESSURE: 134 MMHG | HEART RATE: 80 BPM | WEIGHT: 110.6 LBS

## 2022-06-20 DIAGNOSIS — I48.21 PERMANENT ATRIAL FIBRILLATION: Primary | ICD-10-CM

## 2022-06-20 DIAGNOSIS — N18.6 END STAGE RENAL DISEASE: ICD-10-CM

## 2022-06-20 DIAGNOSIS — I10 ESSENTIAL HYPERTENSION: ICD-10-CM

## 2022-06-20 DIAGNOSIS — I50.32 CHRONIC DIASTOLIC CONGESTIVE HEART FAILURE: ICD-10-CM

## 2022-06-20 PROCEDURE — 99214 OFFICE O/P EST MOD 30 MIN: CPT | Performed by: INTERNAL MEDICINE

## 2022-06-20 PROCEDURE — 93000 ELECTROCARDIOGRAM COMPLETE: CPT | Performed by: INTERNAL MEDICINE

## 2022-06-20 RX ORDER — POTASSIUM CHLORIDE 20 MEQ/1
20 TABLET, EXTENDED RELEASE ORAL DAILY
COMMUNITY
End: 2022-11-23

## 2022-06-20 NOTE — PROGRESS NOTES
[facility] Follow Up Office Note     Encounter Date:22  Patient:Cristina Lemus  :3/13/1930  MRN:[id number]      Chief Complaint: Follow up atrial fibrillation and diastolic heart failure  Chief Complaint   Patient presents with   • Congestive Heart Failure     6 month f/u   • Atrial Fibrillation     History of Presenting Illness:      Ms. Evans is a 89 y.o. woman with past medical history notable for permanent atrial fibrillation, hypertension, chronic diastolic heart failure, and chronic kidney disease stage 4 which has since progressed to end-stage renal disease on hemodialysis who presents to our office for scheduled follow up.  She was last seen in our office approximately 6 months ago and at that time was doing reasonably well.  Unfortunately she did require going on hemodialysis and started that back in February.  Additionally she had a hip fracture and fall was nonoperable but actually has been doing reasonably well since being discharged from the hospital but is obviously very limited now arrived in a wheelchair whereas before she was ambulatory.    Review of Systems:  Review of Systems   Constitutional: Positive for malaise/fatigue.   HENT: Negative.    Eyes: Negative.    Cardiovascular: Negative.    Respiratory: Positive for shortness of breath.    Endocrine: Negative.    Hematologic/Lymphatic: Negative.    Skin: Negative.    Musculoskeletal: Positive for back pain.   Gastrointestinal: Negative.    Genitourinary: Negative.    Neurological: Negative.    Psychiatric/Behavioral: Negative.    Allergic/Immunologic: Negative.      Current Outpatient Medications on File Prior to Visit   Medication Sig Dispense Refill   • acetaminophen (TYLENOL) 325 MG tablet Take 2 tablets by mouth Every 4 (Four) Hours As Needed for Mild Pain .     • Cholecalciferol (VITAMIN D3 PO) Take 1,000 Units by mouth Daily.     • LORazepam (ATIVAN) 1 MG tablet Take 1 tablet by mouth Every Night. 3 tablet 0   • potassium  chloride (K-DUR,KLOR-CON) 20 MEQ CR tablet Take 20 mEq by mouth Daily.     • sennosides-docusate (PERICOLACE) 8.6-50 MG per tablet Take 2 tablets by mouth 2 (Two) Times a Day. Hold for loose stool.     • torsemide (DEMADEX) 20 MG tablet Take 5 tablets by mouth Daily. (Patient taking differently: Take 20 mg by mouth Daily.) 30 tablet 1   • warfarin (Jantoven) 2 MG tablet Take one tablet (2 mg) by mouth on Tuesday, Thursday, and Saturday, then take 2 tablets (4 mg) all other days or as directed (Patient taking differently: Take 2 mg by mouth Daily.) 150 tablet 1     No current facility-administered medications on file prior to visit.         Allergies   Allergen Reactions   • Statins Nausea And Vomiting   • Sulfa Antibiotics Nausea And Vomiting and Other (See Comments)     CHILLS AND FEVER       Past Medical History:   Diagnosis Date   • Atrial fibrillation (HCC)    • CHF (congestive heart failure) (HCC)        Past Surgical History:   Procedure Laterality Date   • ARTERIOVENOUS FISTULA/SHUNT SURGERY Left 1/24/2022    Procedure: LEFT ARM DIALYSIS GRAFT;  Surgeon: King Reynaga MD;  Location: [facility];  Service: Vascular;  Laterality: Left;   • BACK SURGERY     • CYST REMOVAL     • INSERTION HEMODIALYSIS CATHETER N/A 1/18/2022    Procedure: HEMODIALYSIS CATHETER INSERTION with C arm;  Surgeon: King Reynaga MD;  Location: [facility];  Service: Vascular;  Laterality: N/A;   • OTHER SURGICAL HISTORY      rupture disk       Social History     Socioeconomic History   • Marital status: Single   Tobacco Use   • Smoking status: Never Smoker   • Smokeless tobacco: Never Used   Substance and Sexual Activity   • Alcohol use: No     Comment: caffeine use    • Drug use: No   • Sexual activity: Defer       History reviewed. No pertinent family history.    The following portions of the patient's history were reviewed and updated as appropriate: allergies, current medications, past family history, past  "medical history, past social history, past surgical history and problem list.       Objective:       Vitals:    06/20/22 1410   BP: 134/62   BP Location: Right arm   Patient Position: Sitting   Pulse: 80   Weight: 50.2 kg (110 lb 9.6 oz)   Height: 160 cm (63\")       Body mass index is 19.59 kg/m².     Physical Exam:  Constitutional:  Chronically ill-appearing, frail, no acute distress, arrived in wheelchair  HENT: Oropharynx clear and membrane moist  Eyes: Normal conjunctiva, no sclera icterus.  Neck: Supple, no carotid bruit bilaterally.  Cardiovascular: Irregularly irregular rate and rhythm, No Murmur, Trace bilateral lower extremity edema.  Pulmonary: Normal respiratory effort, normal lung sounds, no wheezing.  Abdominal: Soft, nontender, no hepatosplenomegaly, liver is non-pulsatile.  Neurological: Alert and orient x 3.   Skin: Warm, dry, no ecchymosis, no rash.  Psych: Appropriate mood and affect. Normal judgment and insight.      Lab Results   Component Value Date    GLUCOSE 75 05/15/2022    BUN 43 (H) 05/15/2022    CREATININE 3.89 (H) 05/15/2022    EGFRIFNONA 10 (L) 01/26/2022    EGFRIFAFRI  01/26/2022      Comment:      <15 Indicative of kidney failure.    BCR 11.1 05/15/2022    K 3.9 05/15/2022    CO2 21.0 (L) 05/15/2022    CALCIUM 8.1 (L) 05/15/2022    PROTENTOTREF 7.3 11/18/2021    ALBUMIN 3.60 05/15/2022    LABIL2 1.6 11/18/2021    AST 16 01/16/2022    ALT 9 01/16/2022       Lab Results   Component Value Date    WBC 7.68 05/14/2022    HGB 10.2 (L) 05/14/2022    HCT 29.7 (L) 05/14/2022    MCV 89.7 05/14/2022     (L) 05/14/2022       Lab Results   Component Value Date    TROPONINI 0.028 06/30/2020    TROPONINT 0.142 (C) 01/17/2022       No results found for: CHOL, CHLPL  No results found for: TRIG  No results found for: HDL  No results found for: LDL, LDLDIRECT    Lab Results   Component Value Date    TSH 1.830 01/13/2020         ECG 12 Lead    Date/Time: 6/20/2022 4:02 PM  Performed by: Barron" Lm ALAMO MD  Authorized by: Lm Mart MD   Comparison: compared with previous ECG from 1/16/2022  Similar to previous ECG  Rhythm: atrial fibrillation        Echocardiogram 12/21/2021 with images reviewed by myself:  · Left ventricular wall thickness is consistent with borderline concentric hypertrophy.  · Estimated left ventricular EF = 61% Left ventricular systolic function is normal.  · Left ventricular diastolic function is consistent with age.  · The left atrial cavity is severely dilated.  · The right atrial cavity is severely dilated.  · There is moderate, bileaflet mitral valve thickening present.  · Moderate tricuspid valve regurgitation is present.  · Estimated right ventricular systolic pressure from tricuspid regurgitation is mildly elevated (35-45 mmHg). Calculated right ventricular systolic pressure from tricuspid regurgitation is 40 mmHg    Lower Extremity Duplex 1/19/2021:  · Acute right lower extremity superficial thrombophlebitis noted in the varicosity (below knee).  · All other right sided veins appeared normal.    Echocardiogram 6/25/2020 [facility]:  · Normal LV systolic function with apical hypokinesis  · EF 71%  · Asymmetric apical hypertrophy  · Mild aortic insufficiency  · Mild/moderate mitral regurgitation  · Mild tricuspid regurgitation        Assessment:          Diagnosis Plan   1. Permanent atrial fibrillation (HCC)  ECG 12 Lead   2. Chronic diastolic congestive heart failure (HCC)     3. Essential hypertension     4. End stage renal disease (HCC)            Plan:       Ms. Evans is a 89 y.o. woman with past medical history notable for permanent atrial fibrillation, hypertension, chronic diastolic heart failure, and chronic kidney disease stage 4 now end-stage renal who presents to our office for scheduled follow-up.  Overall patient is doing reasonably well despite recent events.  Surprisingly she is tolerating dialysis reasonably well is actually helped out with managing  her volume status which obviously was challenging given her borderline kidney function before.      Permanent atrial fibrillation:  · Continue Coumadin   · Rate well controlled not on any rate controlling agents    Hypertension:  · Reasonably well controlled for age      Chronic diastolic congestive heart failure:  · Volume management per dialysis    Chronic kidney disease:  · Following with nephrology  · Now on dialysis 3 times a week      Follow-up:  3 Months      Thank you for allowing me to participate in the care of Cristina Lemus. Feel free to contact me directly with any further questions or concerns.    Lm Mart MD  [facility]  06/20/22  16:11 EDT

## 2022-06-22 ENCOUNTER — ANTICOAGULATION VISIT (OUTPATIENT)
Dept: PHARMACY | Facility: HOSPITAL | Age: 87
End: 2022-06-22

## 2022-06-22 DIAGNOSIS — I48.21 PERMANENT ATRIAL FIBRILLATION: Primary | ICD-10-CM

## 2022-06-22 LAB — INR PPP: 5.8

## 2022-06-22 NOTE — PROGRESS NOTES
Anticoagulation Clinic Progress Note    Anticoagulation Summary  As of 2022    INR goal:  2.0-3.0   TTR:  56.3 % (1.5 y)   INR used for dosin.80 (2022)   Warfarin maintenance plan:  4 mg every day   Weekly warfarin total:  28 mg   Plan last modified:  Elizabeth Gray, PharmD (2022)   Next INR check:  2022   Priority:  High   Target end date:  Indefinite    Indications    Permanent atrial fibrillation (HCC) [I48.21]             Anticoagulation Episode Summary     INR check location:      Preferred lab:      Send INR reminders to:   MAJO [facility]  POOL    Comments:  tried calling [facility] but no stable warf dosing available from July/Aug 2020 , prior to Eliquis; [facility]      Anticoagulation Care Providers     Provider Role Specialty Phone number    Lm Mart MD Referring Cardiology [phone]          Clinic Interview: No pertinent clinical findings       INR History:  Anticoagulation Monitoring 2022   INR 2.80 3.19 5.80   INR Date 6/10/2022 2022 2022   INR Goal 2.0-3.0 2.0-3.0 2.0-3.0   Trend Same Same Same   Last Week Total 28 mg 28 mg 28 mg   Next Week Total 28 mg 27 mg 20 mg   Sun - - -   Mon 4 mg - -   Tue 4 mg 3 mg () -   Wed - - Hold ()   Thu - - Hold ()   Fri - - -   Sat - - -   Visit Report - - -   Some recent data might be hidden       Plan:  1. INR is Supratherapeutic today- see above in Anticoagulation Summary.   Will instruct Cristina Lemus to Change their warfarin regimen- see above in Anticoagulation Summary.  2. Follow up in 2 days   3. They have been instructed to call if any changes in medications, doses, concerns, etc. Patient expresses understanding and has no further questions at this time.    Tasneem Wilkinson [facility]

## 2022-06-23 ENCOUNTER — PATIENT OUTREACH (OUTPATIENT)
Dept: CASE MANAGEMENT | Facility: OTHER | Age: 87
End: 2022-06-23

## 2022-06-23 NOTE — OUTREACH NOTE
Patient Outreach    AMBULATORY CASE MANAGEMENT NOTE    Name and Relationship of Patient/Support Person: ShawnCristina aldridge - Self    Call to patient at preferred number answered by her daughter who is primary caregiver. She states her mother is doing well. She is moving around better. She does still have [facility] for home health/PT. She is at dialysis today. Denies any problem with transportation to and from dialysis. No questions, concerns or needs regarding health wellness. [facility] Pt appreciative of phone call and declined to participate in  Case Management Program. No needs identified.         NIA MCKENZIE  Ambulatory Case Management    6/23/2022, 11:22 EDT

## 2022-06-24 ENCOUNTER — ANTICOAGULATION VISIT (OUTPATIENT)
Dept: PHARMACY | Facility: HOSPITAL | Age: 87
End: 2022-06-24

## 2022-06-24 DIAGNOSIS — I48.21 PERMANENT ATRIAL FIBRILLATION: Primary | ICD-10-CM

## 2022-06-24 LAB — INR PPP: 4.2

## 2022-06-24 NOTE — PROGRESS NOTES
Anticoagulation Clinic Progress Note    Anticoagulation Summary  As of 2022    INR goal:  2.0-3.0   TTR:  56.0 % (1.5 y)   INR used for dosin.20 (2022)   Warfarin maintenance plan:  4 mg every day   Weekly warfarin total:  28 mg   Plan last modified:  Elizabeth Gray PharmD (2022)   Next INR check:  2022   Priority:  High   Target end date:  Indefinite    Indications    Permanent atrial fibrillation (HCC) [I48.21]             Anticoagulation Episode Summary     INR check location:      Preferred lab:      [facility]    Comments:  tried calling [facility] but no stable warf dosing available from July/Aug 2020 , prior to Eliquis; [facility]      Anticoagulation Care Providers     Provider Role Specialty Phone number    Lm Mart MD Referring Cardiology [phone]          INR History:  Anticoagulation Monitoring 2022   INR 3.19 5.80 4.20   INR Date 2022   INR Goal 2.0-3.0 2.0-3.0 2.0-3.0   Trend Same Same Same   Last Week Total 28 mg 28 mg 20 mg   Next Week Total 27 mg 20 mg 22 mg   Sun - - 4 mg   Mon - - -   Tue 3 mg () - -   Wed - Hold () -   Thu - Hold () -   Fri - - Hold ()   Sat - - 2 mg ()   Visit Report - - -   Some recent data might be hidden       Plan:  1. INR is Supratherapeutic today- see above in Anticoagulation Summary.   Provided instructions to Anabel with [facility] to Change their warfarin regimen- see above in Anticoagulation Summary.  2. Follow up in 3 days      Mc Zaidi, BritniD

## 2022-06-27 ENCOUNTER — ANTICOAGULATION VISIT (OUTPATIENT)
Dept: PHARMACY | Facility: HOSPITAL | Age: 87
End: 2022-06-27

## 2022-06-27 DIAGNOSIS — I48.21 PERMANENT ATRIAL FIBRILLATION: Primary | ICD-10-CM

## 2022-06-27 LAB — INR PPP: 1.9

## 2022-06-27 NOTE — PROGRESS NOTES
Anticoagulation Clinic Progress Note    Anticoagulation Summary  As of 2022    INR goal:  2.0-3.0   TTR:  56.0 % (1.5 y)   INR used for dosin.90 (2022)   Warfarin maintenance plan:  4 mg every day   Weekly warfarin total:  28 mg   Plan last modified:  Elizabeth Gray PharmD (2022)   Next INR check:  2022   Priority:  High   Target end date:  Indefinite    Indications    Permanent atrial fibrillation (HCC) [I48.21]             Anticoagulation Episode Summary     INR check location:      Preferred lab:      [facility]    Comments:  tried calling [facility] but no stable warf dosing available from July/Aug 2020 , prior to Eliquis; [facility]      Anticoagulation Care Providers     Provider Role Specialty Phone number    Lm Mart MD Referring Cardiology [phone]          INR History:  Anticoagulation Monitoring 2022   INR 5.80 4.20 1.90   INR Date 2022   INR Goal 2.0-3.0 2.0-3.0 2.0-3.0   Trend Same Same Same   Last Week Total 28 mg 20 mg 14 mg   Next Week Total 20 mg 22 mg 28 mg   Sun - 4 mg -   Mon - - 4 mg   Tue - - 4 mg   Wed Hold () - -   Thu Hold () - -   Fri - Hold () -   Sat - 2 mg () -   Visit Report - - -   Some recent data might be hidden       Plan:  1. INR is Subtherapeutic today- see above in Anticoagulation Summary.   Provided instructions to [facility] to Continue their warfarin regimen- see above in Anticoagulation Summary.  2. Follow up in 3 days      Britni AnnD

## 2022-06-29 ENCOUNTER — ANTICOAGULATION VISIT (OUTPATIENT)
Dept: PHARMACY | Facility: HOSPITAL | Age: 87
End: 2022-06-29

## 2022-06-29 DIAGNOSIS — I48.21 PERMANENT ATRIAL FIBRILLATION: Primary | ICD-10-CM

## 2022-06-29 LAB — INR PPP: 1.7

## 2022-06-29 NOTE — PROGRESS NOTES
Anticoagulation Clinic Progress Note    Anticoagulation Summary  As of 2022    INR goal:  2.0-3.0   TTR:  55.8 % (1.5 y)   INR used for dosin.70 (2022)   Warfarin maintenance plan:  4 mg every day   Weekly warfarin total:  28 mg   Plan last modified:  Elizabeth Gray PharmD (2022)   Next INR check:  2022   Priority:  High   Target end date:  Indefinite    Indications    Permanent atrial fibrillation (HCC) [I48.21]             Anticoagulation Episode Summary     INR check location:      Preferred lab:      [facility]    Comments:  tried calling [facility] but no stable warf dosing available from July/Aug 2020 , prior to Eliquis; [facility]      Anticoagulation Care Providers     Provider Role Specialty Phone number    Lm Mart MD Referring Cardiology [phone]          INR History:  Anticoagulation Monitoring 2022   INR 4.20 1.90 1.70   INR Date 2022   INR Goal 2.0-3.0 2.0-3.0 2.0-3.0   Trend Same Same Same   Last Week Total 20 mg 14 mg 14 mg   Next Week Total 22 mg 28 mg 30 mg   Sun 4 mg - 4 mg   Mon - 4 mg 4 mg   Tue - 4 mg 4 mg   Wed - - 6 mg ()   Thu - - 4 mg   Fri Hold () - 4 mg   Sat 2 mg () - 4 mg   Visit Report - - -   Some recent data might be hidden       Plan:  1. INR is Subtherapeutic today- see above in Anticoagulation Summary.   Provided instructions to [facility] to Continue their warfarin regimen- see above in Anticoagulation Summary. Instructed patient to take 6mg today only, then resume previous dosing.  2. Follow up in 1 week      Arlette Gutierrez PharmD

## 2022-07-05 DIAGNOSIS — I10 HTN, GOAL BELOW 140/80: ICD-10-CM

## 2022-07-05 RX ORDER — AMLODIPINE BESYLATE 10 MG/1
TABLET ORAL
Qty: 90 TABLET | Refills: 3 | OUTPATIENT
Start: 2022-07-05

## 2022-07-06 ENCOUNTER — ANTICOAGULATION VISIT (OUTPATIENT)
Dept: PHARMACY | Facility: HOSPITAL | Age: 87
End: 2022-07-06

## 2022-07-06 DIAGNOSIS — I48.21 PERMANENT ATRIAL FIBRILLATION: Primary | ICD-10-CM

## 2022-07-06 DIAGNOSIS — I10 HTN, GOAL BELOW 140/80: ICD-10-CM

## 2022-07-06 LAB — INR PPP: 2.9

## 2022-07-06 NOTE — TELEPHONE ENCOUNTER
DESTINY FROM [facility] IS CALLING TO INFORM DR PRADO THAT THE PATIENT IS BEING DISCHARGED FROM PT TODAY.     DESTINY STATES THAT THE PATIENT HAS MET ALL OF HER GOALS, AND IS JUST NEEDING TIME TO HEAL IN ORDER FOR THE PAIN TO EASE.

## 2022-07-06 NOTE — PROGRESS NOTES
Anticoagulation Clinic Progress Note    Anticoagulation Summary  As of 2022    INR goal:  2.0-3.0   TTR:  56.0 % (1.5 y)   INR used for dosin.90 (2022)   Warfarin maintenance plan:  2 mg every Wed; 4 mg all other days   Weekly warfarin total:  26 mg   Plan last modified:  Arlette Gutierrez, PharmD (2022)   Next INR check:  2022   Priority:  High   Target end date:  Indefinite    Indications    Permanent atrial fibrillation (HCC) [I48.21]             Anticoagulation Episode Summary     INR check location:      Preferred lab:      Send INR reminders to:   MAJO [facility]  POOL    Comments:  tried calling [facility]CL[facility] but no stable warf dosing available from July/Aug 2020 , prior to Eliquis; [facility]      Anticoagulation Care Providers     Provider Role Specialty Phone number    Lm Mart MD Referring Cardiology [phone]          INR History:  Anticoagulation Monitoring 2022   INR 1.90 1.70 2.90   INR Date 2022   INR Goal 2.0-3.0 2.0-3.0 2.0-3.0   Trend Same Same Down   Last Week Total 14 mg 14 mg 30 mg   Next Week Total 28 mg 30 mg 26 mg   Sun - 4 mg 4 mg   Mon 4 mg 4 mg 4 mg   Tue 4 mg 4 mg 4 mg   Wed - 6 mg () 2 mg   Thu - 4 mg 4 mg   Fri - 4 mg 4 mg   Sat - 4 mg 4 mg   Visit Report - - -   Some recent data might be hidden       Plan:  1. INR is Therapeutic today- see above in Anticoagulation Summary.   [facility]- see above in Anticoagulation Summary.  Instructed to take 2mg Wednesday only, then 4mg everyday thereafter.  2. Follow up in 1 week      Arlette Gutierrez, BritniD

## 2022-07-07 RX ORDER — AMLODIPINE BESYLATE 10 MG/1
TABLET ORAL
Qty: 90 TABLET | Refills: 0 | Status: SHIPPED | OUTPATIENT
Start: 2022-07-07 | End: 2022-09-22

## 2022-07-13 ENCOUNTER — ANTICOAGULATION VISIT (OUTPATIENT)
Dept: PHARMACY | Facility: HOSPITAL | Age: 87
End: 2022-07-13

## 2022-07-13 DIAGNOSIS — I48.21 PERMANENT ATRIAL FIBRILLATION: Primary | ICD-10-CM

## 2022-07-13 LAB — INR PPP: 4.6

## 2022-07-13 NOTE — PROGRESS NOTES
Anticoagulation Clinic Progress Note    Anticoagulation Summary  As of 2022    INR goal:  2.0-3.0   TTR:  55.4 % (1.5 y)   INR used for dosin.60 (2022)   Warfarin maintenance plan:  2 mg every Wed; 4 mg all other days   Weekly warfarin total:  26 mg   Plan last modified:  Arlette Gutierrez, PharmD (2022)   Next INR check:  2022   Priority:  High   Target end date:  Indefinite    Indications    Permanent atrial fibrillation (HCC) [I48.21]             Anticoagulation Episode Summary     INR check location:      Preferred lab:      Send INR reminders to:  [facility]  POOL    Comments:  tried calling [facility] BARCL[facility] but no stable warf dosing available from July/Aug 2020 , prior to Eliquis; [facility]      Anticoagulation Care Providers     Provider Role Specialty Phone number    Lm Mart MD Referring Cardiology [phone]          Clinic Interview:  Patient Findings     Positives:  Change in diet/appetite    Negatives:  Signs/symptoms of thrombosis, Signs/symptoms of bleeding,   Laboratory test error suspected, Change in health, Change in alcohol use,   Change in activity, Upcoming invasive procedure, Emergency department   visit, Upcoming dental procedure, Missed doses, Extra doses, Change in   medications, Hospital admission, Bruising, Other complaints    Comments:  Patient stopped drinking protein drinks 2 days ago and doesn't   plan to drink anymore.       Clinical Outcomes     Negatives:  Major bleeding event, Thromboembolic event,   Anticoagulation-related hospital admission, Anticoagulation-related ED   visit, Anticoagulation-related fatality    Comments:  Patient stopped drinking protein drinks 2 days ago and doesn't   plan to drink anymore.         INR History:  Anticoagulation Monitoring 2022   INR 1.70 2.90 4.60   INR Date 2022   INR Goal 2.0-3.0 2.0-3.0 2.0-3.0   Trend Same Down Same   Last Week Total 14 mg 30 mg 26 mg    Next Week Total 30 mg 26 mg 18 mg   Sun 4 mg 4 mg 4 mg   Mon 4 mg 4 mg 4 mg   Tue 4 mg 4 mg 4 mg   Wed 6 mg (6/29) 2 mg 2 mg   Thu 4 mg 4 mg Hold (7/14)   Fri 4 mg 4 mg Hold (7/15)   Sat 4 mg 4 mg 4 mg   Visit Report - - -   Some recent data might be hidden       Plan:  1. INR is Supratherapeutic today- see above in Anticoagulation Summary.   Will instruct Cristina Lemus to Change their warfarin regimen- see above in Anticoagulation Summary.  2. Follow up in 1 weeks with [facility] (faxed over order). Patient was discharged from home health today   3.  They have been instructed to call if any changes in medications, doses, concerns, etc. Patient expresses understanding and has no further questions at this time.    Tasneem Wilkinson [facility]

## 2022-07-21 ENCOUNTER — ANTICOAGULATION VISIT (OUTPATIENT)
Dept: PHARMACY | Facility: HOSPITAL | Age: 87
End: 2022-07-21

## 2022-07-21 DIAGNOSIS — I48.21 PERMANENT ATRIAL FIBRILLATION: Primary | ICD-10-CM

## 2022-07-21 LAB — INR PPP: 1.89

## 2022-07-21 NOTE — PROGRESS NOTES
Anticoagulation Clinic Progress Note    Anticoagulation Summary  As of 2022    INR goal:  2.0-3.0   TTR:  55.1 % (1.5 y)   INR used for dosin.89 (2022)   Warfarin maintenance plan:  2 mg every Wed; 4 mg all other days   Weekly warfarin total:  26 mg   No change documented:  Tasneem Wilkinson RPH   Plan last modified:  Arlette Gutierrez, PharmD (2022)   Next INR check:  2022   Priority:  High   Target end date:  Indefinite    Indications    Permanent atrial fibrillation (HCC) [I48.21]             Anticoagulation Episode Summary     INR check location:      Preferred lab:      Send INR reminders to:   MAJO [facility]  POOL    Comments:  tried calling [facility] BARCLAY but no stable warf dosing available from July/Aug 2020 , prior to Eliquis; [facility]CISION      Anticoagulation Care Providers     Provider Role Specialty Phone number    Lm Mart MD Referring Cardiology [phone]          Clinic Interview:  Patient Findings     Positives:  Change in diet/appetite    Negatives:  Signs/symptoms of thrombosis, Signs/symptoms of bleeding,   Laboratory test error suspected, Change in health, Change in alcohol use,   Change in activity, Upcoming invasive procedure, Emergency department   visit, Upcoming dental procedure, Missed doses, Extra doses, Change in   medications, Hospital admission, Bruising, Other complaints    Comments:  Started drinking protein drinks again       Clinical Outcomes     Negatives:  Major bleeding event, Thromboembolic event,   Anticoagulation-related hospital admission, Anticoagulation-related ED   visit, Anticoagulation-related fatality    Comments:  Started drinking protein drinks again         INR History:  Anticoagulation Monitoring 2022   INR 2.90 4.60 1.89   INR Date 2022   INR Goal 2.0-3.0 2.0-3.0 2.0-3.0   Trend Down Same Same   Last Week Total 30 mg 26 mg 18 mg   Next Week Total 26 mg 18 mg 26 mg   Sun 4 mg 4 mg 4  mg   Mon 4 mg 4 mg 4 mg   Tue 4 mg 4 mg 4 mg   Wed 2 mg 2 mg -   Thu 4 mg Hold (7/14) 4 mg   Fri 4 mg Hold (7/15) 4 mg   Sat 4 mg 4 mg 4 mg   Visit Report - - -   Some recent data might be hidden       Plan:  1. INR is Subtherapeutic today- see above in Anticoagulation Summary.   Will instruct Cristina Lemus to Continue their warfarin regimen- see above in Anticoagulation Summary.  2. Follow up in 1 week  3.  They have been instructed to call if any changes in medications, doses, concerns, etc. Patient expresses understanding and has no further questions at this time.    Tasneem Wilkinson [facility]

## 2022-07-22 ENCOUNTER — OFFICE VISIT (OUTPATIENT)
Dept: ORTHOPEDIC SURGERY | Facility: CLINIC | Age: 87
End: 2022-07-22

## 2022-07-22 VITALS — HEIGHT: 65 IN | RESPIRATION RATE: 18 BRPM | TEMPERATURE: 96.9 F | BODY MASS INDEX: 19.16 KG/M2 | WEIGHT: 115 LBS

## 2022-07-22 DIAGNOSIS — Z09 FRACTURE FOLLOW-UP: ICD-10-CM

## 2022-07-22 DIAGNOSIS — R52 PAIN: Primary | ICD-10-CM

## 2022-07-22 PROCEDURE — 99024 POSTOP FOLLOW-UP VISIT: CPT | Performed by: ORTHOPAEDIC SURGERY

## 2022-07-22 PROCEDURE — 73502 X-RAY EXAM HIP UNI 2-3 VIEWS: CPT | Performed by: ORTHOPAEDIC SURGERY

## 2022-07-22 NOTE — PROGRESS NOTES
Chief complaint: Follow-up regarding pelvic ring fracture    Ms. Lemus follows up today with her daughter.  She says that the hip is feeling much better.  Pain is minimal at this point.  She is still ambulating with a walker.  She says she was not using any assist device prior to the injury.    No pain with lateral compression over the iliac wing or greater trochanters.  No tenderness in her right groin although admittedly I did not really palpate deeply.  Hip range of motion is well-tolerated.    AP, inlet and outlet views of the right hip and pelvis are ordered and reviewed today to evaluate healing of her fractures.  These are compared to previous x-rays.  The fractures are well visualized on the x-rays.  There does appear to be some early callus formation.    Assessment: Follow-up now 2 months status post closed treatment of right superior and inferior pubic rami fractures with nondisplaced ipsilateral sacral fracture    Plan: She seems to be healing.  We need to give this a little more time.  We discussed having her continue the walker until she feels like she is stable to transition to a cane.  I will see her back in 6 weeks for a final visit.

## 2022-07-27 LAB — INR PPP: 2.78

## 2022-07-28 ENCOUNTER — ANTICOAGULATION VISIT (OUTPATIENT)
Dept: PHARMACY | Facility: HOSPITAL | Age: 87
End: 2022-07-28

## 2022-07-28 DIAGNOSIS — F51.01 PRIMARY INSOMNIA: ICD-10-CM

## 2022-07-28 DIAGNOSIS — I48.21 PERMANENT ATRIAL FIBRILLATION: Primary | ICD-10-CM

## 2022-07-28 RX ORDER — LORAZEPAM 1 MG/1
1 TABLET ORAL NIGHTLY PRN
Qty: 90 TABLET | Refills: 0 | Status: SHIPPED | OUTPATIENT
Start: 2022-07-28 | End: 2022-10-25

## 2022-07-28 NOTE — PROGRESS NOTES
Anticoagulation Clinic Progress Note    Anticoagulation Summary  As of 2022    INR goal:  2.0-3.0   TTR:  55.4 % (1.6 y)   INR used for dosin.78 (2022)   Warfarin maintenance plan:  2 mg every Wed; 4 mg all other days   Weekly warfarin total:  26 mg   No change documented:  Arlette Gutierrez PharmD   Plan last modified:  Arlette Gutierrez PharmD (2022)   Next INR check:  8/3/2022   Priority:  High   Target end date:  Indefinite    Indications    Permanent atrial fibrillation (HCC) [I48.21]             Anticoagulation Episode Summary     INR check location:      Preferred lab:      Send INR reminders to:   [facility]  POOL    Comments:  tried calling [facility] BARCLAY but no stable warf dosing available from July/Aug 2020 , prior to Eliquis; [facility]      Anticoagulation Care Providers     Provider Role Specialty Phone number    Lm Mart MD Referring Cardiology [phone]          Clinic Interview:  Patient Findings     Positives:  Change in diet/appetite    Negatives:  Signs/symptoms of thrombosis, Signs/symptoms of bleeding,   Laboratory test error suspected, Change in health, Change in alcohol use,   Change in activity, Upcoming invasive procedure, Emergency department   visit, Upcoming dental procedure, Missed doses, Extra doses, Change in   medications, Hospital admission, Bruising, Other complaints    Comments:  Spoke with daughter (Shannan) and she says Cristina is not   drinking protein shakes since about 4 days ago.  No other changes.      Clinical Outcomes     Negatives:  Major bleeding event, Thromboembolic event,   Anticoagulation-related hospital admission, Anticoagulation-related ED   visit, Anticoagulation-related fatality    Comments:  Spoke with daughter (Shannan) and she says Cristina is not   drinking protein shakes since about 4 days ago.  No other changes.        INR History:  Anticoagulation Monitoring 2022   INR 4.60 1.89 2.78   INR Date  7/13/2022 7/21/2022 7/27/2022   INR Goal 2.0-3.0 2.0-3.0 2.0-3.0   Trend Same Same Same   Last Week Total 26 mg 18 mg 26 mg   Next Week Total 18 mg 26 mg 26 mg   Sun 4 mg 4 mg 4 mg   Mon 4 mg 4 mg 4 mg   Tue 4 mg 4 mg 4 mg   Wed 2 mg - -   Thu Hold (7/14) 4 mg 4 mg   Fri Hold (7/15) 4 mg 4 mg   Sat 4 mg 4 mg 4 mg   Visit Report - - -   Some recent data might be hidden       Plan:  1. INR is Therapeutic today- see above in Anticoagulation Summary.   Will instruct Cristina Lemus to Continue their warfarin regimen- see above in Anticoagulation Summary.  2. Follow up in 1 weeks (orders sent to [facility])  3. They have been instructed to call if any changes in medications, doses, concerns, etc. Patient expresses understanding and has no further questions at this time.    Arlette Gutierrez, PharmD

## 2022-08-03 LAB — INR PPP: 3.04

## 2022-08-04 ENCOUNTER — ANTICOAGULATION VISIT (OUTPATIENT)
Dept: PHARMACY | Facility: HOSPITAL | Age: 87
End: 2022-08-04

## 2022-08-04 DIAGNOSIS — I48.21 PERMANENT ATRIAL FIBRILLATION: Primary | ICD-10-CM

## 2022-08-04 NOTE — PROGRESS NOTES
Anticoagulation Clinic Progress Note    Anticoagulation Summary  As of 8/4/2022    INR goal:  2.0-3.0   TTR:  55.6 % (1.6 y)   INR used for dosing:  3.04 (8/3/2022)   Warfarin maintenance plan:  2 mg every Wed; 4 mg all other days   Weekly warfarin total:  26 mg   No change documented:  Tasneem Wilkinson RPH   Plan last modified:  Arlette Gutierrez, PharmD (7/6/2022)   Next INR check:  8/17/2022   Priority:  High   Target end date:  Indefinite    Indications    Permanent atrial fibrillation (HCC) [I48.21]             Anticoagulation Episode Summary     INR check location:      Preferred lab:      Send INR reminders to:   [facility]  POOL    Comments:  tried calling Ch[facility] BARCLAY but no stable warf dosing available from July/Aug 2020 , prior to Eliquis; [facility]      Anticoagulation Care Providers     Provider Role Specialty Phone number    Lm Mart MD Referring Cardiology [phone]          Clinic Interview:  No pertinent clinical findings have been reported.    INR History:  Anticoagulation Monitoring 7/21/2022 7/28/2022 8/4/2022   INR 1.89 2.78 3.04   INR Date 7/21/2022 7/27/2022 8/3/2022   INR Goal 2.0-3.0 2.0-3.0 2.0-3.0   Trend Same Same Same   Last Week Total 18 mg 26 mg 26 mg   Next Week Total 26 mg 26 mg 26 mg   Sun 4 mg 4 mg 4 mg   Mon 4 mg 4 mg 4 mg   Tue 4 mg 4 mg 4 mg   Wed - - 2 mg   Thu 4 mg 4 mg 4 mg   Fri 4 mg 4 mg 4 mg   Sat 4 mg 4 mg 4 mg   Visit Report - - -   Some recent data might be hidden       Plan:  1. INR is therapeutic today- see above in Anticoagulation Summary.    Vera Mariah to continue their warfarin regimen- see above in Anticoagulation Summary.  2. Follow up in 2 weeks  3. They have been instructed to call if any changes in medications, doses, concerns, etc. Patient expresses understanding and has no further questions at this time.    Tasneem Wilkinson RPH

## 2022-08-17 LAB — INR PPP: 2.22

## 2022-08-18 ENCOUNTER — ANTICOAGULATION VISIT (OUTPATIENT)
Dept: PHARMACY | Facility: HOSPITAL | Age: 87
End: 2022-08-18

## 2022-08-18 DIAGNOSIS — I48.21 PERMANENT ATRIAL FIBRILLATION: Primary | ICD-10-CM

## 2022-08-18 NOTE — PROGRESS NOTES
Anticoagulation Clinic Progress Note    Anticoagulation Summary  As of 2022    INR goal:  2.0-3.0   TTR:  56.6 % (1.6 y)   INR used for dosin.22 (2022)   Warfarin maintenance plan:  2 mg every Wed; 4 mg all other days   Weekly warfarin total:  26 mg   No change documented:  Tasneem Wilkinson RPH   Plan last modified:  Arlette Gutierrez, PharmD (2022)   Next INR check:  2022   Priority:  High   Target end date:  Indefinite    Indications    Permanent atrial fibrillation (HCC) [I48.21]             Anticoagulation Episode Summary     INR check location:      Preferred lab:      Send INR reminders to:   [facility]  POOL    Comments:  tried calling Ch[facility] BARCLAY but no stable warf dosing available from July/Aug 2020 , prior to Eliquis; [facility]      Anticoagulation Care Providers     Provider Role Specialty Phone number    Lm Mart MD Referring Cardiology [phone]          Clinic Interview:  Patient Findings     Positives:  Extra doses, Change in diet/appetite    Negatives:  Signs/symptoms of thrombosis, Signs/symptoms of bleeding,   Laboratory test error suspected, Change in health, Change in alcohol use,   Change in activity, Upcoming invasive procedure, Emergency department   visit, Upcoming dental procedure, Missed doses, Change in medications,   Hospital admission, Bruising, Other complaints    Comments:  Patient started drinking protein drinks again. Counseled   patient on being more consistent as this will change the INR which is   likely why we are seeing labile INRs. Patients daughter reports she is   taking 4 mg daily instead of 2 mg on Wednesday       Clinical Outcomes     Negatives:  Major bleeding event, Thromboembolic event,   Anticoagulation-related hospital admission, Anticoagulation-related ED   visit, Anticoagulation-related fatality    Comments:  Patient started drinking protein drinks again. Counseled   patient on being more consistent as this will change  the INR which is   likely why we are seeing labile INRs. Patients daughter reports she is   taking 4 mg daily instead of 2 mg on Wednesday         INR History:  Anticoagulation Monitoring 7/28/2022 8/4/2022 8/18/2022   INR 2.78 3.04 2.22   INR Date 7/27/2022 8/3/2022 8/17/2022   INR Goal 2.0-3.0 2.0-3.0 2.0-3.0   Trend Same Same Same   Last Week Total 26 mg 26 mg 28 mg   Next Week Total 26 mg 26 mg 26 mg   Sun 4 mg 4 mg 4 mg   Mon 4 mg 4 mg 4 mg   Tue 4 mg 4 mg 4 mg   Wed - 2 mg 2 mg   Thu 4 mg 4 mg 4 mg   Fri 4 mg 4 mg 4 mg   Sat 4 mg 4 mg 4 mg   Visit Report - - -   Some recent data might be hidden       Plan:  1. INR is Therapeutic today- see above in Anticoagulation Summary.   Will instruct Cristina Lemus to Continue their warfarin regimen- see above in Anticoagulation Summary.  2. Follow up in 2 weeks  3. They have been instructed to call if any changes in medications, doses, concerns, etc. Patient expresses understanding and has no further questions at this time.    Tasneem Wilkinson, [facility]

## 2022-09-09 ENCOUNTER — OFFICE VISIT (OUTPATIENT)
Dept: ORTHOPEDIC SURGERY | Facility: CLINIC | Age: 87
End: 2022-09-09

## 2022-09-09 VITALS — TEMPERATURE: 97.5 F | HEIGHT: 65 IN | WEIGHT: 114.1 LBS | BODY MASS INDEX: 19.01 KG/M2

## 2022-09-09 DIAGNOSIS — R52 PAIN: Primary | ICD-10-CM

## 2022-09-09 DIAGNOSIS — Z09 FRACTURE FOLLOW-UP: ICD-10-CM

## 2022-09-09 PROCEDURE — 72170 X-RAY EXAM OF PELVIS: CPT | Performed by: ORTHOPAEDIC SURGERY

## 2022-09-09 PROCEDURE — 99212 OFFICE O/P EST SF 10 MIN: CPT | Performed by: ORTHOPAEDIC SURGERY

## 2022-09-09 NOTE — PROGRESS NOTES
Chief complaint: Follow-up regarding pelvic ring fracture    Ms. Lemus follows up today for her pelvic fracture.  She is not having any pain.  She is still ambulating with a walker but she says that this is because of balance and not the hip.  Denies any complaints or issues.    No pain with lateral compression of her pelvis.  No tenderness over the pelvis.  Hip motion is full.  Negative Stinchfield.  Gait is nonantalgic.    AP inlet and outlet views of the right hip and pelvis are ordered and reviewed to evaluate her fracture.  These are compared to previous x-rays.  Alignment is unchanged.  There is increased callus formation relative to previous films.    Assessment: Follow-up now 3-month status post closed treatment of pelvic ring fracture    Plan: She demonstrates evidence for both clinical and radiographic healing.  She is not healed but she is clearly healing at this point.  I see no reason for her to follow-up with me unless she is having problems.  She is in agreement with that plan.

## 2022-09-22 DIAGNOSIS — I10 HTN, GOAL BELOW 140/80: ICD-10-CM

## 2022-09-22 RX ORDER — AMLODIPINE BESYLATE 10 MG/1
TABLET ORAL
Qty: 90 TABLET | Refills: 1 | Status: SHIPPED | OUTPATIENT
Start: 2022-09-22 | End: 2022-12-23 | Stop reason: SDUPTHER

## 2022-09-23 ENCOUNTER — TELEPHONE (OUTPATIENT)
Dept: PHARMACY | Facility: HOSPITAL | Age: 87
End: 2022-09-23

## 2022-09-23 NOTE — TELEPHONE ENCOUNTER
Spoke with [facility] regarding overdue INR. Patient was on the schedule for 9/12 but she was at dialysis and she was rescheduled. INR scheduled for Monday 9/26.

## 2022-09-29 ENCOUNTER — TELEPHONE (OUTPATIENT)
Dept: PHARMACY | Facility: HOSPITAL | Age: 87
End: 2022-09-29

## 2022-09-29 NOTE — TELEPHONE ENCOUNTER
Left voicemail for [facility] regarding overdue INR. Patient had originally been rescheduled from 9/12 to 9/26, however, we have not received an INR. Will await return phone call from [facility] to determine when they are checking the INR.

## 2022-09-30 LAB — INR PPP: 2.5

## 2022-10-03 ENCOUNTER — ANTICOAGULATION VISIT (OUTPATIENT)
Dept: PHARMACY | Facility: HOSPITAL | Age: 87
End: 2022-10-03

## 2022-10-03 ENCOUNTER — TELEPHONE (OUTPATIENT)
Dept: PHARMACY | Facility: HOSPITAL | Age: 87
End: 2022-10-03

## 2022-10-03 DIAGNOSIS — I48.21 PERMANENT ATRIAL FIBRILLATION: Primary | ICD-10-CM

## 2022-10-03 NOTE — TELEPHONE ENCOUNTER
Spoke with [facility] regarding overdue INR. Patient had results drawn on Friday and [facility] is still waiting for INR. They will fax over the result as soon as possible.

## 2022-10-03 NOTE — PROGRESS NOTES
Anticoagulation Clinic Progress Note    Anticoagulation Summary  As of 10/3/2022    INR goal:  2.0-3.0   TTR:  59.6 % (1.7 y)   INR used for dosin.50 (2022)   Warfarin maintenance plan:  4 mg every day   Weekly warfarin total:  28 mg   Plan last modified:  Arlette Gutierrez, PharmD (10/3/2022)   Next INR check:  10/31/2022   Priority:  High   Target end date:  Indefinite    Indications    Permanent atrial fibrillation (HCC) [I48.21]             Anticoagulation Episode Summary     INR check location:      Preferred lab:      Send INR reminders to:   [facility]  POOL    Comments:  tried calling [facility]CL[facility] but no stable warf dosing available from July/Aug 2020 , prior to Eliquis; [facility]      Anticoagulation Care Providers     Provider Role Specialty Phone number    Lm Mart MD Referring Cardiology [phone]          Clinic Interview:  Patient Findings     Negatives:  Signs/symptoms of thrombosis, Signs/symptoms of bleeding,   Laboratory test error suspected, Change in health, Change in alcohol use,   Change in activity, Upcoming invasive procedure, Emergency department   visit, Upcoming dental procedure, Missed doses, Extra doses, Change in   medications, Change in diet/appetite, Hospital admission, Bruising, Other   complaints    Comments:  Patient reports taking 4 mg daily.  Denies any other changes.      Clinical Outcomes     Negatives:  Major bleeding event, Thromboembolic event,   Anticoagulation-related hospital admission, Anticoagulation-related ED   visit, Anticoagulation-related fatality    Comments:  Patient reports taking 4 mg daily.  Denies any other changes.        INR History:  Anticoagulation Monitoring 2022 2022 10/3/2022   INR 3.04 2.22 2.50   INR Date 8/3/2022 2022 2022   INR Goal 2.0-3.0 2.0-3.0 2.0-3.0   Trend Same Same Up   Last Week Total 26 mg 28 mg 28 mg   Next Week Total 26 mg 26 mg 28 mg   Sun 4 mg 4 mg 4 mg   Mon 4 mg 4 mg 4 mg   Tue 4  mg 4 mg 4 mg   Wed 2 mg 2 mg 4 mg   Thu 4 mg 4 mg 4 mg   Fri 4 mg 4 mg 4 mg   Sat 4 mg 4 mg 4 mg   Visit Report - - -   Some recent data might be hidden       Plan:  1. INR is Therapeutic today- see above in Anticoagulation Summary.   Will instruct Cristina Lemus to Continue their warfarin regimen- see above in Anticoagulation Summary.  2. Follow up in 4 weeks  3. They have been instructed to call if any changes in medications, doses, concerns, etc. Patient expresses understanding and has no further questions at this time.    Arlette Gutierrez, PharmD

## 2022-10-24 DIAGNOSIS — F51.01 PRIMARY INSOMNIA: ICD-10-CM

## 2022-10-24 RX ORDER — WARFARIN SODIUM 2 MG/1
TABLET ORAL
Qty: 180 TABLET | Refills: 1 | Status: SHIPPED | OUTPATIENT
Start: 2022-10-24 | End: 2023-03-20

## 2022-10-25 RX ORDER — LORAZEPAM 1 MG/1
TABLET ORAL
Qty: 21 TABLET | Refills: 0 | Status: SHIPPED | OUTPATIENT
Start: 2022-10-25 | End: 2022-11-22 | Stop reason: SDUPTHER

## 2022-10-31 LAB — INR PPP: 1.59

## 2022-11-01 ENCOUNTER — ANTICOAGULATION VISIT (OUTPATIENT)
Dept: PHARMACY | Facility: HOSPITAL | Age: 87
End: 2022-11-01

## 2022-11-01 DIAGNOSIS — I48.21 PERMANENT ATRIAL FIBRILLATION: Primary | Chronic | ICD-10-CM

## 2022-11-01 NOTE — PROGRESS NOTES
Anticoagulation Clinic Progress Note    Anticoagulation Summary  As of 2022    INR goal:  2.0-3.0   TTR:  59.3 % (1.8 y)   INR used for dosin.59 (10/31/2022)   Warfarin maintenance plan:  4 mg every day   Weekly warfarin total:  28 mg   Plan last modified:  Arlette Gutierrez, PharmD (10/3/2022)   Next INR check:  2022   Priority:  High   Target end date:  Indefinite    Indications    Permanent atrial fibrillation (HCC) [I48.21]             Anticoagulation Episode Summary     INR check location:      Preferred lab:      Send INR reminders to:   [facility]  POOL    Comments:  tried calling [facility]CL[facility] but no stable warf dosing available from July/Aug 2020 , prior to Eliquis; [facility]      Anticoagulation Care Providers     Provider Role Specialty Phone number    Lm Mart MD Referring Cardiology [phone]          Clinic Interview:  Patient Findings     Negatives:  Signs/symptoms of thrombosis, Signs/symptoms of bleeding,   Laboratory test error suspected, Change in health, Change in alcohol use,   Change in activity, Upcoming invasive procedure, Emergency department   visit, Upcoming dental procedure, Missed doses, Extra doses, Change in   medications, Change in diet/appetite, Hospital admission, Bruising, Other   complaints      Clinical Outcomes     Negatives:  Major bleeding event, Thromboembolic event,   Anticoagulation-related hospital admission, Anticoagulation-related ED   visit, Anticoagulation-related fatality        INR History:  Anticoagulation Monitoring 2022 10/3/2022 2022   INR 2.22 2.50 1.59   INR Date 2022 2022 10/31/2022   INR Goal 2.0-3.0 2.0-3.0 2.0-3.0   Trend Same Up Same   Last Week Total 28 mg 28 mg 28 mg   Next Week Total 26 mg 28 mg 30 mg   Sun 4 mg 4 mg 4 mg   Mon 4 mg 4 mg 4 mg   Tue 4 mg 4 mg 6 mg (); Otherwise 4 mg   Wed 2 mg 4 mg 4 mg   Thu 4 mg 4 mg 4 mg   Fri 4 mg 4 mg 4 mg   Sat 4 mg 4 mg 4 mg   Visit Report - - -   Some  recent data might be hidden       Plan:  1. INR is Subtherapeutic  today- see above in Anticoagulation Summary.   Will instruct Cristina Lemus to Change their warfarin regimen- see above in Anticoagulation Summary.  2. Follow up in 2 weeks (orders sent to [facility])  3. They have been instructed to call if any changes in medications, doses, concerns, etc. Patient expresses understanding and has no further questions at this time.    Arlette Gutierrez, PharmD

## 2022-11-14 LAB — INR PPP: 1.54

## 2022-11-15 ENCOUNTER — ANTICOAGULATION VISIT (OUTPATIENT)
Dept: PHARMACY | Facility: HOSPITAL | Age: 87
End: 2022-11-15

## 2022-11-15 DIAGNOSIS — I48.21 PERMANENT ATRIAL FIBRILLATION: Primary | Chronic | ICD-10-CM

## 2022-11-15 NOTE — PROGRESS NOTES
Anticoagulation Clinic Progress Note    Anticoagulation Summary  As of 11/15/2022    INR goal:  2.0-3.0   TTR:  58.1 % (1.9 y)   INR used for dosin.54 (2022)   Warfarin maintenance plan:  6 mg every Tue; 4 mg all other days; Starting 11/15/2022   Weekly warfarin total:  30 mg   Plan last modified:  Mc Zaidi, PharmD (11/15/2022)   Next INR check:  2022   Priority:  High   Target end date:  Indefinite    Indications    Permanent atrial fibrillation (HCC) [I48.21]             Anticoagulation Episode Summary     INR check location:      Preferred lab:      Send INR reminders to:   [facility]  POOL    Comments:  tried calling [facility]CL[facility] but no stable warf dosing available from July/Aug 2020 , prior to Eliquis; [facility]      Anticoagulation Care Providers     Provider Role Specialty Phone number    Lm Mart MD Referring Cardiology [phone]          Clinic Interview:  Patient Findings     Negatives:  Signs/symptoms of thrombosis, Signs/symptoms of bleeding,   Laboratory test error suspected, Change in health, Change in alcohol use,   Change in activity, Upcoming invasive procedure, Emergency department   visit, Upcoming dental procedure, Missed doses, Extra doses, Change in   medications, Change in diet/appetite, Hospital admission, Bruising, Other   complaints      Clinical Outcomes     Negatives:  Major bleeding event, Thromboembolic event,   Anticoagulation-related hospital admission, Anticoagulation-related ED   visit, Anticoagulation-related fatality        INR History:  Anticoagulation Monitoring 10/3/2022 2022 11/15/2022   INR 2.50 1.59 1.54   INR Date 2022 10/31/2022 2022   INR Goal 2.0-3.0 2.0-3.0 2.0-3.0   Trend Up Same Up   Last Week Total 28 mg 28 mg 28 mg   Next Week Total 28 mg 30 mg 30 mg   Sun 4 mg 4 mg 4 mg   Mon 4 mg 4 mg 4 mg   Tue 4 mg 6 mg (); Otherwise 4 mg 6 mg   Wed 4 mg 4 mg 4 mg   Thu 4 mg 4 mg 4 mg   Fri 4 mg 4 mg 4 mg   Sat 4  mg 4 mg 4 mg   Visit Report - - -   Some recent data might be hidden       Plan:  1. INR is Subtherapeutic today- see above in Anticoagulation Summary.   Will instruct Cristina Lemus to Increase their warfarin regimen- see above in Anticoagulation Summary.  2. Follow up in 2 weeks  3. They have been instructed to call if any changes in medications, doses, concerns, etc. Patient expresses understanding and has no further questions at this time.    Mc Zaidi, PharmD

## 2022-11-22 DIAGNOSIS — F51.01 PRIMARY INSOMNIA: ICD-10-CM

## 2022-11-22 RX ORDER — LORAZEPAM 1 MG/1
1 TABLET ORAL NIGHTLY PRN
Qty: 30 TABLET | Refills: 0 | Status: SHIPPED | OUTPATIENT
Start: 2022-11-22 | End: 2022-12-23 | Stop reason: SDUPTHER

## 2022-11-22 NOTE — TELEPHONE ENCOUNTER
Caller: Shannan Lemus    Relationship: Emergency Contact    Best call back number: [phone]    Requested Prescriptions:   Requested Prescriptions     Pending Prescriptions Disp Refills   • LORazepam (ATIVAN) 1 MG tablet 21 tablet 0     Sig: Take 1 tablet by mouth At Night As Needed for Sleep.        Pharmacy where request should be sent: [facility] - [address] [facility] AT [address]AL TO [facility] - [phone] [facility] [phone] FX     Additional details provided by patient: PATIENT'S DAUGHTER STATES PATIENT HAS ABOUT 5-6 TABLETS REMAINING.     Does the patient have less than a 3 day supply:  [] Yes  [x] No    Chriss Sepulveda Rep   11/22/22 11:12 EST

## 2022-11-23 RX ORDER — POTASSIUM CHLORIDE 1500 MG/1
TABLET, EXTENDED RELEASE ORAL
Qty: 90 TABLET | Refills: 1 | Status: SHIPPED | OUTPATIENT
Start: 2022-11-23 | End: 2022-12-23 | Stop reason: SDUPTHER

## 2022-12-07 LAB — INR PPP: 2.08

## 2022-12-12 ENCOUNTER — ANTICOAGULATION VISIT (OUTPATIENT)
Dept: PHARMACY | Facility: HOSPITAL | Age: 87
End: 2022-12-12

## 2022-12-12 DIAGNOSIS — I48.21 PERMANENT ATRIAL FIBRILLATION: Primary | Chronic | ICD-10-CM

## 2022-12-12 NOTE — PROGRESS NOTES
Anticoagulation Clinic Progress Note    Anticoagulation Summary  As of 2022    INR goal:  2.0-3.0   TTR:  56.8 % (1.9 y)   INR used for dosin.08 (2022)   Warfarin maintenance plan:  6 mg every Tue; 4 mg all other days; Starting 2022   Weekly warfarin total:  30 mg   No change documented:  Tasneem Wilkinson RPH   Plan last modified:  Mc Zaidi, PharmD (11/15/2022)   Next INR check:  2022   Priority:  High   Target end date:  Indefinite    Indications    Permanent atrial fibrillation (HCC) [I48.21]             Anticoagulation Episode Summary     INR check location:      Preferred lab:      Send INR reminders to:   [facility]  POOL    Comments:  tried calling [facility] but no stable warf dosing available from July/Aug 2020 , prior to Eliquis; [facility]      Anticoagulation Care Providers     Provider Role Specialty Phone number    Lm Mart MD Referring Cardiology [phone]          Clinic Interview:  No pertinent clinical findings have been reported.    INR History:  Anticoagulation Monitoring 2022 11/15/2022 2022   INR 1.59 1.54 2.08   INR Date 10/31/2022 2022 2022   INR Goal 2.0-3.0 2.0-3.0 2.0-3.0   Trend Same Up Same   Last Week Total 28 mg 28 mg 30 mg   Next Week Total 30 mg 30 mg 30 mg   Sun 4 mg 4 mg 4 mg   Mon 4 mg 4 mg 4 mg   Tue 6 mg (); Otherwise 4 mg 6 mg 6 mg   Wed 4 mg 4 mg 4 mg   Thu 4 mg 4 mg 4 mg   Fri 4 mg 4 mg 4 mg   Sat 4 mg 4 mg 4 mg   Visit Report - - -   Some recent data might be hidden       Plan:  1. INR is therapeutic today- see above in Anticoagulation Summary.    Cristina Lemus to continue their warfarin regimen- see above in Anticoagulation Summary.  2. Follow up in 2 weeks  3. Pt has agreed to only be called if INR out of range. They have been instructed to call if any changes in medications, doses, concerns, etc. Patient expresses understanding and has no further questions at this time.    Tasneem Wilkinson RPH

## 2022-12-23 ENCOUNTER — OFFICE VISIT (OUTPATIENT)
Dept: INTERNAL MEDICINE | Facility: CLINIC | Age: 87
End: 2022-12-23

## 2022-12-23 VITALS
HEART RATE: 75 BPM | SYSTOLIC BLOOD PRESSURE: 130 MMHG | HEIGHT: 65 IN | OXYGEN SATURATION: 100 % | TEMPERATURE: 98.9 F | WEIGHT: 117.4 LBS | DIASTOLIC BLOOD PRESSURE: 80 MMHG | BODY MASS INDEX: 19.56 KG/M2

## 2022-12-23 DIAGNOSIS — F51.01 PRIMARY INSOMNIA: ICD-10-CM

## 2022-12-23 DIAGNOSIS — I50.32 CHRONIC DIASTOLIC CONGESTIVE HEART FAILURE: Chronic | ICD-10-CM

## 2022-12-23 DIAGNOSIS — Z00.00 MEDICARE ANNUAL WELLNESS VISIT, SUBSEQUENT: Primary | ICD-10-CM

## 2022-12-23 DIAGNOSIS — I10 HTN, GOAL BELOW 140/80: ICD-10-CM

## 2022-12-23 PROCEDURE — 1126F AMNT PAIN NOTED NONE PRSNT: CPT | Performed by: FAMILY MEDICINE

## 2022-12-23 PROCEDURE — 1170F FXNL STATUS ASSESSED: CPT | Performed by: FAMILY MEDICINE

## 2022-12-23 PROCEDURE — 1159F MED LIST DOCD IN RCRD: CPT | Performed by: FAMILY MEDICINE

## 2022-12-23 PROCEDURE — G0439 PPPS, SUBSEQ VISIT: HCPCS | Performed by: FAMILY MEDICINE

## 2022-12-23 RX ORDER — AMLODIPINE BESYLATE 10 MG/1
10 TABLET ORAL DAILY
Qty: 90 TABLET | Refills: 4 | Status: SHIPPED | OUTPATIENT
Start: 2022-12-23

## 2022-12-23 RX ORDER — LORAZEPAM 1 MG/1
1 TABLET ORAL NIGHTLY PRN
Qty: 90 TABLET | Refills: 1 | Status: SHIPPED | OUTPATIENT
Start: 2022-12-23

## 2022-12-23 RX ORDER — POTASSIUM CHLORIDE 20 MEQ/1
20 TABLET, EXTENDED RELEASE ORAL DAILY
Qty: 90 TABLET | Refills: 4 | Status: SHIPPED | OUTPATIENT
Start: 2022-12-23

## 2022-12-23 RX ORDER — TORSEMIDE 20 MG/1
20 TABLET ORAL DAILY
Qty: 90 TABLET | Refills: 4 | Status: SHIPPED | OUTPATIENT
Start: 2022-12-23

## 2022-12-23 NOTE — PROGRESS NOTES
The ABCs of the Annual Wellness Visit  Subsequent Medicare Wellness Visit    Vandana Lemus is a 89 y.o. female who presents for a Subsequent Medicare Wellness Visit.    The following portions of the patient's history were reviewed and   updated as appropriate: allergies, current medications, past family history, past medical history, past social history, past surgical history and problem list.    Compared to one year ago, the patient feels her physical   health is the same.    Compared to one year ago, the patient feels her mental   health is the same.    Recent Hospitalizations:  This patient has had a [facility] admission record on file within the last 365 days.    Current Medical Providers:  Patient Care Team:  Wood Elias MD as PCP - General (Family Medicine)    Outpatient Medications Prior to Visit   Medication Sig Dispense Refill   • acetaminophen (TYLENOL) 325 MG tablet Take 2 tablets by mouth Every 4 (Four) Hours As Needed for Mild Pain .     • Cholecalciferol (VITAMIN D3 PO) Take 1,000 Units by mouth Daily.     • warfarin (Jantoven) 2 MG tablet Take two tablets (4 mg) by mouth daily or as directed. 180 tablet 1   • amLODIPine (NORVASC) 10 MG tablet TAKE 1 TABLET DAILY 90 tablet 1   • KLOR-CON 20 MEQ CR tablet TAKE 1 TABLET DAILY 90 tablet 1   • LORazepam (ATIVAN) 1 MG tablet Take 1 tablet by mouth At Night As Needed for Sleep. Must be seen for more refills of this medication. 30 tablet 0   • torsemide (DEMADEX) 20 MG tablet Take 5 tablets by mouth Daily. (Patient taking differently: Take 20 mg by mouth Daily.) 30 tablet 1   • sennosides-docusate (PERICOLACE) 8.6-50 MG per tablet Take 2 tablets by mouth 2 (Two) Times a Day. Hold for loose stool.       No facility-administered medications prior to visit.       No opioid medication identified on active medication list. I have reviewed chart for other potential  high risk medication/s and harmful drug interactions in the  "elderly.          Aspirin is not on active medication list.  Aspirin use is not indicated based on review of current medical condition/s. Risk of harm outweighs potential benefits.  .    Patient Active Problem List   Diagnosis   • AVNRT (AV joy re-entry tachycardia) ([facility])   • History of pulmonary embolus (PE)   • HTN, goal below 140/80   • Permanent atrial fibrillation (HCC)   • Stage 4 chronic kidney disease (HCC)   • Chronic diastolic congestive heart failure (HCC)   • Anxiety disorder   • Chronic gout without tophus   • KIM (acute kidney injury) ([facility])   • Essential hypertension   • Dyspnea   • Pleural effusion   • Dyspnea on exertion   • Anemia   • Acute on chronic renal failure (HCC)   • Acute renal failure superimposed on chronic kidney disease (HCC)   • Anticoagulated on warfarin   • UTI (urinary tract infection)   • Metabolic encephalopathy   • End stage renal disease ([facility])   • C. difficile colitis   • Aftercare including intermittent dialysis ([facility])   • Allergy, unspecified, initial encounter   • Anaphylactic shock, unspecified, initial encounter   • Anemia in chronic kidney disease   • Coagulation defect, unspecified ([facility])   • Conjunctivochalasis   • Epiretinal membrane   • Hypokalemia   • Iron deficiency anemia   • Secondary hyperparathyroidism of renal origin ([facility])   • Chronic mid back pain   • Closed fracture of multiple pubic rami, right, initial encounter ([facility])   • Sacral fracture ([facility])     Advance Care Planning  Advance Directive is on file.  ACP discussion was held with the patient during this visit. Patient has an advance directive in EMR which is still valid.      Objective    Vitals:    12/23/22 1427   BP: 130/80   BP Location: Right arm   Patient Position: Sitting   Cuff Size: Adult   Pulse: 75   Temp: 98.9 °F (37.2 °C)   TempSrc: Temporal   SpO2: 100%   Weight: 53.3 kg (117 lb 6.4 oz)   Height: 165.1 cm (65\")   PainSc: 0-No pain     Estimated body mass index is 19.54 kg/m² as calculated from " "the following:    Height as of this encounter: 165.1 cm (65\").    Weight as of this encounter: 53.3 kg (117 lb 6.4 oz).    Physical Exam  Vitals and nursing note reviewed.   Constitutional:       General: She is not in acute distress.     Appearance: Normal appearance.   Cardiovascular:      Rate and Rhythm: Normal rate and regular rhythm.      Heart sounds: Normal heart sounds. No murmur heard.  Pulmonary:      Effort: Pulmonary effort is normal.      Breath sounds: Normal breath sounds.   Neurological:      Mental Status: She is alert.           BMI is within normal parameters. No other follow-up for BMI required.      Does the patient have evidence of cognitive impairment?   No            HEALTH RISK ASSESSMENT    Smoking Status:  Social History     Tobacco Use   Smoking Status Former   • Types: Cigarettes   Smokeless Tobacco Never     Alcohol Consumption:  Social History     Substance and Sexual Activity   Alcohol Use No    Comment: caffeine use      Fall Risk Screen:    JAVIER Fall Risk Assessment was completed, and patient is at HIGH risk for falls. Assessment completed on:12/23/2022    Depression Screening:  PHQ-2/PHQ-9 Depression Screening 12/23/2022   Retired PHQ-9 Total Score -   Retired Total Score -   Little Interest or Pleasure in Doing Things 0-->not at all   Feeling Down, Depressed or Hopeless 0-->not at all   PHQ-9: Brief Depression Severity Measure Score 0       Health Habits and Functional and Cognitive Screening:  Functional & Cognitive Status 12/23/2022   Do you have difficulty preparing food and eating? No   Do you have difficulty bathing yourself, getting dressed or grooming yourself? No   Do you have difficulty using the toilet? No   Do you have difficulty moving around from place to place? Yes   Do you have trouble with steps or getting out of a bed or a chair? Yes   Current Diet Well Balanced Diet   Dental Exam Up to date   Eye Exam Up to date   Exercise (times per week) 3 times per week "   Current Exercises Include Walking   Do you need help using the phone?  No   Are you deaf or do you have serious difficulty hearing?  Yes   Do you need help with transportation? Yes   Do you need help shopping? Yes   Do you need help preparing meals?  Yes   Do you need help with housework?  No   Do you need help with laundry? No   Do you need help taking your medications? Yes   Do you need help managing money? No   Do you ever drive or ride in a car without wearing a seat belt? No   Have you felt unusual stress, anger or loneliness in the last month? No   Who do you live with? Child   If you need help, do you have trouble finding someone available to you? No   Have you been bothered in the last four weeks by sexual problems? No   Do you have difficulty concentrating, remembering or making decisions? No       Age-appropriate Screening Schedule:  Refer to the list below for future screening recommendations based on patient's age, sex and/or medical conditions. Orders for these recommended tests are listed in the plan section. The patient has been provided with a written plan.    Health Maintenance   Topic Date Due   • DXA SCAN  Never done   • TDAP/TD VACCINES (1 - Tdap) Never done   • ZOSTER VACCINE (1 of 2) Never done   • INFLUENZA VACCINE  Completed              Common labs    Common Labs 5/13/22 5/13/22 5/14/22 5/14/22 5/15/22    0536 0536 0329 0329    Glucose  78 98  75   BUN  56 (A) 30 (A)  43 (A)   Creatinine  4.36 (A) 3.02 (A)  3.89 (A)   Sodium  137 134 (A)  133 (A)   Potassium  4.7 4.2  3.9   Chloride  103 101  97 (A)   Calcium  8.1 (A) 8.1 (A)  8.1 (A)   Albumin  3.30 (A) 3.40 (A)  3.60   WBC 6.19   7.68    Hemoglobin 10.0 (A)   10.2 (A)    Hematocrit 30.6 (A)   29.7 (A)    Platelets 180   139 (A)    (A) Abnormal value                CMS Preventative Services Quick Reference  Risk Factors Identified During Encounter:  Immunizations Discussed/Encouraged: Influenza, Prevnar 20 (Pneumococcal 20-valent  conjugate), Shingrix and COVID19  Polypharmacy: Medication List reviewed and Medications are appropriate for patient    The above risks/problems have been discussed with the patient.  Pertinent information has been shared with the patient in the After Visit Summary.    Diagnoses and all orders for this visit:    1. Medicare annual wellness visit, subsequent (Primary)    2. Primary insomnia  -     LORazepam (ATIVAN) 1 MG tablet; Take 1 tablet by mouth At Night As Needed for Sleep. Must be seen for more refills of this medication.  Dispense: 90 tablet; Refill: 1    3. HTN, goal below 140/80  -     amLODIPine (NORVASC) 10 MG tablet; Take 1 tablet by mouth Daily.  Dispense: 90 tablet; Refill: 4  -     potassium chloride (KLOR-CON) 20 MEQ CR tablet; Take 1 tablet by mouth Daily.  Dispense: 90 tablet; Refill: 4    4. Chronic diastolic congestive heart failure (HCC)  -     torsemide (DEMADEX) 20 MG tablet; Take 1 tablet by mouth Daily.  Dispense: 90 tablet; Refill: 4        Follow Up:   Next Medicare Wellness visit to be scheduled in 1 year.      An After Visit Summary and PPPS were made available to the patient.

## 2023-01-01 ENCOUNTER — HOSPITAL ENCOUNTER (INPATIENT)
Facility: HOSPITAL | Age: 88
LOS: 1 days | End: 2023-05-21
Attending: EMERGENCY MEDICINE | Admitting: INTERNAL MEDICINE
Payer: MEDICARE

## 2023-01-01 ENCOUNTER — TELEPHONE (OUTPATIENT)
Dept: INTERNAL MEDICINE | Facility: CLINIC | Age: 88
End: 2023-01-01

## 2023-01-01 ENCOUNTER — APPOINTMENT (OUTPATIENT)
Dept: CT IMAGING | Facility: HOSPITAL | Age: 88
End: 2023-01-01
Payer: MEDICARE

## 2023-01-01 VITALS — DIASTOLIC BLOOD PRESSURE: 59 MMHG | OXYGEN SATURATION: 96 % | TEMPERATURE: 98 F | SYSTOLIC BLOOD PRESSURE: 131 MMHG

## 2023-01-01 DIAGNOSIS — K63.89 PNEUMATOSIS INTESTINALIS: ICD-10-CM

## 2023-01-01 DIAGNOSIS — N18.6 ESRD (END STAGE RENAL DISEASE): Primary | ICD-10-CM

## 2023-01-01 DIAGNOSIS — R10.0 ACUTE ABDOMEN: ICD-10-CM

## 2023-01-01 LAB
ALBUMIN SERPL-MCNC: 3.5 G/DL (ref 3.5–5.2)
ALBUMIN/GLOB SERPL: 1 G/DL
ALP SERPL-CCNC: 150 U/L (ref 39–117)
ALT SERPL W P-5'-P-CCNC: 31 U/L (ref 1–33)
ANION GAP SERPL CALCULATED.3IONS-SCNC: 19 MMOL/L (ref 5–15)
AST SERPL-CCNC: 49 U/L (ref 1–32)
BILIRUB SERPL-MCNC: 1.6 MG/DL (ref 0–1.2)
BUN SERPL-MCNC: 54 MG/DL (ref 8–23)
BUN/CREAT SERPL: 16.3 (ref 7–25)
CALCIUM SPEC-SCNC: 8.8 MG/DL (ref 8.2–9.6)
CHLORIDE SERPL-SCNC: 89 MMOL/L (ref 98–107)
CO2 SERPL-SCNC: 25 MMOL/L (ref 22–29)
CREAT SERPL-MCNC: 3.31 MG/DL (ref 0.57–1)
DEPRECATED RDW RBC AUTO: 49.6 FL (ref 37–54)
EGFRCR SERPLBLD CKD-EPI 2021: 12.5 ML/MIN/1.73
ERYTHROCYTE [DISTWIDTH] IN BLOOD BY AUTOMATED COUNT: 14.1 % (ref 12.3–15.4)
GLOBULIN UR ELPH-MCNC: 3.4 GM/DL
GLUCOSE SERPL-MCNC: 120 MG/DL (ref 65–99)
HCT VFR BLD AUTO: 33.6 % (ref 34–46.6)
HGB BLD-MCNC: 10.9 G/DL (ref 12–15.9)
INR PPP: 5.33 (ref 0.9–1.1)
LIPASE SERPL-CCNC: 9 U/L (ref 13–60)
LYMPHOCYTES # BLD MANUAL: 0.76 10*3/MM3 (ref 0.7–3.1)
LYMPHOCYTES NFR BLD MANUAL: 8.2 % (ref 5–12)
MCH RBC QN AUTO: 31.6 PG (ref 26.6–33)
MCHC RBC AUTO-ENTMCNC: 32.4 G/DL (ref 31.5–35.7)
MCV RBC AUTO: 97.4 FL (ref 79–97)
MONOCYTES # BLD: 3.12 10*3/MM3 (ref 0.1–0.9)
NEUTROPHILS # BLD AUTO: 34.2 10*3/MM3 (ref 1.7–7)
NEUTROPHILS NFR BLD MANUAL: 89.8 % (ref 42.7–76)
PLAT MORPH BLD: NORMAL
PLATELET # BLD AUTO: 254 10*3/MM3 (ref 140–450)
PMV BLD AUTO: 9.5 FL (ref 6–12)
POTASSIUM SERPL-SCNC: 5.2 MMOL/L (ref 3.5–5.2)
PROT SERPL-MCNC: 6.9 G/DL (ref 6–8.5)
PROTHROMBIN TIME: 49.6 SECONDS (ref 11.7–14.2)
QT INTERVAL: 397 MS
RBC # BLD AUTO: 3.45 10*6/MM3 (ref 3.77–5.28)
RBC MORPH BLD: NORMAL
SODIUM SERPL-SCNC: 133 MMOL/L (ref 136–145)
VARIANT LYMPHS NFR BLD MANUAL: 2 % (ref 19.6–45.3)
WBC MORPH BLD: NORMAL
WBC NRBC COR # BLD: 38.09 10*3/MM3 (ref 3.4–10.8)

## 2023-01-01 PROCEDURE — 99222 1ST HOSP IP/OBS MODERATE 55: CPT | Performed by: SURGERY

## 2023-01-01 PROCEDURE — 25010000002 PIPERACILLIN SOD-TAZOBACTAM PER 1 G: Performed by: EMERGENCY MEDICINE

## 2023-01-01 PROCEDURE — 85007 BL SMEAR W/DIFF WBC COUNT: CPT | Performed by: EMERGENCY MEDICINE

## 2023-01-01 PROCEDURE — 25010000002 HYDROMORPHONE 1 MG/ML SOLUTION: Performed by: INTERNAL MEDICINE

## 2023-01-01 PROCEDURE — 93010 ELECTROCARDIOGRAM REPORT: CPT | Performed by: INTERNAL MEDICINE

## 2023-01-01 PROCEDURE — 80053 COMPREHEN METABOLIC PANEL: CPT | Performed by: EMERGENCY MEDICINE

## 2023-01-01 PROCEDURE — 74176 CT ABD & PELVIS W/O CONTRAST: CPT

## 2023-01-01 PROCEDURE — 93005 ELECTROCARDIOGRAM TRACING: CPT | Performed by: EMERGENCY MEDICINE

## 2023-01-01 PROCEDURE — 25010000002 HYDROMORPHONE PER 4 MG: Performed by: EMERGENCY MEDICINE

## 2023-01-01 PROCEDURE — 85610 PROTHROMBIN TIME: CPT | Performed by: EMERGENCY MEDICINE

## 2023-01-01 PROCEDURE — 85025 COMPLETE CBC W/AUTO DIFF WBC: CPT | Performed by: EMERGENCY MEDICINE

## 2023-01-01 PROCEDURE — 25010000002 ONDANSETRON PER 1 MG: Performed by: EMERGENCY MEDICINE

## 2023-01-01 PROCEDURE — 83690 ASSAY OF LIPASE: CPT | Performed by: EMERGENCY MEDICINE

## 2023-01-01 PROCEDURE — 25010000002 LORAZEPAM PER 2 MG: Performed by: INTERNAL MEDICINE

## 2023-01-01 PROCEDURE — 99285 EMERGENCY DEPT VISIT HI MDM: CPT

## 2023-01-01 RX ORDER — ACETAMINOPHEN 650 MG/1
650 SUPPOSITORY RECTAL EVERY 4 HOURS PRN
Status: DISCONTINUED | OUTPATIENT
Start: 2023-01-01 | End: 2023-01-01 | Stop reason: HOSPADM

## 2023-01-01 RX ORDER — ACETAMINOPHEN 160 MG/5ML
650 SOLUTION ORAL EVERY 4 HOURS PRN
Status: DISCONTINUED | OUTPATIENT
Start: 2023-01-01 | End: 2023-01-01 | Stop reason: HOSPADM

## 2023-01-01 RX ORDER — LORAZEPAM 2 MG/ML
1 INJECTION INTRAMUSCULAR
Status: DISCONTINUED | OUTPATIENT
Start: 2023-01-01 | End: 2023-01-01 | Stop reason: HOSPADM

## 2023-01-01 RX ORDER — LORAZEPAM 2 MG/ML
2 INJECTION INTRAMUSCULAR
Status: DISCONTINUED | OUTPATIENT
Start: 2023-01-01 | End: 2023-01-01 | Stop reason: HOSPADM

## 2023-01-01 RX ORDER — ONDANSETRON 2 MG/ML
4 INJECTION INTRAMUSCULAR; INTRAVENOUS ONCE
Status: COMPLETED | OUTPATIENT
Start: 2023-01-01 | End: 2023-01-01

## 2023-01-01 RX ORDER — DIPHENOXYLATE HYDROCHLORIDE AND ATROPINE SULFATE 2.5; .025 MG/1; MG/1
1 TABLET ORAL
Status: DISCONTINUED | OUTPATIENT
Start: 2023-01-01 | End: 2023-01-01 | Stop reason: HOSPADM

## 2023-01-01 RX ORDER — GLYCOPYRROLATE 0.2 MG/ML
0.4 INJECTION INTRAMUSCULAR; INTRAVENOUS
Status: DISCONTINUED | OUTPATIENT
Start: 2023-01-01 | End: 2023-01-01 | Stop reason: HOSPADM

## 2023-01-01 RX ORDER — KETOROLAC TROMETHAMINE 15 MG/ML
15 INJECTION, SOLUTION INTRAMUSCULAR; INTRAVENOUS EVERY 6 HOURS PRN
Status: DISCONTINUED | OUTPATIENT
Start: 2023-01-01 | End: 2023-01-01 | Stop reason: HOSPADM

## 2023-01-01 RX ORDER — LORAZEPAM 2 MG/ML
0.5 CONCENTRATE ORAL
Status: DISCONTINUED | OUTPATIENT
Start: 2023-01-01 | End: 2023-01-01 | Stop reason: HOSPADM

## 2023-01-01 RX ORDER — ACETAMINOPHEN 325 MG/1
650 TABLET ORAL EVERY 4 HOURS PRN
Status: DISCONTINUED | OUTPATIENT
Start: 2023-01-01 | End: 2023-01-01 | Stop reason: HOSPADM

## 2023-01-01 RX ORDER — LORAZEPAM 2 MG/ML
1 CONCENTRATE ORAL
Status: DISCONTINUED | OUTPATIENT
Start: 2023-01-01 | End: 2023-01-01 | Stop reason: HOSPADM

## 2023-01-01 RX ORDER — GLYCOPYRROLATE 0.2 MG/ML
0.2 INJECTION INTRAMUSCULAR; INTRAVENOUS
Status: DISCONTINUED | OUTPATIENT
Start: 2023-01-01 | End: 2023-01-01 | Stop reason: HOSPADM

## 2023-01-01 RX ORDER — MORPHINE SULFATE 20 MG/ML
5 SOLUTION ORAL
Status: DISCONTINUED | OUTPATIENT
Start: 2023-01-01 | End: 2023-01-01 | Stop reason: HOSPADM

## 2023-01-01 RX ORDER — MORPHINE SULFATE 4 MG/ML
4 INJECTION, SOLUTION INTRAMUSCULAR; INTRAVENOUS
Status: DISCONTINUED | OUTPATIENT
Start: 2023-01-01 | End: 2023-01-01 | Stop reason: HOSPADM

## 2023-01-01 RX ORDER — MORPHINE SULFATE 20 MG/ML
10 SOLUTION ORAL
Status: DISCONTINUED | OUTPATIENT
Start: 2023-01-01 | End: 2023-01-01 | Stop reason: HOSPADM

## 2023-01-01 RX ORDER — HYDROMORPHONE HYDROCHLORIDE 1 MG/ML
0.5 INJECTION, SOLUTION INTRAMUSCULAR; INTRAVENOUS; SUBCUTANEOUS
Status: DISCONTINUED | OUTPATIENT
Start: 2023-01-01 | End: 2023-01-01 | Stop reason: HOSPADM

## 2023-01-01 RX ORDER — LORAZEPAM 2 MG/ML
0.5 INJECTION INTRAMUSCULAR
Status: DISCONTINUED | OUTPATIENT
Start: 2023-01-01 | End: 2023-01-01 | Stop reason: HOSPADM

## 2023-01-01 RX ORDER — HYDROMORPHONE HYDROCHLORIDE 1 MG/ML
0.5 INJECTION, SOLUTION INTRAMUSCULAR; INTRAVENOUS; SUBCUTANEOUS ONCE
Status: COMPLETED | OUTPATIENT
Start: 2023-01-01 | End: 2023-01-01

## 2023-01-01 RX ORDER — LORAZEPAM 2 MG/ML
2 CONCENTRATE ORAL
Status: DISCONTINUED | OUTPATIENT
Start: 2023-01-01 | End: 2023-01-01 | Stop reason: HOSPADM

## 2023-01-01 RX ORDER — MORPHINE SULFATE 2 MG/ML
2 INJECTION, SOLUTION INTRAMUSCULAR; INTRAVENOUS
Status: DISCONTINUED | OUTPATIENT
Start: 2023-01-01 | End: 2023-01-01 | Stop reason: HOSPADM

## 2023-01-01 RX ORDER — SCOLOPAMINE TRANSDERMAL SYSTEM 1 MG/1
1 PATCH, EXTENDED RELEASE TRANSDERMAL
Status: DISCONTINUED | OUTPATIENT
Start: 2023-01-01 | End: 2023-01-01 | Stop reason: HOSPADM

## 2023-01-01 RX ADMIN — HYDROMORPHONE HYDROCHLORIDE 1 MG: 1 INJECTION, SOLUTION INTRAMUSCULAR; INTRAVENOUS; SUBCUTANEOUS at 22:16

## 2023-01-01 RX ADMIN — TAZOBACTAM SODIUM AND PIPERACILLIN SODIUM 3.38 G: 375; 3 INJECTION, SOLUTION INTRAVENOUS at 12:41

## 2023-01-01 RX ADMIN — HYDROMORPHONE HYDROCHLORIDE 1 MG: 1 INJECTION, SOLUTION INTRAMUSCULAR; INTRAVENOUS; SUBCUTANEOUS at 19:54

## 2023-01-01 RX ADMIN — LORAZEPAM 0.5 MG: 2 INJECTION INTRAMUSCULAR; INTRAVENOUS at 19:55

## 2023-01-01 RX ADMIN — HYDROMORPHONE HYDROCHLORIDE 0.5 MG: 1 INJECTION, SOLUTION INTRAMUSCULAR; INTRAVENOUS; SUBCUTANEOUS at 13:00

## 2023-01-01 RX ADMIN — HYDROMORPHONE HYDROCHLORIDE 0.5 MG: 1 INJECTION, SOLUTION INTRAMUSCULAR; INTRAVENOUS; SUBCUTANEOUS at 15:39

## 2023-01-01 RX ADMIN — ONDANSETRON 4 MG: 2 INJECTION INTRAMUSCULAR; INTRAVENOUS at 12:59

## 2023-01-11 ENCOUNTER — TELEPHONE (OUTPATIENT)
Dept: CARDIOLOGY | Facility: CLINIC | Age: 88
End: 2023-01-11
Payer: MEDICARE

## 2023-01-11 DIAGNOSIS — I48.21 PERMANENT ATRIAL FIBRILLATION: Primary | ICD-10-CM

## 2023-01-11 NOTE — TELEPHONE ENCOUNTER
Pt's daughter is requesting for someone new to manage pt's PT/INR and warfarin.  They are interested in getting established with our anticoagulation clinic if you are agreeable to placing the referral.  They have previously been working with [facility].    Do you have any recommendations for this patient?    Thank you,    Isa Garcia RN  [facility] [place]  01/11/23  14:00 EST

## 2023-01-12 NOTE — TELEPHONE ENCOUNTER
You are correct, she does follow with our AC clinic.  Please disregard the request for the referral as it is unnecessary.    I called and spoke with pt's daughter this morning, Shannan.  I also called and spoke with our anticoagulation clinic.  Per AC clinic, pt has been not answering phone calls from [facility] regarding scheduling appts and being unavailable at home for PT/INR appts.  I relayed this information to daughter, but she denies this is the case.    I informed daughter that [facility] is the only company that can come to their house to draw blood work and if they wanted to go elsewhere to get lab work completed, that would be acceptable, we would just need to know in advance so we can send the order for blood work.  Either way, our AC clinic can manage pt's PT/INR and warfarin dosing.    Daughter states that she will try reaching out to [facility] again ([phone]) and give them another try.  I requested for her to call our office back if they decide to move forward with another method of lab draws.  She verbalized understanding.    Thank you,    Isa aGrcia, RN  Triage [facility]  01/12/23 08:26 EST

## 2023-01-13 LAB — INR PPP: 2.46

## 2023-01-16 ENCOUNTER — ANTICOAGULATION VISIT (OUTPATIENT)
Dept: PHARMACY | Facility: HOSPITAL | Age: 88
End: 2023-01-16
Payer: MEDICARE

## 2023-01-16 DIAGNOSIS — I48.21 PERMANENT ATRIAL FIBRILLATION: Primary | Chronic | ICD-10-CM

## 2023-01-16 NOTE — PROGRESS NOTES
Anticoagulation Clinic Progress Note    Anticoagulation Summary  As of 2023    INR goal:  2.0-3.0   TTR:  59.0 % (2 y)   INR used for dosin.46 (2023)   Warfarin maintenance plan:  6 mg every Tue; 4 mg all other days; Starting 2023   Weekly warfarin total:  30 mg   No change documented:  Arlette Gutierrez, PharmD   Plan last modified:  Mc Zaidi, PharmD (11/15/2022)   Next INR check:  2023   Priority:  High   Target end date:  Indefinite    Indications    Permanent atrial fibrillation (HCC) [I48.21]             Anticoagulation Episode Summary     INR check location:      Preferred lab:      Send INR reminders to:   MAJO MARIE  POOL    Comments:  tried calling Chaim BARCLAY but no stable warf dosing available from July/Aug 2020 , prior to Eliquis; [facility]      Anticoagulation Care Providers     Provider Role Specialty Phone number    Lm Mart MD Referring Cardiology [phone]          Clinic Interview:  Patient Findings     Negatives:  Signs/symptoms of thrombosis, Signs/symptoms of bleeding,   Laboratory test error suspected, Change in health, Change in alcohol use,   Change in activity, Upcoming invasive procedure, Emergency department   visit, Upcoming dental procedure, Missed doses, Extra doses, Change in   medications, Change in diet/appetite, Hospital admission, Bruising, Other   complaints      Clinical Outcomes     Negatives:  Major bleeding event, Thromboembolic event,   Anticoagulation-related hospital admission, Anticoagulation-related ED   visit, Anticoagulation-related fatality        INR History:  Anticoagulation Monitoring 11/15/2022 2022 2023   INR 1.54 2.08 2.46   INR Date 2022   INR Goal 2.0-3.0 2.0-3.0 2.0-3.0   Trend Up Same Same   Last Week Total 28 mg 30 mg 30 mg   Next Week Total 30 mg 30 mg 30 mg   Sun 4 mg 4 mg 4 mg   Mon 4 mg 4 mg 4 mg   Tue 6 mg 6 mg 6 mg   Wed 4 mg 4 mg 4 mg   Thu 4 mg 4 mg 4 mg   Fri 4  mg 4 mg 4 mg   Sat 4 mg 4 mg 4 mg   Visit Report - - -   Some recent data might be hidden       Plan:  1. INR is Therapeutic today- see above in Anticoagulation Summary.   Will instruct Cristina Lemus to Continue their warfarin regimen- see above in Anticoagulation Summary.  2. Follow up in 4 weeks  3. Pt has agreed to only be called if INR out of range. They have been instructed to call if any changes in medications, doses, concerns, etc. Patient expresses understanding and has no further questions at this time.    Arlette Gutierrez, PharmD

## 2023-01-30 ENCOUNTER — OFFICE VISIT (OUTPATIENT)
Dept: CARDIOLOGY | Facility: CLINIC | Age: 88
End: 2023-01-30
Payer: MEDICARE

## 2023-01-30 VITALS
WEIGHT: 115.4 LBS | HEART RATE: 68 BPM | BODY MASS INDEX: 19.22 KG/M2 | HEIGHT: 65 IN | DIASTOLIC BLOOD PRESSURE: 58 MMHG | SYSTOLIC BLOOD PRESSURE: 138 MMHG

## 2023-01-30 DIAGNOSIS — N18.6 END STAGE RENAL DISEASE: ICD-10-CM

## 2023-01-30 DIAGNOSIS — I50.32 CHRONIC DIASTOLIC CONGESTIVE HEART FAILURE: ICD-10-CM

## 2023-01-30 DIAGNOSIS — I48.21 PERMANENT ATRIAL FIBRILLATION: Primary | ICD-10-CM

## 2023-01-30 DIAGNOSIS — I10 ESSENTIAL HYPERTENSION: ICD-10-CM

## 2023-01-30 PROCEDURE — 99214 OFFICE O/P EST MOD 30 MIN: CPT | Performed by: INTERNAL MEDICINE

## 2023-01-30 PROCEDURE — 93000 ELECTROCARDIOGRAM COMPLETE: CPT | Performed by: INTERNAL MEDICINE

## 2023-01-30 NOTE — PROGRESS NOTES
[facility] Follow Up Office Note     Encounter Date:23  Patient:Cristina Lemus  :3/13/1930  MRN:[id number]      Chief Complaint: Follow up atrial fibrillation and diastolic heart failure  Chief Complaint   Patient presents with   • Atrial Fibrillation     History of Presenting Illness:      Ms. Evans is a 89 y.o. woman with past medical history notable for permanent atrial fibrillation, hypertension, chronic diastolic heart failure, and chronic kidney disease stage 4 which has since progressed to end-stage renal disease on hemodialysis who presents to our office for scheduled follow up.  She was last seen in our office approximate 6 months ago at that time she had just had a hip fracture arrived in a wheelchair but now is more ambulatory and riving and walker.  Overall doing fairly well all things considered    Review of Systems:  Review of Systems   Constitutional: Positive for malaise/fatigue.   HENT: Negative.    Eyes: Negative.    Cardiovascular: Negative.    Respiratory: Positive for shortness of breath.    Endocrine: Negative.    Hematologic/Lymphatic: Negative.    Skin: Negative.    Musculoskeletal: Positive for back pain.   Gastrointestinal: Negative.    Genitourinary: Negative.    Neurological: Negative.    Psychiatric/Behavioral: Negative.    Allergic/Immunologic: Negative.      Current Outpatient Medications on File Prior to Visit   Medication Sig Dispense Refill   • acetaminophen (TYLENOL) 325 MG tablet Take 2 tablets by mouth Every 4 (Four) Hours As Needed for Mild Pain .     • amLODIPine (NORVASC) 10 MG tablet Take 1 tablet by mouth Daily. 90 tablet 4   • Cholecalciferol (VITAMIN D3 PO) Take 1,000 Units by mouth Daily.     • LORazepam (ATIVAN) 1 MG tablet Take 1 tablet by mouth At Night As Needed for Sleep. Must be seen for more refills of this medication. 90 tablet 1   • potassium chloride (KLOR-CON) 20 MEQ CR tablet Take 1 tablet by mouth Daily. 90 tablet 4   • torsemide (DEMADEX) 20 MG  "tablet Take 1 tablet by mouth Daily. 90 tablet 4   • warfarin (Jantoven) 2 MG tablet Take two tablets (4 mg) by mouth daily or as directed. 180 tablet 1     No current facility-administered medications on file prior to visit.         Allergies   Allergen Reactions   • Statins Nausea And Vomiting   • Sulfa Antibiotics Nausea And Vomiting and Other (See Comments)     CHILLS AND FEVER       Past Medical History:   Diagnosis Date   • Atrial fibrillation (HCC)    • CHF (congestive heart failure) (HCC)    • Hypertension    • Renal insufficiency        Past Surgical History:   Procedure Laterality Date   • ARTERIOVENOUS FISTULA/SHUNT SURGERY Left 1/24/2022    Procedure: LEFT ARM DIALYSIS GRAFT;  Surgeon: King Reynaga MD;  Location: [facility];  Service: Vascular;  Laterality: Left;   • BACK SURGERY     • CYST REMOVAL     • INSERTION HEMODIALYSIS CATHETER N/A 1/18/2022    Procedure: HEMODIALYSIS CATHETER INSERTION with C arm;  Surgeon: King Reynaga MD;  Location: [facility];  Service: Vascular;  Laterality: N/A;   • OTHER SURGICAL HISTORY      rupture disk       Social History     Socioeconomic History   • Marital status:    Tobacco Use   • Smoking status: Former     Types: Cigarettes   • Smokeless tobacco: Never   Substance and Sexual Activity   • Alcohol use: No     Comment: caffeine use    • Drug use: No   • Sexual activity: Defer       History reviewed. No pertinent family history.    The following portions of the patient's history were reviewed and updated as appropriate: allergies, current medications, past family history, past medical history, past social history, past surgical history and problem list.       Objective:       Vitals:    01/30/23 1438   BP: 138/58   Pulse: 68   Weight: 52.3 kg (115 lb 6.4 oz)   Height: 165.1 cm (65\")       Body mass index is 19.2 kg/m².     Physical Exam:  Constitutional:  Chronically ill-appearing, frail, no acute distress, arrived in " wheelchair  HENT: Oropharynx clear and membrane moist  Eyes: Normal conjunctiva, no sclera icterus.  Neck: Supple, no carotid bruit bilaterally.  Cardiovascular: Irregularly irregular rate and rhythm, No Murmur, Trace bilateral lower extremity edema.  Pulmonary: Normal respiratory effort, normal lung sounds, no wheezing.  Abdominal: Soft, nontender, no hepatosplenomegaly, liver is non-pulsatile.  Neurological: Alert and orient x 3.   Skin: Warm, dry, no ecchymosis, no rash.  Psych: Appropriate mood and affect. Normal judgment and insight.      Lab Results   Component Value Date    GLUCOSE 75 05/15/2022    BUN 43 (H) 05/15/2022    CREATININE 3.89 (H) 05/15/2022    EGFRIFNONA 10 (L) 01/26/2022    EGFRIFAFRI  01/26/2022      Comment:      <15 Indicative of kidney failure.    BCR 11.1 05/15/2022    K 3.9 05/15/2022    CO2 21.0 (L) 05/15/2022    CALCIUM 8.1 (L) 05/15/2022    PROTENTOTREF 7.3 11/18/2021    ALBUMIN 3.60 05/15/2022    LABIL2 1.6 11/18/2021    AST 16 01/16/2022    ALT 9 01/16/2022       Lab Results   Component Value Date    WBC 7.68 05/14/2022    HGB 10.2 (L) 05/14/2022    HCT 29.7 (L) 05/14/2022    MCV 89.7 05/14/2022     (L) 05/14/2022       Lab Results   Component Value Date    TROPONINI 0.028 06/30/2020    TROPONINT 0.142 (C) 01/17/2022       No results found for: CHOL, CHLPL  No results found for: TRIG  No results found for: HDL  No results found for: LDL, LDLDIRECT    Lab Results   Component Value Date    TSH 1.830 01/13/2020         ECG 12 Lead    Date/Time: 1/30/2023 3:00 PM  Performed by: Lm Mart MD  Authorized by: Lm Mart MD   Comparison: compared with previous ECG from 6/20/2022  Similar to previous ECG  Rhythm: atrial fibrillation  Other findings: left ventricular hypertrophy with strain and prolonged QTc interval        Echocardiogram 12/21/2021:  · Left ventricular wall thickness is consistent with borderline concentric hypertrophy.  · Estimated left ventricular EF  = 61% Left ventricular systolic function is normal.  · Left ventricular diastolic function is consistent with age.  · The left atrial cavity is severely dilated.  · The right atrial cavity is severely dilated.  · There is moderate, bileaflet mitral valve thickening present.  · Moderate tricuspid valve regurgitation is present.  · Estimated right ventricular systolic pressure from tricuspid regurgitation is mildly elevated (35-45 mmHg). Calculated right ventricular systolic pressure from tricuspid regurgitation is 40 mmHg    Lower Extremity Duplex 1/19/2021:  · Acute right lower extremity superficial thrombophlebitis noted in the varicosity (below knee).  · All other right sided veins appeared normal.    Echocardiogram 6/25/2020 [facility]:  · Normal LV systolic function with apical hypokinesis  · EF 71%  · Asymmetric apical hypertrophy  · Mild aortic insufficiency  · Mild/moderate mitral regurgitation  · Mild tricuspid regurgitation        Assessment:          Diagnosis Plan   1. Permanent atrial fibrillation (HCC)  ECG 12 Lead      2. Chronic diastolic congestive heart failure (HCC)        3. End stage renal disease (HCC)        4. Essential hypertension               Plan:       Ms. Evans is a 89 y.o. woman with past medical history notable for permanent atrial fibrillation, hypertension, chronic diastolic heart failure, and chronic kidney disease stage 4 now end-stage renal who presents to our office for scheduled follow-up.  In general patient doing okay no changes needed this time we will plan on seeing back in 6 months.      Permanent atrial fibrillation:  · Continue Coumadin   · Rate well controlled not on any rate controlling agents    Hypertension:  · Reasonably well controlled for age      Chronic diastolic congestive heart failure:  · Volume management per dialysis    Chronic kidney disease/end-stage renal disease:  · Following with nephrology  · Now on dialysis 3 times a week      Follow-up:  6  Months      Thank you for allowing me to participate in the care of Cristina Lemus. Feel free to contact me directly with any further questions or concerns.    Lm Mart MD  [facility]  01/30/23  15:02 EST

## 2023-03-02 ENCOUNTER — TELEPHONE (OUTPATIENT)
Dept: PHARMACY | Facility: HOSPITAL | Age: 88
End: 2023-03-02
Payer: MEDICARE

## 2023-03-03 ENCOUNTER — TELEPHONE (OUTPATIENT)
Dept: PHARMACY | Facility: HOSPITAL | Age: 88
End: 2023-03-03
Payer: MEDICARE

## 2023-03-03 NOTE — TELEPHONE ENCOUNTER
Contacted patient regarding being overdue for an INR check. Patient was scheduled to have [facility] come out to residence to check INR on 2/27. However, after discussions with [facility], they do not have adequate staffing to make it out to her residence on the days she is available (non-dialysis days - Tues/Thurs). Patient does not have a means of transportation and takes [employer] bus to dialysis. Scheduled patient for Tuesday, 3/7 and we will coordinate a [employer] ride to  patient and bring to appointment. Patient verbalized understanding.

## 2023-03-06 LAB — INR PPP: 1.63

## 2023-03-07 ENCOUNTER — ANTICOAGULATION VISIT (OUTPATIENT)
Dept: PHARMACY | Facility: HOSPITAL | Age: 88
End: 2023-03-07
Payer: MEDICARE

## 2023-03-07 DIAGNOSIS — I48.21 PERMANENT ATRIAL FIBRILLATION: Primary | Chronic | ICD-10-CM

## 2023-03-07 NOTE — PROGRESS NOTES
Anticoagulation Clinic Progress Note    Anticoagulation Summary  As of 3/7/2023    INR goal:  2.0-3.0   TTR:  58.7 % (2.2 y)   INR used for dosin.63 (3/6/2023)   Warfarin maintenance plan:  6 mg every Tue; 4 mg all other days; Starting 3/7/2023   Weekly warfarin total:  30 mg   Plan last modified:  Mc Zaidi, PharmD (11/15/2022)   Next INR check:  3/20/2023   Priority:  High   Target end date:  Indefinite    Indications    Permanent atrial fibrillation (HCC) [I48.21]             Anticoagulation Episode Summary     INR check location:      Preferred lab:      Send INR reminders to:   MAJO VASQUEZ  POOL    Comments:  tried calling Chaim BARCLAY but no stable warf dosing available from July/Aug 2020 , prior to Eliquis; PRECISION      Anticoagulation Care Providers     Provider Role Specialty Phone number    Lm Mart MD Referring Cardiology [phone]          Clinic Interview:  Patient Findings     Positives:  Missed doses    Negatives:  Signs/symptoms of thrombosis, Signs/symptoms of bleeding,   Laboratory test error suspected, Change in health, Change in alcohol use,   Change in activity, Upcoming invasive procedure, Emergency department   visit, Upcoming dental procedure, Extra doses, Change in medications,   Change in diet/appetite, Hospital admission, Bruising, Other complaints    Comments:  Patients daughter reports she has not been taking 6 mg on   Tues. Resume 6 mg on tues, 4 AOD,      Clinical Outcomes     Negatives:  Major bleeding event, Thromboembolic event,   Anticoagulation-related hospital admission, Anticoagulation-related ED   visit, Anticoagulation-related fatality    Comments:  Patients daughter reports she has not been taking 6 mg on   Tues. Resume 6 mg on tues, 4 AOD,        INR History:  Anticoagulation Monitoring 2022 2023 3/7/2023   INR 2.08 2.46 1.63   INR Date 2022 2023 3/6/2023   INR Goal 2.0-3.0 2.0-3.0 2.0-3.0   Trend Same Same Same   Last  Week Total 30 mg 30 mg 28 mg   Next Week Total 30 mg 30 mg 30 mg   Sun 4 mg 4 mg 4 mg   Mon 4 mg 4 mg 4 mg   Tue 6 mg 6 mg 6 mg   Wed 4 mg 4 mg 4 mg   Thu 4 mg 4 mg 4 mg   Fri 4 mg 4 mg 4 mg   Sat 4 mg 4 mg 4 mg   Visit Report - - -   Some recent data might be hidden       Plan:  1. INR is Subtherapeutic today- see above in Anticoagulation Summary.   Will instruct Cristina Lemus to Increase their warfarin regimen- see above in Anticoagulation Summary.  Resume dose of 6 mg on tues, 4 AOD.   2. Follow up in 2 weeks (daughter reports dialysis on sun, tues, thurs)  3. They have been instructed to call if any changes in medications, doses, concerns, etc. Patient expresses understanding and has no further questions at this time.    Tasneem Wilkinson [facility]

## 2023-03-17 NOTE — TELEPHONE ENCOUNTER
Caller: Cristina Lemus    Relationship to patient: Self    Best call back number: [phone]    New or established patient?  [] New  [x] Established    Date of discharge: 03/16/2023    Facility discharged from: [facility]    Diagnosis/Symptoms: CLOSED FRACTURE OF THE 5TH THORACIC VERTEBRAE    Length of stay (If applicable): ONE DAY

## 2023-03-20 LAB — INR PPP: 2.18

## 2023-03-20 RX ORDER — WARFARIN SODIUM 2 MG/1
TABLET ORAL
Qty: 190 TABLET | Refills: 1 | Status: SHIPPED | OUTPATIENT
Start: 2023-03-20

## 2023-03-21 ENCOUNTER — ANTICOAGULATION VISIT (OUTPATIENT)
Dept: PHARMACY | Facility: HOSPITAL | Age: 88
End: 2023-03-21
Payer: MEDICARE

## 2023-03-21 DIAGNOSIS — I48.21 PERMANENT ATRIAL FIBRILLATION: Primary | Chronic | ICD-10-CM

## 2023-03-21 NOTE — PROGRESS NOTES
Anticoagulation Clinic Progress Note    Anticoagulation Summary  As of 3/21/2023    INR goal:  2.0-3.0   TTR:  58.2 % (2.2 y)   INR used for dosin.18 (3/20/2023)   Warfarin maintenance plan:  6 mg every Tue; 4 mg all other days; Starting 3/21/2023   Weekly warfarin total:  30 mg   No change documented:  Jaki Castro, [facility]   Plan last modified:  Mc Zaidi, PharmD (11/15/2022)   Next INR check:  4/10/2023   Priority:  High   Target end date:  Indefinite    Indications    Permanent atrial fibrillation (HCC) [I48.21]             Anticoagulation Episode Summary     INR check location:      Preferred lab:      Send INR reminders to:   MAJO MARIE  POOL    Comments:  tried calling Chaim BARCLAY but no stable warf dosing available from July/Aug 2020 , prior to Eliquis; PRECISION      Anticoagulation Care Providers     Provider Role Specialty Phone number    Lm Mart MD Referring Cardiology [phone]          Clinic Interview:  No pertinent clinical findings have been reported.    INR History:  Anticoagulation Monitoring 2023 3/7/2023 3/21/2023   INR 2.46 1.63 2.18   INR Date 2023 3/6/2023 3/20/2023   INR Goal 2.0-3.0 2.0-3.0 2.0-3.0   Trend Same Same Same   Last Week Total 30 mg 28 mg 30 mg   Next Week Total 30 mg 30 mg 30 mg   Sun 4 mg 4 mg 4 mg   Mon 4 mg 4 mg 4 mg   Tue 6 mg 6 mg 6 mg   Wed 4 mg 4 mg 4 mg   Thu 4 mg 4 mg 4 mg   Fri 4 mg 4 mg 4 mg   Sat 4 mg 4 mg 4 mg   Visit Report - - -   Some recent data might be hidden       Plan:  1. INR is therapeutic today- see above in Anticoagulation Summary. Spoke with Cristina Lemus's daughter Shannan. Instructed to continue their current warfarin regimen- see above in Anticoagulation Summary.  2. Follow up in 3 weeks  3. Pt has agreed to only be called if INR out of range. They have been instructed to call if any changes in medications, doses, concerns, etc. Family expresses understanding and has no further questions at this time.    Jaki  TASH Castro, [facility]

## 2023-03-22 NOTE — TELEPHONE ENCOUNTER
Caller: Cristina Lemus    Relationship: Self    Best call back number:     What is the best time to reach you:     Who are you requesting to speak with (clinical staff, provider,  specific staff member):     Do you know the name of the person who called:     What was the call regarding: PATIENT IS CALLING IN TO INFORM DR PRADO THAT SHE WENT TO THE [facility] EMERGENC[facility] ROOM ON MARCH 16, 2023 AND WAS TOLD THAT SHE HAD A FRACTURED VERTEBRAE      Do you require a callback: YES

## 2023-04-05 NOTE — TELEPHONE ENCOUNTER
Caller: Cristina Lemus    Relationship to patient: Self    Best call back number: [phone]    Chief complaint:     Type of visit: HOSPITAL FOLLOW UP     Requested date: ASAP     If rescheduling, when is the original appointment:     Additional notes:SEEN IN ER 3-16-23, FRACTURED BACK. PATIENT WANTS TO BE SEEN ASAP

## 2023-04-12 ENCOUNTER — OFFICE VISIT (OUTPATIENT)
Dept: INTERNAL MEDICINE | Facility: CLINIC | Age: 88
End: 2023-04-12
Payer: MEDICARE

## 2023-04-12 VITALS
OXYGEN SATURATION: 99 % | HEIGHT: 65 IN | TEMPERATURE: 98 F | DIASTOLIC BLOOD PRESSURE: 60 MMHG | WEIGHT: 118 LBS | HEART RATE: 64 BPM | SYSTOLIC BLOOD PRESSURE: 144 MMHG | BODY MASS INDEX: 19.66 KG/M2

## 2023-04-12 DIAGNOSIS — S22.059D CLOSED FRACTURE OF FIFTH THORACIC VERTEBRA WITH ROUTINE HEALING, UNSPECIFIED FRACTURE MORPHOLOGY, SUBSEQUENT ENCOUNTER: Primary | ICD-10-CM

## 2023-04-12 DIAGNOSIS — N18.6 END STAGE RENAL DISEASE: ICD-10-CM

## 2023-04-12 DIAGNOSIS — I10 ESSENTIAL HYPERTENSION: Chronic | ICD-10-CM

## 2023-04-12 DIAGNOSIS — I48.21 PERMANENT ATRIAL FIBRILLATION: Chronic | ICD-10-CM

## 2023-04-12 DIAGNOSIS — I50.32 CHRONIC DIASTOLIC CONGESTIVE HEART FAILURE: Chronic | ICD-10-CM

## 2023-04-12 PROBLEM — S32.591A CLOSED FRACTURE OF MULTIPLE PUBIC RAMI, RIGHT, INITIAL ENCOUNTER: Status: RESOLVED | Noted: 2022-05-12 | Resolved: 2023-04-12

## 2023-04-12 RX ORDER — LIDOCAINE 4 G/G
1 PATCH TOPICAL DAILY
Status: ON HOLD | COMMUNITY
Start: 2023-03-16

## 2023-04-12 RX ORDER — ACETAMINOPHEN 500 MG
500 TABLET ORAL 4 TIMES DAILY
Status: ON HOLD | COMMUNITY
Start: 2023-04-06

## 2023-04-12 NOTE — ASSESSMENT & PLAN NOTE
Recommended to continue with tylenol/lidocaine patches as needed.   We discussed if her pain does improve or worsens, we can refer her to ortho spine for further evaluation.   Patient is not interested in PT at this time.

## 2023-04-12 NOTE — PROGRESS NOTES
"Chief Complaint  Hospital Follow Up Visit    Subjective        Cristina Lemus presents to [facility]  History of Present Illness  This is a 89 y/o female presenting to office for f/u ED visit at NH on 3/16/23. Patient had reported with ongoing throacic back pain. PMH includes ESRD on dialysis. Patient underwent CT which showed Acute/subacute 20% superior endplate T5 vertebral body compression fracture without osseous retropulsion. This fracture extends into the right inferior pedicle. Patient was discharged with recommendation of tylenol, lidocaine patches PRN. Patient was not recommended back brace per neurosurgery. Patient reports she is overall doing well. Sometimes having back pain at night-- has been using tylenol PRN and lido patches. Patient does not want to follow up with spine specialty at this time-- she is adamant about this. Patient reports she is also not interested in any PT. Patient reports overall she feels like she is doing fine.     Objective   Vital Signs:  /60 (BP Location: Right arm, Patient Position: Sitting, Cuff Size: Adult)   Pulse 64   Temp 98 °F (36.7 °C) (Infrared)   Ht 165.1 cm (65\")   Wt 53.5 kg (118 lb)   SpO2 99%   BMI 19.64 kg/m²   Estimated body mass index is 19.64 kg/m² as calculated from the following:    Height as of this encounter: 165.1 cm (65\").    Weight as of this encounter: 53.5 kg (118 lb).       BMI is within normal parameters. No other follow-up for BMI required.      Physical Exam  Constitutional:       Appearance: Normal appearance.   HENT:      Head: Normocephalic and atraumatic.      Right Ear: External ear normal.      Left Ear: External ear normal.   Eyes:      Conjunctiva/sclera: Conjunctivae normal.      Pupils: Pupils are equal, round, and reactive to light.      Comments: +glasses   Neck:      Vascular: No carotid bruit.   Cardiovascular:      Rate and Rhythm: Normal rate and regular rhythm.      Pulses: Normal pulses.      " Heart sounds: Normal heart sounds. No murmur heard.    No friction rub. No gallop.   Pulmonary:      Effort: Pulmonary effort is normal. No respiratory distress.      Breath sounds: Normal breath sounds. No stridor. No wheezing, rhonchi or rales.   Musculoskeletal:         General: Tenderness present. No swelling. Normal range of motion.      Cervical back: Normal range of motion and neck supple.      Comments: Denies current pain; reports sometimes at night experiencing mild upper back pain   Skin:     General: Skin is warm and dry.      Capillary Refill: Capillary refill takes less than 2 seconds.   Neurological:      General: No focal deficit present.      Mental Status: She is alert and oriented to person, place, and time. Mental status is at baseline.      Gait: Gait abnormal.      Comments: Using rollator   5/5  Dorsiflexion 5/5 equally          Result Review :  The following data was reviewed by: RAE Stern on 04/12/2023:  Common labs        5/13/2022    05:36 5/14/2022    03:29 5/15/2022    03:25   Common Labs   Glucose 78   98   75     BUN 56   30   43     Creatinine 4.36   3.02   3.89     Sodium 137   134   133     Potassium 4.7   4.2   3.9     Chloride 103   101   97     Calcium 8.1   8.1   8.1     Albumin 3.30   3.40   3.60     WBC 6.19   7.68      Hemoglobin 10.0   10.2      Hematocrit 30.6   29.7      Platelets 180   139        CT Thoracic Spine Wo Contrast (03/16/2023 5:36 PM)    Reviewed ED summary from [facility]              Assessment and Plan   Diagnoses and all orders for this visit:    1. Closed fracture of fifth thoracic vertebra with routine healing, unspecified fracture morphology, subsequent encounter (Primary)  Assessment & Plan:  Recommended to continue with tylenol/lidocaine patches as needed.   We discussed if her pain does improve or worsens, we can refer her to ortho spine for further evaluation.   Patient is not interested in PT at this time.       2. Essential  hypertension  Assessment & Plan:  Hypertension is improving with treatment.  Continue current treatment regimen.  Blood pressure will be reassessed at the next regular appointment.      3. Permanent atrial fibrillation (HCC)  Assessment & Plan:  Continues on warfarin.   Following with cardiology      4. End stage renal disease  Assessment & Plan:  Stable   Continues on dialysis three times a week       5. Chronic diastolic congestive heart failure (HCC)  Assessment & Plan:  Continues on torsemide.   Dialysis 3x weekly  Following with cardiology              Follow Up   Return for Next scheduled follow up 6/26/23.  Patient was given instructions and counseling regarding her condition or for health maintenance advice. Please see specific information pulled into the AVS if appropriate.

## 2023-04-14 LAB — INR PPP: 1.72

## 2023-04-17 ENCOUNTER — ANTICOAGULATION VISIT (OUTPATIENT)
Dept: PHARMACY | Facility: HOSPITAL | Age: 88
End: 2023-04-17
Payer: MEDICARE

## 2023-04-17 DIAGNOSIS — I48.21 PERMANENT ATRIAL FIBRILLATION: Primary | Chronic | ICD-10-CM

## 2023-04-17 NOTE — PROGRESS NOTES
Anticoagulation Clinic Progress Note    Anticoagulation Summary  As of 2023    INR goal:  2.0-3.0   TTR:  57.7 % (2.3 y)   INR used for dosin.72 (2023)   Warfarin maintenance plan:  6 mg every Tue; 4 mg all other days; Starting 2023   Weekly warfarin total:  30 mg   Plan last modified:  Mc Zaidi, PharmD (11/15/2022)   Next INR check:  2023   Priority:  High   Target end date:  Indefinite    Indications    Permanent atrial fibrillation (HCC) [I48.21]             Anticoagulation Episode Summary     INR check location:      Preferred lab:      Send INR reminders to:   MAJO VASQUEZ  POOL    Comments:  tried calling Chaim BARCLAY but no stable warf dosing available from July/Aug 2020 , prior to Eliquis; PRECISION      Anticoagulation Care Providers     Provider Role Specialty Phone number    Lm Mart MD Referring Cardiology [phone]          Clinic Interview:  Patient Findings     Negatives:  Signs/symptoms of thrombosis, Signs/symptoms of bleeding,   Laboratory test error suspected, Change in health, Change in alcohol use,   Change in activity, Upcoming invasive procedure, Emergency department   visit, Upcoming dental procedure, Missed doses, Extra doses, Change in   medications, Change in diet/appetite, Hospital admission, Bruising, Other   complaints      Clinical Outcomes     Negatives:  Major bleeding event, Thromboembolic event,   Anticoagulation-related hospital admission, Anticoagulation-related ED   visit, Anticoagulation-related fatality        INR History:      2023    11:20 AM 3/6/2023    12:00 AM 3/7/2023     9:32 AM 3/20/2023    12:00 AM 3/21/2023    12:48 PM 2023    12:00 AM 2023    10:09 AM   Anticoagulation Monitoring   INR 2.46  1.63  2.18  1.72   INR Date 2023  3/6/2023  3/20/2023  2023   INR Goal 2.0-3.0  2.0-3.0  2.0-3.0  2.0-3.0   Trend Same  Same  Same  Same   Last Week Total 30 mg  28 mg  30 mg  30 mg   Next Week Total 30  mg  30 mg  30 mg  32 mg   Sun 4 mg  4 mg  4 mg  4 mg   Mon 4 mg  4 mg  4 mg  6 mg (4/17); Otherwise 4 mg   Tue 6 mg  6 mg  6 mg  6 mg   Wed 4 mg  4 mg  4 mg  4 mg   Thu 4 mg  4 mg  4 mg  4 mg   Fri 4 mg  4 mg  4 mg  4 mg   Sat 4 mg  4 mg  4 mg  4 mg   Historical INR  1.63       2.18       1.72             This result is from an external source.       Plan:  1. INR is Subtherapeutic today- see above in Anticoagulation Summary.   Will instruct Cristina Lemus to Increase their warfarin regimen- see above in Anticoagulation Summary.  No changes, confirmed dose, boost to 6 mg today then resume 6 mg on tues, 4 mg AOD, rck 2 weeks   2. Follow up in 2 weeks  3. They have been instructed to call if any changes in medications, doses, concerns, etc. Patient expresses understanding and has no further questions at this time.    Tasneem Wilkinson [facility]

## 2023-04-22 ENCOUNTER — HOSPITAL ENCOUNTER (INPATIENT)
Facility: HOSPITAL | Age: 88
LOS: 5 days | Discharge: HOME | DRG: 480 | End: 2023-04-27
Attending: EMERGENCY MEDICINE | Admitting: HOSPITALIST
Payer: MEDICARE

## 2023-04-22 ENCOUNTER — APPOINTMENT (OUTPATIENT)
Dept: GENERAL RADIOLOGY | Facility: HOSPITAL | Age: 88
DRG: 480 | End: 2023-04-22
Payer: MEDICARE

## 2023-04-22 DIAGNOSIS — S72.001A CLOSED FRACTURE OF RIGHT HIP, INITIAL ENCOUNTER: Primary | ICD-10-CM

## 2023-04-22 DIAGNOSIS — Z99.2 ESRD ON DIALYSIS: ICD-10-CM

## 2023-04-22 DIAGNOSIS — N18.6 ESRD ON DIALYSIS: ICD-10-CM

## 2023-04-22 LAB
ALBUMIN SERPL-MCNC: 4.2 G/DL (ref 3.5–5.2)
ALBUMIN/GLOB SERPL: 1.4 G/DL
ALP SERPL-CCNC: 95 U/L (ref 39–117)
ALT SERPL W P-5'-P-CCNC: 18 U/L (ref 1–33)
ANION GAP SERPL CALCULATED.3IONS-SCNC: 16.7 MMOL/L (ref 5–15)
APTT PPP: 31.7 SECONDS (ref 22.7–35.4)
AST SERPL-CCNC: 23 U/L (ref 1–32)
BASOPHILS # BLD AUTO: 0.02 10*3/MM3 (ref 0–0.2)
BASOPHILS NFR BLD AUTO: 0.3 % (ref 0–1.5)
BILIRUB SERPL-MCNC: 0.4 MG/DL (ref 0–1.2)
BUN SERPL-MCNC: 45 MG/DL (ref 8–23)
BUN/CREAT SERPL: 15.2 (ref 7–25)
CALCIUM SPEC-SCNC: 9.8 MG/DL (ref 8.2–9.6)
CHLORIDE SERPL-SCNC: 99 MMOL/L (ref 98–107)
CO2 SERPL-SCNC: 22.3 MMOL/L (ref 22–29)
CREAT SERPL-MCNC: 2.96 MG/DL (ref 0.57–1)
DEPRECATED RDW RBC AUTO: 40.7 FL (ref 37–54)
EGFRCR SERPLBLD CKD-EPI 2021: 14.3 ML/MIN/1.73
EOSINOPHIL # BLD AUTO: 0.12 10*3/MM3 (ref 0–0.4)
EOSINOPHIL NFR BLD AUTO: 2 % (ref 0.3–6.2)
ERYTHROCYTE [DISTWIDTH] IN BLOOD BY AUTOMATED COUNT: 11.6 % (ref 12.3–15.4)
GLOBULIN UR ELPH-MCNC: 3 GM/DL
GLUCOSE SERPL-MCNC: 192 MG/DL (ref 65–99)
HCT VFR BLD AUTO: 33.6 % (ref 34–46.6)
HGB BLD-MCNC: 11.6 G/DL (ref 12–15.9)
IMM GRANULOCYTES # BLD AUTO: 0.01 10*3/MM3 (ref 0–0.05)
IMM GRANULOCYTES NFR BLD AUTO: 0.2 % (ref 0–0.5)
INR PPP: 1.5 (ref 0.9–1.1)
LYMPHOCYTES # BLD AUTO: 1.49 10*3/MM3 (ref 0.7–3.1)
LYMPHOCYTES NFR BLD AUTO: 25.2 % (ref 19.6–45.3)
MAGNESIUM SERPL-MCNC: 2.3 MG/DL (ref 1.7–2.3)
MCH RBC QN AUTO: 33 PG (ref 26.6–33)
MCHC RBC AUTO-ENTMCNC: 34.5 G/DL (ref 31.5–35.7)
MCV RBC AUTO: 95.7 FL (ref 79–97)
MONOCYTES # BLD AUTO: 0.53 10*3/MM3 (ref 0.1–0.9)
MONOCYTES NFR BLD AUTO: 9 % (ref 5–12)
NEUTROPHILS NFR BLD AUTO: 3.74 10*3/MM3 (ref 1.7–7)
NEUTROPHILS NFR BLD AUTO: 63.3 % (ref 42.7–76)
NRBC BLD AUTO-RTO: 0 /100 WBC (ref 0–0.2)
PLATELET # BLD AUTO: 207 10*3/MM3 (ref 140–450)
PMV BLD AUTO: 10.4 FL (ref 6–12)
POTASSIUM SERPL-SCNC: 4.1 MMOL/L (ref 3.5–5.2)
PROT SERPL-MCNC: 7.2 G/DL (ref 6–8.5)
PROTHROMBIN TIME: 18.3 SECONDS (ref 11.7–14.2)
QT INTERVAL: 491 MS
RBC # BLD AUTO: 3.51 10*6/MM3 (ref 3.77–5.28)
SODIUM SERPL-SCNC: 138 MMOL/L (ref 136–145)
WBC NRBC COR # BLD: 5.91 10*3/MM3 (ref 3.4–10.8)

## 2023-04-22 PROCEDURE — 71045 X-RAY EXAM CHEST 1 VIEW: CPT

## 2023-04-22 PROCEDURE — 85025 COMPLETE CBC W/AUTO DIFF WBC: CPT | Performed by: PHYSICIAN ASSISTANT

## 2023-04-22 PROCEDURE — 93010 ELECTROCARDIOGRAM REPORT: CPT | Performed by: INTERNAL MEDICINE

## 2023-04-22 PROCEDURE — 25010000002 MORPHINE PER 10 MG: Performed by: EMERGENCY MEDICINE

## 2023-04-22 PROCEDURE — 99285 EMERGENCY DEPT VISIT HI MDM: CPT

## 2023-04-22 PROCEDURE — 25010000002 HYDROMORPHONE PER 4 MG: Performed by: HOSPITALIST

## 2023-04-22 PROCEDURE — 85610 PROTHROMBIN TIME: CPT | Performed by: PHYSICIAN ASSISTANT

## 2023-04-22 PROCEDURE — 80053 COMPREHEN METABOLIC PANEL: CPT | Performed by: PHYSICIAN ASSISTANT

## 2023-04-22 PROCEDURE — 73502 X-RAY EXAM HIP UNI 2-3 VIEWS: CPT

## 2023-04-22 PROCEDURE — 25010000002 HYDROMORPHONE PER 4 MG: Performed by: EMERGENCY MEDICINE

## 2023-04-22 PROCEDURE — 85730 THROMBOPLASTIN TIME PARTIAL: CPT | Performed by: PHYSICIAN ASSISTANT

## 2023-04-22 PROCEDURE — 25010000002 ONDANSETRON PER 1 MG: Performed by: PHYSICIAN ASSISTANT

## 2023-04-22 PROCEDURE — 83735 ASSAY OF MAGNESIUM: CPT | Performed by: EMERGENCY MEDICINE

## 2023-04-22 PROCEDURE — 93005 ELECTROCARDIOGRAM TRACING: CPT | Performed by: PHYSICIAN ASSISTANT

## 2023-04-22 RX ORDER — ACETAMINOPHEN 325 MG/1
650 TABLET ORAL EVERY 4 HOURS PRN
Status: DISCONTINUED | OUTPATIENT
Start: 2023-04-22 | End: 2023-04-27 | Stop reason: HOSPADM

## 2023-04-22 RX ORDER — HYDROCODONE BITARTRATE AND ACETAMINOPHEN 5; 325 MG/1; MG/1
1 TABLET ORAL EVERY 4 HOURS PRN
Status: DISCONTINUED | OUTPATIENT
Start: 2023-04-22 | End: 2023-04-25

## 2023-04-22 RX ORDER — HYDROMORPHONE HYDROCHLORIDE 1 MG/ML
0.5 INJECTION, SOLUTION INTRAMUSCULAR; INTRAVENOUS; SUBCUTANEOUS ONCE
Status: COMPLETED | OUTPATIENT
Start: 2023-04-22 | End: 2023-04-22

## 2023-04-22 RX ORDER — SODIUM CHLORIDE 0.9 % (FLUSH) 0.9 %
10 SYRINGE (ML) INJECTION AS NEEDED
Status: DISCONTINUED | OUTPATIENT
Start: 2023-04-22 | End: 2023-04-27 | Stop reason: HOSPADM

## 2023-04-22 RX ORDER — LORAZEPAM 0.5 MG/1
0.5 TABLET ORAL NIGHTLY PRN
Status: DISCONTINUED | OUTPATIENT
Start: 2023-04-22 | End: 2023-04-27 | Stop reason: HOSPADM

## 2023-04-22 RX ORDER — SODIUM CHLORIDE 0.9 % (FLUSH) 0.9 %
10 SYRINGE (ML) INJECTION EVERY 12 HOURS SCHEDULED
Status: DISCONTINUED | OUTPATIENT
Start: 2023-04-22 | End: 2023-04-27 | Stop reason: HOSPADM

## 2023-04-22 RX ORDER — ONDANSETRON 2 MG/ML
4 INJECTION INTRAMUSCULAR; INTRAVENOUS EVERY 6 HOURS PRN
Status: DISCONTINUED | OUTPATIENT
Start: 2023-04-22 | End: 2023-04-23

## 2023-04-22 RX ORDER — ONDANSETRON 4 MG/1
4 TABLET, FILM COATED ORAL EVERY 6 HOURS PRN
Status: DISCONTINUED | OUTPATIENT
Start: 2023-04-22 | End: 2023-04-23

## 2023-04-22 RX ORDER — MORPHINE SULFATE 2 MG/ML
2 INJECTION, SOLUTION INTRAMUSCULAR; INTRAVENOUS ONCE
Status: COMPLETED | OUTPATIENT
Start: 2023-04-22 | End: 2023-04-22

## 2023-04-22 RX ORDER — SODIUM CHLORIDE 9 MG/ML
40 INJECTION, SOLUTION INTRAVENOUS AS NEEDED
Status: DISCONTINUED | OUTPATIENT
Start: 2023-04-22 | End: 2023-04-27 | Stop reason: HOSPADM

## 2023-04-22 RX ORDER — ACETAMINOPHEN 160 MG/5ML
650 SOLUTION ORAL EVERY 4 HOURS PRN
Status: DISCONTINUED | OUTPATIENT
Start: 2023-04-22 | End: 2023-04-27 | Stop reason: HOSPADM

## 2023-04-22 RX ORDER — MELATONIN
1000 DAILY
Status: DISCONTINUED | OUTPATIENT
Start: 2023-04-22 | End: 2023-04-27 | Stop reason: HOSPADM

## 2023-04-22 RX ORDER — HYDROMORPHONE HYDROCHLORIDE 1 MG/ML
0.5 INJECTION, SOLUTION INTRAMUSCULAR; INTRAVENOUS; SUBCUTANEOUS
Status: DISCONTINUED | OUTPATIENT
Start: 2023-04-22 | End: 2023-04-23

## 2023-04-22 RX ORDER — AMLODIPINE BESYLATE 10 MG/1
10 TABLET ORAL DAILY
Status: DISCONTINUED | OUTPATIENT
Start: 2023-04-22 | End: 2023-04-27 | Stop reason: HOSPADM

## 2023-04-22 RX ORDER — ONDANSETRON 2 MG/ML
4 INJECTION INTRAMUSCULAR; INTRAVENOUS ONCE
Status: COMPLETED | OUTPATIENT
Start: 2023-04-22 | End: 2023-04-22

## 2023-04-22 RX ADMIN — HYDROCODONE BITARTRATE AND ACETAMINOPHEN 1 TABLET: 5; 325 TABLET ORAL at 16:46

## 2023-04-22 RX ADMIN — HYDROCODONE BITARTRATE AND ACETAMINOPHEN 1 TABLET: 5; 325 TABLET ORAL at 22:38

## 2023-04-22 RX ADMIN — MORPHINE SULFATE 2 MG: 2 INJECTION, SOLUTION INTRAMUSCULAR; INTRAVENOUS at 10:22

## 2023-04-22 RX ADMIN — HYDROMORPHONE HYDROCHLORIDE 0.5 MG: 1 INJECTION, SOLUTION INTRAMUSCULAR; INTRAVENOUS; SUBCUTANEOUS at 20:17

## 2023-04-22 RX ADMIN — HYDROMORPHONE HYDROCHLORIDE 0.5 MG: 1 INJECTION, SOLUTION INTRAMUSCULAR; INTRAVENOUS; SUBCUTANEOUS at 12:12

## 2023-04-22 RX ADMIN — Medication 10 ML: at 12:12

## 2023-04-22 RX ADMIN — Medication 10 ML: at 21:00

## 2023-04-22 RX ADMIN — ONDANSETRON 4 MG: 2 INJECTION INTRAMUSCULAR; INTRAVENOUS at 10:22

## 2023-04-22 NOTE — ED NOTES
Nursing report ED to floor  Cristina Lemus  89 y.o.  female    HPI :   Chief Complaint   Patient presents with    Fall       Admitting doctor:   Porfirio Pfeiffer MD    Admitting diagnosis:   The primary encounter diagnosis was Closed fracture of right hip, initial encounter. A diagnosis of ESRD on dialysis was also pertinent to this visit.    Code status:   Current Code Status       Date Active Code Status Order ID Comments User Context       Prior            Allergies:   Statins and Sulfa antibiotics    Isolation:   No active isolations    Intake and Output  No intake or output data in the 24 hours ending 04/22/23 1205    Weight:   There were no vitals filed for this visit.    Most recent vitals:   Vitals:    04/22/23 1002 04/22/23 1003 04/22/23 1101   BP: 142/78  146/62   Pulse: 62  62   Resp: 16     Temp:  97 °F (36.1 °C)    TempSrc:  Tympanic    SpO2: 98%  94%       Active LDAs/IV Access:   Lines, Drains & Airways       Active LDAs       Name Placement date Placement time Site Days    Peripheral IV 04/22/23 1022 Anterior;Proximal;Right Forearm 04/22/23  1022  Forearm  less than 1                    Labs (abnormal labs have a star):   Labs Reviewed   COMPREHENSIVE METABOLIC PANEL - Abnormal; Notable for the following components:       Result Value    Glucose 192 (*)     BUN 45 (*)     Creatinine 2.96 (*)     Calcium 9.8 (*)     Anion Gap 16.7 (*)     eGFR 14.3 (*)     All other components within normal limits    Narrative:     GFR Normal >60  Chronic Kidney Disease <60  Kidney Failure <15    The GFR formula is only valid for adults with stable renal function between ages 18 and 70.   PROTIME-INR - Abnormal; Notable for the following components:    Protime 18.3 (*)     INR 1.50 (*)     All other components within normal limits   CBC WITH AUTO DIFFERENTIAL - Abnormal; Notable for the following components:    RBC 3.51 (*)     Hemoglobin 11.6 (*)     Hematocrit 33.6 (*)     RDW 11.6 (*)     All other components within  normal limits   APTT - Normal   MAGNESIUM - Normal   CBC AND DIFFERENTIAL    Narrative:     The following orders were created for panel order CBC & Differential.  Procedure                               Abnormality         Status                     ---------                               -----------         ------                     CBC Auto Differential[[id number]]        Abnormal            Final result                 Please view results for these tests on the individual orders.       EKG:   ECG 12 Lead Other; Pre op   Preliminary Result   HEART RATE= 63  bpm   RR Interval= 952  ms   NC Interval=   ms   P Horizontal Axis=   deg   P Front Axis=   deg   QRSD Interval= 109  ms   QT Interval= 491  ms   QRS Axis= -22  deg   T Wave Axis= 87  deg   - ABNORMAL ECG -   Atrial fibrillation   Borderline left axis deviation   Nonspecific T abnrm, anterolateral leads   Prolonged QT interval   Electronically Signed By:    Date and Time of Study: 2023-04-22 10:21:31          Meds given in ED:   Medications   sodium chloride 0.9 % flush 10 mL (has no administration in time range)   HYDROmorphone (DILAUDID) injection 0.5 mg (has no administration in time range)   morphine injection 2 mg (2 mg Intravenous Given 4/22/23 1022)   ondansetron (ZOFRAN) injection 4 mg (4 mg Intravenous Given 4/22/23 1022)       Imaging results:  XR Chest 1 View    Result Date: 4/22/2023  As described.  This report was finalized on 4/22/2023 11:39 AM by Dr. Wilfredo Mann M.D.      XR Hip With or Without Pelvis 2 - 3 View Right    Result Date: 4/22/2023  As described.  This report was finalized on 4/22/2023 11:39 AM by Dr. Wilfredo Mann M.D.       Ambulatory status:   - bedrest    Social issues:   Social History     Socioeconomic History    Marital status:    Tobacco Use    Smoking status: Former     Types: Cigarettes    Smokeless tobacco: Never   Substance and Sexual Activity    Alcohol use: No     Comment: caffeine use     Drug use: No     Sexual activity: Not Currently       NIH Stroke Scale:         Tania Faye RN  04/22/23 12:05 EDT

## 2023-04-22 NOTE — H&P
HISTORY AND PHYSICAL   [facility]        Patient Identification:  Name: Cristina Lemus  Age: 89 y.o.  Sex: female  :  3/13/1930  MRN: [id number]                     Primary Care Physician: Wood Elias MD    Chief Complaint: Fall, hip pain.    History of Present Illness:   Pleasant 89-year-old female presents emergency room after sustaining a mechanical fall resulting in persistent right hip pain.  She denies any lightheadedness, loss of consciousness, head trauma, palpitations.  No chest pain no shortness of air.  She does have a history of atrial fibrillation and is on Coumadin therapy.  Only complaints at this point of the ongoing right hip pain.        Past Medical History:  Past Medical History:   Diagnosis Date   • Atrial fibrillation    • CHF (congestive heart failure)    • Hypertension    • Renal insufficiency      Past Surgical History:  Past Surgical History:   Procedure Laterality Date   • ARTERIOVENOUS FISTULA/SHUNT SURGERY Left 2022    Procedure: LEFT ARM DIALYSIS GRAFT;  Surgeon: King Reynaga MD;  Location: [facility];  Service: Vascular;  Laterality: Left;   • BACK SURGERY     • CYST REMOVAL     • INSERTION HEMODIALYSIS CATHETER N/A 2022    Procedure: HEMODIALYSIS CATHETER INSERTION with C arm;  Surgeon: King Reynaga MD;  Location: [facility];  Service: Vascular;  Laterality: N/A;   • OTHER SURGICAL HISTORY      rupture disk      Home Meds:  Medications Prior to Admission   Medication Sig Dispense Refill Last Dose   • amLODIPine (NORVASC) 10 MG tablet Take 1 tablet by mouth Daily. 90 tablet 4 Past Week   • Lidocaine 4 % patch Apply 1 patch topically to the appropriate area as directed Daily.   2023   • LORazepam (ATIVAN) 1 MG tablet Take 1 tablet by mouth At Night As Needed for Sleep. Must be seen for more refills of this medication. 90 tablet 1 Past Week   • torsemide (DEMADEX) 20 MG tablet Take 1 tablet by mouth Daily. 90 tablet 4 Past Week    • warfarin (Jantoven) 2 MG tablet TAKE 3 TABLETS (6MG) BY MOUTH ON TUESDAYS; TAKE 2 TABLETS (4MG) ON ALL OTHER DAYS OR AS DIRECTED. 190 tablet 1 Past Week   • acetaminophen (TYLENOL) 325 MG tablet Take 2 tablets by mouth Every 4 (Four) Hours As Needed for Mild Pain . (Patient not taking: Reported on 4/12/2023)      • acetaminophen (TYLENOL) 500 MG tablet Take 1 tablet by mouth 4 (Four) Times a Day.      • Cholecalciferol (VITAMIN D3 PO) Take 1,000 Units by mouth Daily. (Patient not taking: Reported on 4/12/2023)      • potassium chloride (KLOR-CON) 20 MEQ CR tablet Take 1 tablet by mouth Daily. 90 tablet 4 Unknown       Allergies:  Allergies   Allergen Reactions   • Statins Nausea And Vomiting   • Sulfa Antibiotics Nausea And Vomiting and Other (See Comments)     CHILLS AND FEVER     Immunizations:  Immunization History   Administered Date(s) Administered   • COVID-19 (MODERNA) 1st, 2nd, 3rd Dose Only 04/09/2021, 05/10/2021, 11/29/2021   • COVID-19 (PFIZER) BIVALENT BOOSTER 12+YRS 10/19/2022   • FLUAD TRI 65YR+ 10/08/2020   • Fluzone High Dose =>65 Years ([employer] ONLY) 11/08/2019, 11/23/2021   • Fluzone High-Dose 65+yrs 10/08/2020, 09/20/2022   • Hepatitis B 02/10/2022   • Hepatitis B 2 Dose Vaccine Heplisav-B 02/10/2022, 03/10/2022, 04/14/2022, 06/18/2022, 09/17/2022, 10/13/2022, 11/10/2022, 01/12/2023     Social History:   Social History     Social History Narrative   • Not on file     Social History     Tobacco Use   • Smoking status: Former     Types: Cigarettes   • Smokeless tobacco: Never   Substance Use Topics   • Alcohol use: No     Comment: caffeine use      Family History:  History reviewed. No pertinent family history.     Review of Systems  Review of Systems   Constitutional: Negative.    HENT: Negative.    Eyes: Negative.    Respiratory: Negative.    Cardiovascular: Negative.    Gastrointestinal: Negative.    Endocrine: Negative.    Genitourinary: Negative.    Musculoskeletal:        As per history of  present illness.   Skin: Negative.    Allergic/Immunologic: Negative.    Neurological: Negative.    Hematological: Negative.    Psychiatric/Behavioral: Negative.        Objective:  T Max 24 hrs: Temp (24hrs), Av.4 °F (36.3 °C), Min:97 °F (36.1 °C), Max:97.7 °F (36.5 °C)    Vitals Ranges:   Temp:  [97 °F (36.1 °C)-97.7 °F (36.5 °C)] 97.7 °F (36.5 °C)  Heart Rate:  [55-74] 74  Resp:  [16-18] 16  BP: (127-146)/(58-78) 145/61      Exam:  Physical Exam  Constitutional:       General: She is not in acute distress.     Appearance: Normal appearance. She is normal weight. She is not ill-appearing, toxic-appearing or diaphoretic.   HENT:      Head: Normocephalic and atraumatic.      Right Ear: External ear normal.      Left Ear: External ear normal.      Nose: Nose normal.   Eyes:      Extraocular Movements: Extraocular movements intact.      Conjunctiva/sclera: Conjunctivae normal.   Cardiovascular:      Rate and Rhythm: Normal rate. Rhythm irregular.   Pulmonary:      Effort: Pulmonary effort is normal. No respiratory distress.      Breath sounds: Normal breath sounds.   Abdominal:      General: Abdomen is flat. Bowel sounds are normal. There is no distension.      Palpations: Abdomen is soft. There is no mass.      Tenderness: There is no abdominal tenderness. There is no guarding or rebound.   Musculoskeletal:         General: Normal range of motion.      Cervical back: Neck supple.      Right lower leg: No edema.      Left lower leg: No edema.      Comments: Right foot everted.   Skin:     General: Skin is warm and dry.   Neurological:      General: No focal deficit present.      Mental Status: She is alert and oriented to person, place, and time.   Psychiatric:         Mood and Affect: Mood normal.         Behavior: Behavior normal.         Thought Content: Thought content normal.         Judgment: Judgment normal.         Data Review:  All labs and radiology reviewed.    Assessment:  Right intertrochanteric hip  fracture: Bedrest.  Orthopedic consultation.  Pain management.  Chronic atrial fibrillation: Continue rate control.  Warfarin on hold.  Presently subtherapeutic at 1.5 INR.  History of CHF with preserved LV function: Presently appears euvolemic.  ESRD-HDD: Nephrology consult  Hypertension: Continue home meds.  History of pulmonary embolus: Monitor closely perioperatively.  History of C. difficile colitis: Minimize antibiotic use.    SCDs for DVT prophylaxis for the time being.    Plan:  Please see above.    Porfirio Pfeiffer MD  4/22/2023  15:27 EDT    EMR Dragon/Transcription disclaimer:   Much of this encounter note is an electronic transcription/translation of spoken language to printed text. The electronic translation of spoken language may permit erroneous, or at times, nonsensical words or phrases to be inadvertently transcribed; Although I have reviewed the note for such errors, some may still exist.

## 2023-04-22 NOTE — ED PROVIDER NOTES
The FERNANDO and I have discussed this patient's history, physical exam and treatment plan.  I provided a substantive portion of the care of this patient.  I have reviewed the documentation and personally had a face to face interaction with the patient and personally performed the physical exam, in its entirety.  I affirm the documentation and agree with the treatment and plan.  The following describes my personal findings.      The patient presents complaining of fall when she tripped over her walker and landed on her right hip with persistent pain and inability to ambulate secondary to pain since the fall..    Comprehensive Physical exam:  Patient is nontoxic appearing oriented, conversant awake, alert  HEENT: normocephalic, atraumatic  Neck: No JVD, no goiter, no pain with ROM, C-spine nontender to palpation  Pulmonary: Nontachypneic, breath sounds are well bilaterally, chest wall nontender  cardiovascular: Nontachycardic  Abdomen: Soft, nontender  musculoskeletal: Exquisite pain on any even minor passive range of motion right hip, right knee nontender, ankle nontender, distal range of motion, sensation, pulses intact  Neuro/psychiatric:calm, appropriate, cooperative  Skin:warm, dry    EKG          EKG time: 1021  Rhythm/Rate: Atrial fibs, rate in the 60s  P waves and MA: Not well appreciated  QRS, axis: Unremarkable  ST and T waves: Nonspecific ST/T wave findings, prolonged QT    Interpreted Contemporaneously by me, independently viewed  Mildly changed compared to prior 1/16/22    Anticipate admission for definitive care of hip fracture, further testing, treatment as needed.      Patient was wearing facemask when I entered the room and throughout our encounter. Full protective equipment was worn throughout this patient encounter including a face mask, eye protection and gloves. Hand hygiene was performed before donning protective equipment and after removal when leaving the room.           Sana Colunga MD  04/22/23  0121

## 2023-04-22 NOTE — ED NOTES
Pt tripped over her walker at home and landed on right hip. Pt was leaving home to go to dialysis. Pt denies hitting her head. Pt is on Warfarin.

## 2023-04-22 NOTE — PLAN OF CARE
Goal Outcome Evaluation:  Plan of Care Reviewed With: patient        Progress: no change  Outcome Evaluation: Pt is 89/F alert and oriented here after a fall. VSS. Purewick in place. Q2 turns and accumax in place. PRN pain meds given. Nephrology and cardiology consulted. Plan is for surgery tomorrow. NPO at midnight. Educated on BP monitoring. Will continue to monitor.

## 2023-04-22 NOTE — ED PROVIDER NOTES
EMERGENCY DEPARTMENT ENCOUNTER    Room Number:  04/04  Date of encounter:  4/22/2023  PCP: Wood Elias MD  Historian: Patient/EMS  Full history not obtainable due to: None    HPI:  Chief Complaint: Hip pain    Context: Cristina Lemus is a 89 y.o. female with a PMH significant for atrial fibrillation anticoagulated on warfarin, hypertension, chronic kidney disease who presents to the ED c/o intense aching pain to the right hip since a fall this morning.  The patient was walking on her front door when she tripped over one of the wheels of her walker and fell directly onto her right side.  She denies injury to the head or neck as well as loss of consciousness.  She has not been able to ambulate since the time of her fall.  Pain is much worse with range of motion of the right hip.      MEDICAL RECORD REVIEW:    Upon review of the medical record it appears the patient was evaluated in the office with internal medicine for closed fifth thoracic vertebrae fracture.  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   • History of pulmonary embolus (PE) 06/30/2020   • HTN, goal below 140/80 09/01/2016   • Permanent atrial fibrillation (HCC) 07/07/2017   • Stage 4 chronic kidney disease 06/30/2020   • Chronic diastolic congestive heart failure (HCC) 10/01/2020   • Anxiety disorder 04/23/2021   • Chronic gout without tophus 04/23/2021   • KIM (acute kidney injury) 06/09/2021   • Essential hypertension 06/25/2021   • Dyspnea 06/25/2021   • Pleural effusion 06/25/2021   • Dyspnea on exertion 06/25/2021   • Anemia 07/12/2021   • Acute on chronic renal failure 01/16/2022   • Acute renal failure superimposed on chronic kidney disease 01/17/2022   • Anticoagulated on warfarin 01/18/2022   • UTI (urinary tract infection) 01/18/2022   • Metabolic encephalopathy  01/18/2022   • End stage renal disease 01/24/2022   • C. difficile colitis 01/26/2022   • Aftercare including intermittent dialysis 02/11/2022   • Allergy, unspecified, initial encounter 02/11/2022   • Anaphylactic shock, unspecified, initial encounter 02/11/2022   • Anemia in chronic kidney disease 02/19/2022   • Coagulation defect, unspecified 02/11/2022   • Conjunctivochalasis 01/07/2013   • Epiretinal membrane 01/05/2016   • Hypokalemia 02/11/2022   • Iron deficiency anemia 02/11/2022   • Secondary hyperparathyroidism of renal origin 02/11/2022   • Chronic mid back pain 02/25/2022   • Sacral fracture 05/12/2022   • Closed fracture of fifth thoracic vertebra with routine healing 04/12/2023     Resolved Ambulatory Problems     Diagnosis Date Noted   • Closed fracture of multiple pubic rami, right, initial encounter 05/12/2022     Past Medical History:   Diagnosis Date   • Atrial fibrillation    • CHF (congestive heart failure)    • Hypertension    • Renal insufficiency          PAST SURGICAL HISTORY  Past Surgical History:   Procedure Laterality Date   • ARTERIOVENOUS FISTULA/SHUNT SURGERY Left 1/24/2022    Procedure: LEFT ARM DIALYSIS GRAFT;  Surgeon: King Reynaga MD;  Location: [facility];  Service: Vascular;  Laterality: Left;   • BACK SURGERY     • CYST REMOVAL     • INSERTION HEMODIALYSIS CATHETER N/A 1/18/2022    Procedure: HEMODIALYSIS CATHETER INSERTION with C arm;  Surgeon: King Reynaga MD;  Location: [facility] OR;  Service: Vascular;  Laterality: N/A;   • OTHER SURGICAL HISTORY      rupture disk         FAMILY HISTORY  No family history on file.      SOCIAL HISTORY  Social History     Socioeconomic History   • Marital status:    Tobacco Use   • Smoking status: Former     Types: Cigarettes   • Smokeless tobacco: Never   Substance and Sexual Activity   • Alcohol use: No     Comment: caffeine use    • Drug use: No   • Sexual activity: Not Currently         ALLERGIES  Statins  and Sulfa antibiotics        REVIEW OF SYSTEMS    All systems reviewed and marked as negative except as listed in HPI     PHYSICAL EXAM    I have reviewed the triage vital signs and nursing notes.    ED Triage Vitals   Temp Heart Rate Resp BP SpO2   04/22/23 1003 04/22/23 1002 04/22/23 1002 04/22/23 1002 04/22/23 1002   97 °F (36.1 °C) 62 16 142/78 98 %      Temp src Heart Rate Source Patient Position BP Location FiO2 (%)   04/22/23 1003 -- -- -- --   Tympanic           Physical Exam  Constitutional:       General: She is not in acute distress.     Appearance: She is well-developed.   HENT:      Head: Normocephalic and atraumatic.   Eyes:      General: No scleral icterus.     Conjunctiva/sclera: Conjunctivae normal.   Neck:      Trachea: No tracheal deviation.   Cardiovascular:      Rate and Rhythm: Normal rate and regular rhythm.   Pulmonary:      Effort: Pulmonary effort is normal.      Breath sounds: Normal breath sounds.   Abdominal:      Palpations: Abdomen is soft.      Tenderness: There is no abdominal tenderness.   Musculoskeletal:      Cervical back: Normal range of motion.      Right hip: Deformity and tenderness present. Decreased range of motion.   Lymphadenopathy:      Cervical: No cervical adenopathy.   Skin:     General: Skin is warm and dry.   Neurological:      Mental Status: She is alert and oriented to person, place, and time.   Psychiatric:         Behavior: Behavior normal.         Vital signs and nursing notes reviewed.            LAB RESULTS  Recent Results (from the past 24 hour(s))   ECG 12 Lead Other; Pre op    Collection Time: 04/22/23 10:21 AM   Result Value Ref Range    QT Interval 491 ms   Comprehensive Metabolic Panel    Collection Time: 04/22/23 10:28 AM    Specimen: Blood   Result Value Ref Range    Glucose 192 (H) 65 - 99 mg/dL    BUN 45 (H) 8 - 23 mg/dL    Creatinine 2.96 (H) 0.57 - 1.00 mg/dL    Sodium 138 136 - 145 mmol/L    Potassium 4.1 3.5 - 5.2 mmol/L    Chloride 99 98 - 107  mmol/L    CO2 22.3 22.0 - 29.0 mmol/L    Calcium 9.8 (H) 8.2 - 9.6 mg/dL    Total Protein 7.2 6.0 - 8.5 g/dL    Albumin 4.2 3.5 - 5.2 g/dL    ALT (SGPT) 18 1 - 33 U/L    AST (SGOT) 23 1 - 32 U/L    Alkaline Phosphatase 95 39 - 117 U/L    Total Bilirubin 0.4 0.0 - 1.2 mg/dL    Globulin 3.0 gm/dL    A/G Ratio 1.4 g/dL    BUN/Creatinine Ratio 15.2 7.0 - 25.0    Anion Gap 16.7 (H) 5.0 - 15.0 mmol/L    eGFR 14.3 (L) >60.0 mL/min/1.73   aPTT    Collection Time: 04/22/23 10:28 AM    Specimen: Blood   Result Value Ref Range    PTT 31.7 22.7 - 35.4 seconds   Protime-INR    Collection Time: 04/22/23 10:28 AM    Specimen: Blood   Result Value Ref Range    Protime 18.3 (H) 11.7 - 14.2 Seconds    INR 1.50 (H) 0.90 - 1.10   CBC Auto Differential    Collection Time: 04/22/23 10:28 AM    Specimen: Blood   Result Value Ref Range    WBC 5.91 3.40 - 10.80 10*3/mm3    RBC 3.51 (L) 3.77 - 5.28 10*6/mm3    Hemoglobin 11.6 (L) 12.0 - 15.9 g/dL    Hematocrit 33.6 (L) 34.0 - 46.6 %    MCV 95.7 79.0 - 97.0 fL    MCH 33.0 26.6 - 33.0 pg    MCHC 34.5 31.5 - 35.7 g/dL    RDW 11.6 (L) 12.3 - 15.4 %    RDW-SD 40.7 37.0 - 54.0 fl    MPV 10.4 6.0 - 12.0 fL    Platelets 207 140 - 450 10*3/mm3    Neutrophil % 63.3 42.7 - 76.0 %    Lymphocyte % 25.2 19.6 - 45.3 %    Monocyte % 9.0 5.0 - 12.0 %    Eosinophil % 2.0 0.3 - 6.2 %    Basophil % 0.3 0.0 - 1.5 %    Immature Grans % 0.2 0.0 - 0.5 %    Neutrophils, Absolute 3.74 1.70 - 7.00 10*3/mm3    Lymphocytes, Absolute 1.49 0.70 - 3.10 10*3/mm3    Monocytes, Absolute 0.53 0.10 - 0.90 10*3/mm3    Eosinophils, Absolute 0.12 0.00 - 0.40 10*3/mm3    Basophils, Absolute 0.02 0.00 - 0.20 10*3/mm3    Immature Grans, Absolute 0.01 0.00 - 0.05 10*3/mm3    nRBC 0.0 0.0 - 0.2 /100 WBC   Magnesium    Collection Time: 04/22/23 10:28 AM    Specimen: Blood   Result Value Ref Range    Magnesium 2.3 1.7 - 2.3 mg/dL       Ordered the above labs and independently reviewed the results.        RADIOLOGY  XR Hip With or Without  Pelvis 2 - 3 View Right, XR Chest 1 View    Result Date: 4/22/2023  XR CHEST 1 VW-, XR HIP W OR WO PELVIS 2-3 VIEW RIGHT-  HISTORY: Female who is 89 years-old,  trauma  TECHNIQUE: Frontal view of the chest, frontal view the pelvis, 3 views of the right hip  COMPARISON: 05/12/2022  FINDINGS:  Chest x-ray: The heart is enlarged. Aorta is tortuous, calcified. Pulmonary vasculature is unremarkable. No focal pulmonary consolidation, pleural effusion, or pneumothorax. Old granulomatous disease is seen. No acute osseous process in the chest.  Pelvis and right hip: Angulated, comminuted right intertrochanteric fracture is noted. Chronic right superior and inferior pubic ramus deformities are present. No other fractures are identified. Mild bilateral hip joint space narrowing. Degenerative changes seen at the symphysis pubis and partly included lumbar spine. Arterial calcifications are conspicuous.      As described.  This report was finalized on 4/22/2023 11:39 AM by Dr. Wilfredo Mann M.D.        I ordered the above noted radiological studies. Independently reviewed by me and discussed with radiologist.  See dictation above for official radiology interpretation.      PROCEDURES    Procedures        MEDICATIONS GIVEN IN ER    Medications   sodium chloride 0.9 % flush 10 mL (has no administration in time range)   HYDROmorphone (DILAUDID) injection 0.5 mg (has no administration in time range)   morphine injection 2 mg (2 mg Intravenous Given 4/22/23 1022)   ondansetron (ZOFRAN) injection 4 mg (4 mg Intravenous Given 4/22/23 1022)         PROGRESS, DATA ANALYSIS, CONSULTS, AND MEDICAL DECISION MAKING    All labs have been independently interpreted by me.  All radiology studies have been interpreted by me.  Discussion below represents my analysis of pertinent findings related to patient's condition, differential diagnosis, treatment plan and final disposition.    Patient presentation and work-up consistent with right hip  fracture from mechanical fall.  She will require admission to the hospital for orthopedic consultation and likely operative fixation.  She also has end-stage renal disease and will require dialysis in the near future, likely while admitted.  Patient understands the plan and agrees with admission.    - Chronic or social conditions impacting care: ESRD      DIFFERENTIAL DIAGNOSIS INCLUDE BUT NOT LIMITED TO:     Hip contusion, hip dislocation, hip fracture      Orders placed during this visit:  Orders Placed This Encounter   Procedures   • XR Hip With or Without Pelvis 2 - 3 View Right   • XR Chest 1 View   • Comprehensive Metabolic Panel   • aPTT   • Protime-INR   • CBC Auto Differential   • Magnesium   • [facility] (on-call MD unless specified) Details   • Ortho (on-call MD unless specified)   • ECG 12 Lead Other; Pre op   • Insert Peripheral IV   • Inpatient Admission   • CBC & Differential         ED Course as of 04/22/23 1201   Sat Apr 22, 2023   1145 Per my independent interpretation of the chest x-ray, there is no obvious pneumothorax. [DC]   1145 My independent interpretation of the x-ray hip 2 view right is acute right hip fracture.   [DC]   1157 I discussed the case with MD Margarette at this time regarding the patient.  I discussed work-up, results, concerns.  I discussed the consulting provider's desire for med surg admit.   [DC]   1201 I discussed the case with MD Krys with Ortho at this time regarding the patient.  I discussed work-up, results, concerns.  I discussed the consulting provider's desire for consulting once admitted.   [DC]      ED Course User Index  [DC] King Jhaveri PA       AS OF 11:58 EDT VITALS:    BP - 146/62  HR - 62  TEMP - 97 °F (36.1 °C) (Tympanic)  02 SATS - 94%    1158 I rechecked the patient.  I discussed the patient's labs, radiology findings (including all incidental findings), diagnosis, and plan for admit.  All questions answered.        DIAGNOSIS  Final diagnoses:   Closed  fracture of right hip, initial encounter   ESRD on dialysis         DISPOSITION  Admit    Pt masked in first look. I wore a surgical mask throughout my encounters with the pt. I performed hand hygiene on entry into the pt room and upon exit.     Dictated utilizing Dragon dictation     Note Disclaimer: At [facility], we believe that sharing information builds trust and better relationships. You are receiving this note because you recently visited [facility]. It is possible you will see health information before a provider has talked with you about it. This kind of information can be easy to misunderstand. To help you fully understand what it means for your health, we urge you to discuss this note with your provider.      King Jhaveri, PA  04/22/23 1159       King Jhaveri PA  04/22/23 1201

## 2023-04-23 ENCOUNTER — APPOINTMENT (OUTPATIENT)
Dept: GENERAL RADIOLOGY | Facility: HOSPITAL | Age: 88
DRG: 480 | End: 2023-04-23
Payer: MEDICARE

## 2023-04-23 ENCOUNTER — ANESTHESIA (OUTPATIENT)
Dept: PERIOP | Facility: HOSPITAL | Age: 88
DRG: 480 | End: 2023-04-23
Payer: MEDICARE

## 2023-04-23 ENCOUNTER — ANESTHESIA EVENT (OUTPATIENT)
Dept: PERIOP | Facility: HOSPITAL | Age: 88
DRG: 480 | End: 2023-04-23
Payer: MEDICARE

## 2023-04-23 LAB
ABO GROUP BLD: NORMAL
ALBUMIN SERPL-MCNC: 3.9 G/DL (ref 3.5–5.2)
ANION GAP SERPL CALCULATED.3IONS-SCNC: 11.2 MMOL/L (ref 5–15)
BASOPHILS # BLD AUTO: 0.02 10*3/MM3 (ref 0–0.2)
BASOPHILS NFR BLD AUTO: 0.2 % (ref 0–1.5)
BLD GP AB SCN SERPL QL: NEGATIVE
BUN SERPL-MCNC: 44 MG/DL (ref 8–23)
BUN/CREAT SERPL: 14.9 (ref 7–25)
CALCIUM SPEC-SCNC: 9.4 MG/DL (ref 8.2–9.6)
CHLORIDE SERPL-SCNC: 103 MMOL/L (ref 98–107)
CO2 SERPL-SCNC: 24.8 MMOL/L (ref 22–29)
CREAT SERPL-MCNC: 2.95 MG/DL (ref 0.57–1)
DEPRECATED RDW RBC AUTO: 39.6 FL (ref 37–54)
EGFRCR SERPLBLD CKD-EPI 2021: 14.4 ML/MIN/1.73
EOSINOPHIL # BLD AUTO: 0.03 10*3/MM3 (ref 0–0.4)
EOSINOPHIL NFR BLD AUTO: 0.3 % (ref 0.3–6.2)
ERYTHROCYTE [DISTWIDTH] IN BLOOD BY AUTOMATED COUNT: 11.6 % (ref 12.3–15.4)
GLUCOSE SERPL-MCNC: 116 MG/DL (ref 65–99)
HCT VFR BLD AUTO: 28.2 % (ref 34–46.6)
HGB BLD-MCNC: 9.7 G/DL (ref 12–15.9)
IMM GRANULOCYTES # BLD AUTO: 0.05 10*3/MM3 (ref 0–0.05)
IMM GRANULOCYTES NFR BLD AUTO: 0.5 % (ref 0–0.5)
INR PPP: 1.68 (ref 0.9–1.1)
INR PPP: 1.89 (ref 0.9–1.1)
LYMPHOCYTES # BLD AUTO: 1.29 10*3/MM3 (ref 0.7–3.1)
LYMPHOCYTES NFR BLD AUTO: 13.8 % (ref 19.6–45.3)
MCH RBC QN AUTO: 32.7 PG (ref 26.6–33)
MCHC RBC AUTO-ENTMCNC: 34.4 G/DL (ref 31.5–35.7)
MCV RBC AUTO: 94.9 FL (ref 79–97)
MONOCYTES # BLD AUTO: 1.12 10*3/MM3 (ref 0.1–0.9)
MONOCYTES NFR BLD AUTO: 11.9 % (ref 5–12)
NEUTROPHILS NFR BLD AUTO: 6.87 10*3/MM3 (ref 1.7–7)
NEUTROPHILS NFR BLD AUTO: 73.3 % (ref 42.7–76)
NRBC BLD AUTO-RTO: 0 /100 WBC (ref 0–0.2)
PHOSPHATE SERPL-MCNC: 4.4 MG/DL (ref 2.5–4.5)
PLATELET # BLD AUTO: 174 10*3/MM3 (ref 140–450)
PMV BLD AUTO: 10.4 FL (ref 6–12)
POTASSIUM SERPL-SCNC: 4.8 MMOL/L (ref 3.5–5.2)
PROTHROMBIN TIME: 20 SECONDS (ref 11.7–14.2)
PROTHROMBIN TIME: 22 SECONDS (ref 11.7–14.2)
RBC # BLD AUTO: 2.97 10*6/MM3 (ref 3.77–5.28)
RH BLD: POSITIVE
SODIUM SERPL-SCNC: 139 MMOL/L (ref 136–145)
T&S EXPIRATION DATE: NORMAL
WBC NRBC COR # BLD: 9.38 10*3/MM3 (ref 3.4–10.8)

## 2023-04-23 PROCEDURE — 80069 RENAL FUNCTION PANEL: CPT | Performed by: INTERNAL MEDICINE

## 2023-04-23 PROCEDURE — 25010000002 HYDROMORPHONE PER 4 MG: Performed by: ANESTHESIOLOGY

## 2023-04-23 PROCEDURE — 25010000002 FENTANYL CITRATE (PF) 50 MCG/ML SOLUTION: Performed by: ANESTHESIOLOGY

## 2023-04-23 PROCEDURE — P9059 PLASMA, FRZ BETWEEN 8-24HOUR: HCPCS

## 2023-04-23 PROCEDURE — 85025 COMPLETE CBC W/AUTO DIFF WBC: CPT | Performed by: HOSPITALIST

## 2023-04-23 PROCEDURE — 86927 PLASMA FRESH FROZEN: CPT

## 2023-04-23 PROCEDURE — 73501 X-RAY EXAM HIP UNI 1 VIEW: CPT

## 2023-04-23 PROCEDURE — C1713 ANCHOR/SCREW BN/BN,TIS/BN: HCPCS | Performed by: ORTHOPAEDIC SURGERY

## 2023-04-23 PROCEDURE — 99222 1ST HOSP IP/OBS MODERATE 55: CPT | Performed by: INTERNAL MEDICINE

## 2023-04-23 PROCEDURE — 36430 TRANSFUSION BLD/BLD COMPNT: CPT

## 2023-04-23 PROCEDURE — 0QS636Z REPOSITION RIGHT UPPER FEMUR WITH INTRAMEDULLARY INTERNAL FIXATION DEVICE, PERCUTANEOUS APPROACH: ICD-10-PCS | Performed by: ORTHOPAEDIC SURGERY

## 2023-04-23 PROCEDURE — 86850 RBC ANTIBODY SCREEN: CPT | Performed by: ORTHOPAEDIC SURGERY

## 2023-04-23 PROCEDURE — 85610 PROTHROMBIN TIME: CPT | Performed by: ORTHOPAEDIC SURGERY

## 2023-04-23 PROCEDURE — 25010000002 PROPOFOL 10 MG/ML EMULSION: Performed by: ANESTHESIOLOGY

## 2023-04-23 PROCEDURE — 85610 PROTHROMBIN TIME: CPT | Performed by: HOSPITALIST

## 2023-04-23 PROCEDURE — 86901 BLOOD TYPING SEROLOGIC RH(D): CPT | Performed by: ORTHOPAEDIC SURGERY

## 2023-04-23 PROCEDURE — 76000 FLUOROSCOPY <1 HR PHYS/QHP: CPT

## 2023-04-23 PROCEDURE — 25010000002 NEOSTIGMINE 5 MG/10ML SOLUTION: Performed by: ANESTHESIOLOGY

## 2023-04-23 PROCEDURE — 86900 BLOOD TYPING SEROLOGIC ABO: CPT | Performed by: ORTHOPAEDIC SURGERY

## 2023-04-23 PROCEDURE — 73502 X-RAY EXAM HIP UNI 2-3 VIEWS: CPT

## 2023-04-23 PROCEDURE — C1769 GUIDE WIRE: HCPCS | Performed by: ORTHOPAEDIC SURGERY

## 2023-04-23 PROCEDURE — 25010000002 CEFAZOLIN IN DEXTROSE 2-4 GM/100ML-% SOLUTION: Performed by: ORTHOPAEDIC SURGERY

## 2023-04-23 DEVICE — CABL W/SLV DALLMILES BEAD 2MM: Type: IMPLANTABLE DEVICE | Site: HIP | Status: FUNCTIONAL

## 2023-04-23 DEVICE — LOCKING SCREW, FULLY THREADED: Type: IMPLANTABLE DEVICE | Site: HIP | Status: FUNCTIONAL

## 2023-04-23 DEVICE — TROCHANTERIC NAIL KIT, TI
Type: IMPLANTABLE DEVICE | Site: HIP | Status: FUNCTIONAL
Brand: GAMMA

## 2023-04-23 DEVICE — LAG SCREW, TI
Type: IMPLANTABLE DEVICE | Site: HIP | Status: FUNCTIONAL
Brand: GAMMA

## 2023-04-23 RX ORDER — SODIUM CHLORIDE 0.9 % (FLUSH) 0.9 %
10 SYRINGE (ML) INJECTION AS NEEDED
Status: DISCONTINUED | OUTPATIENT
Start: 2023-04-23 | End: 2023-04-27 | Stop reason: HOSPADM

## 2023-04-23 RX ORDER — HYDROCODONE BITARTRATE AND ACETAMINOPHEN 7.5; 325 MG/1; MG/1
1 TABLET ORAL EVERY 4 HOURS PRN
Status: DISCONTINUED | OUTPATIENT
Start: 2023-04-23 | End: 2023-04-27 | Stop reason: HOSPADM

## 2023-04-23 RX ORDER — SODIUM CHLORIDE 0.9 % (FLUSH) 0.9 %
3-10 SYRINGE (ML) INJECTION AS NEEDED
Status: DISCONTINUED | OUTPATIENT
Start: 2023-04-23 | End: 2023-04-23 | Stop reason: HOSPADM

## 2023-04-23 RX ORDER — UREA 10 %
1 LOTION (ML) TOPICAL NIGHTLY PRN
Status: DISCONTINUED | OUTPATIENT
Start: 2023-04-23 | End: 2023-04-27 | Stop reason: HOSPADM

## 2023-04-23 RX ORDER — SODIUM CHLORIDE 9 MG/ML
9 INJECTION, SOLUTION INTRAVENOUS ONCE
Status: COMPLETED | OUTPATIENT
Start: 2023-04-23 | End: 2023-04-23

## 2023-04-23 RX ORDER — CEFAZOLIN SODIUM 2 G/100ML
2 INJECTION, SOLUTION INTRAVENOUS ONCE
Status: COMPLETED | OUTPATIENT
Start: 2023-04-23 | End: 2023-04-23

## 2023-04-23 RX ORDER — NEOSTIGMINE METHYLSULFATE 0.5 MG/ML
INJECTION, SOLUTION INTRAVENOUS AS NEEDED
Status: DISCONTINUED | OUTPATIENT
Start: 2023-04-23 | End: 2023-04-23 | Stop reason: SURG

## 2023-04-23 RX ORDER — SODIUM CHLORIDE 9 MG/ML
40 INJECTION, SOLUTION INTRAVENOUS AS NEEDED
Status: DISCONTINUED | OUTPATIENT
Start: 2023-04-23 | End: 2023-04-27 | Stop reason: HOSPADM

## 2023-04-23 RX ORDER — HYDROMORPHONE HYDROCHLORIDE 1 MG/ML
0.25 INJECTION, SOLUTION INTRAMUSCULAR; INTRAVENOUS; SUBCUTANEOUS
Status: DISCONTINUED | OUTPATIENT
Start: 2023-04-23 | End: 2023-04-23 | Stop reason: HOSPADM

## 2023-04-23 RX ORDER — HYDROCODONE BITARTRATE AND ACETAMINOPHEN 5; 325 MG/1; MG/1
1 TABLET ORAL EVERY 4 HOURS PRN
Status: DISCONTINUED | OUTPATIENT
Start: 2023-04-23 | End: 2023-04-27 | Stop reason: HOSPADM

## 2023-04-23 RX ORDER — FENTANYL CITRATE 50 UG/ML
50 INJECTION, SOLUTION INTRAMUSCULAR; INTRAVENOUS
Status: DISCONTINUED | OUTPATIENT
Start: 2023-04-23 | End: 2023-04-23 | Stop reason: HOSPADM

## 2023-04-23 RX ORDER — HYDROMORPHONE HYDROCHLORIDE 1 MG/ML
0.25 INJECTION, SOLUTION INTRAMUSCULAR; INTRAVENOUS; SUBCUTANEOUS
Status: DISCONTINUED | OUTPATIENT
Start: 2023-04-23 | End: 2023-04-27 | Stop reason: HOSPADM

## 2023-04-23 RX ORDER — LIDOCAINE HYDROCHLORIDE 10 MG/ML
0.5 INJECTION, SOLUTION EPIDURAL; INFILTRATION; INTRACAUDAL; PERINEURAL ONCE AS NEEDED
Status: DISCONTINUED | OUTPATIENT
Start: 2023-04-23 | End: 2023-04-23 | Stop reason: HOSPADM

## 2023-04-23 RX ORDER — SODIUM CHLORIDE, SODIUM LACTATE, POTASSIUM CHLORIDE, CALCIUM CHLORIDE 600; 310; 30; 20 MG/100ML; MG/100ML; MG/100ML; MG/100ML
100 INJECTION, SOLUTION INTRAVENOUS CONTINUOUS
Status: DISCONTINUED | OUTPATIENT
Start: 2023-04-23 | End: 2023-04-23

## 2023-04-23 RX ORDER — SODIUM CHLORIDE 9 MG/ML
INJECTION, SOLUTION INTRAVENOUS CONTINUOUS PRN
Status: DISCONTINUED | OUTPATIENT
Start: 2023-04-23 | End: 2023-04-23 | Stop reason: SURG

## 2023-04-23 RX ORDER — ENOXAPARIN SODIUM 100 MG/ML
40 INJECTION SUBCUTANEOUS DAILY
Status: DISCONTINUED | OUTPATIENT
Start: 2023-04-24 | End: 2023-04-24

## 2023-04-23 RX ORDER — FAMOTIDINE 20 MG/1
40 TABLET, FILM COATED ORAL DAILY
Status: DISCONTINUED | OUTPATIENT
Start: 2023-04-23 | End: 2023-04-24

## 2023-04-23 RX ORDER — SODIUM CHLORIDE 9 MG/ML
60 INJECTION, SOLUTION INTRAVENOUS CONTINUOUS
Status: DISCONTINUED | OUTPATIENT
Start: 2023-04-23 | End: 2023-04-24

## 2023-04-23 RX ORDER — LIDOCAINE HYDROCHLORIDE 20 MG/ML
INJECTION, SOLUTION INFILTRATION; PERINEURAL AS NEEDED
Status: DISCONTINUED | OUTPATIENT
Start: 2023-04-23 | End: 2023-04-23 | Stop reason: SURG

## 2023-04-23 RX ORDER — SODIUM CHLORIDE 0.9 % (FLUSH) 0.9 %
3 SYRINGE (ML) INJECTION EVERY 12 HOURS SCHEDULED
Status: DISCONTINUED | OUTPATIENT
Start: 2023-04-23 | End: 2023-04-23 | Stop reason: HOSPADM

## 2023-04-23 RX ORDER — GLYCOPYRROLATE 0.2 MG/ML
INJECTION INTRAMUSCULAR; INTRAVENOUS AS NEEDED
Status: DISCONTINUED | OUTPATIENT
Start: 2023-04-23 | End: 2023-04-23 | Stop reason: SURG

## 2023-04-23 RX ORDER — CEFAZOLIN SODIUM 2 G/100ML
2 INJECTION, SOLUTION INTRAVENOUS EVERY 8 HOURS
Status: COMPLETED | OUTPATIENT
Start: 2023-04-23 | End: 2023-04-23

## 2023-04-23 RX ORDER — WARFARIN SODIUM 6 MG/1
6 TABLET ORAL ONCE
Status: COMPLETED | OUTPATIENT
Start: 2023-04-23 | End: 2023-04-23

## 2023-04-23 RX ORDER — PROPOFOL 10 MG/ML
VIAL (ML) INTRAVENOUS AS NEEDED
Status: DISCONTINUED | OUTPATIENT
Start: 2023-04-23 | End: 2023-04-23 | Stop reason: SURG

## 2023-04-23 RX ORDER — ONDANSETRON 4 MG/1
4 TABLET, FILM COATED ORAL EVERY 6 HOURS PRN
Status: DISCONTINUED | OUTPATIENT
Start: 2023-04-23 | End: 2023-04-27 | Stop reason: HOSPADM

## 2023-04-23 RX ORDER — NALOXONE HCL 0.4 MG/ML
0.1 VIAL (ML) INJECTION
Status: DISCONTINUED | OUTPATIENT
Start: 2023-04-23 | End: 2023-04-27 | Stop reason: HOSPADM

## 2023-04-23 RX ORDER — FENTANYL CITRATE 50 UG/ML
25 INJECTION, SOLUTION INTRAMUSCULAR; INTRAVENOUS
Status: DISCONTINUED | OUTPATIENT
Start: 2023-04-23 | End: 2023-04-23 | Stop reason: HOSPADM

## 2023-04-23 RX ORDER — EPHEDRINE SULFATE 50 MG/ML
INJECTION INTRAVENOUS AS NEEDED
Status: DISCONTINUED | OUTPATIENT
Start: 2023-04-23 | End: 2023-04-23 | Stop reason: SURG

## 2023-04-23 RX ORDER — ONDANSETRON 2 MG/ML
4 INJECTION INTRAMUSCULAR; INTRAVENOUS EVERY 6 HOURS PRN
Status: DISCONTINUED | OUTPATIENT
Start: 2023-04-23 | End: 2023-04-27 | Stop reason: HOSPADM

## 2023-04-23 RX ORDER — ACETAMINOPHEN 500 MG
1000 TABLET ORAL EVERY 6 HOURS
Status: DISCONTINUED | OUTPATIENT
Start: 2023-04-23 | End: 2023-04-27 | Stop reason: HOSPADM

## 2023-04-23 RX ORDER — SODIUM CHLORIDE 0.9 % (FLUSH) 0.9 %
10 SYRINGE (ML) INJECTION EVERY 12 HOURS SCHEDULED
Status: DISCONTINUED | OUTPATIENT
Start: 2023-04-23 | End: 2023-04-27 | Stop reason: HOSPADM

## 2023-04-23 RX ORDER — ROCURONIUM BROMIDE 10 MG/ML
INJECTION, SOLUTION INTRAVENOUS AS NEEDED
Status: DISCONTINUED | OUTPATIENT
Start: 2023-04-23 | End: 2023-04-23 | Stop reason: SURG

## 2023-04-23 RX ORDER — DOCUSATE SODIUM 100 MG/1
200 CAPSULE, LIQUID FILLED ORAL 2 TIMES DAILY
Status: DISCONTINUED | OUTPATIENT
Start: 2023-04-23 | End: 2023-04-27 | Stop reason: HOSPADM

## 2023-04-23 RX ORDER — MAGNESIUM HYDROXIDE 1200 MG/15ML
LIQUID ORAL AS NEEDED
Status: DISCONTINUED | OUTPATIENT
Start: 2023-04-23 | End: 2023-04-23 | Stop reason: HOSPADM

## 2023-04-23 RX ORDER — SODIUM CHLORIDE, SODIUM LACTATE, POTASSIUM CHLORIDE, CALCIUM CHLORIDE 600; 310; 30; 20 MG/100ML; MG/100ML; MG/100ML; MG/100ML
9 INJECTION, SOLUTION INTRAVENOUS CONTINUOUS
Status: DISCONTINUED | OUTPATIENT
Start: 2023-04-23 | End: 2023-04-23

## 2023-04-23 RX ADMIN — ROCURONIUM BROMIDE 30 MG: 10 INJECTION, SOLUTION INTRAVENOUS at 12:01

## 2023-04-23 RX ADMIN — NEOSTIGMINE METHYLSULFATE 2 MG: 0.5 INJECTION INTRAVENOUS at 12:36

## 2023-04-23 RX ADMIN — CEFAZOLIN SODIUM 2 G: 2 INJECTION, SOLUTION INTRAVENOUS at 11:48

## 2023-04-23 RX ADMIN — WARFARIN 6 MG: 6 TABLET ORAL at 18:04

## 2023-04-23 RX ADMIN — HYDROMORPHONE HYDROCHLORIDE 0.25 MG: 1 INJECTION, SOLUTION INTRAMUSCULAR; INTRAVENOUS; SUBCUTANEOUS at 14:00

## 2023-04-23 RX ADMIN — FENTANYL CITRATE 25 MCG: 50 INJECTION, SOLUTION INTRAMUSCULAR; INTRAVENOUS at 11:11

## 2023-04-23 RX ADMIN — HYDROMORPHONE HYDROCHLORIDE 0.25 MG: 1 INJECTION, SOLUTION INTRAMUSCULAR; INTRAVENOUS; SUBCUTANEOUS at 13:50

## 2023-04-23 RX ADMIN — EPHEDRINE SULFATE 10 MG: 50 INJECTION INTRAVENOUS at 12:14

## 2023-04-23 RX ADMIN — Medication 10 ML: at 20:29

## 2023-04-23 RX ADMIN — Medication 1000 UNITS: at 18:04

## 2023-04-23 RX ADMIN — AMLODIPINE BESYLATE 10 MG: 10 TABLET ORAL at 18:04

## 2023-04-23 RX ADMIN — SODIUM CHLORIDE 60 ML/HR: 9 INJECTION, SOLUTION INTRAVENOUS at 16:25

## 2023-04-23 RX ADMIN — GLYCOPYRROLATE 0.3 MCG: 1 INJECTION INTRAMUSCULAR; INTRAVENOUS at 12:36

## 2023-04-23 RX ADMIN — ACETAMINOPHEN 1000 MG: 500 TABLET, FILM COATED ORAL at 22:05

## 2023-04-23 RX ADMIN — FENTANYL CITRATE 50 MCG: 50 INJECTION, SOLUTION INTRAMUSCULAR; INTRAVENOUS at 14:10

## 2023-04-23 RX ADMIN — PROPOFOL 120 MG: 10 INJECTION, EMULSION INTRAVENOUS at 12:01

## 2023-04-23 RX ADMIN — PROPOFOL 50 MG: 10 INJECTION, EMULSION INTRAVENOUS at 13:03

## 2023-04-23 RX ADMIN — FENTANYL CITRATE 50 MCG: 50 INJECTION, SOLUTION INTRAMUSCULAR; INTRAVENOUS at 13:32

## 2023-04-23 RX ADMIN — ACETAMINOPHEN 1000 MG: 500 TABLET, FILM COATED ORAL at 16:33

## 2023-04-23 RX ADMIN — SODIUM CHLORIDE: 9 INJECTION, SOLUTION INTRAVENOUS at 11:54

## 2023-04-23 RX ADMIN — CEFAZOLIN SODIUM 2 G: 2 INJECTION, SOLUTION INTRAVENOUS at 23:07

## 2023-04-23 RX ADMIN — CEFAZOLIN SODIUM 2 G: 2 INJECTION, SOLUTION INTRAVENOUS at 16:25

## 2023-04-23 RX ADMIN — LIDOCAINE HYDROCHLORIDE 60 MG: 20 INJECTION, SOLUTION INFILTRATION; PERINEURAL at 12:01

## 2023-04-23 RX ADMIN — SODIUM CHLORIDE 9 ML/HR: 9 INJECTION, SOLUTION INTRAVENOUS at 11:11

## 2023-04-23 RX ADMIN — FAMOTIDINE 40 MG: 20 TABLET, FILM COATED ORAL at 16:33

## 2023-04-23 NOTE — ANESTHESIA POSTPROCEDURE EVALUATION
Patient: Cristina Lemus    Procedure Summary     Date: 04/23/23 Room / Location: [facility] OR  / [facility] MAIN OR    Anesthesia Start: 1154 Anesthesia Stop: 1317    Procedure: RIGHT HIP INTRAMEDULLARY NAILING WITH CERCLAGE CABLING (Right: Hip) Diagnosis:     Surgeons: King Marcano MD Provider: Sterling Rodriges MD    Anesthesia Type: general ASA Status: 4          Anesthesia Type: general    Vitals  Vitals Value Taken Time   /65 04/23/23 1431   Temp 36.5 °C (97.7 °F) 04/23/23 1313   Pulse 82 04/23/23 1439   Resp 18 04/23/23 1430   SpO2 89 % 04/23/23 1440   Vitals shown include unvalidated device data.        Post Anesthesia Care and Evaluation    Level of consciousness: awake  Pain management: satisfactory to patient    Airway patency: patent  Anesthetic complications: No anesthetic complications  PONV Status: controlled  Cardiovascular status: acceptable  Respiratory status: acceptable  Hydration status: acceptable    Comments: SpO2 consistently >95 on 3LNC

## 2023-04-23 NOTE — CONSULTS
Patient Name: Cristina Lemus  :3/13/1930  89 y.o.    Date of Admission: 2023  Date of Consultation:  23  Encounter Provider: Clinton Puckett MD  Place of Service: [facility] CARDIOLOGY  Referring Provider: Porfirio Pfeiffer MD  Patient Care Team:  Wood Elias MD as PCP - General (Family Medicine)      Chief complaint:    History of Present Illness: Ms. Lemus is a 89 year old woman followed by Dr. Mart. She has permanent atrial fibrillation, chronic diastolic heart failure, end stage renal disease on hemodialysis, and hypertension. She is on chronic anticoagulation with warfarin.    She came to the emergency room last night after a fall. She has a right hip fracture. Ortho saw this morning and recommends IM nailing.     INr 1.5 yesterday and 1.68 today.     Echo 21  • Left ventricular wall thickness is consistent with borderline concentric hypertrophy.  • Estimated left ventricular EF = 61% Left ventricular systolic function is normal.  • Left ventricular diastolic function is consistent with age.  • The left atrial cavity is severely dilated.  • The right atrial cavity is severely dilated.  • There is moderate, bileaflet mitral valve thickening present.  • Moderate tricuspid valve regurgitation is present.  • Estimated right ventricular systolic pressure from tricuspid regurgitation is mildly elevated (35-45 mmHg). Calculated right ventricular systolic pressure from tricuspid regurgitation is 40 mmHg.          Past Medical History:   Diagnosis Date   • Atrial fibrillation    • CHF (congestive heart failure)    • Hypertension    • Renal insufficiency        Past Surgical History:   Procedure Laterality Date   • ARTERIOVENOUS FISTULA/SHUNT SURGERY Left 2022    Procedure: LEFT ARM DIALYSIS GRAFT;  Surgeon: King Reynaga MD;  Location: [facility];  Service: Vascular;  Laterality: Left;   • BACK SURGERY     • CYST REMOVAL     • INSERTION  HEMODIALYSIS CATHETER N/A 1/18/2022    Procedure: HEMODIALYSIS CATHETER INSERTION with C arm;  Surgeon: King Reynaga MD;  Location: [facility] MAIN OR;  Service: Vascular;  Laterality: N/A;   • OTHER SURGICAL HISTORY      rupture disk         Prior to Admission medications    Medication Sig Start Date End Date Taking? Authorizing Provider   amLODIPine (NORVASC) 10 MG tablet Take 1 tablet by mouth Daily. 12/23/22  Yes Wood Elias MD   Lidocaine 4 % patch Apply 1 patch topically to the appropriate area as directed Daily. 3/16/23  Yes Oswald Sheppard MD   LORazepam (ATIVAN) 1 MG tablet Take 1 tablet by mouth At Night As Needed for Sleep. Must be seen for more refills of this medication. 12/23/22  Yes Wood Elias MD   torsemide (DEMADEX) 20 MG tablet Take 1 tablet by mouth Daily. 12/23/22  Yes Wood Elias MD   warfarin (Jantoven) 2 MG tablet TAKE 3 TABLETS (6MG) BY MOUTH ON TUESDAYS; TAKE 2 TABLETS (4MG) ON ALL OTHER DAYS OR AS DIRECTED. 3/20/23  Yes Lm Mart MD   acetaminophen (TYLENOL) 325 MG tablet Take 2 tablets by mouth Every 4 (Four) Hours As Needed for Mild Pain .  Patient not taking: Reported on 4/12/2023 5/15/22   Margot Flowers MD   acetaminophen (TYLENOL) 500 MG tablet Take 1 tablet by mouth 4 (Four) Times a Day. 4/6/23   Oswald Sheppard MD   Cholecalciferol (VITAMIN D3 PO) Take 1,000 Units by mouth Daily.  Patient not taking: Reported on 4/12/2023    Oswald Sheppard MD   potassium chloride (KLOR-CON) 20 MEQ CR tablet Take 1 tablet by mouth Daily. 12/23/22   Wood Elias MD       Allergies   Allergen Reactions   • Statins Nausea And Vomiting   • Sulfa Antibiotics Nausea And Vomiting and Other (See Comments)     CHILLS AND FEVER       Social History     Socioeconomic History   • Marital status:    Tobacco Use   • Smoking status: Former     Types: Cigarettes   • Smokeless tobacco: Never   Vaping Use   • Vaping Use: Never used   Substance and  Sexual Activity   • Alcohol use: No     Comment: caffeine use    • Drug use: No   • Sexual activity: Not Currently       History reviewed. No pertinent family history.    REVIEW OF SYSTEMS:   All systems reviewed.  Pertinent positives identified in HPI.  All other systems are negative.      Objective:     Vitals:    04/22/23 1759 04/22/23 2154 04/23/23 0153 04/23/23 0555   BP: 152/70 139/66 152/66 157/58   BP Location: Right arm Right arm Right arm Right arm   Patient Position: Lying  Lying Lying   Pulse: 91 77 73 69   Resp: 16 18 18 18   Temp: 97.9 °F (36.6 °C) 97.7 °F (36.5 °C) 98 °F (36.7 °C) 97.8 °F (36.6 °C)   TempSrc: Oral Oral Oral Oral   SpO2: 98% 100% 100% 100%   Weight:       Height:         Body mass index is 19.63 kg/m².    Physical Exam:  Constitutional: She is oriented to person, place, and time. She appears well-developed. She does not appear ill.   HENT:   Head: Normocephalic and atraumatic. Head is without contusion.   Right Ear: Hearing normal. No drainage.   Left Ear: Hearing normal. No drainage.   Nose: No nasal deformity. No epistaxis.   Eyes: Lids are normal. Right eye exhibits no exudate. Left eye exhibits no exudate.  Neck: No JVD present. Carotid bruit is not present. No tracheal deviation present. No thyroid mass and no thyromegaly present.   Cardiovascular: Normal rate, regular rhythm and normal heart sounds.    Pulses:       Posterior tibial pulses are 2+ on the right side, and 2+ on the left side.   Pulmonary/Chest: Effort normal and breath sounds normal.   Abdominal: Soft. Normal appearance and bowel sounds are normal. There is no tenderness.   Musculoskeletal: Normal range of motion.        Right shoulder: She exhibits no deformity.        Left shoulder: She exhibits no deformity.   Neurological: She is alert and oriented to person, place, and time. She has normal strength.   Skin: Skin is warm, dry and intact. No rash noted.   Psychiatric: She has a normal mood and affect. Her  behavior is normal. Thought content normal.   Vitals reviewed      Lab Review:     Results from last 7 days   Lab Units 04/23/23  0337 04/22/23  1028   SODIUM mmol/L 139 138   POTASSIUM mmol/L 4.8 4.1   CHLORIDE mmol/L 103 99   CO2 mmol/L 24.8 22.3   BUN mg/dL 44* 45*   CREATININE mg/dL 2.95* 2.96*   CALCIUM mg/dL 9.4 9.8*   BILIRUBIN mg/dL  --  0.4   ALK PHOS U/L  --  95   ALT (SGPT) U/L  --  18   AST (SGOT) U/L  --  23   GLUCOSE mg/dL 116* 192*         Results from last 7 days   Lab Units 04/23/23  0337   WBC 10*3/mm3 9.38   HEMOGLOBIN g/dL 9.7*   HEMATOCRIT % 28.2*   PLATELETS 10*3/mm3 174     Results from last 7 days   Lab Units 04/23/23  0337 04/22/23  1028   INR  1.68* 1.50*   APTT seconds  --  31.7     Results from last 7 days   Lab Units 04/22/23  1028   MAGNESIUM mg/dL 2.3                          Current Facility-Administered Medications:   •  acetaminophen (TYLENOL) tablet 650 mg, 650 mg, Oral, Q4H PRN **OR** acetaminophen (TYLENOL) 160 MG/5ML solution 650 mg, 650 mg, Oral, Q4H PRN **OR** acetaminophen (TYLENOL) suppository 650 mg, 650 mg, Rectal, Q4H PRN, Porfirio Pfeiffer MD  •  amLODIPine (NORVASC) tablet 10 mg, 10 mg, Oral, Daily, Porfirio Pfeiffer MD  •  cholecalciferol (VITAMIN D3) tablet 1,000 Units, 1,000 Units, Oral, Daily, Porfirio Pfeiffer MD  •  HYDROcodone-acetaminophen (NORCO) 5-325 MG per tablet 1 tablet, 1 tablet, Oral, Q4H PRN, Porfirio Pfeiffer MD, 1 tablet at 04/22/23 2238  •  HYDROmorphone (DILAUDID) injection 0.5 mg, 0.5 mg, Intravenous, Q2H PRN, Porfirio Pfeiffer MD, 0.5 mg at 04/22/23 2017  •  LORazepam (ATIVAN) tablet 0.5 mg, 0.5 mg, Oral, Nightly PRN, Porfirio Pfeiffer MD  •  ondansetron (ZOFRAN) tablet 4 mg, 4 mg, Oral, Q6H PRN **OR** ondansetron (ZOFRAN) injection 4 mg, 4 mg, Intravenous, Q6H PRN, Porfirio Pfeiffer MD  •  [COMPLETED] Insert Peripheral IV, , , Once **AND** sodium chloride 0.9 % flush 10 mL, 10 mL, Intravenous, PRN, Porfirio Pfeiffer MD, 10  mL at 04/22/23 1212  •  sodium chloride 0.9 % flush 10 mL, 10 mL, Intravenous, Q12H, Porfirio Pfeiffer MD, 10 mL at 04/22/23 2100  •  sodium chloride 0.9 % flush 10 mL, 10 mL, Intravenous, PRN, Porfirio Pfeiffer MD  •  sodium chloride 0.9 % infusion 40 mL, 40 mL, Intravenous, PRN, Porfirio Pfeiffer MD    Assessment and Plan:       Mrs Lemus is a pleasant 89 years old lady with past medical history of permanent atrial fibrillation, chronic diastolic CHF, essential hypertension admitted with right hip fracture from mechanical fall.  At baseline she is reasonably active considering multiple comorbidities.  She denies any significant shortness of breath, any chest pain, orthopnea, PND, palpitations, presyncope syncope or extremity swelling.  Except for right hip pain no significant complaints.  Atrial fibrillation rate controlled without AV joy blockers.  She was on a diuretic besides dialysis for volume control.  On amlodipine for blood pressure control.    1. Preoperative cardiovascular examination for right hip repair  2. Permanent atrial fibrillation with fairly controlled heart rate and anticoagulated with Coumadin.  INR is below 2  3. Essential hypertension is fairly controlled    Considering her age, arrhythmia and end-stage renal disease she is at high risk for perioperative cardiovascular complications during general anesthesia for orthopedic surgeries but not prohibitive.  Risk factors are nonmodifiable and doing further testing is not going to change treatment.  She can proceed with essential orthopedic surgeries was closed perioperative monitoring.  She needs to be on telemetry floor after surgery.  I have discussed patient with her nurse.    Thank you for consulting with cardiology and allowing me to participate in care of this patient. Cardiology will follow along.    Clinton Puckett MD   04/23/23  08:35 EDT

## 2023-04-23 NOTE — PLAN OF CARE
Problem: Adult Inpatient Plan of Care  Goal: Plan of Care Review  Outcome: Ongoing, Progressing  Flowsheets (Taken 4/23/2023 1646)  Progress: improving  Plan of Care Reviewed With: patient  Outcome Evaluation: pt admitted to [facility] after Hip surgery today. Vitals stable. Pain controlled. Q2 turns: accumax pump placed on bed & optifoam dressing placed on coccyx to help prevent skin break down. Will continue to monitor.

## 2023-04-23 NOTE — PROGRESS NOTES
[facility] Clinical Pharmacy Services: Warfarin Dosing/Monitoring Consult    Cristina Lemus is a 89 y.o. female, estimated creatinine clearance is 10.1 mL/min (A) (by C-G formula based on SCr of 2.95 mg/dL (H)). weighing 53.5 kg (117 lb 15.1 oz).    Results from last 7 days   Lab Units 04/23/23  0337 04/22/23  1028   INR  1.68* 1.50*   APTT seconds  --  31.7   HEMOGLOBIN g/dL 9.7* 11.6*   HEMATOCRIT % 28.2* 33.6*   PLATELETS 10*3/mm3 174 207     Prior to admission anticoagulation: warfarin 6 mg on Tues; 4 mg all other days    Hospital Anticoagulation:  Consulting provider: Dr. Marcano  Start date: 4/23  Indication: A Fib - requiring full anticoagulation  Target INR: 2 - 3  Expected duration: tbd   Bridge Therapy: Yes  with enoxaparin (to start 4/24 - pt just had surgery)    Potential food or drug interactions: none at this time    Education complete?/Date: No; plan for follow up TBD    Assessment/Plan:  Dose: 6 mg x once tonight - will reassess tomorrow after AM labs  Monitor for any signs or symptoms of bleeding  Follow up daily INRs and dose adjustments    Pharmacy will continue to follow until discharge or discontinuation of warfarin.     Mely Hall [facility]  Clinical Pharmacist

## 2023-04-23 NOTE — OP NOTE
FEMUR INTRAMEDULLARY NAILING/RODDING  Procedure Note    Cristina Lemus  4/23/2023    Pre-op Diagnosis: Right  Intertrochanteric Femur Fracture  Post-op Diagnosis: Same  Procedure:  Right  Hip Intramedullary Nailing with cerclage cabling  Surgeon: King Marcano MD  Assistant: Lester Song PA-C  Anesthesia: General, Anesthesiologist: Sterling Rodriges MD  Staff: Circulator: Asuncion Hall RN  Radiology Technologist: Rylie Alberts, RRT  Scrub Person: Luanne Valentine  Vendor Representative: Marito Cardona  Assistant: Lester Song PA-C  Estimated Blood Loss:100ml   Specimens: * No orders in the log *  Drains: none  Complications: None    Components Utilized:     Implant Name Type Inv. Item Serial No.  Lot No. LRB No. Used Action   NAIL FEM TROCH GAMMA3 TI 125DEG [id number] STRL - [id number] Implant NAIL FEM TROCH GAMMA3 TI 125DEG [id number] STRL  [employer] [id number] Right 1 Implanted   SCRW LK FUL/THRD 5X32MM STRL - [id number] Implant SCRW LK FUL/THRD 5X32MM STRL  [employer] [id number] Right 1 Implanted   SCRW LAG GAM3 TI 10.5X90MM STRL - [id number] Implant SCRW LAG GAM3 TI 10.5X90MM STRL  [employer] [id number] Right 1 Implanted   CABL W/SLV DALLMILES BEAD 2MM - [id number] Implant CABL W/SLV DALLMILES BEAD 2MM  [employer] [id number] Right 1 Implanted        Indication for Procedure:   The patient is a 89 y.o. female presents today for hip intramedullary nailing for management of the patient's intertrochanteric femur fracture. The patient was educated in risks of surgery that could include possible risk of infection, fracture malunion, nonunion, deep venous thrombosis, pulmonary embolism, fracture, neurovascular injury, leg length discrepancy, dislocation, possible persistent pain, avascular necrosis, need for additional surgeries, anesthetic risks, medical risks including heart attack and stroke, and death.  Also discussion of other operative treatments were discussed though not advised. The discussion  occurred on the floor and in pre-op holding area pre-operatively, and patient had the opportunity to ask questions, and concerns about the proposed surgery.  The patient also understood that medicine is not an exact science, and that outcomes of the surgical procedure may be less than desired. The patient wished to proceed.      Protocols for intravenous antibiotics and venous thrombosis were followed for this patient.  IV antibiotics were infused prior to surgery and will be discontinued within 24 hours of completion of the surgical procedure.  Thrombosis prophylaxis will be initiated within 24 hours of the completion of the surgical procedure.      Procedure:  After the patient was identified in the preoperative area, and the surgical site confirmed and marked, the patient was brought to the operating room on a stretcher.  The above anesthetic was placed uneventfully on the stretcher and the patient was transferred to the [place] operating room table.  A time-out procedure was performed.  The intravenous ancef antibiotics infusion was completed. The fracture was closed reduced under c-arm guidance by placing longitudinal traction on the leg and internally rotated.  The operative leg was then prepped and draped in usual sterile fashion.     A 10 blade scalpel was then used to make a lateral based incision superior to the greater trochanter and was dissected down to the greater trochanter.  A sharp awl was then placed in the tip of the greater trochanter and with C-arm guidance was placed into the medullary canal.  The ball tip guidewire was then placed into the medullary canal.  The Canal opening reamer was used over the guide wire.  The above size nail was then placed into the medullary canal over the guide wire.  Then a lateral based incision using the out  guide was made and the drill guide was placed on the lateral cortex.  Then with C-arm guidance, the threaded K-wire was placed into the femoral head center  on AP and Lateral views and within 25 mm of pole of femoral head.  Then the Omnidrone drill was then used over the k-wire.  The above lag screw was then placed over the guide wire, and under C-arm guidance was placed up into the femoral head, again within 25 mm of tip of femoral head.  Then the traction was removed and the fracture compressed using the outrigger guide.  Then a final stab incision was made using the guide over the static locking screw hole distally.  The 4.3 mm drill was then used and measured and the above screw was placed.  Then with C-arm guidance, to help reduce the large lessor trochanter fragment, a dall [employer] cable was passed around the fracture site and tensioned and crimped.   Final C-arm images were obtained which showed good fracture reduction and hardware placement.  The wounds were copiously irrigated and closed with 0-vicyl for fascia, 2-0 Vicryl for deep dermis and staples for skin.  Sterile dressings were applied.  The patient was awakened from anesthesia and returned to recovery in stable condition.  Sponge and needle counts were correct at the completion and there were no intra-operative complications.    King Marcano MD

## 2023-04-23 NOTE — CONSULTS
ORTHOPEDIC SURGERY CONSULT      Patient: Cristina Lemus  Date of Admission: 4/22/2023 10:03 AM  YOB: 1930  Medical Record Number: [id number]  Attending Physician: Porfirio Pfeiffer MD  Consulting Physician: King Marcano MD    CHIEF COMPLAINT: Right hip pain    HISTORY OF PRESENT ILLINESS: Patient is a 89 y.o. year old female presents to [facility] with above complaints.  I was consulted for further evaluation and treatment.  She is a pleasant 89-year-old female who presents to [facility] emergency room after a fall.  She was unable to ambulate after the fall.  Denies other symptoms other than right hip pain.  X-rays were obtained in the [facility] emergency room and showed a displaced right intertrochanteric femur fracture.  Orthopedics was consulted for definitive management.  She has a significant past medical history of kidney failure on dialysis.  Also atrial fibrillation.    ALLERGIES:   Allergies   Allergen Reactions   • Statins Nausea And Vomiting   • Sulfa Antibiotics Nausea And Vomiting and Other (See Comments)     CHILLS AND FEVER       HOME MEDICATIONS:  Medications Prior to Admission   Medication Sig Dispense Refill Last Dose   • amLODIPine (NORVASC) 10 MG tablet Take 1 tablet by mouth Daily. 90 tablet 4 Past Week   • Lidocaine 4 % patch Apply 1 patch topically to the appropriate area as directed Daily.   4/21/2023   • LORazepam (ATIVAN) 1 MG tablet Take 1 tablet by mouth At Night As Needed for Sleep. Must be seen for more refills of this medication. 90 tablet 1 Past Week   • torsemide (DEMADEX) 20 MG tablet Take 1 tablet by mouth Daily. 90 tablet 4 Past Week   • warfarin (Jantoven) 2 MG tablet TAKE 3 TABLETS (6MG) BY MOUTH ON TUESDAYS; TAKE 2 TABLETS (4MG) ON ALL OTHER DAYS OR AS DIRECTED. 190 tablet 1 Past Week   • acetaminophen (TYLENOL) 325 MG tablet Take 2 tablets by mouth Every 4 (Four) Hours As Needed for Mild Pain . (Patient not taking:  Reported on 4/12/2023)      • acetaminophen (TYLENOL) 500 MG tablet Take 1 tablet by mouth 4 (Four) Times a Day.      • Cholecalciferol (VITAMIN D3 PO) Take 1,000 Units by mouth Daily. (Patient not taking: Reported on 4/12/2023)      • potassium chloride (KLOR-CON) 20 MEQ CR tablet Take 1 tablet by mouth Daily. 90 tablet 4 Unknown       CURRENT MEDICATIONS:  Scheduled Meds:amLODIPine, 10 mg, Oral, Daily  cholecalciferol, 1,000 Units, Oral, Daily  sodium chloride, 10 mL, Intravenous, Q12H      Continuous Infusions:   PRN Meds:.•  acetaminophen **OR** acetaminophen **OR** acetaminophen  •  HYDROcodone-acetaminophen  •  HYDROmorphone  •  LORazepam  •  ondansetron **OR** ondansetron  •  [COMPLETED] Insert Peripheral IV **AND** sodium chloride  •  sodium chloride  •  sodium chloride    Past Medical History:   Diagnosis Date   • Atrial fibrillation    • CHF (congestive heart failure)    • Hypertension    • Renal insufficiency      Past Surgical History:   Procedure Laterality Date   • ARTERIOVENOUS FISTULA/SHUNT SURGERY Left 1/24/2022    Procedure: LEFT ARM DIALYSIS GRAFT;  Surgeon: King Reynaga MD;  Location: [facility];  Service: Vascular;  Laterality: Left;   • BACK SURGERY     • CYST REMOVAL     • INSERTION HEMODIALYSIS CATHETER N/A 1/18/2022    Procedure: HEMODIALYSIS CATHETER INSERTION with C arm;  Surgeon: King Reynaga MD;  Location: [facility];  Service: Vascular;  Laterality: N/A;   • OTHER SURGICAL HISTORY      rupture disk     Social History     Occupational History   • Not on file   Tobacco Use   • Smoking status: Former     Types: Cigarettes   • Smokeless tobacco: Never   Vaping Use   • Vaping Use: Never used   Substance and Sexual Activity   • Alcohol use: No     Comment: caffeine use    • Drug use: No   • Sexual activity: Not Currently      Social History     Social History Narrative   • Not on file     History reviewed. No pertinent family history.    REVIEW OF SYSTEMS:     HEENT: Patient denies any headaches, vision changes, change in hearing, or tinnitus, Patient denies epistaxis, sinus pain, hoarseness, or dysphagia   Pulmonary: Patient denies any cough, congestion, acute change in SOA or wheezing.   Cardiovascular: Patient denies any change in chest pain, dyspnea, palpitations, weakness, intolerance of exercise, varicosities, change in murmur   Gastrointestinal:  Patient denies change in appetite, melena, change in bowel habits.   Genital/Urinary: Patient denies dysuria, change in color of urine, change in frequency of urination, pain with urgency, change in incontinence, retention.   Musculoskeletal: Patient denies complaints of acute changes in symptoms of other joints not mentioned above.   Neurological: Patient denies changes in dizziness, tremor, ataxia, or difficulty in speaking or changes in memory.   Endocrine system: Patient denies acute changes in tremors, palpitations, polyuria, polydipsia, polyphagia, diaphoresis, exophthalmos, or goiter.   Psychological: Patient denies thoughts/plans or harming self or other; denies acute changes in depression,  insomnia, night terrors, yadi, disorientation.   Skin: Patient denies any bruising, rashes, discoloration, pruritus,or wounds not mentioned in history of present illness or chief complaint above.   Hematopoietic: Patient denies current bleeding, epistaxis, hematuria, or melena.    PHYSICAL EXAM:   Vitals:  Vitals:    04/22/23 1759 04/22/23 2154 04/23/23 0153 04/23/23 0555   BP: 152/70 139/66 152/66 157/58   BP Location: Right arm Right arm Right arm Right arm   Patient Position: Lying  Lying Lying   Pulse: 91 77 73 69   Resp: 16 18 18 18   Temp: 97.9 °F (36.6 °C) 97.7 °F (36.5 °C) 98 °F (36.7 °C) 97.8 °F (36.6 °C)   TempSrc: Oral Oral Oral Oral   SpO2: 98% 100% 100% 100%   Weight:       Height:           General:  89 y.o. female who appears about stated age.    Alert, cooperative, in no acute distress         Head:     Normocephalic, without obvious abnormality, atraumatic   Eyes:            Lids and lashes normal, conjunctivae and sclerae normal, no         icterus, no pallor, corneas clear, PERRLA   Ears:    Ears appear intact with no abnormalities noted   Throat:   No oral lesions, no thrush, oral mucosa moist   Neck:   No adenopathy, supple, trachea midline, no JVD   Back:     Limited exam shows no severe kyphosis present,no visible           erythema, no excessive  tenderness to palpation.    Lungs:     Respirations regular, even and unlabored.     Heart:    Normal rate, Pulses palpable   Chest Wall:    No abnormalities observed.   Abdomen:     Normal bowel sounds, no masses, no organomegaly, soft              non-tender, non-distended, no guarding, no rebound                      tenderness   Rectal:     Deferred   Pulses:   Pulses palpable and equal bilaterally   Skin:   No bleeding, bruising or rash   Lymph nodes:   No palpable adenopathy   Extremities:     Right hip: Skin is intact.  Tender to palpation.  Painful range of motion.  Neurovascular intact distally.    DIAGNOSTIC TEST:  Admission on 04/22/2023   Component Date Value Ref Range Status   • Glucose 04/22/2023 192 (H)  65 - 99 mg/dL Final   • BUN 04/22/2023 45 (H)  8 - 23 mg/dL Final   • Creatinine 04/22/2023 2.96 (H)  0.57 - 1.00 mg/dL Final   • Sodium 04/22/2023 138  136 - 145 mmol/L Final   • Potassium 04/22/2023 4.1  3.5 - 5.2 mmol/L Final   • Chloride 04/22/2023 99  98 - 107 mmol/L Final   • CO2 04/22/2023 22.3  22.0 - 29.0 mmol/L Final   • Calcium 04/22/2023 9.8 (H)  8.2 - 9.6 mg/dL Final   • Total Protein 04/22/2023 7.2  6.0 - 8.5 g/dL Final   • Albumin 04/22/2023 4.2  3.5 - 5.2 g/dL Final   • ALT (SGPT) 04/22/2023 18  1 - 33 U/L Final   • AST (SGOT) 04/22/2023 23  1 - 32 U/L Final   • Alkaline Phosphatase 04/22/2023 95  39 - 117 U/L Final   • Total Bilirubin 04/22/2023 0.4  0.0 - 1.2 mg/dL Final   • Globulin 04/22/2023 3.0  gm/dL Final   • A/G Ratio  04/22/2023 1.4  g/dL Final   • BUN/Creatinine Ratio 04/22/2023 15.2  7.0 - 25.0 Final   • Anion Gap 04/22/2023 16.7 (H)  5.0 - 15.0 mmol/L Final   • eGFR 04/22/2023 14.3 (L)  >60.0 mL/min/1.73 Final    <15 Indicative of kidney failure   • PTT 04/22/2023 31.7  22.7 - 35.4 seconds Final   • Protime 04/22/2023 18.3 (H)  11.7 - 14.2 Seconds Final   • INR 04/22/2023 1.50 (H)  0.90 - 1.10 Final   • QT Interval 04/22/2023 491  ms Final   • WBC 04/22/2023 5.91  3.40 - 10.80 10*3/mm3 Final   • RBC 04/22/2023 3.51 (L)  3.77 - 5.28 10*6/mm3 Final   • Hemoglobin 04/22/2023 11.6 (L)  12.0 - 15.9 g/dL Final   • Hematocrit 04/22/2023 33.6 (L)  34.0 - 46.6 % Final   • MCV 04/22/2023 95.7  79.0 - 97.0 fL Final   • MCH 04/22/2023 33.0  26.6 - 33.0 pg Final   • MCHC 04/22/2023 34.5  31.5 - 35.7 g/dL Final   • RDW 04/22/2023 11.6 (L)  12.3 - 15.4 % Final   • RDW-SD 04/22/2023 40.7  37.0 - 54.0 fl Final   • MPV 04/22/2023 10.4  6.0 - 12.0 fL Final   • Platelets 04/22/2023 207  140 - 450 10*3/mm3 Final   • Neutrophil % 04/22/2023 63.3  42.7 - 76.0 % Final   • Lymphocyte % 04/22/2023 25.2  19.6 - 45.3 % Final   • Monocyte % 04/22/2023 9.0  5.0 - 12.0 % Final   • Eosinophil % 04/22/2023 2.0  0.3 - 6.2 % Final   • Basophil % 04/22/2023 0.3  0.0 - 1.5 % Final   • Immature Grans % 04/22/2023 0.2  0.0 - 0.5 % Final   • Neutrophils, Absolute 04/22/2023 3.74  1.70 - 7.00 10*3/mm3 Final   • Lymphocytes, Absolute 04/22/2023 1.49  0.70 - 3.10 10*3/mm3 Final   • Monocytes, Absolute 04/22/2023 0.53  0.10 - 0.90 10*3/mm3 Final   • Eosinophils, Absolute 04/22/2023 0.12  0.00 - 0.40 10*3/mm3 Final   • Basophils, Absolute 04/22/2023 0.02  0.00 - 0.20 10*3/mm3 Final   • Immature Grans, Absolute 04/22/2023 0.01  0.00 - 0.05 10*3/mm3 Final   • nRBC 04/22/2023 0.0  0.0 - 0.2 /100 WBC Final   • Magnesium 04/22/2023 2.3  1.7 - 2.3 mg/dL Final   • WBC 04/23/2023 9.38  3.40 - 10.80 10*3/mm3 Final   • RBC 04/23/2023 2.97 (L)  3.77 - 5.28 10*6/mm3 Final   •  Hemoglobin 04/23/2023 9.7 (L)  12.0 - 15.9 g/dL Final   • Hematocrit 04/23/2023 28.2 (L)  34.0 - 46.6 % Final   • MCV 04/23/2023 94.9  79.0 - 97.0 fL Final   • MCH 04/23/2023 32.7  26.6 - 33.0 pg Final   • MCHC 04/23/2023 34.4  31.5 - 35.7 g/dL Final   • RDW 04/23/2023 11.6 (L)  12.3 - 15.4 % Final   • RDW-SD 04/23/2023 39.6  37.0 - 54.0 fl Final   • MPV 04/23/2023 10.4  6.0 - 12.0 fL Final   • Platelets 04/23/2023 174  140 - 450 10*3/mm3 Final   • Neutrophil % 04/23/2023 73.3  42.7 - 76.0 % Final   • Lymphocyte % 04/23/2023 13.8 (L)  19.6 - 45.3 % Final   • Monocyte % 04/23/2023 11.9  5.0 - 12.0 % Final   • Eosinophil % 04/23/2023 0.3  0.3 - 6.2 % Final   • Basophil % 04/23/2023 0.2  0.0 - 1.5 % Final   • Immature Grans % 04/23/2023 0.5  0.0 - 0.5 % Final   • Neutrophils, Absolute 04/23/2023 6.87  1.70 - 7.00 10*3/mm3 Final   • Lymphocytes, Absolute 04/23/2023 1.29  0.70 - 3.10 10*3/mm3 Final   • Monocytes, Absolute 04/23/2023 1.12 (H)  0.10 - 0.90 10*3/mm3 Final   • Eosinophils, Absolute 04/23/2023 0.03  0.00 - 0.40 10*3/mm3 Final   • Basophils, Absolute 04/23/2023 0.02  0.00 - 0.20 10*3/mm3 Final   • Immature Grans, Absolute 04/23/2023 0.05  0.00 - 0.05 10*3/mm3 Final   • nRBC 04/23/2023 0.0  0.0 - 0.2 /100 WBC Final   • Protime 04/23/2023 20.0 (H)  11.7 - 14.2 Seconds Final   • INR 04/23/2023 1.68 (H)  0.90 - 1.10 Final   • Glucose 04/23/2023 116 (H)  65 - 99 mg/dL Final   • BUN 04/23/2023 44 (H)  8 - 23 mg/dL Final   • Creatinine 04/23/2023 2.95 (H)  0.57 - 1.00 mg/dL Final   • Sodium 04/23/2023 139  136 - 145 mmol/L Final   • Potassium 04/23/2023 4.8  3.5 - 5.2 mmol/L Final   • Chloride 04/23/2023 103  98 - 107 mmol/L Final   • CO2 04/23/2023 24.8  22.0 - 29.0 mmol/L Final   • Calcium 04/23/2023 9.4  8.2 - 9.6 mg/dL Final   • Albumin 04/23/2023 3.9  3.5 - 5.2 g/dL Final   • Phosphorus 04/23/2023 4.4  2.5 - 4.5 mg/dL Final   • Anion Gap 04/23/2023 11.2  5.0 - 15.0 mmol/L Final   • BUN/Creatinine Ratio  04/23/2023 14.9  7.0 - 25.0 Final   • eGFR 04/23/2023 14.4 (L)  >60.0 mL/min/1.73 Final    <15 Indicative of kidney failure       No results found.  Imaging: X-rays the right hip, 2 views were visualized which show a displaced right intertrochanteric femur fracture.    ASSESSMENT:  Right displaced intertrochanteric femur fracture in a elderly 89-year-old female with significant past history of congestive heart failure and stage IV kidney disease and history of PE    Patient Active Problem List   Diagnosis   • AVNRT (AV joy re-entry tachycardia)   • History of pulmonary embolus (PE)   • HTN, goal below 140/80   • Permanent atrial fibrillation (HCC)   • Stage 4 chronic kidney disease   • Chronic diastolic congestive heart failure (HCC)   • Anxiety disorder   • Chronic gout without tophus   • KIM (acute kidney injury)   • Essential hypertension   • Dyspnea   • Pleural effusion   • Dyspnea on exertion   • Anemia   • Acute on chronic renal failure   • Acute renal failure superimposed on chronic kidney disease   • Anticoagulated on warfarin   • UTI (urinary tract infection)   • Metabolic encephalopathy   • End stage renal disease   • C. difficile colitis   • Aftercare including intermittent dialysis   • Allergy, unspecified, initial encounter   • Anaphylactic shock, unspecified, initial encounter   • Anemia in chronic kidney disease   • Coagulation defect, unspecified   • Conjunctivochalasis   • Epiretinal membrane   • Hypokalemia   • Iron deficiency anemia   • Secondary hyperparathyroidism of renal origin   • Chronic mid back pain   • Sacral fracture   • Closed fracture of fifth thoracic vertebra with routine healing   • Closed fracture of right hip, initial encounter       PLAN:    Recommended intramedullary nailing of the right hip.  We are waiting on cardiology consult for clearance.  Nephrology has seen the patient and will perform dialysis tomorrow.  She is tentatively scheduled for later this morning for  surgery.    The above diagnosis and treatment plan was discussed with the patient.  They were educated in treatment options for their condition.   They were given the opportunity to ask questions and were answered to their satisfaction.  They agreed to proceed with the above treatment plan.        King Marcano MD  Date: 4/23/2023

## 2023-04-23 NOTE — CONSULTS
[facility] Consult Note      Patient Name: Cristina Lemus  : 3/13/1930  MRN: [id number]  Primary Care Physician:  Wood Elias MD  Referring Physician: Porfirio Pfeiffer MD  Date of admission: 2023    Subjective     Reason for Consult:  ESRD    HPI:   Cristina Lemus is a 89 y.o. female with ESRD on HD (TTS; [facility]; left arm AVG; Dr. Karlos Sierra of our group), admitted today after falling and sustaining a right hip fracture.  She was locking her door, preparing to leave home to go to the dialysis unit, when she tripped over the wheel of her walker.  PMH outlined below; pertinent is atrial fibrillation on AC; hypertension; and previous pelvic fracture.    · Continues to have significant right hip pain  · No shortness of breath or chest pain; no leg swelling  · Appetite is usually good; no N/V  · Still makes some urine    Review of Systems:   14 point review of systems is otherwise negative except for mentioned above on HPI    Personal History     Past Medical History:   Diagnosis Date   • Atrial fibrillation    • CHF (congestive heart failure)    • Hypertension    • Renal insufficiency        Past Surgical History:   Procedure Laterality Date   • ARTERIOVENOUS FISTULA/SHUNT SURGERY Left 2022    Procedure: LEFT ARM DIALYSIS GRAFT;  Surgeon: King Reynaga MD;  Location: [facility];  Service: Vascular;  Laterality: Left;   • BACK SURGERY     • CYST REMOVAL     • INSERTION HEMODIALYSIS CATHETER N/A 2022    Procedure: HEMODIALYSIS CATHETER INSERTION with C arm;  Surgeon: King Reynaga MD;  Location: [facility];  Service: Vascular;  Laterality: N/A;   • OTHER SURGICAL HISTORY      rupture disk       Family History: family history is not on file.    Social History:  reports that she has quit smoking. Her smoking use included cigarettes. She has never used smokeless tobacco. She reports that she does not drink alcohol and does not use  drugs.    Home Medications:  Prior to Admission medications    Medication Sig Start Date End Date Taking? Authorizing Provider   amLODIPine (NORVASC) 10 MG tablet Take 1 tablet by mouth Daily. 12/23/22  Yes Wood Elias MD   Lidocaine 4 % patch Apply 1 patch topically to the appropriate area as directed Daily. 3/16/23  Yes Oswald Sheppard MD   LORazepam (ATIVAN) 1 MG tablet Take 1 tablet by mouth At Night As Needed for Sleep. Must be seen for more refills of this medication. 12/23/22  Yes Wood Elias MD   torsemide (DEMADEX) 20 MG tablet Take 1 tablet by mouth Daily. 12/23/22  Yes Wood Elias MD   warfarin (Jantoven) 2 MG tablet TAKE 3 TABLETS (6MG) BY MOUTH ON TUESDAYS; TAKE 2 TABLETS (4MG) ON ALL OTHER DAYS OR AS DIRECTED. 3/20/23  Yes Lm Mart MD   acetaminophen (TYLENOL) 325 MG tablet Take 2 tablets by mouth Every 4 (Four) Hours As Needed for Mild Pain .  Patient not taking: Reported on 4/12/2023 5/15/22   Margot Flowers MD   acetaminophen (TYLENOL) 500 MG tablet Take 1 tablet by mouth 4 (Four) Times a Day. 4/6/23   Oswald Sheppard MD   Cholecalciferol (VITAMIN D3 PO) Take 1,000 Units by mouth Daily.  Patient not taking: Reported on 4/12/2023    Oswald Sheppard MD   potassium chloride (KLOR-CON) 20 MEQ CR tablet Take 1 tablet by mouth Daily. 12/23/22   Wood Elias MD       Allergies:  Allergies   Allergen Reactions   • Statins Nausea And Vomiting   • Sulfa Antibiotics Nausea And Vomiting and Other (See Comments)     CHILLS AND FEVER       Objective     Vitals:   Temp:  [97 °F (36.1 °C)-97.9 °F (36.6 °C)] 97.9 °F (36.6 °C)  Heart Rate:  [55-91] 91  Resp:  [16-18] 16  BP: (127-152)/(58-78) 152/70    Intake/Output Summary (Last 24 hours) at 4/22/2023 2127  Last data filed at 4/22/2023 1958  Gross per 24 hour   Intake 110 ml   Output --   Net 110 ml       Physical Exam:   Constitutional: Awake, alert, NAD but uncomfortable, fully oriented, [place]  HEENT: Sclera  anicteric, no conjunctival injection, dry MM  Neck: Supple, no carotid bruit, trachea at midline, no JVD  Respiratory: Clear anteriorly, nonlabored respiration  Cardiovascular: RR, tachycardic, no rub  Gastrointestinal: Soft, BS +, nontender and nondistended  Vascular: Patent left arm AV graft  : No palpable bladder  Musculoskeletal: No edema, no clubbing or cyanosis  Psychiatric: Appropriate affect, cooperative, oriented  Neurologic:  moving all extremities except right leg, normal speech  Skin: Warm and dry       Scheduled Meds:     amLODIPine, 10 mg, Oral, Daily  cholecalciferol, 1,000 Units, Oral, Daily  sodium chloride, 10 mL, Intravenous, Q12H      IV Meds:        Results Reviewed:   I have personally reviewed the results from the time of this admission to 4/22/2023 21:27 EDT     Lab Results   Component Value Date    GLUCOSE 192 (H) 04/22/2023    CALCIUM 9.8 (H) 04/22/2023     04/22/2023    K 4.1 04/22/2023    CO2 22.3 04/22/2023    CL 99 04/22/2023    BUN 45 (H) 04/22/2023    CREATININE 2.96 (H) 04/22/2023    EGFRIFAFRI  01/26/2022      Comment:      <15 Indicative of kidney failure.    EGFRIFNONA 10 (L) 01/26/2022    BCR 15.2 04/22/2023    ANIONGAP 16.7 (H) 04/22/2023      Lab Results   Component Value Date    MG 2.3 04/22/2023    PHOS 4.8 (H) 05/15/2022    ALBUMIN 4.2 04/22/2023           Assessment / Plan     ASSESSMENT:  1.  ESRD: Stable volume and electrolytes; last HD was 4/20  2.  Mechanical fall with right hip fracture  3.  Atrial fibrillation on AC; she reports prior history of PE  4.  Anemia of ESRD  5.  History of CHF:  appears compensated  6.  Hypertension of ESRD    PLAN:  1.  OR tomorrow to address right hip fracture  2.  Provided volume and electrolytes stable tomorrow, anticipate next HD 4/24.  Will perform sooner, though, if needed  3.  Surveillance labs    Thank you for involving us in the care of Cristina Lemus.  Please feel free to call with any questions.    Alex Alcazar,  MD  04/22/23  21:27 EDT    [facility]  [phone]      Please note that portions of this note were completed with a voice recognition program.

## 2023-04-23 NOTE — ANESTHESIA PREPROCEDURE EVALUATION
Anesthesia Evaluation     Patient summary reviewed and Nursing notes reviewed   NPO Solid Status: > 6 hours  NPO Liquid Status: > 6 hours           Airway   Mallampati: II  TM distance: >3 FB  Neck ROM: full  Dental    (+) poor dentition    Pulmonary    (+) a smoker Former,   (-) COPD, asthma, sleep apnea  Cardiovascular     ECG reviewed  PT is on anticoagulation therapy    (+) hypertension, dysrhythmias Atrial Fib, CHF Diastolic >=55%,   (-) CAD, angina, cardiac stents      Neuro/Psych  (+) psychiatric history Anxiety,    (-) seizures, CVA  GI/Hepatic/Renal/Endo    (+)   renal disease (TTS HD, most recent HD Thursday),   (-) GERD, liver disease, diabetes, no thyroid disorder    Musculoskeletal     Abdominal    Substance History      OB/GYN          Other                        Anesthesia Plan    ASA 4     general     (Pt's warfarin held x1 day. INR 1.68 preop. Receiving FFP x1 preoperatively per surgeon request.)    Anesthetic plan, risks, benefits, and alternatives have been provided, discussed and informed consent has been obtained with: patient.        CODE STATUS:    Code Status (Patient has no pulse and is not breathing): CPR (Attempt to Resuscitate)  Medical Interventions (Patient has pulse or is breathing): Full Support

## 2023-04-23 NOTE — PERIOPERATIVE NURSING NOTE
FFP initiated, patient taken to OR by Prince prior to 15 minute check. He will pass off to anesthesia.

## 2023-04-23 NOTE — PROGRESS NOTES
"DAILY PROGRESS NOTE  [facility]    Patient Identification:  Name: Cristina Lemus  Age: 89 y.o.  Sex: female  :  3/13/1930  MRN: [id number]         Primary Care Physician: Wood Elias MD      Subjective  Patient with no acute complaints.  Postop pain control appears to be good.    Objective:  General Appearance:  Comfortable, well-appearing, in no acute distress and not in pain.    Vital signs: (most recent): Blood pressure 148/67, pulse 77, temperature 97.2 °F (36.2 °C), temperature source Oral, resp. rate 18, height 165.1 cm (65\"), weight 53.5 kg (117 lb 15.1 oz), SpO2 95 %.    Lungs:  Normal effort and normal respiratory rate.  Breath sounds clear to auscultation.    Heart: Normal rate.  Irregular rhythm.    Extremities: There is no dependent edema.    Neurological: (Resting on entering the room.  Easily awakened.  Pleasant and cooperative.).    Skin:  Warm and dry.                Vital signs in last 24 hours:  Temp:  [97.2 °F (36.2 °C)-98.2 °F (36.8 °C)] 97.2 °F (36.2 °C)  Heart Rate:  [69-91] 77  Resp:  [16-28] 18  BP: (104-164)/(58-91) 148/67    Intake/Output:    Intake/Output Summary (Last 24 hours) at 2023 1706  Last data filed at 2023 1638  Gross per 24 hour   Intake 993 ml   Output 250 ml   Net 743 ml         Results from last 7 days   Lab Units 23  0337 23  1028   WBC 10*3/mm3 9.38 5.91   HEMOGLOBIN g/dL 9.7* 11.6*   PLATELETS 10*3/mm3 174 207     Results from last 7 days   Lab Units 23  0337 23  1028   SODIUM mmol/L 139 138   POTASSIUM mmol/L 4.8 4.1   CHLORIDE mmol/L 103 99   CO2 mmol/L 24.8 22.3   BUN mg/dL 44* 45*   CREATININE mg/dL 2.95* 2.96*   GLUCOSE mg/dL 116* 192*   Estimated Creatinine Clearance: 10.1 mL/min (A) (by C-G formula based on SCr of 2.95 mg/dL (H)).  Results from last 7 days   Lab Units 23  0337 23  1028   CALCIUM mg/dL 9.4 9.8*   ALBUMIN g/dL 3.9 4.2   MAGNESIUM mg/dL  --  2.3   PHOSPHORUS mg/dL 4.4  --      Results " from last 7 days   Lab Units 04/23/23  0337 04/22/23  1028   ALBUMIN g/dL 3.9 4.2   BILIRUBIN mg/dL  --  0.4   ALK PHOS U/L  --  95   AST (SGOT) U/L  --  23   ALT (SGPT) U/L  --  18       Assessment:  Right intertrochanteric hip fracture:  S/p intramedullary nailing 4/23/2023.  Orthopedic evaluation and treatment appreciated..  Chronic atrial fibrillation: Continue rate control.  Warfarin on hold.    History of CHF with preserved LV function: Presently appears euvolemic.  ESRD-HDD:  Nephrology management appreciated.    Hypertension: Continue home meds.  History of pulmonary embolus: Monitor closely perioperatively.  History of C. difficile colitis: Minimize antibiotic use.      Plan:  Please see above.    Porfirio Pfeiffer MD  4/23/2023  17:06 EDT

## 2023-04-23 NOTE — PLAN OF CARE
Goal Outcome Evaluation:  Plan of Care Reviewed With: patient            Pt admitted on 4/22 after a fall. Pt has a rt hip fracture. Pt was on her way to dialysis. Pt goes on Tue, Wed, Sat. Pt has a AV fistula to the LUE, no stick band placed. Nephrology was consulted, note states pt is stable for surgery and next dialysis would possibly be on 4/24. Pt has a cardiology consult pending. Pt continues with the Pure Wick, voiding function intact. Pt continues with PO and IV pain meds. Pt had a large incontinent episode this shift. Pt is currently NPO in anticipation of surgery. Pt educated on the importance of monitoring blood pressures related to comorbidity of HTN. Pt voiced understanding. Pt resting at this time, will continue to  monitor.

## 2023-04-23 NOTE — ANESTHESIA PROCEDURE NOTES
Airway  Urgency: elective    Date/Time: 4/23/2023 12:05 PM  Airway not difficult    General Information and Staff    Patient location during procedure: OR  Anesthesiologist: Sterling Rodriges MD    Indications and Patient Condition  Indications for airway management: airway protection    Preoxygenated: yes  MILS maintained throughout  Mask difficulty assessment: 1 - vent by mask    Final Airway Details  Final airway type: endotracheal airway      Successful airway: ETT  Cuffed: yes   Successful intubation technique: direct laryngoscopy  Endotracheal tube insertion site: oral  Blade: Hurd  Blade size: 2  ETT size (mm): 7.0  Cormack-Lehane Classification: grade I - full view of glottis  Placement verified by: chest auscultation   Number of attempts at approach: 1  Assessment: lips, teeth, and gum same as pre-op

## 2023-04-24 LAB
ANION GAP SERPL CALCULATED.3IONS-SCNC: 12.6 MMOL/L (ref 5–15)
BH BB BLOOD EXPIRATION DATE: NORMAL
BH BB BLOOD TYPE BARCODE: 6200
BH BB DISPENSE STATUS: NORMAL
BH BB PRODUCT CODE: NORMAL
BH BB UNIT NUMBER: NORMAL
BUN SERPL-MCNC: 49 MG/DL (ref 8–23)
BUN/CREAT SERPL: 14.7 (ref 7–25)
CALCIUM SPEC-SCNC: 8.3 MG/DL (ref 8.2–9.6)
CHLORIDE SERPL-SCNC: 101 MMOL/L (ref 98–107)
CO2 SERPL-SCNC: 19.4 MMOL/L (ref 22–29)
CREAT SERPL-MCNC: 3.33 MG/DL (ref 0.57–1)
EGFRCR SERPLBLD CKD-EPI 2021: 12.4 ML/MIN/1.73
GLUCOSE SERPL-MCNC: 112 MG/DL (ref 65–99)
HBV SURFACE AG SERPL QL IA: NORMAL
HCT VFR BLD AUTO: 26.1 % (ref 34–46.6)
HGB BLD-MCNC: 8.6 G/DL (ref 12–15.9)
INR PPP: 3 (ref 0.9–1.1)
POTASSIUM SERPL-SCNC: 4.9 MMOL/L (ref 3.5–5.2)
PROTHROMBIN TIME: 31.6 SECONDS (ref 11.7–14.2)
SODIUM SERPL-SCNC: 133 MMOL/L (ref 136–145)
UNIT  ABO: NORMAL
UNIT  RH: NORMAL

## 2023-04-24 PROCEDURE — 85014 HEMATOCRIT: CPT | Performed by: ORTHOPAEDIC SURGERY

## 2023-04-24 PROCEDURE — 85610 PROTHROMBIN TIME: CPT | Performed by: ORTHOPAEDIC SURGERY

## 2023-04-24 PROCEDURE — 99232 SBSQ HOSP IP/OBS MODERATE 35: CPT | Performed by: INTERNAL MEDICINE

## 2023-04-24 PROCEDURE — 25010000002 HYDROMORPHONE PER 4 MG: Performed by: ORTHOPAEDIC SURGERY

## 2023-04-24 PROCEDURE — 87340 HEPATITIS B SURFACE AG IA: CPT | Performed by: HOSPITALIST

## 2023-04-24 PROCEDURE — 97110 THERAPEUTIC EXERCISES: CPT

## 2023-04-24 PROCEDURE — 5A1D70Z PERFORMANCE OF URINARY FILTRATION, INTERMITTENT, LESS THAN 6 HOURS PER DAY: ICD-10-PCS | Performed by: INTERNAL MEDICINE

## 2023-04-24 PROCEDURE — 97162 PT EVAL MOD COMPLEX 30 MIN: CPT

## 2023-04-24 PROCEDURE — 85018 HEMOGLOBIN: CPT | Performed by: ORTHOPAEDIC SURGERY

## 2023-04-24 PROCEDURE — 80048 BASIC METABOLIC PNL TOTAL CA: CPT | Performed by: ORTHOPAEDIC SURGERY

## 2023-04-24 RX ORDER — FAMOTIDINE 20 MG/1
20 TABLET, FILM COATED ORAL DAILY
Status: DISCONTINUED | OUTPATIENT
Start: 2023-04-25 | End: 2023-04-27 | Stop reason: HOSPADM

## 2023-04-24 RX ADMIN — HYDROCODONE BITARTRATE AND ACETAMINOPHEN 1 TABLET: 7.5; 325 TABLET ORAL at 05:40

## 2023-04-24 RX ADMIN — HYDROMORPHONE HYDROCHLORIDE 0.25 MG: 1 INJECTION, SOLUTION INTRAMUSCULAR; INTRAVENOUS; SUBCUTANEOUS at 08:56

## 2023-04-24 RX ADMIN — Medication 10 ML: at 20:38

## 2023-04-24 RX ADMIN — DOCUSATE SODIUM 200 MG: 100 CAPSULE, LIQUID FILLED ORAL at 20:39

## 2023-04-24 RX ADMIN — Medication 10 ML: at 15:24

## 2023-04-24 RX ADMIN — DOCUSATE SODIUM 200 MG: 100 CAPSULE, LIQUID FILLED ORAL at 15:20

## 2023-04-24 RX ADMIN — Medication 10 ML: at 20:39

## 2023-04-24 RX ADMIN — AMLODIPINE BESYLATE 10 MG: 10 TABLET ORAL at 15:20

## 2023-04-24 RX ADMIN — Medication 1000 UNITS: at 15:20

## 2023-04-24 NOTE — THERAPY EVALUATION
Patient Name: Cristina Lemus  : 3/13/1930    MRN: [id number]                              Today's Date: 2023       Admit Date: 2023    Visit Dx:     ICD-10-CM ICD-9-CM   1. Closed fracture of right hip, initial encounter  S72.001A 820.8   2. ESRD on dialysis  N18.6 585.6    Z99.2 V45.11     Patient Active Problem List   Diagnosis   • AVNRT (AV joy re-entry tachycardia)   • History of pulmonary embolus (PE)   • HTN, goal below 140/80   • Permanent atrial fibrillation (HCC)   • Stage 4 chronic kidney disease   • Chronic diastolic congestive heart failure (HCC)   • Anxiety disorder   • Chronic gout without tophus   • KIM (acute kidney injury)   • Essential hypertension   • Dyspnea   • Pleural effusion   • Dyspnea on exertion   • Anemia   • Acute on chronic renal failure   • Acute renal failure superimposed on chronic kidney disease   • Anticoagulated on warfarin   • UTI (urinary tract infection)   • Metabolic encephalopathy   • End stage renal disease   • C. difficile colitis   • Aftercare including intermittent dialysis   • Allergy, unspecified, initial encounter   • Anaphylactic shock, unspecified, initial encounter   • Anemia in chronic kidney disease   • Coagulation defect, unspecified   • Conjunctivochalasis   • Epiretinal membrane   • Hypokalemia   • Iron deficiency anemia   • Secondary hyperparathyroidism of renal origin   • Chronic mid back pain   • Sacral fracture   • Closed fracture of fifth thoracic vertebra with routine healing   • Closed fracture of right hip, initial encounter     Past Medical History:   Diagnosis Date   • Atrial fibrillation    • CHF (congestive heart failure)    • Hypertension    • Renal insufficiency      Past Surgical History:   Procedure Laterality Date   • ARTERIOVENOUS FISTULA/SHUNT SURGERY Left 2022    Procedure: LEFT ARM DIALYSIS GRAFT;  Surgeon: King Reynaga MD;  Location: [facility] OR;  Service: Vascular;  Laterality: Left;   • BACK SURGERY     • CYST  REMOVAL     • FEMUR IM NAILING/RODDING Right 4/23/2023    Procedure: RIGHT HIP INTRAMEDULLARY NAILING WITH CERCLAGE CABLING;  Surgeon: King Marcano MD;  Location: [facility] OR;  Service: Orthopedics;  Laterality: Right;  Christa   • INSERTION HEMODIALYSIS CATHETER N/A 1/18/2022    Procedure: HEMODIALYSIS CATHETER INSERTION with C arm;  Surgeon: King Reynaga MD;  Location: [facility] MAIN OR;  Service: Vascular;  Laterality: N/A;   • OTHER SURGICAL HISTORY      rupture disk      General Information     Row Name 04/24/23 0938          Physical Therapy Time and Intention    Document Type evaluation  -MS     Mode of Treatment physical therapy  -MS     Row Name 04/24/23 0938          General Information    Patient Profile Reviewed yes  -MS     Prior Level of Function independent:;all household mobility;ADL's  walker, fall from admission but denies any other fall hx, ind with mobility and ADLs  -MS     Existing Precautions/Restrictions fall  -MS     Barriers to Rehab none identified  -MS     Row Name 04/24/23 0938          Living Environment    People in Home child(liana), adult  daughter  -MS     Row Name 04/24/23 0938          Cognition    Orientation Status (Cognition) oriented x 4  -MS     Row Name 04/24/23 0912          Safety Issues, Functional Mobility    Safety Issues Affecting Function (Mobility) positioning of assistive device;sequencing abilities  -MS     Impairments Affecting Function (Mobility) strength;endurance/activity tolerance;pain;balance  -MS     Comment, Safety Issues/Impairments (Mobility) Non skid socks donned.  -MS           User Key  (r) = Recorded By, (t) = Taken By, (c) = Cosigned By    Initials Name Provider Type    MS Siena Donald PT Physical Therapist               Mobility     Row Name 04/24/23 0932          Bed Mobility    Bed Mobility supine-sit;sit-supine;scooting/bridging  -MS     Scooting/Bridging [place] (Bed Mobility) dependent (less than 25% patient effort);2  person assist;verbal cues;nonverbal cues (demo/gesture)  -MS     Supine-Sit [place] (Bed Mobility) maximum assist (25% patient effort);verbal cues;nonverbal cues (demo/gesture)  -MS     Sit-Supine [place] (Bed Mobility) maximum assist (25% patient effort);verbal cues;nonverbal cues (demo/gesture)  -MS     Assistive Device (Bed Mobility) bed rails;head of bed elevated  -MS     Comment, (Bed Mobility) Incr time required, therapist assisting with R LE primarily, difficulty scooting forward, cues to reach for bed rail to promote ind level. Once EOB, CGA for sitting balance- sat EOB for 4-5 minutes before requesting to return to supine.  -MS     Row Name 04/24/23 0939          Transfers    Comment, (Transfers) Unable to assess this date d/t pain.  -MS     Row Name 04/24/23 0939          Mobility    Extremity Weight-bearing Status right lower extremity  -MS     Right Lower Extremity (Weight-bearing Status) weight-bearing as tolerated (WBAT)  -MS           User Key  (r) = Recorded By, (t) = Taken By, (c) = Cosigned By    Initials Name Provider Type    MS DonaldSiena, PT Physical Therapist               Obj/Interventions     Row Name 04/24/23 0941          Range of Motion Comprehensive    Comment, General Range of Motion R LE limited 2/2 pain and soreness  -MS     Row Name 04/24/23 0941          Strength Comprehensive (MMT)    Comment, General Manual Muscle Testing (MMT) Assessment R LE post op weakness, L LE 3+/5 grossly  -MS     Row Name 04/24/23 0941          Motor Skills    Therapeutic Exercise --  APs x 10 reps, declined LAQs d/t pain  -MS     Row Name 04/24/23 0941          Balance    Balance Assessment sitting static balance  -MS     Static Sitting Balance contact guard  -MS     Position, Sitting Balance sitting edge of bed  -MS     Row Name 04/24/23 0941          Sensory Assessment (Somatosensory)    Sensory Assessment (Somatosensory) sensation intact  -MS           User Key  (r) = Recorded By, (t)  = Taken By, (c) = Cosigned By    Initials Name Provider Type    Benigno Najeramarvin BOBBY, PT Physical Therapist               Goals/Plan     Row Name 04/24/23 0943          Bed Mobility Goal 1 (PT)    Activity/Assistive Device (Bed Mobility Goal 1, PT) bed mobility activities, all  -MS     [place] Level/Cues Needed (Bed Mobility Goal 1, PT) contact guard required  -MS     Time Frame (Bed Mobility Goal 1, PT) 1 week  -MS     Row Name 04/24/23 0943          Transfer Goal 1 (PT)    Activity/Assistive Device (Transfer Goal 1, PT) transfers, all;walker, rolling  -MS     [place] Level/Cues Needed (Transfer Goal 1, PT) contact guard required  -MS     Time Frame (Transfer Goal 1, PT) 1 week  -MS     Row Name 04/24/23 0943          Gait Training Goal 1 (PT)    Activity/Assistive Device (Gait Training Goal 1, PT) gait (walking locomotion);walker, rolling  -MS     [place] Level (Gait Training Goal 1, PT) contact guard required  -MS     Distance (Gait Training Goal 1, PT) 100'  -MS     Time Frame (Gait Training Goal 1, PT) 1 week  -MS     Row Name 04/24/23 0943          Therapy Assessment/Plan (PT)    Planned Therapy Interventions (PT) balance training;bed mobility training;gait training;home exercise program;patient/family education;postural re-education;ROM (range of motion);stair training;strengthening;transfer training  -MS           User Key  (r) = Recorded By, (t) = Taken By, (c) = Cosigned By    Initials Name Provider Type    Siena Najera KANU, PT Physical Therapist               Clinical Impression     Row Name 04/24/23 0942          Pain    Pretreatment Pain Rating 0/10 - no pain  -MS     Posttreatment Pain Rating 10/10  -MS     Pain Location - Side/Orientation Right  -MS     Pain Location lower  -MS     Pain Location - extremity  -MS     Pain Intervention(s) Nursing Notified;Repositioned;Rest  -MS     Row Name 04/24/23 0942          Therapy Assessment/Plan (PT)    Rehab Potential (PT) good, to achieve  stated therapy goals  -MS     Criteria for Skilled Interventions Met (PT) yes;meets criteria  -MS     Therapy Frequency (PT) 6 times/wk  -MS     Row Name 04/24/23 0942          Vital Signs    Pre SpO2 (%) 91  -MS     O2 Delivery Pre Treatment supplemental O2  -MS     O2 Delivery Intra Treatment supplemental O2  attempted RA temporarily prior to mobilization though noted high 80s, donned O2 for entirety of eval  -MS     Post SpO2 (%) 90  -MS     O2 Delivery Post Treatment supplemental O2  -MS     Row Name 04/24/23 0942          Positioning and Restraints    Pre-Treatment Position in bed  -MS     Post Treatment Position bed  -MS     In Bed fowlers;with other staff  w transporter, O2 on, pure wick and SCD pumps disconnected, RN notified of need for pain medicine  -MS           User Key  (r) = Recorded By, (t) = Taken By, (c) = Cosigned By    Initials Name Provider Type    Siena Najera, PT Physical Therapist               Outcome Measures     Row Name 04/24/23 0944          How much help from another person do you currently need...    Turning from your back to your side while in flat bed without using bedrails? 2  -MS     Moving from lying on back to sitting on the side of a flat bed without bedrails? 1  -MS     Moving to and from a bed to a chair (including a wheelchair)? 1  -MS     Standing up from a chair using your arms (e.g., wheelchair, bedside chair)? 1  -MS     Climbing 3-5 steps with a railing? 1  -MS     To walk in hospital room? 1  -MS     AM-PAC 6 Clicks Score (PT) 7  -MS     Highest level of mobility 2 --> Bed activities/dependent transfer  -MS           User Key  (r) = Recorded By, (t) = Taken By, (c) = Cosigned By    Initials Name Provider Type    Siena Najera, PT Physical Therapist                             Physical Therapy Education     Title: PT OT SLP Therapies (In Progress)     Topic: Physical Therapy (In Progress)     Point: Mobility training (Done)     Learning Progress Summary            Patient Acceptance, E,TB, VU by MS at 4/24/2023 0944                   Point: Home exercise program (Not Started)     Learner Progress:  Not documented in this visit.          Point: Body mechanics (Not Started)     Learner Progress:  Not documented in this visit.          Point: Precautions (Not Started)     Learner Progress:  Not documented in this visit.                      User Key     Initials Effective Dates Name Provider Type Discipline    MS 06/16/21 -  Siena Donald PT Physical Therapist PT              PT Recommendation and Plan  Planned Therapy Interventions (PT): balance training, bed mobility training, gait training, home exercise program, patient/family education, postural re-education, ROM (range of motion), stair training, strengthening, transfer training        Time Calculation:    PT Charges     Row Name 04/24/23 0938             Time Calculation    Start Time 0840  -MS      Stop Time 0855  -MS      Time Calculation (min) 15 min  -MS      PT Received On 04/24/23  -MS      PT - Next Appointment 04/25/23  -MS      PT Goal Re-Cert Due Date 05/01/23  -MS            User Key  (r) = Recorded By, (t) = Taken By, (c) = Cosigned By    Initials Name Provider Type    MS Donald Siena BOBBY, PT Physical Therapist              Therapy Charges for Today     Code Description Service Date Service Provider Modifiers Qty    93336963451 HC PT EVAL MOD COMPLEXITY 3 4/24/2023 Siena Donald, PT GP 1    81197136545 HC PT THER PROC EA 15 MIN 4/24/2023 Siena Donald, PT GP 1    67743373330 HC PT THER SUPP EA 15 MIN 4/24/2023 Siena Donald, PT GP 1          PT G-Codes  AM-PAC 6 Clicks Score (PT): 7  PT Discharge Summary  Anticipated Discharge Disposition (PT): skilled nursing facility    Siena Donald PT  4/24/2023

## 2023-04-24 NOTE — DISCHARGE PLACEMENT REQUEST
"Steven Lemus (89 y.o. Female)     Date of Birth   03/13/1930    Social Security Number       Address   [address] [address]    Home Phone   [phone]    MRN   [id number]       [facility]    Marital Status                               Admission Date   4/22/23    Admission Type   Emergency    Admitting Provider   Porfirio Pfeiffer MD    Attending Provider   Porfirio Pfeiffer MD    Department, Room/Bed   [address], E468/1       Discharge Date       Discharge Disposition       Discharge Destination                               Attending Provider: Porfirio Pfeiffer MD    Allergies: Statins, Sulfa Antibiotics    Isolation: None   Infection: None   Code Status: CPR    Ht: 165.1 cm (65\")   Wt: 50.5 kg (111 lb 5.3 oz)    Admission Cmt: None   Principal Problem: Closed fracture of right hip, initial encounter [S72.001A]                 Active Insurance as of 4/22/2023     Primary Coverage     Payor Plan Insurance Group Employer/Plan Group    MEDICARE MEDICARE A & B      Payor Plan Address Payor Plan Phone Number Payor Plan Fax Number Effective Dates    [address] [phone]  3/1/1995 - None Entered    [facility] [id number]       Subscriber Name Subscriber Birth Date Member ID       STEVEN LEMUS 3/13/1930 [id number]           Secondary Coverage     Payor Plan Insurance Group Employer/Plan Group    TOBI [facility] SUPP [id number]     Payor Plan Address Payor Plan Phone Number Payor Plan Fax Number Effective Dates    [address]   12/1/2016 - None Entered    [facility] [id number]       Subscriber Name Subscriber Birth Date Member ID       STEVEN LEMUS 3/13/1930 [id number]                 Emergency Contacts      (Rel.) Home Phone Work Phone Mobile Phone    Shannan Lemus (Daughter) [phone] -- [phone]    Jese Lemus (Son) [phone] -- [phone]          "

## 2023-04-24 NOTE — PROGRESS NOTES
[facility] Progress Note      Patient Name: Cristina Lemus  : 3/13/1930  MRN: [id number]  Primary Care Physician:  Wood Elias MD  Date of admission: 2023    Subjective     Interval History:   Right hip intramedullary nailing earlier today  Feels very tired, but reports no pain  Very little appetite  Breathing is comfortable on 3 L/min    Review of Systems:   As noted above    Objective     Vitals:   Temp:  [97.2 °F (36.2 °C)-98.2 °F (36.8 °C)] 97.7 °F (36.5 °C)  Heart Rate:  [69-81] 81  Resp:  [18-28] 18  BP: (104-164)/(46-91) 116/46  Flow (L/min):  [2-4] 3    Intake/Output Summary (Last 24 hours) at 2023  Last data filed at 2023 1800  Gross per 24 hour   Intake 983 ml   Output 250 ml   Net 733 ml       Physical Exam:    Constitutional: Groggy, NAD, pleasant  HEENT: Sclera anicteric, no conjunctival injection, dry MM  Neck: Supple, no carotid bruit, trachea at midline, no JVD  Respiratory: Clear anteriorly, nonlabored respiration  Cardiovascular: RRR, no rub  Gastrointestinal: Soft, BS +, nontender and nondistended  Vascular: Patent left arm AV graft  : No palpable bladder  Musculoskeletal: No edema, no clubbing or cyanosis  Psychiatric: Groggy, cooperative  Neurologic:  moving all extremities except right leg  Skin: Warm and dry    Scheduled Meds:     acetaminophen, 1,000 mg, Oral, Q6H  amLODIPine, 10 mg, Oral, Daily  ceFAZolin, 2 g, Intravenous, Q8H  cholecalciferol, 1,000 Units, Oral, Daily  docusate sodium, 200 mg, Oral, BID  [START ON 2023] enoxaparin, 40 mg, Subcutaneous, Daily  famotidine, 40 mg, Oral, Daily  sodium chloride, 10 mL, Intravenous, Q12H  sodium chloride, 10 mL, Intravenous, Q12H      IV Meds:   Pharmacy to dose warfarin,   sodium chloride, 60 mL/hr, Last Rate: 60 mL/hr (23 1625)        Results Reviewed:   I have personally reviewed the results from the time of this admission to 2023 20:31 EDT     Results from last 7 days    Lab Units 04/23/23  0337 04/22/23  1028   SODIUM mmol/L 139 138   POTASSIUM mmol/L 4.8 4.1   CHLORIDE mmol/L 103 99   CO2 mmol/L 24.8 22.3   BUN mg/dL 44* 45*   CREATININE mg/dL 2.95* 2.96*   CALCIUM mg/dL 9.4 9.8*   BILIRUBIN mg/dL  --  0.4   ALK PHOS U/L  --  95   ALT (SGPT) U/L  --  18   AST (SGOT) U/L  --  23   GLUCOSE mg/dL 116* 192*       Estimated Creatinine Clearance: 10.1 mL/min (A) (by C-G formula based on SCr of 2.95 mg/dL (H)).    Results from last 7 days   Lab Units 04/23/23  0337 04/22/23  1028   MAGNESIUM mg/dL  --  2.3   PHOSPHORUS mg/dL 4.4  --              Results from last 7 days   Lab Units 04/23/23  0337 04/22/23  1028   WBC 10*3/mm3 9.38 5.91   HEMOGLOBIN g/dL 9.7* 11.6*   PLATELETS 10*3/mm3 174 207       Results from last 7 days   Lab Units 04/23/23  1531 04/23/23  0337 04/22/23  1028   INR  1.89* 1.68* 1.50*       Assessment / Plan     ASSESSMENT:  1.  ESRD: SCr stable in the absence of HD, though suspect reflecting mostly lack of protein intake and her low muscle mass.  Stable volume and electrolytes; last HD was 4/20.  Her usual schedule is [facility]  2.  Mechanical fall with right hip fracture, s/p right hip intramedullary nailing 4/23  3.  Atrial fibrillation on AC; she reports prior history of PE  4.  Anemia of ESRD  5.  History of CHF:  appears compensated  6.  Hypertension of ESRD    PLAN:  1.  HD tomorrow barring any surprises with labs  2.  NS at 60 mL/h  3.  Surveillance labs    Thank you for involving us in the care of Cristina Lemus.  Please feel free to call with any questions.    Alex Alcazar MD  04/23/23  20:31 EDT    [facility]  [phone]    Please note that portions of this note were completed with a voice recognition program.

## 2023-04-24 NOTE — PROGRESS NOTES
Procedure(s):  RIGHT HIP INTRAMEDULLARY NAILING WITH CERCLAGE CABLING     LOS: 2 days     Subjective :   Patient seen and examined.  Resting comfortably in her hospital bed.  No acute issues overnight.  Pain appropriately controlled.  Answers questions appropriately.  Denies fever, chills, chest pain, shortness of breath, calf pain.    Objective :    Vital signs in last 24 hours:  Vitals:    04/23/23 2318 04/24/23 0130 04/24/23 0318 04/24/23 0534   BP: 116/55      BP Location: Right arm      Patient Position: Lying      Pulse: 79 66 66 82   Resp: 18      Temp: 98.1 °F (36.7 °C)      TempSrc: Oral      SpO2: 98% 91% 96% 98%   Weight:       Height:           PHYSICAL EXAM:  Patient is calm, in no acute distress, awake and oriented x 3.  Dressing is clean, dry and intact.  No signs of infection.  Swelling is appropriate in amount.  Ecchymosis is appropriate in amount.  Homans test is negative.  EHL, FHL, GS, TA intact.    DP/TP 2+.  Patient is neurovascularly intact distally.    LABS:  Results from last 7 days   Lab Units 04/24/23  0631 04/23/23  0337   WBC 10*3/mm3  --  9.38   HEMOGLOBIN g/dL 8.6* 9.7*   HEMATOCRIT % 26.1* 28.2*   PLATELETS 10*3/mm3  --  174     Results from last 7 days   Lab Units 04/24/23  0631   SODIUM mmol/L 133*   POTASSIUM mmol/L 4.9   CHLORIDE mmol/L 101   CO2 mmol/L 19.4*   BUN mg/dL 49*   CREATININE mg/dL 3.33*   GLUCOSE mg/dL 112*   CALCIUM mg/dL 8.3     Results from last 7 days   Lab Units 04/24/23  0631 04/23/23  1531 04/23/23  0337 04/22/23  1028   INR  3.00* 1.89* 1.68* 1.50*   APTT seconds  --   --   --  31.7         ASSESSMENT:  Status post Procedure(s):  RIGHT HIP INTRAMEDULLARY NAILING WITH CERCLAGE CABLING  , POD #1    Plan:  Continue Physical Therapy, increase mobility and range of motion as tolerated.  Continue SCDs, Continue DVT prophylaxis.    Patient will continue with medical management under the care of the hospitalist, cardiology and nephrology teams.  Dispo planning per  primary team.  We will sign off from orthopedic standpoint at this time.  Thank you very much for allowing us to be a part of the patient's care.  Please contact our office with any questions or concerns regarding the patient's care.    Patient will follow-up in our office in 10 to 14 days for staple removal.    Tylor Newberry III, PA-C    Date: 4/24/2023  Time: 07:13 EDT

## 2023-04-24 NOTE — PLAN OF CARE
Goal Outcome Evaluation:    Progress: improving  Outcome Evaluation: Vitals stable. HD completed this AM - patient tolerated well. Weaned to room air. IVF discontinued. Warfarin to be restarted. Right hip dressing C/D/I. Turned [id number] and skin care provided. Daughter updated over the phone. Patient stable and needs met at this time.

## 2023-04-24 NOTE — PLAN OF CARE
Problem: Adult Inpatient Plan of Care  Goal: Plan of Care Review  Outcome: Ongoing, Progressing  Flowsheets (Taken 4/24/2023 0443)  Outcome Evaluation: VSS. Patient able to sleep paroxiamtely 4 hours. Pain controlled. Q2 turns. SCDs on. Tolerated IVF. Able to urinate 100 ml. Will continue to monitor. To be endorsed accordingly.

## 2023-04-24 NOTE — PROGRESS NOTES
[facility] Clinical Pharmacy Services: Warfarin Dosing/Monitoring Consult    Cristina Lemus is a 89 y.o. female, estimated creatinine clearance is 8.9 mL/min (A) (by C-G formula based on SCr of 3.33 mg/dL (H)). weighing 53.5 kg (117 lb 15.1 oz).    Results from last 7 days   Lab Units 04/24/23  0631 04/23/23  1531 04/23/23  0337 04/22/23  1028   INR  3.00* 1.89* 1.68* 1.50*   APTT seconds  --   --   --  31.7   HEMOGLOBIN g/dL 8.6*  --  9.7* 11.6*   HEMATOCRIT % 26.1*  --  28.2* 33.6*   PLATELETS 10*3/mm3  --   --  174 207     Prior to admission anticoagulation:   - Per [facility] records (last visit 04/17/23): Current warfarin regimen is 6 mg on Tues; 4 mg all other days.   - Med rec matches this regimen.     Hospital Anticoagulation:  Consulting provider: Dr. Marcano  Start date: 4/23  Indication: A Fib - requiring full anticoagulation  Target INR: 2 - 3  Expected duration: tbd   Bridge Therapy: No  with enoxaparin 40 mg SC daily to start 4/24 - not full anticoagulation for bridge therapy.    Potential food or drug interactions:  - Cefazolin: May enhance the anticoagulant effect of warfarin. (3 perioperative doses administered 04/23)   - Patient was NPO for surgery on 04/23. Regular diet ordered currently, did not eat dinner last night, no other PO intake charted at this time.     Education complete?/Date: No; plan for follow up TBD    Assessment/Plan:  1. Hold warfarin at this time based on magnitude of trend.   2. Monitor for any signs or symptoms of bleeding.  3. Follow up daily INRs and dose adjustments. INR ordered daily with AM labs while inpatient.  4. Discontinue enoxaparin. INR is now >2 and enoxaparin is not recommended in dialysis patients due to risk of accumulation of non-dialyzable active metabolites.     Pharmacy will continue to follow until discharge or discontinuation of warfarin.     Wood Alston, PharmD  Clinical Pharmacist

## 2023-04-24 NOTE — PROGRESS NOTES
"DAILY PROGRESS NOTE  [facility]    Patient Identification:  Name: Cristina Lemus  Age: 89 y.o.  Sex: female  :  3/13/1930  MRN: [id number]         Primary Care Physician: Wood Elias MD      Subjective  Patient with no acute complaints.  Doing very nicely postop so far.    Objective:  General Appearance:  Comfortable, well-appearing, not in pain and in no acute distress.    Vital signs: (most recent): Blood pressure 116/57, pulse 89, temperature 98.9 °F (37.2 °C), temperature source Temporal, resp. rate 16, height 165.1 cm (65\"), weight 50.5 kg (111 lb 5.3 oz), SpO2 92 %.    Lungs:  Normal effort and normal respiratory rate.  Breath sounds clear to auscultation.    Heart: Normal rate.  Regular rhythm.    Extremities: There is no dependent edema.    Neurological: Patient is alert.  (Oriented to person and place.  Cooperative and pleasant).    Skin:  Warm and dry.                Vital signs in last 24 hours:  Temp:  [97.7 °F (36.5 °C)-98.9 °F (37.2 °C)] 98.9 °F (37.2 °C)  Heart Rate:  [66-89] 89  Resp:  [16-18] 16  BP: (116-158)/(46-83) 116/57    Intake/Output:    Intake/Output Summary (Last 24 hours) at 2023 1551  Last data filed at 2023 1426  Gross per 24 hour   Intake 1100 ml   Output 0 ml   Net 1100 ml         Results from last 7 days   Lab Units 23  0631 23  0337 23  1028   WBC 10*3/mm3  --  9.38 5.91   HEMOGLOBIN g/dL 8.6* 9.7* 11.6*   PLATELETS 10*3/mm3  --  174 207     Results from last 7 days   Lab Units 23  0631 23  0337 23  1028   SODIUM mmol/L 133* 139 138   POTASSIUM mmol/L 4.9 4.8 4.1   CHLORIDE mmol/L 101 103 99   CO2 mmol/L 19.4* 24.8 22.3   BUN mg/dL 49* 44* 45*   CREATININE mg/dL 3.33* 2.95* 2.96*   GLUCOSE mg/dL 112* 116* 192*   Estimated Creatinine Clearance: 8.4 mL/min (A) (by C-G formula based on SCr of 3.33 mg/dL (H)).  Results from last 7 days   Lab Units 23  0631 23  0337 23  1028   CALCIUM mg/dL 8.3 9.4 9.8* "   ALBUMIN g/dL  --  3.9 4.2   MAGNESIUM mg/dL  --   --  2.3   PHOSPHORUS mg/dL  --  4.4  --      Results from last 7 days   Lab Units 04/23/23  0337 04/22/23  1028   ALBUMIN g/dL 3.9 4.2   BILIRUBIN mg/dL  --  0.4   ALK PHOS U/L  --  95   AST (SGOT) U/L  --  23   ALT (SGPT) U/L  --  18       Assessment:  Right intertrochanteric hip fracture:  S/p intramedullary nailing 4/23/2023.  Orthopedic evaluation and treatment appreciated..  Chronic atrial fibrillation: Continue rate control.  Warfarin on hold.  Resume when okay with orthopedic surgery.  History of CHF with preserved LV function: Presently appears euvolemic.  ESRD-HDD:  Nephrology management appreciated.    Hypertension: Continue home meds.  History of pulmonary embolus: Monitor closely perioperatively.  History of C. difficile colitis: Minimize antibiotic use.      Plan:  Please see above.  Discharge planning    Porfirio Pfeiffer MD  4/24/2023  15:51 EDT

## 2023-04-24 NOTE — PROGRESS NOTES
[facility] Progress Note      Patient Name: Cristina Lemus  : 3/13/1930  MRN: [id number]  Primary Care Physician:  Wood Elias MD  Date of admission: 2023    Subjective     Interval History:   Right hip intramedullary nailing yesterday  Denies nausea or vomiting.  Pain is under control.  Afebrile.    Review of Systems:   As noted above    Objective     Vitals:   Temp:  [97.2 °F (36.2 °C)-98.2 °F (36.8 °C)] 97.8 °F (36.6 °C)  Heart Rate:  [66-84] 84  Resp:  [18-28] 18  BP: (104-164)/(46-91) 149/83  Flow (L/min):  [2-4] 3    Intake/Output Summary (Last 24 hours) at 2023 0921  Last data filed at 2023 0919  Gross per 24 hour   Intake 1403 ml   Output 250 ml   Net 1153 ml       Physical Exam:    Constitutional: Groggy, NAD, pleasant  HEENT: Sclera anicteric, no conjunctival injection, dry MM  Neck: Supple, no carotid bruit, trachea at midline, no JVD  Respiratory: Clear anteriorly, nonlabored respiration  Cardiovascular: RRR, no rub  Gastrointestinal: Soft, BS +, nontender and nondistended  Vascular: Patent left arm AV graft  : No palpable bladder  Musculoskeletal: No edema, no clubbing or cyanosis  Psychiatric: Groggy, cooperative  Neurologic:  moving all extremities except right leg  Skin: Warm and dry    Scheduled Meds:     acetaminophen, 1,000 mg, Oral, Q6H  amLODIPine, 10 mg, Oral, Daily  cholecalciferol, 1,000 Units, Oral, Daily  docusate sodium, 200 mg, Oral, BID  famotidine, 40 mg, Oral, Daily  sodium chloride, 10 mL, Intravenous, Q12H  sodium chloride, 10 mL, Intravenous, Q12H  warfarin (COUMADIN) (dosing per levels), , Does not apply, Daily      IV Meds:   Pharmacy to dose warfarin,   sodium chloride, 60 mL/hr, Last Rate: 60 mL/hr (23 1625)        Results Reviewed:   I have personally reviewed the results from the time of this admission to 2023 09:21 EDT     Results from last 7 days   Lab Units 23  0631 23  0337 23  1028   SODIUM  mmol/L 133* 139 138   POTASSIUM mmol/L 4.9 4.8 4.1   CHLORIDE mmol/L 101 103 99   CO2 mmol/L 19.4* 24.8 22.3   BUN mg/dL 49* 44* 45*   CREATININE mg/dL 3.33* 2.95* 2.96*   CALCIUM mg/dL 8.3 9.4 9.8*   BILIRUBIN mg/dL  --   --  0.4   ALK PHOS U/L  --   --  95   ALT (SGPT) U/L  --   --  18   AST (SGOT) U/L  --   --  23   GLUCOSE mg/dL 112* 116* 192*       Estimated Creatinine Clearance: 8.9 mL/min (A) (by C-G formula based on SCr of 3.33 mg/dL (H)).    Results from last 7 days   Lab Units 04/23/23  0337 04/22/23  1028   MAGNESIUM mg/dL  --  2.3   PHOSPHORUS mg/dL 4.4  --              Results from last 7 days   Lab Units 04/24/23  0631 04/23/23  0337 04/22/23  1028   WBC 10*3/mm3  --  9.38 5.91   HEMOGLOBIN g/dL 8.6* 9.7* 11.6*   PLATELETS 10*3/mm3  --  174 207       Results from last 7 days   Lab Units 04/24/23  0631 04/23/23  1531 04/23/23  0337 04/22/23  1028   INR  3.00* 1.89* 1.68* 1.50*       Assessment / Plan     ASSESSMENT:  1.  ESRD: SCr stable in the absence of HD, though suspect reflecting mostly lack of protein intake and her low muscle mass.  Stable volume and electrolytes; last HD was 4/20.  Her usual schedule is TTS at [facility]  2.  Mechanical fall with right hip fracture, s/p right hip intramedullary nailing 4/23  3.  Atrial fibrillation on AC; she reports prior history of PE  4.  Anemia of ESRD  5.  History of CHF:  appears compensated  6.  Hypertension of ESRD    PLAN:  1.  Dialyzing today per schedule.  Plan to continue dialysis on a Monday Wednesday Friday schedule using a left upper extremity AV fistula.  2.  We will continue surveillance lab for the time being.  3.  DC IV fluids    Thank you for involving us in the care of Cristina Lemus.  Please feel free to call with any questions.    João Lui MD  04/24/23  09:21 EDT    [facility]  [phone]    Please note that portions of this note were completed with a voice recognition program.

## 2023-04-24 NOTE — CASE MANAGEMENT/SOCIAL WORK
Discharge Planning Assessment  [facility]     Patient Name: Cristina Lemus  MRN: [id number]  Today's Date: 4/24/2023    Admit Date: 4/22/2023    Plan: SNF - pending acce   Discharge Needs Assessment     Row Name 04/24/23 1621       Living Environment    Current Living Arrangements apartment    Potentially Unsafe Housing Conditions none    Primary Care Provided by self;child(liana)    Provides Primary Care For no one    Family Caregiver if Needed child(liana), adult    Family Caregiver Names Shannan Lemus [facility] [phone]    Quality of Family Relationships unable to assess    Able to Return to Prior Arrangements no       Resource/Environmental Concerns    Resource/Environmental Concerns none    Transportation Concerns none       Transition Planning    Patient/Family Anticipates Transition to inpatient rehabilitation facility    Patient/Family Anticipated Services at Transition rehabilitation services    Transportation Anticipated other (see comments)       Discharge Needs Assessment    Readmission Within the Last 30 Days no previous admission in last 30 days    Equipment Currently Used at Home walker, rolling    Concerns to be Addressed discharge planning    Anticipated Changes Related to Illness none    Discharge Facility/Level of Care Needs rehabilitation facility    Provided Post Acute Provider List? Yes    Post Acute Provider List Inpatient Rehab    Delivered To Support Person    Support Person Shannan Lemus [facility] [phone]    Method of Delivery Telephone    Current Discharge Risk chronically ill    Row Name 04/24/23 1613       Living Environment    People in Home child(liana), adult    Name(s) of People in Home Shannan Lemus [facility] [phone]               Discharge Plan     Row Name 04/24/23 1628       Plan    Plan SNF - pending acce    Plan Comments IMM 4/22/2023. Met with patient at bedside. Face sheet verified. Prior to admission patient was living with her daughter. Patient has a walker. Patient goes to dialysis [facility] at  [facility]. [facility] provides transport to dialysis. Patient uses the [facility] on [address].  Patient granted me permission to speak with Shannan Lemus dt [phone].  Daughter supports need for SNF placement post discharge. Reviewed with daughter facilities that have dialysis, 1. [facility], spoke with Abbey, she may not have a bed. 2. [facility]- spoke with Adrianne, she is following. Adrianne states patient will have dialysis M-F, shorter chair time, allows for longer time with therapy. Adrianne will review and get back to CCP. Patient will need transport. Jese Lemus and Shannan Lemus are patient’s health care surrogates. Will continue to monitor for new or changing discharge needs. Lyndsay GOLDSTEIN RN CCP              Continued Care and Services - Admitted Since 4/22/2023     Destination     Service Provider Request Status Selected Services Address Phone Fax Patient Preferred    [facility] Pending - Request Sent N/A [address] [id number] [phone] --    [facility] Pending - Request Sent N/A [address] [id number] [phone] --              Expected Discharge Date and Time     Expected Discharge Date Expected Discharge Time    Apr 25, 2023          Demographic Summary     Row Name 04/24/23 1612       General Information    Admission Type inpatient    Arrived From emergency department    Required Notices Provided Important Message from Medicare    Reason for Consult discharge planning    Preferred Language English       Contact Information    Permission Granted to Share Info With family/designee  Shannan Lemus [facility] [phone]               Functional Status     Row Name 04/24/23 1612       Functional Status    Usual Activity Tolerance moderate    Current Activity Tolerance fair       Functional Status, IADL    Medications independent    Meal Preparation assistive person    Housekeeping assistive person    Laundry assistive  person    Shopping assistive person       Mental Status    General Appearance WDL WDL       Mental Status Summary    Recent Changes in Mental Status/Cognitive Functioning hearing       Employment/    Employment Status retired               Psychosocial    No documentation.                Abuse/Neglect    No documentation.                Legal    No documentation.                Substance Abuse    No documentation.                Patient Forms    No documentation.                   Lyndsay Rey RN

## 2023-04-24 NOTE — PROGRESS NOTES
[facility] Progress Note       Encounter Date:23  Patient:Cristina Lemus  :3/13/1930  MRN:[id number]      Chief Complaint: Follow-up of atrial fibrillation      Subjective:        Patient still very weak but slowly recovering from hip surgery    Review of Systems:  Review of Systems   Constitutional: Positive for malaise/fatigue.   Musculoskeletal: Positive for joint pain.   Neurological: Positive for weakness.       Medications:  Scheduled Meds:  acetaminophen, 1,000 mg, Oral, Q6H  amLODIPine, 10 mg, Oral, Daily  cholecalciferol, 1,000 Units, Oral, Daily  docusate sodium, 200 mg, Oral, BID  enoxaparin, 40 mg, Subcutaneous, Daily  famotidine, 40 mg, Oral, Daily  sodium chloride, 10 mL, Intravenous, Q12H  sodium chloride, 10 mL, Intravenous, Q12H    Continuous Infusions:  Pharmacy to dose warfarin,   sodium chloride, 60 mL/hr, Last Rate: 60 mL/hr (23 1625)    PRN Meds:  •  acetaminophen **OR** acetaminophen **OR** acetaminophen  •  HYDROcodone-acetaminophen  •  HYDROcodone-acetaminophen  •  HYDROcodone-acetaminophen  •  HYDROmorphone **AND** naloxone  •  LORazepam  •  melatonin  •  ondansetron **OR** ondansetron  •  Pharmacy to dose warfarin  •  [COMPLETED] Insert Peripheral IV **AND** sodium chloride  •  sodium chloride  •  sodium chloride  •  sodium chloride  •  sodium chloride         Objective:       Vitals:    23 0130 23 0318 23 0534 23 0758   BP:    149/83   BP Location:    Right arm   Patient Position:    Lying   Pulse: 66 66 82 84   Resp:    18   Temp:    97.8 °F (36.6 °C)   TempSrc:    Oral   SpO2: 91% 96% 98% 97%   Weight:       Height:               Physical Exam:  Constitutional: Well appearing, frail, no acute distress   HENT: Oropharynx clear and membrane moist  Eyes: Normal conjunctiva, no sclera icterus.  Neck: Supple, no carotid bruit bilaterally.  Cardiovascular: Irregularly irregular rate and rhythm, No Murmur, No bilateral lower extremity  edema.  Pulmonary: Normal respiratory effort, normal lung sounds, no wheezing.  Abdominal: Soft, nontender, no hepatosplenomegaly, liver is non-pulsatile.  Neurological: Alert and orient x 3.   Skin: Warm, dry, no ecchymosis, no rash.  Psych: Appropriate mood and affect. Normal judgment and insight.           Lab Review:   Results from last 7 days   Lab Units 04/24/23  0631 04/23/23  0337 04/22/23  1028   SODIUM mmol/L 133* 139 138   POTASSIUM mmol/L 4.9 4.8 4.1   CHLORIDE mmol/L 101 103 99   CO2 mmol/L 19.4* 24.8 22.3   BUN mg/dL 49* 44* 45*   CREATININE mg/dL 3.33* 2.95* 2.96*   GLUCOSE mg/dL 112* 116* 192*   CALCIUM mg/dL 8.3 9.4 9.8*   AST (SGOT) U/L  --   --  23   ALT (SGPT) U/L  --   --  18         Results from last 7 days   Lab Units 04/24/23  0631 04/23/23  0337 04/22/23  1028   WBC 10*3/mm3  --  9.38 5.91   HEMOGLOBIN g/dL 8.6* 9.7* 11.6*   HEMATOCRIT % 26.1* 28.2* 33.6*   PLATELETS 10*3/mm3  --  174 207     Results from last 7 days   Lab Units 04/24/23  0631 04/23/23  1531 04/23/23  0337 04/22/23  1028   INR  3.00* 1.89* 1.68* 1.50*   APTT seconds  --   --   --  31.7     Results from last 7 days   Lab Units 04/22/23  1028   MAGNESIUM mg/dL 2.3           Invalid input(s): LDLCALC                 Assessment:          Diagnosis Plan   1. Closed fracture of right hip, initial encounter        2. ESRD on dialysis               Plan:       Ms. Evans is a 89 y.o. woman with past medical history notable for permanent atrial fibrillation, hypertension, chronic diastolic heart failure, and chronic kidney disease stage 4 now end-stage renal who presented to the hospital after fall and noted to have hip fracture.  We are asked to assist with perioperative evaluation and clearance.  She was cleared for surgery as no major modifiable risk factors were identified she was obviously moderate to high risk for surgery given her age and comorbidities but both were well managed.  She did have surgery on 4/23/2023.  She is  making slow but steady recovery.  From a cardiac standpoint volume management is handled by nephrology with dialysis.  From her atrial fibrillation standpoint she remains well rate controlled would be reasonable to restart anticoagulation once safe from a postoperative bleeding perspective.        Permanent atrial fibrillation:  • Would restart Coumadin once safe from a postoperative bleeding perspective  • Rate well controlled not on any rate controlling agents     Hypertension:  • Reasonably well controlled for age       Chronic diastolic congestive heart failure:  • Volume management per dialysis     Chronic kidney disease/end-stage renal disease:  • Following with nephrology  • Now on dialysis 3 times a week                     Lm Mart MD  [facility]  04/24/23  08:19 EDT

## 2023-04-24 NOTE — PLAN OF CARE
Goal Outcome Evaluation:       Patient is a pleasant 89 y.o. female POD1 R hip IMN after falling at home sustaining a R hip fracture who presents with expected post op weakness and impaired functional mobility. R LE WBAT per Ortho. Patient is ambulatory with a RW at baseline and lives at home with daughter. Patient reports she was fairly independent with all mobility and ADLs PTA. Today, patient performed bed mobility with maxA this date- pain limiting overall mobility. Unable to assess transfers this date. RN notified regarding pain level. Patient will benefit from skilled PT services acutely to address functional deficits as well as improve level of independence prior to discharge. Anticipate need for SNF for continued rehabilitation upon DC.

## 2023-04-25 LAB
ANION GAP SERPL CALCULATED.3IONS-SCNC: 9.1 MMOL/L (ref 5–15)
BASOPHILS # BLD AUTO: 0.02 10*3/MM3 (ref 0–0.2)
BASOPHILS NFR BLD AUTO: 0.2 % (ref 0–1.5)
BUN SERPL-MCNC: 25 MG/DL (ref 8–23)
BUN/CREAT SERPL: 12 (ref 7–25)
CALCIUM SPEC-SCNC: 8.8 MG/DL (ref 8.2–9.6)
CHLORIDE SERPL-SCNC: 102 MMOL/L (ref 98–107)
CO2 SERPL-SCNC: 23.9 MMOL/L (ref 22–29)
CREAT SERPL-MCNC: 2.09 MG/DL (ref 0.57–1)
DEPRECATED RDW RBC AUTO: 43.2 FL (ref 37–54)
EGFRCR SERPLBLD CKD-EPI 2021: 21.7 ML/MIN/1.73
EOSINOPHIL # BLD AUTO: 0.02 10*3/MM3 (ref 0–0.4)
EOSINOPHIL NFR BLD AUTO: 0.2 % (ref 0.3–6.2)
ERYTHROCYTE [DISTWIDTH] IN BLOOD BY AUTOMATED COUNT: 12 % (ref 12.3–15.4)
GLUCOSE SERPL-MCNC: 89 MG/DL (ref 65–99)
HCT VFR BLD AUTO: 23 % (ref 34–46.6)
HGB BLD-MCNC: 7.7 G/DL (ref 12–15.9)
IMM GRANULOCYTES # BLD AUTO: 0.04 10*3/MM3 (ref 0–0.05)
IMM GRANULOCYTES NFR BLD AUTO: 0.4 % (ref 0–0.5)
INR PPP: 3.11 (ref 0.9–1.1)
LYMPHOCYTES # BLD AUTO: 1.69 10*3/MM3 (ref 0.7–3.1)
LYMPHOCYTES NFR BLD AUTO: 15 % (ref 19.6–45.3)
MCH RBC QN AUTO: 32.8 PG (ref 26.6–33)
MCHC RBC AUTO-ENTMCNC: 33.5 G/DL (ref 31.5–35.7)
MCV RBC AUTO: 97.9 FL (ref 79–97)
MONOCYTES # BLD AUTO: 1.13 10*3/MM3 (ref 0.1–0.9)
MONOCYTES NFR BLD AUTO: 10.1 % (ref 5–12)
NEUTROPHILS NFR BLD AUTO: 74.1 % (ref 42.7–76)
NEUTROPHILS NFR BLD AUTO: 8.34 10*3/MM3 (ref 1.7–7)
NRBC BLD AUTO-RTO: 0 /100 WBC (ref 0–0.2)
PLATELET # BLD AUTO: 150 10*3/MM3 (ref 140–450)
PMV BLD AUTO: 10.8 FL (ref 6–12)
POTASSIUM SERPL-SCNC: 4.1 MMOL/L (ref 3.5–5.2)
PROTHROMBIN TIME: 32.5 SECONDS (ref 11.7–14.2)
RBC # BLD AUTO: 2.35 10*6/MM3 (ref 3.77–5.28)
SODIUM SERPL-SCNC: 135 MMOL/L (ref 136–145)
WBC NRBC COR # BLD: 11.24 10*3/MM3 (ref 3.4–10.8)

## 2023-04-25 PROCEDURE — 85610 PROTHROMBIN TIME: CPT | Performed by: ORTHOPAEDIC SURGERY

## 2023-04-25 PROCEDURE — 85025 COMPLETE CBC W/AUTO DIFF WBC: CPT | Performed by: HOSPITALIST

## 2023-04-25 PROCEDURE — 97110 THERAPEUTIC EXERCISES: CPT

## 2023-04-25 PROCEDURE — 99232 SBSQ HOSP IP/OBS MODERATE 35: CPT | Performed by: INTERNAL MEDICINE

## 2023-04-25 PROCEDURE — 80048 BASIC METABOLIC PNL TOTAL CA: CPT | Performed by: HOSPITALIST

## 2023-04-25 RX ORDER — LIDOCAINE 50 MG/G
1 PATCH TOPICAL
Status: DISCONTINUED | OUTPATIENT
Start: 2023-04-25 | End: 2023-04-27 | Stop reason: HOSPADM

## 2023-04-25 RX ADMIN — FAMOTIDINE 20 MG: 20 TABLET, FILM COATED ORAL at 08:41

## 2023-04-25 RX ADMIN — Medication 10 ML: at 21:42

## 2023-04-25 RX ADMIN — Medication 10 ML: at 08:42

## 2023-04-25 RX ADMIN — HYDROCODONE BITARTRATE AND ACETAMINOPHEN 1 TABLET: 5; 325 TABLET ORAL at 00:26

## 2023-04-25 RX ADMIN — AMLODIPINE BESYLATE 10 MG: 10 TABLET ORAL at 08:41

## 2023-04-25 RX ADMIN — LIDOCAINE 1 PATCH: 700 PATCH TOPICAL at 14:52

## 2023-04-25 RX ADMIN — DOCUSATE SODIUM 200 MG: 100 CAPSULE, LIQUID FILLED ORAL at 21:42

## 2023-04-25 RX ADMIN — Medication 1000 UNITS: at 08:41

## 2023-04-25 RX ADMIN — DOCUSATE SODIUM 200 MG: 100 CAPSULE, LIQUID FILLED ORAL at 08:41

## 2023-04-25 NOTE — PLAN OF CARE
Goal Outcome Evaluation:  Plan of Care Reviewed With: patient        Progress: improving  Outcome Evaluation: Pt seen for PT treatment this afternoon. Pt declined OOB activities but pt agreed to therex. Pt able to perform bilateral LE exercises with minimal c/o difficulty needing assist with RLE on a couple of the exercises. Will progress pt as able.

## 2023-04-25 NOTE — PROGRESS NOTES
[facility] Progress Note      Patient Name: Cristina Lemus  : 3/13/1930  MRN: [id number]  Primary Care Physician:  Wood Elias MD  Date of admission: 2023    Subjective     Interval History:   Confused this morning.  Denies any nausea no vomiting.  No fever no chills.  No chest pain or shortness of breath.  Dialyzed yesterday with no UF      Review of Systems:   As noted above    Objective     Vitals:   Temp:  [98.4 °F (36.9 °C)-99 °F (37.2 °C)] 99 °F (37.2 °C)  Heart Rate:  [66-94] 86  Resp:  [16-18] 18  BP: (116-158)/(56-84) 137/84    Intake/Output Summary (Last 24 hours) at 2023 0836  Last data filed at 2023 1426  Gross per 24 hour   Intake 360 ml   Output 0 ml   Net 360 ml       Physical Exam:    Constitutional: Confused, frail  HEENT: Sclera anicteric, no conjunctival injection, dry MM  Neck: Supple, no carotid bruit, trachea at midline, no JVD  Respiratory: Clear anteriorly, nonlabored respiration  Cardiovascular: RRR, no rub  Gastrointestinal: Soft, BS +, nontender and nondistended  Vascular: Patent left arm AV graft  : No palpable bladder  Musculoskeletal: No edema, no clubbing or cyanosis  Neurologic:  moving all extremities except right leg  Skin: Warm and dry    Scheduled Meds:     acetaminophen, 1,000 mg, Oral, Q6H  amLODIPine, 10 mg, Oral, Daily  cholecalciferol, 1,000 Units, Oral, Daily  docusate sodium, 200 mg, Oral, BID  famotidine, 20 mg, Oral, Daily  sodium chloride, 10 mL, Intravenous, Q12H  sodium chloride, 10 mL, Intravenous, Q12H  warfarin (COUMADIN) (dosing per levels), , Does not apply, Daily      IV Meds:   Pharmacy to dose warfarin,         Results Reviewed:   I have personally reviewed the results from the time of this admission to 2023 08:36 EDT     Results from last 7 days   Lab Units 23  0525 23  0631 23  0337 23  1028   SODIUM mmol/L 135* 133* 139 138   POTASSIUM mmol/L 4.1 4.9 4.8 4.1   CHLORIDE mmol/L 102  101 103 99   CO2 mmol/L 23.9 19.4* 24.8 22.3   BUN mg/dL 25* 49* 44* 45*   CREATININE mg/dL 2.09* 3.33* 2.95* 2.96*   CALCIUM mg/dL 8.8 8.3 9.4 9.8*   BILIRUBIN mg/dL  --   --   --  0.4   ALK PHOS U/L  --   --   --  95   ALT (SGPT) U/L  --   --   --  18   AST (SGOT) U/L  --   --   --  23   GLUCOSE mg/dL 89 112* 116* 192*       Estimated Creatinine Clearance: 13.4 mL/min (A) (by C-G formula based on SCr of 2.09 mg/dL (H)).    Results from last 7 days   Lab Units 04/23/23  0337 04/22/23  1028   MAGNESIUM mg/dL  --  2.3   PHOSPHORUS mg/dL 4.4  --              Results from last 7 days   Lab Units 04/25/23  0525 04/24/23  0631 04/23/23  0337 04/22/23  1028   WBC 10*3/mm3 11.24*  --  9.38 5.91   HEMOGLOBIN g/dL 7.7* 8.6* 9.7* 11.6*   PLATELETS 10*3/mm3 150  --  174 207       Results from last 7 days   Lab Units 04/25/23  0525 04/24/23  0631 04/23/23  1531 04/23/23  0337 04/22/23  1028   INR  3.11* 3.00* 1.89* 1.68* 1.50*       Assessment / Plan     ASSESSMENT:  1.  ESRD: SCr stable in the absence of HD, though suspect reflecting mostly lack of protein intake and her low muscle mass.  Stable volume and electrolytes; last HD was 4/20.  Her usual schedule is TTS at [facility]  2.  Mechanical fall with right hip fracture, s/p right hip intramedullary nailing 4/23  3.  Atrial fibrillation on AC; she reports prior history of PE  4.  Anemia of ESRD  5.  History of CHF:  appears compensated  6.  Hypertension of ESRD    PLAN:  1.  We will continue dialysis on Monday Wednesday Friday per schedule while in the hospital.  2.  Okay to discharge once outpatient dialysis is arranged.  Plan noted for possible discharge to [place] with daily dialysis.  3.  Continue surveillance labs    Thank you for involving us in the care of Cristina Lemus.  Please feel free to call with any questions.    João Lui MD  04/25/23  08:36 EDT    [facility]  [phone]    Please note that portions of this note were  completed with a voice recognition program.

## 2023-04-25 NOTE — THERAPY TREATMENT NOTE
Patient Name: Cristina Lemus  : 3/13/1930    MRN: [id number]                              Today's Date: 2023       Admit Date: 2023    Visit Dx:     ICD-10-CM ICD-9-CM   1. Closed fracture of right hip, initial encounter  S72.001A 820.8   2. ESRD on dialysis  N18.6 585.6    Z99.2 V45.11     Patient Active Problem List   Diagnosis   • AVNRT (AV joy re-entry tachycardia)   • History of pulmonary embolus (PE)   • HTN, goal below 140/80   • Permanent atrial fibrillation (HCC)   • Stage 4 chronic kidney disease   • Chronic diastolic congestive heart failure (HCC)   • Anxiety disorder   • Chronic gout without tophus   • KIM (acute kidney injury)   • Essential hypertension   • Dyspnea   • Pleural effusion   • Dyspnea on exertion   • Anemia   • Acute on chronic renal failure   • Acute renal failure superimposed on chronic kidney disease   • Anticoagulated on warfarin   • UTI (urinary tract infection)   • Metabolic encephalopathy   • End stage renal disease   • C. difficile colitis   • Aftercare including intermittent dialysis   • Allergy, unspecified, initial encounter   • Anaphylactic shock, unspecified, initial encounter   • Anemia in chronic kidney disease   • Coagulation defect, unspecified   • Conjunctivochalasis   • Epiretinal membrane   • Hypokalemia   • Iron deficiency anemia   • Secondary hyperparathyroidism of renal origin   • Chronic mid back pain   • Sacral fracture   • Closed fracture of fifth thoracic vertebra with routine healing   • Closed fracture of right hip, initial encounter     Past Medical History:   Diagnosis Date   • Atrial fibrillation    • CHF (congestive heart failure)    • Hypertension    • Renal insufficiency      Past Surgical History:   Procedure Laterality Date   • ARTERIOVENOUS FISTULA/SHUNT SURGERY Left 2022    Procedure: LEFT ARM DIALYSIS GRAFT;  Surgeon: King Reynaga MD;  Location: [facility] OR;  Service: Vascular;  Laterality: Left;   • BACK SURGERY     • CYST  REMOVAL     • FEMUR IM NAILING/RODDING Right 4/23/2023    Procedure: RIGHT HIP INTRAMEDULLARY NAILING WITH CERCLAGE CABLING;  Surgeon: King Marcano MD;  Location: [facility] MAIN OR;  Service: Orthopedics;  Laterality: Right;  Christa   • INSERTION HEMODIALYSIS CATHETER N/A 1/18/2022    Procedure: HEMODIALYSIS CATHETER INSERTION with C arm;  Surgeon: King Reynaga MD;  Location: [facility] MAIN OR;  Service: Vascular;  Laterality: N/A;   • OTHER SURGICAL HISTORY      rupture disk      General Information     Row Name 04/25/23 1538          Physical Therapy Time and Intention    Document Type therapy note (daily note)  -EB     Mode of Treatment individual therapy;physical therapy  -EB     Row Name 04/25/23 1538          General Information    Existing Precautions/Restrictions fall  -EB     Row Name 04/25/23 1538          Cognition    Orientation Status (Cognition) oriented x 4  -EB     Row Name 04/25/23 1538          Safety Issues, Functional Mobility    Impairments Affecting Function (Mobility) endurance/activity tolerance;strength;pain  -EB           User Key  (r) = Recorded By, (t) = Taken By, (c) = Cosigned By    Initials Name Provider Type    Tiera Palmer PTA Physical Therapist Assistant               Mobility     Row Name 04/25/23 1538          Bed Mobility    Comment, (Bed Mobility) pt declined OOB due to fatigue, pain and was cold. max encouragement given, pt agreeagle to BLE  therapeutic exercises.  -EB           User Key  (r) = Recorded By, (t) = Taken By, (c) = Cosigned By    Initials Name Provider Type    Tiera Palmer PTA Physical Therapist Assistant               Obj/Interventions     Row Name 04/25/23 1539          Motor Skills    Therapeutic Exercise --  BLE: AP, QS, HS, hip abd/add, glute sets (X10) assist with HS and hip abd/add on RLE.  -EB           User Key  (r) = Recorded By, (t) = Taken By, (c) = Cosigned By    Initials Name Provider Type    Tiera Palmer PTA Physical Therapist  Assistant               Goals/Plan    No documentation.                Clinical Impression     Row Name 04/25/23 1545          Plan of Care Review    Plan of Care Reviewed With patient  -EB     Progress improving  -EB     Outcome Evaluation Pt seen for PT treatment this afternoon. Pt declined OOB activities but pt agreed to therex. Pt able to perform bilateral LE exercises with minimal c/o difficulty needing assist with RLE on a couple of the exercises. Will progress pt as able.  -EB     Row Name 04/25/23 1548          Therapy Assessment/Plan (PT)    Therapy Frequency (PT) 6 times/wk  -EB     Row Name 04/25/23 2150          Positioning and Restraints    Pre-Treatment Position in bed  -EB     Post Treatment Position bed  -EB     In Bed supine;call light within reach;encouraged to call for assist;exit alarm on  -EB           User Key  (r) = Recorded By, (t) = Taken By, (c) = Cosigned By    Initials Name Provider Type    Tiera Palmer PTA Physical Therapist Assistant               Outcome Measures     Row Name 04/25/23 5197          How much help from another person do you currently need...    Turning from your back to your side while in flat bed without using bedrails? 2  -EB     Moving from lying on back to sitting on the side of a flat bed without bedrails? 1  -EB     Moving to and from a bed to a chair (including a wheelchair)? 1  -EB     Standing up from a chair using your arms (e.g., wheelchair, bedside chair)? 1  -EB     Climbing 3-5 steps with a railing? 1  -EB     To walk in hospital room? 1  -EB     AM-PAC 6 Clicks Score (PT) 7  -EB     Highest level of mobility 2 --> Bed activities/dependent transfer  -EB           User Key  (r) = Recorded By, (t) = Taken By, (c) = Cosigned By    Initials Name Provider Type    Tiera Palmer PTA Physical Therapist Assistant                             Physical Therapy Education     Title: PT OT SLP Therapies (In Progress)     Topic: Physical Therapy (In Progress)      Point: Mobility training (Done)     Learning Progress Summary           Patient Acceptance, E,TB, VU by MS at 4/24/2023 0944                   Point: Home exercise program (Done)     Learning Progress Summary           Patient Acceptance, E,D, VU,DU by EB at 4/25/2023 1545                   Point: Body mechanics (Not Started)     Learner Progress:  Not documented in this visit.          Point: Precautions (Not Started)     Learner Progress:  Not documented in this visit.                      User Key     Initials Effective Dates Name Provider Type Discipline    MS 06/16/21 -  Siena Donald PT Physical Therapist PT    EB 02/14/23 -  Tiera Smith PTA Physical Therapist Assistant PT              PT Recommendation and Plan     Plan of Care Reviewed With: patient  Progress: improving  Outcome Evaluation: Pt seen for PT treatment this afternoon. Pt declined OOB activities but pt agreed to therex. Pt able to perform bilateral LE exercises with minimal c/o difficulty needing assist with RLE on a couple of the exercises. Will progress pt as able.     Time Calculation:    PT Charges     Row Name 04/25/23 1537             Time Calculation    Start Time 1457  -EB      Stop Time 1511  -EB      Time Calculation (min) 14 min  -EB      PT Received On 04/25/23  -EB      PT - Next Appointment 04/26/23  -EB         Time Calculation- PT    Total Timed Code Minutes- PT 14 minute(s)  -EB            User Key  (r) = Recorded By, (t) = Taken By, (c) = Cosigned By    Initials Name Provider Type    EB Tiera Smith PTA Physical Therapist Assistant              Therapy Charges for Today     Code Description Service Date Service Provider Modifiers Qty    [id number] HC PT THER PROC EA 15 MIN 4/25/2023 Tiera Smith PTA GP 1          PT G-Codes  AM-PAC 6 Clicks Score (PT): 7       Tiera Smith PTA  4/25/2023

## 2023-04-25 NOTE — PLAN OF CARE
Goal Outcome Evaluation:    Progress: improving  Outcome Evaluation: Vitals stable. Hgb trending down - this AM 7.7. Redraw in AM. On HD MWF schedule with hospitalized - awaiting HD orders for tomorrow. At home HD schedule is Tues, Thurs, Sat at [facility]. Awaiting restart of warfarin - INR elevated at 3.11. C/o pain only upon movement, refused pain medication. Worked with PT, sat at edge of bed. Daughter in law at bedside this AM briefly. Bed available at [facility] with HD Monday - Friday (shorter sessions) to accommodate therapy. Patient stable and needs met at this time.

## 2023-04-25 NOTE — PROGRESS NOTES
"DAILY PROGRESS NOTE  [facility]    Patient Identification:  Name: Cristina Lemus  Age: 89 y.o.  Sex: female  :  3/13/1930  MRN: [id number]         Primary Care Physician: Wood Elias MD      Subjective  Patient with no acute complaints.    Objective:  General Appearance:  Comfortable, well-appearing, in no acute distress and not in pain.    Vital signs: (most recent): Blood pressure 144/70, pulse 84, temperature 99 °F (37.2 °C), temperature source Oral, resp. rate 18, height 165.1 cm (65\"), weight 50.5 kg (111 lb 5.3 oz), SpO2 90 %.    Lungs:  Normal effort and normal respiratory rate.  Breath sounds clear to auscultation.    Heart: Normal rate.  Irregular rhythm.    Extremities: There is no dependent edema.    Neurological: Patient is alert.  (Oriented to person and place.  Cooperative and pleasant.).    Skin:  Warm and dry.                Vital signs in last 24 hours:  Temp:  [98.4 °F (36.9 °C)-99 °F (37.2 °C)] 99 °F (37.2 °C)  Heart Rate:  [66-94] 84  Resp:  [16-18] 18  BP: (116-158)/(56-84) 144/70    Intake/Output:    Intake/Output Summary (Last 24 hours) at 2023 1108  Last data filed at 2023 1426  Gross per 24 hour   Intake 240 ml   Output 0 ml   Net 240 ml         Results from last 7 days   Lab Units 23  0523  0623  1028   WBC 10*3/mm3 11.24*  --  9.38 5.91   HEMOGLOBIN g/dL 7.7* 8.6* 9.7* 11.6*   PLATELETS 10*3/mm3 150  --  174 207     Results from last 7 days   Lab Units 23  0523  0623  1028   SODIUM mmol/L 135* 133* 139 138   POTASSIUM mmol/L 4.1 4.9 4.8 4.1   CHLORIDE mmol/L 102 101 103 99   CO2 mmol/L 23.9 19.4* 24.8 22.3   BUN mg/dL 25* 49* 44* 45*   CREATININE mg/dL 2.09* 3.33* 2.95* 2.96*   GLUCOSE mg/dL 89 112* 116* 192*   Estimated Creatinine Clearance: 13.4 mL/min (A) (by C-G formula based on SCr of 2.09 mg/dL (H)).  Results from last 7 days   Lab Units 23  0525 23  0631 " 04/23/23  0337 04/22/23  1028   CALCIUM mg/dL 8.8 8.3 9.4 9.8*   ALBUMIN g/dL  --   --  3.9 4.2   MAGNESIUM mg/dL  --   --   --  2.3   PHOSPHORUS mg/dL  --   --  4.4  --      Results from last 7 days   Lab Units 04/23/23 0337 04/22/23  1028   ALBUMIN g/dL 3.9 4.2   BILIRUBIN mg/dL  --  0.4   ALK PHOS U/L  --  95   AST (SGOT) U/L  --  23   ALT (SGPT) U/L  --  18       Assessment:  Right intertrochanteric hip fracture:  S/p intramedullary nailing 4/23/2023.  Orthopedic evaluation and treatment appreciated..  Chronic atrial fibrillation: Continue rate control.  Warfarin resumed.  Dose per pharmacy.  History of CHF with preserved LV function: Presently appears euvolemic.  ESRD-HDD:  Nephrology management appreciated.    Hypertension: Continue home meds.  Good control.  History of pulmonary embolus: Monitor closely perioperatively.  History of C. difficile colitis: Minimize antibiotic use.  Anemia: Acute postop on chronic.  Postop anemia expected.  With her end-stage renal disease as well as anemia of chronic disease recovery likely to be slow.  Continue to monitor.  Check iron panels, B12, folate....      Plan:  Please see above.  Discharge planning.  Will need rehab as well as outpatient dialysis.    Porfirio Pfeiffer MD  4/25/2023  11:08 EDT

## 2023-04-25 NOTE — PROGRESS NOTES
[facility] Clinical Pharmacy Services: Warfarin Dosing/Monitoring Consult    Cristina Lemus is a 89 y.o. female, estimated creatinine clearance is 13.4 mL/min (A) (by C-G formula based on SCr of 2.09 mg/dL (H)). weighing 50.5 kg (111 lb 5.3 oz).    Results from last 7 days   Lab Units 04/25/23  0525 04/24/23  0631 04/23/23  1531 04/23/23  0337 04/22/23  1028   INR  3.11* 3.00* 1.89* 1.68* 1.50*   APTT seconds  --   --   --   --  31.7   HEMOGLOBIN g/dL 7.7* 8.6*  --  9.7* 11.6*   HEMATOCRIT % 23.0* 26.1*  --  28.2* 33.6*   PLATELETS 10*3/mm3 150  --   --  174 207     Prior to admission anticoagulation:   - Per [facility] records (last visit 04/17/23): Current warfarin regimen is 6 mg on Tues; 4 mg all other days.   - Med rec matches this regimen.     Hospital Anticoagulation:  Consulting provider: Dr. Marcano  Start date: 4/23  Indication: A Fib - requiring full anticoagulation  Target INR: 2 - 3  Expected duration: tbd   Bridge Therapy: No     Potential food or drug interactions:  No significant DDI    Education complete?/Date: No; plan for follow up TBD    Assessment/Plan:  INR supratherapeutic, mild increase to 3.11.  Significant interval INR increase after 1x warfarin 6mg dose on 4/23.  Hold warfarin again tonight.  Resume warfarin when INR has plateaued.    Monitor for any signs or symptoms of bleeding.  Significant drop in H/H noted.  Follow up daily INRs and dose adjustments. INR ordered daily with AM labs while inpatient.    Pharmacy will continue to follow until discharge or discontinuation of warfarin.     Elana Ramirez, PharmD  Clinical Pharmacist

## 2023-04-25 NOTE — PROGRESS NOTES
[facility] Progress Note       Encounter Date:23  Patient:Cristina Lemus  :3/13/1930  MRN:[id number]      Chief Complaint: Follow-up of atrial fibrillation      Subjective:        Patient doing about the same    Review of Systems:  Review of Systems   Constitutional: Positive for malaise/fatigue.   Musculoskeletal: Positive for joint pain.   Neurological: Positive for weakness.       Medications:  Scheduled Meds:  acetaminophen, 1,000 mg, Oral, Q6H  amLODIPine, 10 mg, Oral, Daily  cholecalciferol, 1,000 Units, Oral, Daily  docusate sodium, 200 mg, Oral, BID  famotidine, 20 mg, Oral, Daily  sodium chloride, 10 mL, Intravenous, Q12H  sodium chloride, 10 mL, Intravenous, Q12H  warfarin (COUMADIN) (dosing per levels), , Does not apply, Daily    Continuous Infusions:  Pharmacy to dose warfarin,     PRN Meds:  •  acetaminophen **OR** acetaminophen **OR** acetaminophen  •  HYDROcodone-acetaminophen  •  HYDROcodone-acetaminophen  •  HYDROcodone-acetaminophen  •  HYDROmorphone **AND** naloxone  •  LORazepam  •  melatonin  •  ondansetron **OR** ondansetron  •  Pharmacy to dose warfarin  •  [COMPLETED] Insert Peripheral IV **AND** sodium chloride  •  sodium chloride  •  sodium chloride  •  sodium chloride  •  sodium chloride         Objective:       Vitals:    23 1940 23 2310 23 0700 23 0841   BP: 127/67 134/62 137/84 144/70   BP Location: Right arm Right arm Right arm    Patient Position: Lying Lying Lying    Pulse: 85 94 86 84   Resp: 17 18 18    Temp: 98.5 °F (36.9 °C) 98.4 °F (36.9 °C) 99 °F (37.2 °C)    TempSrc: Oral Oral Oral    SpO2: 91% 91% 90%    Weight:       Height:               Physical Exam:  Constitutional: Well appearing, frail, no acute distress   HENT: Oropharynx clear and membrane moist  Eyes: Normal conjunctiva, no sclera icterus.  Neck: Supple, no carotid bruit bilaterally.  Cardiovascular: Irregularly irregular rate and rhythm, No Murmur, No bilateral lower  extremity edema.  Pulmonary: Normal respiratory effort, normal lung sounds, no wheezing.  Abdominal: Soft, nontender, no hepatosplenomegaly, liver is non-pulsatile.  Neurological: Alert and orient x 3.   Skin: Warm, dry, no ecchymosis, no rash.  Psych: Appropriate mood and affect. Normal judgment and insight.           Lab Review:   Results from last 7 days   Lab Units 04/25/23  0525 04/24/23  0631 04/23/23  0337 04/22/23  1028   SODIUM mmol/L 135* 133* 139 138   POTASSIUM mmol/L 4.1 4.9 4.8 4.1   CHLORIDE mmol/L 102 101 103 99   CO2 mmol/L 23.9 19.4* 24.8 22.3   BUN mg/dL 25* 49* 44* 45*   CREATININE mg/dL 2.09* 3.33* 2.95* 2.96*   GLUCOSE mg/dL 89 112* 116* 192*   CALCIUM mg/dL 8.8 8.3 9.4 9.8*   AST (SGOT) U/L  --   --   --  23   ALT (SGPT) U/L  --   --   --  18         Results from last 7 days   Lab Units 04/25/23  0525 04/24/23  0631 04/23/23  0337 04/22/23  1028   WBC 10*3/mm3 11.24*  --  9.38 5.91   HEMOGLOBIN g/dL 7.7* 8.6* 9.7* 11.6*   HEMATOCRIT % 23.0* 26.1* 28.2* 33.6*   PLATELETS 10*3/mm3 150  --  174 207     Results from last 7 days   Lab Units 04/25/23  0525 04/24/23  0631 04/23/23  1531 04/23/23  0337 04/22/23  1028   INR  3.11* 3.00* 1.89* 1.68* 1.50*   APTT seconds  --   --   --   --  31.7     Results from last 7 days   Lab Units 04/22/23  1028   MAGNESIUM mg/dL 2.3           Invalid input(s): LDLCALC                 Assessment:          Diagnosis Plan   1. Closed fracture of right hip, initial encounter        2. ESRD on dialysis               Plan:       Ms. Evans is a 89 y.o. woman with past medical history notable for permanent atrial fibrillation, hypertension, chronic diastolic heart failure, and chronic kidney disease stage 4 now end-stage renal who presented to the hospital after fall and noted to have hip fracture.  We are asked to assist with perioperative evaluation and clearance.  She was cleared for surgery as no major modifiable risk factors were identified she was obviously moderate  to high risk for surgery given her age and comorbidities but both were well managed.  She did have surgery on 4/23/2023.  She is making slow but steady recovery.  From a cardiac standpoint volume management is handled by nephrology with dialysis.  From her atrial fibrillation standpoint she remains well rate controlled and has restarted couamdin.        Permanent atrial fibrillation:  • Restarted on Coumadin   • Rate well controlled not on any rate controlling agents     Hypertension:  • Reasonably well controlled for age       Chronic diastolic congestive heart failure:  • Volume management per dialysis     Chronic kidney disease/end-stage renal disease:  • Following with nephrology  • Now on dialysis 3 times a week                     Lm Mart MD  [facility]  04/25/23  11:55 EDT

## 2023-04-25 NOTE — PLAN OF CARE
Goal Outcome Evaluation:Patient forgetful , keep on asking about her dialysis schedule , told her that she had dialysis early morning yesterday.Still producing urine ,changed the whole bed ,she pee on her bed and it was dried up. VS stable ,will continue to monitor.

## 2023-04-26 LAB
ANION GAP SERPL CALCULATED.3IONS-SCNC: 12.6 MMOL/L (ref 5–15)
BASOPHILS # BLD AUTO: 0.01 10*3/MM3 (ref 0–0.2)
BASOPHILS NFR BLD AUTO: 0.2 % (ref 0–1.5)
BUN SERPL-MCNC: 45 MG/DL (ref 8–23)
BUN/CREAT SERPL: 16 (ref 7–25)
CALCIUM SPEC-SCNC: 8.6 MG/DL (ref 8.2–9.6)
CHLORIDE SERPL-SCNC: 101 MMOL/L (ref 98–107)
CO2 SERPL-SCNC: 21.4 MMOL/L (ref 22–29)
CREAT SERPL-MCNC: 2.81 MG/DL (ref 0.57–1)
DEPRECATED RDW RBC AUTO: 39.9 FL (ref 37–54)
EGFRCR SERPLBLD CKD-EPI 2021: 15.2 ML/MIN/1.73
EOSINOPHIL # BLD AUTO: 0.06 10*3/MM3 (ref 0–0.4)
EOSINOPHIL NFR BLD AUTO: 0.9 % (ref 0.3–6.2)
ERYTHROCYTE [DISTWIDTH] IN BLOOD BY AUTOMATED COUNT: 11.5 % (ref 12.3–15.4)
FOLATE SERPL-MCNC: 13.2 NG/ML (ref 4.78–24.2)
GLUCOSE SERPL-MCNC: 83 MG/DL (ref 65–99)
HCT VFR BLD AUTO: 21.7 % (ref 34–46.6)
HGB BLD-MCNC: 7.4 G/DL (ref 12–15.9)
IMM GRANULOCYTES # BLD AUTO: 0.03 10*3/MM3 (ref 0–0.05)
IMM GRANULOCYTES NFR BLD AUTO: 0.5 % (ref 0–0.5)
INR PPP: 2.22 (ref 0.9–1.1)
IRON 24H UR-MRATE: 26 MCG/DL (ref 37–145)
IRON SATN MFR SERPL: 17 % (ref 20–50)
LYMPHOCYTES # BLD AUTO: 0.96 10*3/MM3 (ref 0.7–3.1)
LYMPHOCYTES NFR BLD AUTO: 14.5 % (ref 19.6–45.3)
MCH RBC QN AUTO: 32.2 PG (ref 26.6–33)
MCHC RBC AUTO-ENTMCNC: 34.1 G/DL (ref 31.5–35.7)
MCV RBC AUTO: 94.3 FL (ref 79–97)
MONOCYTES # BLD AUTO: 0.82 10*3/MM3 (ref 0.1–0.9)
MONOCYTES NFR BLD AUTO: 12.3 % (ref 5–12)
NEUTROPHILS NFR BLD AUTO: 4.76 10*3/MM3 (ref 1.7–7)
NEUTROPHILS NFR BLD AUTO: 71.6 % (ref 42.7–76)
NRBC BLD AUTO-RTO: 0 /100 WBC (ref 0–0.2)
PLATELET # BLD AUTO: 195 10*3/MM3 (ref 140–450)
PMV BLD AUTO: 10.7 FL (ref 6–12)
POTASSIUM SERPL-SCNC: 4.1 MMOL/L (ref 3.5–5.2)
PROTHROMBIN TIME: 24.9 SECONDS (ref 11.7–14.2)
RBC # BLD AUTO: 2.3 10*6/MM3 (ref 3.77–5.28)
RETICS # AUTO: 0.05 10*6/MM3 (ref 0.02–0.13)
RETICS/RBC NFR AUTO: 2.08 % (ref 0.7–1.9)
SODIUM SERPL-SCNC: 135 MMOL/L (ref 136–145)
TIBC SERPL-MCNC: 156 MCG/DL (ref 298–536)
TRANSFERRIN SERPL-MCNC: 105 MG/DL (ref 200–360)
VIT B12 BLD-MCNC: 1178 PG/ML (ref 211–946)
WBC NRBC COR # BLD: 6.64 10*3/MM3 (ref 3.4–10.8)

## 2023-04-26 PROCEDURE — 84466 ASSAY OF TRANSFERRIN: CPT | Performed by: HOSPITALIST

## 2023-04-26 PROCEDURE — 25010000002 NA FERRIC GLUC CPLX PER 12.5 MG: Performed by: HOSPITALIST

## 2023-04-26 PROCEDURE — 82746 ASSAY OF FOLIC ACID SERUM: CPT | Performed by: HOSPITALIST

## 2023-04-26 PROCEDURE — 83540 ASSAY OF IRON: CPT | Performed by: HOSPITALIST

## 2023-04-26 PROCEDURE — 80048 BASIC METABOLIC PNL TOTAL CA: CPT | Performed by: HOSPITALIST

## 2023-04-26 PROCEDURE — 99232 SBSQ HOSP IP/OBS MODERATE 35: CPT | Performed by: INTERNAL MEDICINE

## 2023-04-26 PROCEDURE — 82607 VITAMIN B-12: CPT | Performed by: HOSPITALIST

## 2023-04-26 PROCEDURE — 85610 PROTHROMBIN TIME: CPT | Performed by: ORTHOPAEDIC SURGERY

## 2023-04-26 PROCEDURE — 85025 COMPLETE CBC W/AUTO DIFF WBC: CPT | Performed by: HOSPITALIST

## 2023-04-26 PROCEDURE — 85045 AUTOMATED RETICULOCYTE COUNT: CPT | Performed by: HOSPITALIST

## 2023-04-26 PROCEDURE — 97530 THERAPEUTIC ACTIVITIES: CPT

## 2023-04-26 RX ORDER — WARFARIN SODIUM 4 MG/1
4 TABLET ORAL
Status: COMPLETED | OUTPATIENT
Start: 2023-04-26 | End: 2023-04-26

## 2023-04-26 RX ADMIN — HYDROCODONE BITARTRATE AND ACETAMINOPHEN 1 TABLET: 7.5; 325 TABLET ORAL at 06:31

## 2023-04-26 RX ADMIN — DOCUSATE SODIUM 200 MG: 100 CAPSULE, LIQUID FILLED ORAL at 09:09

## 2023-04-26 RX ADMIN — SODIUM CHLORIDE 250 MG: 9 INJECTION, SOLUTION INTRAVENOUS at 11:33

## 2023-04-26 RX ADMIN — LIDOCAINE 1 PATCH: 700 PATCH TOPICAL at 09:08

## 2023-04-26 RX ADMIN — Medication 1000 UNITS: at 09:09

## 2023-04-26 RX ADMIN — WARFARIN 4 MG: 4 TABLET ORAL at 18:17

## 2023-04-26 RX ADMIN — FAMOTIDINE 20 MG: 20 TABLET, FILM COATED ORAL at 09:09

## 2023-04-26 RX ADMIN — AMLODIPINE BESYLATE 10 MG: 10 TABLET ORAL at 09:09

## 2023-04-26 RX ADMIN — Medication 10 ML: at 09:08

## 2023-04-26 RX ADMIN — DOCUSATE SODIUM 200 MG: 100 CAPSULE, LIQUID FILLED ORAL at 22:00

## 2023-04-26 RX ADMIN — Medication 10 ML: at 09:10

## 2023-04-26 NOTE — PROGRESS NOTES
[facility] Progress Note      Patient Name: Cristina Lemus  : 3/13/1930  MRN: [id number]  Primary Care Physician:  Wood Elias MD  Date of admission: 2023    Subjective     Interval History:   More awake today.  Denies any nausea or vomiting no issues noted overnight.  Afebrile.  Denies any chest pain or shortness of breath.      Review of Systems:   As noted above    Objective     Vitals:   Temp:  [98.2 °F (36.8 °C)-99.1 °F (37.3 °C)] 98.8 °F (37.1 °C)  Heart Rate:  [73-91] 80  Resp:  [18] 18  BP: (118-145)/(51-93) 118/54  Flow (L/min):  [1] 1    Intake/Output Summary (Last 24 hours) at 2023 0835  Last data filed at 2023 0300  Gross per 24 hour   Intake 360 ml   Output --   Net 360 ml       Physical Exam:    Constitutional: Confused, frail  HEENT: Sclera anicteric, no conjunctival injection, dry MM  Neck: Supple, no carotid bruit, trachea at midline, no JVD  Respiratory: Clear anteriorly, nonlabored respiration  Cardiovascular: RRR, no rub  Gastrointestinal: Soft, BS +, nontender and nondistended  Vascular: Patent left arm AV graft  : No palpable bladder  Musculoskeletal: No edema, no clubbing or cyanosis  Neurologic:  moving all extremities except right leg  Skin: Warm and dry    Scheduled Meds:     acetaminophen, 1,000 mg, Oral, Q6H  amLODIPine, 10 mg, Oral, Daily  cholecalciferol, 1,000 Units, Oral, Daily  docusate sodium, 200 mg, Oral, BID  famotidine, 20 mg, Oral, Daily  lidocaine, 1 patch, Transdermal, Q24H  sodium chloride, 10 mL, Intravenous, Q12H  sodium chloride, 10 mL, Intravenous, Q12H  warfarin (COUMADIN) (dosing per levels), , Does not apply, Daily      IV Meds:   Pharmacy to dose warfarin,         Results Reviewed:   I have personally reviewed the results from the time of this admission to 2023 08:35 EDT     Results from last 7 days   Lab Units 23  0437 23  0525 23  0631 23  0337 23  1028   SODIUM mmol/L 135* 135*  133*   < > 138   POTASSIUM mmol/L 4.1 4.1 4.9   < > 4.1   CHLORIDE mmol/L 101 102 101   < > 99   CO2 mmol/L 21.4* 23.9 19.4*   < > 22.3   BUN mg/dL 45* 25* 49*   < > 45*   CREATININE mg/dL 2.81* 2.09* 3.33*   < > 2.96*   CALCIUM mg/dL 8.6 8.8 8.3   < > 9.8*   BILIRUBIN mg/dL  --   --   --   --  0.4   ALK PHOS U/L  --   --   --   --  95   ALT (SGPT) U/L  --   --   --   --  18   AST (SGOT) U/L  --   --   --   --  23   GLUCOSE mg/dL 83 89 112*   < > 192*    < > = values in this interval not displayed.       Estimated Creatinine Clearance: 10 mL/min (A) (by C-G formula based on SCr of 2.81 mg/dL (H)).    Results from last 7 days   Lab Units 04/23/23  0337 04/22/23  1028   MAGNESIUM mg/dL  --  2.3   PHOSPHORUS mg/dL 4.4  --              Results from last 7 days   Lab Units 04/26/23  0437 04/25/23  0525 04/24/23  0631 04/23/23  0337 04/22/23  1028   WBC 10*3/mm3 6.64 11.24*  --  9.38 5.91   HEMOGLOBIN g/dL 7.4* 7.7* 8.6* 9.7* 11.6*   PLATELETS 10*3/mm3 195 150  --  174 207       Results from last 7 days   Lab Units 04/26/23  0437 04/25/23  0525 04/24/23  0631 04/23/23  1531 04/23/23  0337   INR  2.22* 3.11* 3.00* 1.89* 1.68*       Assessment / Plan     ASSESSMENT:  1.  ESRD:  Her usual schedule is TTS at [facility]  2.  Mechanical fall with right hip fracture, s/p right hip intramedullary nailing 4/23  3.  Atrial fibrillation on AC; she reports prior history of PE  4.  Anemia of ESRD and iron deficiency anemia  5.  History of CHF:  appears compensated  6.  Hypertension of ESRD      PLAN:  1.  We will continue dialysis on Monday Wednesday Friday while in the hospital.  Tentative plan for dialysis today  2.  Okay to discharge once outpatient dialysis is arranged.  Plan noted for possible discharge to [facility].   3.  Continue surveillance labs  4.  We will start iron infusion    Thank you for involving us in the care of Cristina Lemus.  Please feel free to call with any questions.    João Lui MD  04/26/23  08:35  MESERET    [facility]  [phone]    Please note that portions of this note were completed with a voice recognition program.

## 2023-04-26 NOTE — PLAN OF CARE
Goal Outcome Evaluation:VS stable , able to sleep most of the time. Dialysis not yet confirmed MD did not put his order yet. No complaints of pain on surgical site. For possible transfer to [facility] bed is available . No order yet.

## 2023-04-26 NOTE — PLAN OF CARE
Goal Outcome Evaluation:  Plan of Care Reviewed With: patient        Progress: no change  Outcome Evaluation: Pt seen for PT treatment today. Pt performed bed mobility with MinAX2/ModAX2 with sequencing cues given. Pt feeling a little dizzy upon sitting.  Pt needed ModAX2 with STS transfers. pt unsteady initially with standing and required verbal cues for posture correction. Pt able to take side  steps along EOB to the HOB with minAX2. Pt with sequencing cues given. Will continue to progress pt as able.

## 2023-04-26 NOTE — PROGRESS NOTES
[facility] Progress Note       Encounter Date:23  Patient:Cristina Lemus  :3/13/1930  MRN:[id number]      Chief Complaint: Follow-up of atrial fibrillation      Subjective:      No events overnight     Review of Systems:  Review of Systems   Constitutional: Positive for malaise/fatigue.   Musculoskeletal: Positive for joint pain.   Neurological: Positive for weakness.       Medications:  Scheduled Meds:  acetaminophen, 1,000 mg, Oral, Q6H  amLODIPine, 10 mg, Oral, Daily  cholecalciferol, 1,000 Units, Oral, Daily  docusate sodium, 200 mg, Oral, BID  famotidine, 20 mg, Oral, Daily  lidocaine, 1 patch, Transdermal, Q24H  sodium chloride, 10 mL, Intravenous, Q12H  sodium chloride, 10 mL, Intravenous, Q12H  warfarin, 4 mg, Oral, Daily    Continuous Infusions:  Pharmacy to dose warfarin,     PRN Meds:  •  acetaminophen **OR** acetaminophen **OR** acetaminophen  •  HYDROcodone-acetaminophen  •  HYDROcodone-acetaminophen  •  HYDROmorphone **AND** naloxone  •  LORazepam  •  melatonin  •  ondansetron **OR** ondansetron  •  Pharmacy to dose warfarin  •  [COMPLETED] Insert Peripheral IV **AND** sodium chloride  •  sodium chloride  •  sodium chloride  •  sodium chloride  •  sodium chloride         Objective:       Vitals:    233 23 2347 23 0700 23 1300   BP: 131/51 131/51 118/54 123/63   BP Location: Right arm Right arm Right arm Right arm   Patient Position: Lying Lying Lying Lying   Pulse: 73 89 80 75   Resp: 18 18 18 18   Temp: 98.2 °F (36.8 °C) 99.1 °F (37.3 °C) 98.8 °F (37.1 °C) 97.9 °F (36.6 °C)   TempSrc: Oral Oral Oral Oral   SpO2: 100% 95% 96% 90%   Weight:       Height:               Physical Exam:  Constitutional: Well appearing, frail, no acute distress   HENT: Oropharynx clear and membrane moist  Eyes: Normal conjunctiva, no sclera icterus.  Neck: Supple, no carotid bruit bilaterally.  Cardiovascular: Irregularly irregular rate and rhythm, No Murmur, No bilateral  lower extremity edema.  Pulmonary: Normal respiratory effort, normal lung sounds, no wheezing.             Lab Review:   Results from last 7 days   Lab Units 04/26/23 0437 04/25/23  0525 04/24/23  0631 04/23/23 0337 04/22/23  1028   SODIUM mmol/L 135* 135* 133* 139 138   POTASSIUM mmol/L 4.1 4.1 4.9 4.8 4.1   CHLORIDE mmol/L 101 102 101 103 99   CO2 mmol/L 21.4* 23.9 19.4* 24.8 22.3   BUN mg/dL 45* 25* 49* 44* 45*   CREATININE mg/dL 2.81* 2.09* 3.33* 2.95* 2.96*   GLUCOSE mg/dL 83 89 112* 116* 192*   CALCIUM mg/dL 8.6 8.8 8.3 9.4 9.8*   AST (SGOT) U/L  --   --   --   --  23   ALT (SGPT) U/L  --   --   --   --  18         Results from last 7 days   Lab Units 04/26/23 0437 04/25/23 0525 04/24/23  0631 04/23/23 0337 04/22/23  1028   WBC 10*3/mm3 6.64 11.24*  --  9.38 5.91   HEMOGLOBIN g/dL 7.4* 7.7* 8.6* 9.7* 11.6*   HEMATOCRIT % 21.7* 23.0* 26.1* 28.2* 33.6*   PLATELETS 10*3/mm3 195 150  --  174 207     Results from last 7 days   Lab Units 04/26/23 0437 04/25/23  0525 04/24/23  0631 04/23/23  1531 04/23/23 0337 04/22/23  1028   INR  2.22* 3.11* 3.00* 1.89* 1.68* 1.50*   APTT seconds  --   --   --   --   --  31.7     Results from last 7 days   Lab Units 04/22/23  1028   MAGNESIUM mg/dL 2.3           Invalid input(s): LDLCALC                 Assessment:          Diagnosis Plan   1. Closed fracture of right hip, initial encounter        2. ESRD on dialysis               Plan:       Ms. Evans is a 89 y.o. woman with past medical history notable for permanent atrial fibrillation, hypertension, chronic diastolic heart failure, and chronic kidney disease stage 4 now end-stage renal who presented to the hospital after fall and noted to have hip fracture.  We are asked to assist with perioperative evaluation and clearance.  She was cleared for surgery as no major modifiable risk factors were identified she was obviously moderate to high risk for surgery given her age and comorbidities but both were well managed.  She did  have surgery on 4/23/2023.  She is making slow but steady recovery.  From a cardiac standpoint volume management is handled by nephrology with dialysis.  From her atrial fibrillation standpoint she remains well rate controlled and has restarted couamdin. Hb has downtrended over the last couple of days and will monitor.        Permanent atrial fibrillation:  • Restarted on Coumadin   • Rate well controlled not on any rate controlling agents  • Follow H/H     Hypertension:  • Reasonably well controlled for age       Chronic diastolic congestive heart failure:  • Volume management per dialysis     Chronic kidney disease/end-stage renal disease:  • Following with nephrology  • Now on dialysis 3 times a week                     Lm Mart MD  [facility]  04/26/23  17:30 EDT

## 2023-04-26 NOTE — CASE MANAGEMENT/SOCIAL WORK
Continued Stay Note  [facility]     Patient Name: Cristina Lemus  MRN: [id number]  Today's Date: 4/26/2023    Admit Date: 4/22/2023    Plan: Pt has bed at [facility] with onsite HD setup.  [facility] setup to transport tomorrow at 1300.   Discharge Plan     Row Name 04/26/23 1550       Plan    Plan Pt has bed at [facility] with onsite HD setup.  [facility] setup to transport tomorrow at 1300.    Patient/Family in Agreement with Plan yes    Plan Comments Plan for D/C tomorrow to [facility] with HD setup.  [facility] setup to transport tomorrow at 1300. Called and notified daughter, Shannan. Met with pt at bedside and discussed D/C plan for tomorrow. Donaldo Grover RN-BC               Discharge Codes    No documentation.               Expected Discharge Date and Time     Expected Discharge Date Expected Discharge Time    Apr 26, 2023             Donaldo Grover RN

## 2023-04-26 NOTE — PLAN OF CARE
Problem: Adult Inpatient Plan of Care  Goal: Plan of Care Review  Outcome: Ongoing, Progressing  Flowsheets (Taken 4/26/2023 1718)  Progress: improving  Plan of Care Reviewed With: patient  Outcome Evaluation: Vitals stable. Dialysis ordered for today - to be done in room this evening per dialysis center. Possible discharge in am -  EMS  at 1300 tomorrow. Pain controlled. Will continue to monitor.

## 2023-04-26 NOTE — THERAPY TREATMENT NOTE
Patient Name: Cristina Lemus  : 3/13/1930    MRN: [id number]                              Today's Date: 2023       Admit Date: 2023    Visit Dx:     ICD-10-CM ICD-9-CM   1. Closed fracture of right hip, initial encounter  S72.001A 820.8   2. ESRD on dialysis  N18.6 585.6    Z99.2 V45.11     Patient Active Problem List   Diagnosis   • AVNRT (AV joy re-entry tachycardia)   • History of pulmonary embolus (PE)   • HTN, goal below 140/80   • Permanent atrial fibrillation (HCC)   • Stage 4 chronic kidney disease   • Chronic diastolic congestive heart failure (HCC)   • Anxiety disorder   • Chronic gout without tophus   • KIM (acute kidney injury)   • Essential hypertension   • Dyspnea   • Pleural effusion   • Dyspnea on exertion   • Anemia   • Acute on chronic renal failure   • Acute renal failure superimposed on chronic kidney disease   • Anticoagulated on warfarin   • UTI (urinary tract infection)   • Metabolic encephalopathy   • End stage renal disease   • C. difficile colitis   • Aftercare including intermittent dialysis   • Allergy, unspecified, initial encounter   • Anaphylactic shock, unspecified, initial encounter   • Anemia in chronic kidney disease   • Coagulation defect, unspecified   • Conjunctivochalasis   • Epiretinal membrane   • Hypokalemia   • Iron deficiency anemia   • Secondary hyperparathyroidism of renal origin   • Chronic mid back pain   • Sacral fracture   • Closed fracture of fifth thoracic vertebra with routine healing   • Closed fracture of right hip, initial encounter     Past Medical History:   Diagnosis Date   • Atrial fibrillation    • CHF (congestive heart failure)    • Hypertension    • Renal insufficiency      Past Surgical History:   Procedure Laterality Date   • ARTERIOVENOUS FISTULA/SHUNT SURGERY Left 2022    Procedure: LEFT ARM DIALYSIS GRAFT;  Surgeon: King Reynaga MD;  Location: [facility] OR;  Service: Vascular;  Laterality: Left;   • BACK SURGERY     • CYST  REMOVAL     • FEMUR IM NAILING/RODDING Right 4/23/2023    Procedure: RIGHT HIP INTRAMEDULLARY NAILING WITH CERCLAGE CABLING;  Surgeon: King Marcano MD;  Location: [facility] MAIN OR;  Service: Orthopedics;  Laterality: Right;  Christa   • INSERTION HEMODIALYSIS CATHETER N/A 1/18/2022    Procedure: HEMODIALYSIS CATHETER INSERTION with C arm;  Surgeon: King Reynaga MD;  Location: [facility] MAIN OR;  Service: Vascular;  Laterality: N/A;   • OTHER SURGICAL HISTORY      rupture disk      General Information     Row Name 04/26/23 1553          Physical Therapy Time and Intention    Document Type therapy note (daily note)  -     Mode of Treatment individual therapy;physical therapy  -     Row Name 04/26/23 1553          General Information    Existing Precautions/Restrictions fall  -     Row Name 04/26/23 1553          Cognition    Orientation Status (Cognition) oriented x 4  -     Row Name 04/26/23 1553          Safety Issues, Functional Mobility    Impairments Affecting Function (Mobility) endurance/activity tolerance;strength;pain;balance;postural/trunk control  -           User Key  (r) = Recorded By, (t) = Taken By, (c) = Cosigned By    Initials Name Provider Type    EB Tiera Smith PTA Physical Therapist Assistant               Mobility     Row Name 04/26/23 1554          Bed Mobility    Supine-Sit Fabius (Bed Mobility) minimum assist (75% patient effort);2 person assist;verbal cues;nonverbal cues (demo/gesture)  -     Sit-Supine Fabius (Bed Mobility) moderate assist (50% patient effort);2 person assist;verbal cues;nonverbal cues (demo/gesture)  -     Assistive Device (Bed Mobility) bed rails;head of bed elevated  -     Row Name 04/26/23 1554          Transfers    Comment, (Transfers) pt a little dizzy upon sitting  -     Row Name 04/26/23 1554          Sit-Stand Transfer    Sit-Stand Fabius (Transfers) moderate assist (50% patient effort);2 person assist  -     Assistive  Device (Sit-Stand Transfers) walker, front-wheeled  -EB     Comment, (Sit-Stand Transfer) Pt continued to feel dizzy when standing but no worse than when sitting. Cues for posture correction and balance.  -     Row Name 04/26/23 1556          Gait/Stairs (Locomotion)    Woodson Level (Gait) minimum assist (75% patient effort);2 person assist  -     Assistive Device (Gait) walker, front-wheeled  -EB     Distance in Feet (Gait) several side steps to HOB.  -EB     Deviations/Abnormal Patterns (Gait) stride length decreased;weight shifting decreased;gait speed decreased  -           User Key  (r) = Recorded By, (t) = Taken By, (c) = Cosigned By    Initials Name Provider Type    EB Tiera Smith PTA Physical Therapist Assistant               Obj/Interventions    No documentation.                Goals/Plan    No documentation.                Clinical Impression     Row Name 04/26/23 1558          Pain    Pain Location - Side/Orientation Right  -     Pain Location lower  -     Pain Location - extremity  -     Row Name 04/26/23 5352          Plan of Care Review    Plan of Care Reviewed With patient  -EB     Progress no change  -     Outcome Evaluation Pt seen for PT treatment today. Pt performed bed mobility with MinAX2/ModAX2 with sequencing cues given. Pt feeling a little dizzy upon sitting.  Pt needed ModAX2 with STS transfers. pt unsteady initially with standing and required verbal cues for posture correction. Pt able to take side  steps along EOB to the HOB with minAX2. Pt with sequencing cues given. Will continue to progress pt as able.  -     Row Name 04/26/23 7803          Therapy Assessment/Plan (PT)    Therapy Frequency (PT) 6 times/wk  -     Row Name 04/26/23 2152          Positioning and Restraints    Pre-Treatment Position in bed  -EB     Post Treatment Position bed  -EB     In Bed supine;call light within reach;encouraged to call for assist;exit alarm on;notified nsg  -EB           User  Key  (r) = Recorded By, (t) = Taken By, (c) = Cosigned By    Initials Name Provider Type    Tiera Palmer PTA Physical Therapist Assistant               Outcome Measures     Row Name 04/26/23 1600          How much help from another person do you currently need...    Turning from your back to your side while in flat bed without using bedrails? 2  -EB     Moving from lying on back to sitting on the side of a flat bed without bedrails? 2  -EB     Moving to and from a bed to a chair (including a wheelchair)? 2  -EB     Standing up from a chair using your arms (e.g., wheelchair, bedside chair)? 2  -EB     Climbing 3-5 steps with a railing? 1  -EB     To walk in hospital room? 2  -EB     AM-PAC 6 Clicks Score (PT) 11  -EB     Highest level of mobility 4 --> Transferred to chair/commode  -EB           User Key  (r) = Recorded By, (t) = Taken By, (c) = Cosigned By    Initials Name Provider Type    Tiera Palmer PTA Physical Therapist Assistant                             Physical Therapy Education     Title: PT OT SLP Therapies (Done)     Topic: Physical Therapy (Done)     Point: Mobility training (Done)     Learning Progress Summary           Patient Acceptance, E,D, VU,NR by EB at 4/26/2023 1600    Acceptance, E,TB, VU by MS at 4/24/2023 0944                   Point: Home exercise program (Done)     Learning Progress Summary           Patient Acceptance, E,D, VU,NR by EB at 4/26/2023 1600    Acceptance, E,D, VU,DU by EB at 4/25/2023 1545                   Point: Body mechanics (Done)     Learning Progress Summary           Patient Acceptance, E,D, VU,NR by EB at 4/26/2023 1600    Acceptance, E,TB, VU,NR by BB at 4/25/2023 1724                   Point: Precautions (Done)     Learning Progress Summary           Patient Acceptance, E,D, VU,NR by EB at 4/26/2023 1600    Acceptance, E,TB, VU,NR by BB at 4/25/2023 1724                               User Key     Initials Effective Dates Name Provider Type Discipline     MS 06/16/21 -  Siena Donald PT Physical Therapist PT    EB 02/14/23 -  Tiera Smith PTA Physical Therapist Assistant PT    BB 06/01/21 -  Sudha Mcmillan, RN Registered Nurse Nurse              PT Recommendation and Plan     Plan of Care Reviewed With: patient  Progress: no change  Outcome Evaluation: Pt seen for PT treatment today. Pt performed bed mobility with MinAX2/ModAX2 with sequencing cues given. Pt feeling a little dizzy upon sitting.  Pt needed ModAX2 with STS transfers. pt unsteady initially with standing and required verbal cues for posture correction. Pt able to take side  steps along EOB to the HOB with minAX2. Pt with sequencing cues given. Will continue to progress pt as able.     Time Calculation:    PT Charges     Row Name 04/26/23 1553             Time Calculation    Start Time 1503  -EB      Stop Time 1515  -EB      Time Calculation (min) 12 min  -EB      PT Received On 04/26/23  -EB      PT - Next Appointment 04/27/23  -EB         Time Calculation- PT    Total Timed Code Minutes- PT 12 minute(s)  -EB            User Key  (r) = Recorded By, (t) = Taken By, (c) = Cosigned By    Initials Name Provider Type    EB Tiera Smith PTA Physical Therapist Assistant              Therapy Charges for Today     Code Description Service Date Service Provider Modifiers Qty    93090061343 HC PT THER PROC EA 15 MIN 4/25/2023 Tiera Smith PTA GP 1    09581071012 HC PT THERAPEUTIC ACT EA 15 MIN 4/26/2023 Tiera Smith PTA GP 1    44089992512 HC PT THER SUPP EA 15 MIN 4/26/2023 Tiera Smith PTA GP 1          PT G-Codes  AM-PAC 6 Clicks Score (PT): 11       Tiera Smith PTA  4/26/2023

## 2023-04-26 NOTE — PROGRESS NOTES
Name: Cristina Lemus ADMIT: 2023   : 3/13/1930  PCP: Wood Elias MD    MRN: [id number] LOS: 4 days   AGE/SEX: 89 y.o. female  ROOM: [facility]     Subjective   Subjective    Patient seen at bedside.       Objective   Objective   Vital Signs  Temp:  [97.9 °F (36.6 °C)-99.1 °F (37.3 °C)] 97.9 °F (36.6 °C)  Heart Rate:  [73-89] 75  Resp:  [18] 18  BP: (118-131)/(51-63) 123/63  SpO2:  [90 %-100 %] 90 %  on  Flow (L/min):  [1] 1;   Device (Oxygen Therapy): nasal cannula  Body mass index is 18.53 kg/m².  Physical Exam   General, awake and alert.  Head and ENT, normocephalic and atraumatic.  Lungs, symmetric expansion, equal air entry bilaterally.  Heart, regular rate and rhythm.  Abdomen, soft and nontender.  Extremities, no clubbing or cyanosis.  Neuro, no focal deficits.  Skin: Warm and no rash.  Psych, normal mood and affect.  Musculoskeletal, joint examination is grossly normal.      Results Review     I reviewed the patient's new clinical results.  Results from last 7 days   Lab Units 237 23  1028   WBC 10*3/mm3 6.64 11.24*  --  9.38 5.91   HEMOGLOBIN g/dL 7.4* 7.7* 8.6* 9.7* 11.6*   PLATELETS 10*3/mm3 195 150  --  174 207     Results from last 7 days   Lab Units 2331 23  0337   SODIUM mmol/L 135* 135* 133* 139   POTASSIUM mmol/L 4.1 4.1 4.9 4.8   CHLORIDE mmol/L 101 102 101 103   CO2 mmol/L 21.4* 23.9 19.4* 24.8   BUN mg/dL 45* 25* 49* 44*   CREATININE mg/dL 2.81* 2.09* 3.33* 2.95*   GLUCOSE mg/dL 83 89 112* 116*   EGFR mL/min/1.73 15.2* 21.7* 12.4* 14.4*     Results from last 7 days   Lab Units 23  0337 23  1028   ALBUMIN g/dL 3.9 4.2   BILIRUBIN mg/dL  --  0.4   ALK PHOS U/L  --  95   AST (SGOT) U/L  --  23   ALT (SGPT) U/L  --  18     Results from last 7 days   Lab Units 23  0437 23  0525 23  0631 23  0337 23  1028   CALCIUM mg/dL 8.6 8.8 8.3 9.4 9.8*    ALBUMIN g/dL  --   --   --  3.9 4.2   MAGNESIUM mg/dL  --   --   --   --  2.3   PHOSPHORUS mg/dL  --   --   --  4.4  --        No results found for: HGBA1C, POCGLU    No radiology results for the last day  I have personally reviewed all medications:  Scheduled Medications  acetaminophen, 1,000 mg, Oral, Q6H  amLODIPine, 10 mg, Oral, Daily  cholecalciferol, 1,000 Units, Oral, Daily  docusate sodium, 200 mg, Oral, BID  famotidine, 20 mg, Oral, Daily  lidocaine, 1 patch, Transdermal, Q24H  sodium chloride, 10 mL, Intravenous, Q12H  sodium chloride, 10 mL, Intravenous, Q12H  warfarin, 4 mg, Oral, Daily    Infusions  Pharmacy to dose warfarin,     Diet  Diet: Cardiac Diets, Renal Diets; Healthy Heart (2-3 Na+); Low Sodium (2-3g), Low Potassium, Low Phosphorus; Texture: Regular Texture (IDDSI 7); Fluid Consistency: Thin (IDDSI 0)    I have personally reviewed:  [x]  Laboratory   [x]  Microbiology   [x]  Radiology   [x]  EKG/Telemetry  [x]  Cardiology/Vascular   [x]  Pathology    [x]  Records       Assessment/Plan     Active Hospital Problems    Diagnosis  POA   • **Closed fracture of right hip, initial encounter [S72.001A]  Yes      Resolved Hospital Problems   No resolved problems to display.       89 y.o. female admitted with Closed fracture of right hip, initial encounter.    Assessment and plan  1.  Right intertrochanteric hip fracture, status post surgical intervention, continue pain control, continue physical therapy.    2.  Chronic atrial fibrillation, history of CHF, end-stage renal disease on hemodialysis, chronic conditions, continue dialysis based on nephrology recommendations.    3.  Hypertension, history of C. difficile, anemia, chronic conditions, continue to monitor labs.    4.  CODE STATUS is full code.  Further plans based on hospital course.    Daniel Gonzalez MD  [facility]  04/26/23  16:23 EDT

## 2023-04-26 NOTE — PROGRESS NOTES
[facility] Clinical Pharmacy Services: Warfarin Dosing/Monitoring Consult    Cristina Lemus is a 89 y.o. female, estimated creatinine clearance is 13.4 mL/min (A) (by C-G formula based on SCr of 2.09 mg/dL (H)). weighing 50.5 kg (111 lb 5.3 oz).    Results from last 7 days   Lab Units 04/26/23  0437 04/25/23  0525 04/24/23  0631 04/23/23  1531 04/23/23  0337 04/22/23  1028   INR  2.22* 3.11* 3.00* 1.89* 1.68* 1.50*   APTT seconds  --   --   --   --   --  31.7   HEMOGLOBIN g/dL 7.4* 7.7* 8.6*  --  9.7* 11.6*   HEMATOCRIT % 21.7* 23.0* 26.1*  --  28.2* 33.6*   PLATELETS 10*3/mm3 195 150  --   --  174 207     Prior to admission anticoagulation:   - Per [facility] records (last visit 04/17/23): Current warfarin regimen is 6 mg on Tues; 4 mg all other days.   - Med rec matches this regimen.     Hospital Anticoagulation:  Consulting provider: Dr. Marcano  Start date: 4/23  Indication: A Fib - requiring full anticoagulation  Target INR: 2 - 3  Expected duration: tbd   Bridge Therapy: No     Potential food or drug interactions:  No significant DDI    Education complete?/Date: No; plan for follow up TBD    Assessment/Plan:  INR back within goal, will continue with home dose warfarin 4mg due today.   Monitor for any signs or symptoms of bleeding.  Significant drop in H/H noted.  Follow up daily INRs and dose adjustments. INR ordered daily with AM labs while inpatient.    Pharmacy will continue to follow until discharge or discontinuation of warfarin.     Carolann Brenner PharmD, BCPS

## 2023-04-26 NOTE — CASE MANAGEMENT/SOCIAL WORK
Continued Stay Note  [facility]     Patient Name: Cristina Lemus  MRN: [id number]  Today's Date: 4/26/2023    Admit Date: 4/22/2023    Plan: Pt has bed at [facility] with onsite HD setup. Can accept anytime. Needs ambulance transport.   Discharge Plan     Row Name 04/26/23 0823       Plan    Plan Pt has bed at [facility] with onsite HD setup. Can accept anytime. Needs ambulance transport.    Patient/Family in Agreement with Plan yes    Plan Comments Per [facility], can accept to [facility] SNF and HD chair obtained. Can accept when ready. Donaldo Grover RN-BC               Discharge Codes    No documentation.               Expected Discharge Date and Time     Expected Discharge Date Expected Discharge Time    Apr 25, 2023             Donaldo Grover RN

## 2023-04-27 ENCOUNTER — READMISSION MANAGEMENT (OUTPATIENT)
Dept: CALL CENTER | Facility: HOSPITAL | Age: 88
End: 2023-04-27
Payer: MEDICARE

## 2023-04-27 VITALS
BODY MASS INDEX: 18.55 KG/M2 | OXYGEN SATURATION: 96 % | HEART RATE: 70 BPM | HEIGHT: 65 IN | RESPIRATION RATE: 18 BRPM | SYSTOLIC BLOOD PRESSURE: 145 MMHG | DIASTOLIC BLOOD PRESSURE: 79 MMHG | TEMPERATURE: 98 F | WEIGHT: 111.33 LBS

## 2023-04-27 LAB
ALBUMIN SERPL-MCNC: 3.1 G/DL (ref 3.5–5.2)
ALBUMIN/GLOB SERPL: 1.1 G/DL
ALP SERPL-CCNC: 185 U/L (ref 39–117)
ALT SERPL W P-5'-P-CCNC: 9 U/L (ref 1–33)
ANION GAP SERPL CALCULATED.3IONS-SCNC: 10.3 MMOL/L (ref 5–15)
AST SERPL-CCNC: 54 U/L (ref 1–32)
BASOPHILS # BLD AUTO: 0.01 10*3/MM3 (ref 0–0.2)
BASOPHILS NFR BLD AUTO: 0.2 % (ref 0–1.5)
BILIRUB SERPL-MCNC: 0.8 MG/DL (ref 0–1.2)
BUN SERPL-MCNC: 30 MG/DL (ref 8–23)
BUN/CREAT SERPL: 17.3 (ref 7–25)
CALCIUM SPEC-SCNC: 8.9 MG/DL (ref 8.2–9.6)
CHLORIDE SERPL-SCNC: 103 MMOL/L (ref 98–107)
CO2 SERPL-SCNC: 23.7 MMOL/L (ref 22–29)
CREAT SERPL-MCNC: 1.73 MG/DL (ref 0.57–1)
DEPRECATED RDW RBC AUTO: 41.8 FL (ref 37–54)
EGFRCR SERPLBLD CKD-EPI 2021: 27.3 ML/MIN/1.73
EOSINOPHIL # BLD AUTO: 0.2 10*3/MM3 (ref 0–0.4)
EOSINOPHIL NFR BLD AUTO: 3.2 % (ref 0.3–6.2)
ERYTHROCYTE [DISTWIDTH] IN BLOOD BY AUTOMATED COUNT: 12.2 % (ref 12.3–15.4)
GLOBULIN UR ELPH-MCNC: 2.9 GM/DL
GLUCOSE SERPL-MCNC: 89 MG/DL (ref 65–99)
HCT VFR BLD AUTO: 21.7 % (ref 34–46.6)
HGB BLD-MCNC: 7.3 G/DL (ref 12–15.9)
IMM GRANULOCYTES # BLD AUTO: 0.04 10*3/MM3 (ref 0–0.05)
IMM GRANULOCYTES NFR BLD AUTO: 0.6 % (ref 0–0.5)
INR PPP: 2.03 (ref 0.9–1.1)
LYMPHOCYTES # BLD AUTO: 1.21 10*3/MM3 (ref 0.7–3.1)
LYMPHOCYTES NFR BLD AUTO: 19.4 % (ref 19.6–45.3)
MCH RBC QN AUTO: 31.9 PG (ref 26.6–33)
MCHC RBC AUTO-ENTMCNC: 33.6 G/DL (ref 31.5–35.7)
MCV RBC AUTO: 94.8 FL (ref 79–97)
MONOCYTES # BLD AUTO: 0.71 10*3/MM3 (ref 0.1–0.9)
MONOCYTES NFR BLD AUTO: 11.4 % (ref 5–12)
NEUTROPHILS NFR BLD AUTO: 4.07 10*3/MM3 (ref 1.7–7)
NEUTROPHILS NFR BLD AUTO: 65.2 % (ref 42.7–76)
NRBC BLD AUTO-RTO: 0 /100 WBC (ref 0–0.2)
PLATELET # BLD AUTO: 184 10*3/MM3 (ref 140–450)
PMV BLD AUTO: 10.1 FL (ref 6–12)
POTASSIUM SERPL-SCNC: 4 MMOL/L (ref 3.5–5.2)
PROT SERPL-MCNC: 6 G/DL (ref 6–8.5)
PROTHROMBIN TIME: 23.3 SECONDS (ref 11.7–14.2)
RBC # BLD AUTO: 2.29 10*6/MM3 (ref 3.77–5.28)
SODIUM SERPL-SCNC: 137 MMOL/L (ref 136–145)
WBC NRBC COR # BLD: 6.24 10*3/MM3 (ref 3.4–10.8)

## 2023-04-27 PROCEDURE — 85610 PROTHROMBIN TIME: CPT | Performed by: ORTHOPAEDIC SURGERY

## 2023-04-27 PROCEDURE — 85025 COMPLETE CBC W/AUTO DIFF WBC: CPT | Performed by: INTERNAL MEDICINE

## 2023-04-27 PROCEDURE — 80053 COMPREHEN METABOLIC PANEL: CPT | Performed by: INTERNAL MEDICINE

## 2023-04-27 PROCEDURE — 99232 SBSQ HOSP IP/OBS MODERATE 35: CPT | Performed by: NURSE PRACTITIONER

## 2023-04-27 RX ORDER — WARFARIN SODIUM 6 MG/1
6 TABLET ORAL
Status: DISCONTINUED | OUTPATIENT
Start: 2023-05-02 | End: 2023-04-27 | Stop reason: HOSPADM

## 2023-04-27 RX ORDER — LORAZEPAM 0.5 MG/1
0.5 TABLET ORAL NIGHTLY PRN
Qty: 3 TABLET | Refills: 0 | Status: SHIPPED | OUTPATIENT
Start: 2023-04-27

## 2023-04-27 RX ORDER — WARFARIN SODIUM 4 MG/1
4 TABLET ORAL
Status: DISCONTINUED | OUTPATIENT
Start: 2023-04-27 | End: 2023-04-27 | Stop reason: HOSPADM

## 2023-04-27 RX ORDER — HYDROCODONE BITARTRATE AND ACETAMINOPHEN 5; 325 MG/1; MG/1
1 TABLET ORAL EVERY 6 HOURS PRN
Qty: 12 TABLET | Refills: 0 | Status: SHIPPED | OUTPATIENT
Start: 2023-04-27 | End: 2023-04-30

## 2023-04-27 RX ORDER — BISACODYL 10 MG
10 SUPPOSITORY, RECTAL RECTAL DAILY
Status: DISCONTINUED | OUTPATIENT
Start: 2023-04-27 | End: 2023-04-27 | Stop reason: HOSPADM

## 2023-04-27 RX ADMIN — Medication 10 ML: at 08:54

## 2023-04-27 RX ADMIN — DOCUSATE SODIUM 200 MG: 100 CAPSULE, LIQUID FILLED ORAL at 08:54

## 2023-04-27 RX ADMIN — Medication 10 ML: at 05:52

## 2023-04-27 RX ADMIN — AMLODIPINE BESYLATE 10 MG: 10 TABLET ORAL at 08:54

## 2023-04-27 RX ADMIN — BISACODYL 10 MG: 10 SUPPOSITORY RECTAL at 11:28

## 2023-04-27 RX ADMIN — FAMOTIDINE 20 MG: 20 TABLET, FILM COATED ORAL at 08:54

## 2023-04-27 RX ADMIN — LIDOCAINE 1 PATCH: 700 PATCH TOPICAL at 08:54

## 2023-04-27 RX ADMIN — Medication 1000 UNITS: at 08:54

## 2023-04-27 NOTE — DISCHARGE SUMMARY
[facility]    Date of Discharge:  4/27/2023    PCP: Wood Elias MD    Discharge Diagnosis:   Active Hospital Problems    Diagnosis  POA   • **Closed fracture of right hip, initial encounter [S72.001A]  Yes      Resolved Hospital Problems   No resolved problems to display.     Procedure(s):  RIGHT HIP INTRAMEDULLARY NAILING WITH CERCLAGE CABLING     Consults     Date and Time Order Name Status Description    4/22/2023  3:24 PM Inpatient Nephrology Consult      4/22/2023  3:24 PM Inpatient Cardiology Consult Completed     4/22/2023 11:45 AM Ortho (on-call MD unless specified) Completed     4/22/2023 11:45 AM LHA (on-call MD unless specified) Details          Hospital Course  89 y.o. female with past medical history significant for CHF, A-fib, end-stage renal disease on hemodialysis, presented to the hospital with right intertrochanteric hip fracture, she is status post surgical intervention, patient has done well postop.  Overall patient has remained stable, rehab facility can accept her on dialysis.  Patient will be discharged in a stable condition, she would need to follow-up with PCP in 7 days.  Total time spent on discharge management is more than 30 minutes.  Plan of care discussed with the patient and she has verbalized understanding.        Temp:  [97.6 °F (36.4 °C)-98 °F (36.7 °C)] 98 °F (36.7 °C)  Heart Rate:  [75-93] 93  Resp:  [16-18] 18  BP: (112-145)/(56-79) 145/79  Body mass index is 18.53 kg/m².    Physical Exam   General Appearance:    Alert, cooperative, no distress, appears stated age.  Head:    Normocephalic, without obvious abnormality, atraumatic  Eyes:    PERRL, conjunctiva/corneas clear, EOM's intact, both eyes  Ears:    Normal external ear canals, both ears  Nose:   Nares normal, septum midline, mucosa normal, no drainage    or sinus tenderness  Throat:   Lips, tongue, gums normal; oral mucosa pink and moist  Neck:   Supple,  symmetrical, trachea midline, no adenopathy;     thyroid:  no enlargement/tenderness/nodules; no carotid    bruit or JVD  Back:     Symmetric, no curvature, ROM normal, no CVA tenderness  Lungs:     Clear to auscultation bilaterally, respirations unlabored  Chest Wall:    No tenderness or deformity   Heart:    Regular rate and rhythm, S1 and S2 normal, no murmur, rub   or gallop  Abdomen:    Soft nontender no organomegaly detected  Extremities:   Extremities normal, atraumatic, no cyanosis or edema  Pulses:   Pulses palpable in all extremities; symmetric all extremities  Skin:   Skin color normal, Skin is warm and dry,  no rashes or palpable lesions  Neurologic:   CNII-XII intact, motor strength grossly intact, sensation grossly intact to light touch, no focal deficits noted         Disposition: Home or Self Care       Discharge Medications      New Medications      Instructions Start Date   HYDROcodone-acetaminophen 5-325 MG per tablet  Commonly known as: NORCO   1 tablet, Oral, Every 6 Hours PRN         Changes to Medications      Instructions Start Date   LORazepam 1 MG tablet  Commonly known as: ATIVAN  What changed: Another medication with the same name was added. Make sure you understand how and when to take each.   1 mg, Oral, Nightly PRN, Must be seen for more refills of this medication.      LORazepam 0.5 MG tablet  Commonly known as: ATIVAN  What changed: You were already taking a medication with the same name, and this prescription was added. Make sure you understand how and when to take each.   0.5 mg, Oral, Nightly PRN         Continue These Medications      Instructions Start Date   acetaminophen 325 MG tablet  Commonly known as: TYLENOL   650 mg, Oral, Every 4 Hours PRN      acetaminophen 500 MG tablet  Commonly known as: TYLENOL   500 mg, Oral, 4 Times Daily      amLODIPine 10 MG tablet  Commonly known as: NORVASC   10 mg, Oral, Daily      Lidocaine 4 % patch   1 patch, Topical, Daily      potassium  chloride 20 MEQ CR tablet  Commonly known as: KLOR-CON   20 mEq, Oral, Daily      torsemide 20 MG tablet  Commonly known as: DEMADEX   20 mg, Oral, Daily      VITAMIN D3 PO   1,000 Units, Daily      warfarin 2 MG tablet  Commonly known as: Jantoven   TAKE 3 TABLETS (6MG) BY MOUTH ON TUESDAYS; TAKE 2 TABLETS (4MG) ON ALL OTHER DAYS OR AS DIRECTED.              Additional Instructions for the Follow-ups that You Need to Schedule     Discharge Follow-up with PCP   As directed       Currently Documented PCP:    Wood Elias MD    PCP Phone Number:    [phone]     Follow Up Details: Follow-up with PCP in 7 days.            Contact information for follow-up providers     Lm Mart MD. Schedule an appointment as soon as possible for a visit in 4 week(s).    Specialty: Cardiology  Why: Follow-up Whit Burr NP in 2-4 weeks  Contact information:  [address]  [address]  [place] [address]  [phone]             Wood Elias MD .    Specialties: Family Medicine, Emergency Medicine  Why: Follow-up with PCP in 7 days.  Contact information:  [address] [facility]  [address]  [place] [address]  [phone]                   Contact information for after-discharge care     Destination     [facility] .    Service: Skilled Nursing  Contact information:  [address] [facility] [address] [phone]                            Future Appointments   Date Time Provider Department Center   6/26/2023  1:15 PM Wood Elias MD [facility]   7/31/2023  2:30 PM Lm Mart MD [facility] MAJO Gonzalez MD  [facility]  04/27/23    Discharge time spent greater than 30 minutes.

## 2023-04-27 NOTE — PROGRESS NOTES
"Nutrition Services    Patient Name:  Cristina Lemus  YOB: 1930  MRN: [id number]  Admit Date:  4/22/2023  Assessment Date:  04/27/23    Comment: Nutrition assessment for low BMI  Dx:   BMI 18.53  Sweet little lady. States appetite is good, water stable  Does not meet criteria for MSA assessment  Will drink Novasource Renal supplements--added BID  Note possible D/C today      CLINICAL NUTRITION ASSESSMENT      Reason for Assessment BMI     Diagnosis/Problem   Hip fracture   Medical/Surgical History Past Medical History:   Diagnosis Date   • Atrial fibrillation    • CHF (congestive heart failure)    • Hypertension    • Renal insufficiency        Past Surgical History:   Procedure Laterality Date   • ARTERIOVENOUS FISTULA/SHUNT SURGERY Left 1/24/2022    Procedure: LEFT ARM DIALYSIS GRAFT;  Surgeon: King Reynaga MD;  Location: [facility]  Service: Vascular;  Laterality: Left;   • BACK SURGERY     • CYST REMOVAL     • FEMUR IM NAILING/RODDING Right 4/23/2023    Procedure: RIGHT HIP INTRAMEDULLARY NAILING WITH CERCLAGE CABLING;  Surgeon: King Marcano MD;  Location: [facility]  Service: Orthopedics;  Laterality: Right;  [place]   • INSERTION HEMODIALYSIS CATHETER N/A 1/18/2022    Procedure: HEMODIALYSIS CATHETER INSERTION with C arm;  Surgeon: King Reynaga MD;  Location: [facility]  Service: Vascular;  Laterality: N/A;   • OTHER SURGICAL HISTORY      rupture disk        Encounter Information        Nutrition History:  States appetite is usually good and wt stable   Food Preferences:    Supplements: Will drink vanilla renal supplement   Factors Affecting Intake: No factors at this time     Anthropometrics        Current Height  Current Weight  BMI kg/m2 Height: 165.1 cm (65\")  Weight: 50.5 kg (111 lb 5.3 oz) (04/24/23 1337)  Body mass index is 18.53 kg/m².   Adjusted BMI (if applicable)    BMI Category Normal/Healthy (18.4 - 24.9)       Admission Weight        Ideal Body Weight " (IBW) 57 kg   Adjusted IBW (if applicable)        Usual Body Weight (UBW) 110-115   Weight Change/Trend Stable       Weight History Wt Readings from Last 30 Encounters:   04/24/23 1337 50.5 kg (111 lb 5.3 oz)   04/22/23 1334 53.5 kg (117 lb 15.1 oz)   04/22/23 1230 53.5 kg (118 lb)   04/12/23 1414 53.5 kg (118 lb)   01/30/23 1438 52.3 kg (115 lb 6.4 oz)   12/23/22 1427 53.3 kg (117 lb 6.4 oz)   09/09/22 1305 51.8 kg (114 lb 1.6 oz)   07/22/22 1346 52.2 kg (115 lb)   06/20/22 1410 50.2 kg (110 lb 9.6 oz)   06/03/22 1137 49.9 kg (110 lb)   05/13/22 1840 50 kg (110 lb 3.7 oz)   05/12/22 1353 53 kg (116 lb 13.5 oz)   05/12/22 0935 52.2 kg (115 lb)   02/25/22 1449 50.8 kg (112 lb)   01/26/22 0600 52.6 kg (115 lb 15.4 oz)   01/26/22 0236 52.6 kg (116 lb)   01/24/22 0602 49.2 kg (108 lb 6.5 oz)   01/24/22 0452 49.7 kg (109 lb 9.6 oz)   01/23/22 1743 51.2 kg (112 lb 14 oz)   01/23/22 0530 54.7 kg (120 lb 9.6 oz)   01/22/22 0547 54.2 kg (119 lb 6.4 oz)   01/16/22 1617 50.3 kg (111 lb)   12/21/21 1414 50.3 kg (111 lb)   12/13/21 1318 50.7 kg (111 lb 12.8 oz)   11/18/21 1406 52.5 kg (115 lb 12.8 oz)   09/13/21 1007 52.4 kg (115 lb 9.6 oz)   08/17/21 1436 51.9 kg (114 lb 6.4 oz)   07/27/21 1637 49.4 kg (109 lb)   07/14/21 1629 51.5 kg (113 lb 9.6 oz)   07/12/21 1355 53.4 kg (117 lb 10.1 oz)   07/08/21 1620 51.7 kg (114 lb 1 oz)   06/29/21 1238 56.7 kg (125 lb)   06/24/21 1312 56.7 kg (125 lb)   06/18/21 1459 58.1 kg (128 lb 2 oz)   06/16/21 1315 57.3 kg (126 lb 4.8 oz)   06/11/21 0500 56.2 kg (124 lb)   06/09/21 2002 53.3 kg (117 lb 6.4 oz)   06/09/21 1408 53.5 kg (118 lb)   05/26/21 1424 56.2 kg (124 lb)   04/23/21 1442 58.5 kg (129 lb)   02/10/21 1431 59.6 kg (131 lb 6.4 oz)   11/05/20 1423 60.6 kg (133 lb 9.6 oz)   10/01/20 1359 64.1 kg (141 lb 6.4 oz)           --  Tests/Procedures        Tests/Procedures No new tests/procedures     Labs       Pertinent Labs    Results from last 7 days   Lab Units 04/27/23  035  04/26/23  0437 04/25/23  0525 04/23/23  0337 04/22/23  1028   SODIUM mmol/L 137 135* 135*   < > 138   POTASSIUM mmol/L 4.0 4.1 4.1   < > 4.1   CHLORIDE mmol/L 103 101 102   < > 99   CO2 mmol/L 23.7 21.4* 23.9   < > 22.3   BUN mg/dL 30* 45* 25*   < > 45*   CREATININE mg/dL 1.73* 2.81* 2.09*   < > 2.96*   CALCIUM mg/dL 8.9 8.6 8.8   < > 9.8*   BILIRUBIN mg/dL 0.8  --   --   --  0.4   ALK PHOS U/L 185*  --   --   --  95   ALT (SGPT) U/L 9  --   --   --  18   AST (SGOT) U/L 54*  --   --   --  23   GLUCOSE mg/dL 89 83 89   < > 192*    < > = values in this interval not displayed.     Results from last 7 days   Lab Units 04/27/23 0356 04/24/23 0631 04/23/23 0337 04/22/23  1028   MAGNESIUM mg/dL  --   --   --  2.3   PHOSPHORUS mg/dL  --   --  4.4  --    HEMOGLOBIN g/dL 7.3*   < > 9.7* 11.6*   HEMATOCRIT % 21.7*   < > 28.2* 33.6*   WBC 10*3/mm3 6.24   < > 9.38 5.91   ALBUMIN g/dL 3.1*  --  3.9 4.2    < > = values in this interval not displayed.     Results from last 7 days   Lab Units 04/27/23 0356 04/26/23 0437 04/25/23  0525 04/24/23  0631 04/23/23  1531 04/23/23  0337 04/22/23  1028   INR  2.03* 2.22* 3.11* 3.00* 1.89* 1.68* 1.50*   APTT seconds  --   --   --   --   --   --  31.7   PLATELETS 10*3/mm3 184 195 150  --   --  174 207     COVID19   Date Value Ref Range Status   05/16/2022 Not Detected Not Detected - Ref. Range Final     Lab Results   Component Value Date    HGBA1C 5.8 (H) 06/26/2020          Medications           Scheduled Medications acetaminophen, 1,000 mg, Oral, Q6H  amLODIPine, 10 mg, Oral, Daily  bisacodyl, 10 mg, Rectal, Daily  cholecalciferol, 1,000 Units, Oral, Daily  docusate sodium, 200 mg, Oral, BID  famotidine, 20 mg, Oral, Daily  lidocaine, 1 patch, Transdermal, Q24H  sodium chloride, 10 mL, Intravenous, Q12H  sodium chloride, 10 mL, Intravenous, Q12H  warfarin, 4 mg, Oral, Once per day on Sun Mon Wed Thu Fri Sat  [START ON 5/2/2023] warfarin, 6 mg, Oral, Once per day on Tue       Infusions  Pharmacy to dose warfarin,        PRN Medications •  acetaminophen **OR** acetaminophen **OR** acetaminophen  •  HYDROcodone-acetaminophen  •  HYDROcodone-acetaminophen  •  HYDROmorphone **AND** naloxone  •  LORazepam  •  melatonin  •  ondansetron **OR** ondansetron  •  Pharmacy to dose warfarin  •  [COMPLETED] Insert Peripheral IV **AND** sodium chloride  •  sodium chloride  •  sodium chloride  •  sodium chloride  •  sodium chloride     Physical Findings          Physical Appearance alert, thin   Oral/Mouth Cavity tooth or teeth missing   Edema  no edema   Gastrointestinal normoactive   Skin  skin intact   Tubes/Drains/Lines none   NFPE No clinical signs of muscle wasting or fat loss   -  Estimated/Assessed Needs       Energy Requirements    Weight for Calculation 50.5 kg   Method for Estimation  30 kcal/kg, 35 kcal/kg   EST Needs (kcal/day) 0899-0972       Protein Requirements    Weight for Calculation 50.5 kg   EST Protein Needs (g/kg) 1.0 gm/kg, 1.5 gm/kg   EST Daily Needs (g/day) 50-75       Fluid Requirements     Method for Estimation Defer to physician    Estimated Needs (mL/day)    -  Current Nutrition Orders & Evaluation of Intake       Oral Nutrition     Food Allergies NKFA   Current PO Diet Diet: Cardiac Diets, Renal Diets; Healthy Heart (2-3 Na+); Low Sodium (2-3g), Low Potassium, Low Phosphorus; Texture: Regular Texture (IDDSI 7); Fluid Consistency: Thin (IDDSI 0)   Supplement    PO Evaluation     % PO Intake Good per pt    # of Days Evaluated    --  PES STATEMENT / NUTRITION DIAGNOSIS      Nutrition Dx Problem  Problem: Increased Nutrient Needs  Etiology: Medical Diagnosis hip fx, low BMI  Signs/Symptoms: Calories and Report/Observation    Comment:    --  NUTRITION INTERVENTION / PLAN OF CARE      Intervention Goal(s) Maintain nutrition status, Meet estimated needs, Maintain weight, Appropriate weight gain and PO intake goal %: 75         RD Intervention/Action Interview for preferences, Supplement  provided, Encourage intake and Follow Tx Progress         Prescription/Orders:       PO Diet       Supplements novasource      Snacks       Enteral Nutrition       Parenteral Nutrition    New Prescription Ordered? Yes   --      Monitor/Evaluation Per protocol   Discharge Plan/Needs Pending clinical course   Education Will instruct as appropriate   --    RD to follow per protocol.      Electronically signed by:  Gita Tomas RD  04/27/23 11:44 EDT

## 2023-04-27 NOTE — PROGRESS NOTES
[facility] Clinical Pharmacy Services: Warfarin Dosing/Monitoring Consult    Cristina Lemus is a 89 y.o. female, estimated creatinine clearance is 13.4 mL/min (A) (by C-G formula based on SCr of 2.09 mg/dL (H)). weighing 50.5 kg (111 lb 5.3 oz).    Results from last 7 days   Lab Units 04/27/23  0356 04/26/23  0437 04/25/23  0525 04/24/23  0631 04/23/23  1531 04/23/23  0337 04/22/23  1028   INR  2.03* 2.22* 3.11* 3.00* 1.89* 1.68* 1.50*   APTT seconds  --   --   --   --   --   --  31.7   HEMOGLOBIN g/dL 7.3* 7.4* 7.7* 8.6*  --  9.7* 11.6*   HEMATOCRIT % 21.7* 21.7* 23.0* 26.1*  --  28.2* 33.6*   PLATELETS 10*3/mm3 184 195 150  --   --  174 207     Prior to admission anticoagulation:   - Per [facility] records (last visit 04/17/23): Current warfarin regimen is 6 mg on Tues; 4 mg all other days.   - Med rec matches this regimen.     Hospital Anticoagulation:  Consulting provider: Dr. Marcano  Start date: 4/23  Indication: A Fib - requiring full anticoagulation  Target INR: 2 - 3  Expected duration: tbd   Bridge Therapy: No     Potential food or drug interactions:  No significant DDI    Education complete?/Date: No; plan for follow up TBD    Assessment/Plan:  Will restart home dose warfarin today, INR trending down again but not surprising after being held for a few days.    Monitor for any signs or symptoms of bleeding.  Significant drop in H/H noted.  Follow up daily INRs and dose adjustments. INR ordered daily with AM labs while inpatient.    Pharmacy will continue to follow until discharge or discontinuation of warfarin.     Carolann Brenner PharmD, BCPS

## 2023-04-27 NOTE — PROGRESS NOTES
[facility] Progress Note       Encounter Date:23  Patient:Cristina Lemus  :3/13/1930  MRN:[id number]      Chief Complaint: f/u atrial fibrillation      Subjective:      Sitting in bed, no complaints    Medications:  Scheduled Meds:  acetaminophen, 1,000 mg, Oral, Q6H  amLODIPine, 10 mg, Oral, Daily  cholecalciferol, 1,000 Units, Oral, Daily  docusate sodium, 200 mg, Oral, BID  famotidine, 20 mg, Oral, Daily  lidocaine, 1 patch, Transdermal, Q24H  sodium chloride, 10 mL, Intravenous, Q12H  sodium chloride, 10 mL, Intravenous, Q12H    Continuous Infusions:  Pharmacy to dose warfarin,     PRN Meds:  •  acetaminophen **OR** acetaminophen **OR** acetaminophen  •  HYDROcodone-acetaminophen  •  HYDROcodone-acetaminophen  •  HYDROmorphone **AND** naloxone  •  LORazepam  •  melatonin  •  ondansetron **OR** ondansetron  •  Pharmacy to dose warfarin  •  [COMPLETED] Insert Peripheral IV **AND** sodium chloride  •  sodium chloride  •  sodium chloride  •  sodium chloride  •  sodium chloride         Objective:       Vitals:    23 2300 23 2335 23 0008 23 0700   BP: 130/68 132/56 138/59 145/79   BP Location:  Right arm Right arm Right arm   Patient Position:  Lying Lying Lying   Pulse: 80 77  93   Resp:  16  18   Temp:  97.6 °F (36.4 °C)  98 °F (36.7 °C)   TempSrc:  Oral  Oral   SpO2:    94%   Weight:       Height:               Physical Exam:  Constitutional: Elderly, hard of hearing. Pleasant  HENT: Oropharynx clear and membrane moist  Eyes: Normal conjunctiva, no sclera icterus.  Neck: Supple  Cardiovascular: Irregularly irregula rate and rhythm, No Murmur, No bilateral lower extremity edema.  Pulmonary: Normal respiratory effort, normal lung sounds  Abdominal: Soft, nontender  Neurological: Alert and orient x 3  Skin: Warm, dry, no ecchymosis, no rash  Psych: Appropriate mood and affect. Normal judgment and insight           Lab Review:   Results from last 7 days   Lab Units  04/27/23  0356 04/26/23  0437 04/25/23  0525 04/24/23  0631 04/23/23  0337 04/22/23  1028   SODIUM mmol/L 137 135* 135* 133* 139 138   POTASSIUM mmol/L 4.0 4.1 4.1 4.9 4.8 4.1   CHLORIDE mmol/L 103 101 102 101 103 99   CO2 mmol/L 23.7 21.4* 23.9 19.4* 24.8 22.3   BUN mg/dL 30* 45* 25* 49* 44* 45*   CREATININE mg/dL 1.73* 2.81* 2.09* 3.33* 2.95* 2.96*   GLUCOSE mg/dL 89 83 89 112* 116* 192*   CALCIUM mg/dL 8.9 8.6 8.8 8.3 9.4 9.8*   AST (SGOT) U/L 54*  --   --   --   --  23   ALT (SGPT) U/L 9  --   --   --   --  18         Results from last 7 days   Lab Units 04/27/23  0356 04/26/23 0437 04/25/23  0525 04/24/23  0631 04/23/23  0337 04/22/23  1028   WBC 10*3/mm3 6.24 6.64 11.24*  --  9.38 5.91   HEMOGLOBIN g/dL 7.3* 7.4* 7.7* 8.6* 9.7* 11.6*   HEMATOCRIT % 21.7* 21.7* 23.0* 26.1* 28.2* 33.6*   PLATELETS 10*3/mm3 184 195 150  --  174 207     Results from last 7 days   Lab Units 04/27/23  0356 04/26/23  0437 04/25/23  0525 04/24/23  0631 04/23/23  1531 04/23/23  0337 04/22/23  1028   INR  2.03* 2.22* 3.11* 3.00* 1.89* 1.68* 1.50*   APTT seconds  --   --   --   --   --   --  31.7     Results from last 7 days   Lab Units 04/22/23  1028   MAGNESIUM mg/dL 2.3           Invalid input(s): LDLCALC                 Assessment:          Diagnosis Plan   1. Closed fracture of right hip, initial encounter        2. ESRD on dialysis               Plan:       1. Permanent atrial fibrillation -anticoagulation with warfarin, her INR is at goal.  Her hemoglobin is low, stable today.  She is rate controlled not on any rate controlling agents  2. Chronic diastolic heart failure -volume management per dialysis  3. Essential hypertension -Home dose amlodipine has been resumed.  Her blood pressure is controlled      Okay for discharge from cardiac perspective. Follow-up Dr Barron BLAIR in 4 weeks         Whit MCBRIDE  [facility]  04/27/23  08:43 EDT

## 2023-04-27 NOTE — PLAN OF CARE
Problem: Adult Inpatient Plan of Care  Goal: Plan of Care Review  Outcome: Ongoing, Progressing  Flowsheets (Taken 4/27/2023 0329)  Outcome Evaluation: VSS. Dialysis done during shift--1L removed. Possible discharge today. Pain controlled. Q2 turn. Will continue to monitor. To be endorsed accordingly.

## 2023-04-27 NOTE — DISCHARGE PLACEMENT REQUEST
"Steven Lemus (89 y.o. Female)     Date of Birth   03/13/1930    Social Security Number       [facility]   [address]  [facility] [facility] [address] [address]    Home Phone   [phone]    MRN   [id number]       [facility]    Marital Status                               Admission Date   4/22/23    Admission Type   Emergency    Admitting Provider   Porfirio Pfeiffer MD    Attending Provider   Daniel Gonzalez MD    Department, Room/Bed   [address], E468/1       Discharge Date       Discharge Disposition   Home or Self Care    Discharge Destination                               Attending Provider: Daniel Gonzalez MD    Allergies: Statins, Sulfa Antibiotics    Isolation: None   Infection: None   Code Status: CPR    Ht: 165.1 cm (65\")   Wt: 50.5 kg (111 lb 5.3 oz)    Admission Cmt: None   Principal Problem: Closed fracture of right hip, initial encounter [S72.001A]                 Active Insurance as of 4/22/2023     Primary Coverage     Payor Plan Insurance Group Employer/Plan Group    MEDICARE MEDICARE A & B      Payor Plan Address Payor Plan Phone Number Payor Plan Fax Number Effective Dates    [address] [phone]  3/1/1995 - None Entered    [facility] [id number]       Subscriber Name Subscriber Birth Date Member ID       STEVEN LEMUS 3/13/1930 [id number]           Secondary Coverage     Payor Plan Insurance Group Employer/Plan Group    TOBI [facility] SUPP [id number]     Payor Plan Address Payor Plan Phone Number Payor Plan Fax Number Effective Dates    [address]   12/1/2016 - None Entered    [facility] [id number]       Subscriber Name Subscriber Birth Date Member ID       STEVEN LEMUS 3/13/1930 [id number]                 Emergency Contacts      (Rel.) Home Phone Work Phone Mobile Phone    Shannan Lemus (Daughter) [phone] -- [phone]    ShawnJese aldridge (Son) [phone] -- [phone]                 Discharge Summary      Daniel Gonzalez, " MD at 04/27/23 0848                              [facility]    Date of Discharge:  4/27/2023    PCP: Wood Elias MD    Discharge Diagnosis:   Active Hospital Problems    Diagnosis  POA   • **Closed fracture of right hip, initial encounter [S72.001A]  Yes      Resolved Hospital Problems   No resolved problems to display.     Procedure(s):  RIGHT HIP INTRAMEDULLARY NAILING WITH CERCLAGE CABLING     Consults     Date and Time Order Name Status Description    4/22/2023  3:24 PM Inpatient Nephrology Consult      4/22/2023  3:24 PM Inpatient Cardiology Consult Completed     4/22/2023 11:45 AM Ortho (on-call MD unless specified) Completed     4/22/2023 11:45 AM [facility] (on-call MD unless specified) Details          Hospital Course  89 y.o. female with past medical history significant for CHF, A-fib, end-stage renal disease on hemodialysis, presented to the hospital with right intertrochanteric hip fracture, she is status post surgical intervention, patient has done well postop.  Overall patient has remained stable, rehab facility can accept her on dialysis.  Patient will be discharged in a stable condition, she would need to follow-up with PCP in 7 days.  Total time spent on discharge management is more than 30 minutes.  Plan of care discussed with the patient and she has verbalized understanding.        Temp:  [97.6 °F (36.4 °C)-98 °F (36.7 °C)] 98 °F (36.7 °C)  Heart Rate:  [75-93] 93  Resp:  [16-18] 18  BP: (112-145)/(56-79) 145/79  Body mass index is 18.53 kg/m².    Physical Exam   General Appearance:    Alert, cooperative, no distress, appears stated age.  Head:    Normocephalic, without obvious abnormality, atraumatic  Eyes:    PERRL, conjunctiva/corneas clear, EOM's intact, both eyes  Ears:    Normal external ear canals, both ears  Nose:   Nares normal, septum midline, mucosa normal, no drainage    or sinus tenderness  Throat:   Lips, tongue, gums normal; oral mucosa pink and  moist  Neck:   Supple, symmetrical, trachea midline, no adenopathy;     thyroid:  no enlargement/tenderness/nodules; no carotid    bruit or JVD  Back:     Symmetric, no curvature, ROM normal, no CVA tenderness  Lungs:     Clear to auscultation bilaterally, respirations unlabored  Chest Wall:    No tenderness or deformity   Heart:    Regular rate and rhythm, S1 and S2 normal, no murmur, rub   or gallop  Abdomen:    Soft nontender no organomegaly detected  Extremities:   Extremities normal, atraumatic, no cyanosis or edema  Pulses:   Pulses palpable in all extremities; symmetric all extremities  Skin:   Skin color normal, Skin is warm and dry,  no rashes or palpable lesions  Neurologic:   CNII-XII intact, motor strength grossly intact, sensation grossly intact to light touch, no focal deficits noted         Disposition: Home or Self Care       Discharge Medications      New Medications      Instructions Start Date   HYDROcodone-acetaminophen 5-325 MG per tablet  Commonly known as: NORCO   1 tablet, Oral, Every 6 Hours PRN         Changes to Medications      Instructions Start Date   LORazepam 1 MG tablet  Commonly known as: ATIVAN  What changed: Another medication with the same name was added. Make sure you understand how and when to take each.   1 mg, Oral, Nightly PRN, Must be seen for more refills of this medication.      LORazepam 0.5 MG tablet  Commonly known as: ATIVAN  What changed: You were already taking a medication with the same name, and this prescription was added. Make sure you understand how and when to take each.   0.5 mg, Oral, Nightly PRN         Continue These Medications      Instructions Start Date   acetaminophen 325 MG tablet  Commonly known as: TYLENOL   650 mg, Oral, Every 4 Hours PRN      acetaminophen 500 MG tablet  Commonly known as: TYLENOL   500 mg, Oral, 4 Times Daily      amLODIPine 10 MG tablet  Commonly known as: NORVASC   10 mg, Oral, Daily      Lidocaine 4 % patch   1 patch,  Topical, Daily      potassium chloride 20 MEQ CR tablet  Commonly known as: KLOR-CON   20 mEq, Oral, Daily      torsemide 20 MG tablet  Commonly known as: DEMADEX   20 mg, Oral, Daily      VITAMIN D3 PO   1,000 Units, Daily      warfarin 2 MG tablet  Commonly known as: Jantoven   TAKE 3 TABLETS (6MG) BY MOUTH ON TUESDAYS; TAKE 2 TABLETS (4MG) ON ALL OTHER DAYS OR AS DIRECTED.              Additional Instructions for the Follow-ups that You Need to Schedule     Discharge Follow-up with PCP   As directed       Currently Documented PCP:    Wood Elias MD    PCP Phone Number:    [phone]     Follow Up Details: Follow-up with PCP in 7 days.            Contact information for follow-up providers     Lm Mart MD. Schedule an appointment as soon as possible for a visit in 4 week(s).    Specialty: Cardiology  Why: Follow-up Whit Burr NP in 2-4 weeks  Contact information:  [address] [facility]  [address]  [place] [address]  [phone]             Wood Elias MD .    Specialties: Family Medicine, Emergency Medicine  Why: Follow-up with PCP in 7 days.  Contact information:  [address] [facility]  [address]  [place] [address]  [phone]                   Contact information for after-discharge care     Destination     [facility] .    Service: Skilled Nursing  Contact information:  [address]  [facility] [address] [phone]                            Future Appointments   Date Time Provider Department Center   6/26/2023  1:15 PM Wood Elias MD [facility]   7/31/2023  2:30 PM Lm Mart MD [facility]        Daniel Gonzalez MD  [facility]  04/27/23    Discharge time spent greater than 30 minutes.    Electronically signed by Daniel Gonzalez MD at 04/27/23 0849       Discharge Order (From admission, onward)     Start     Ordered    04/27/23 0846  Discharge patient  Once        Expected Discharge Date: 04/27/23    Discharge  Disposition: Home or Self Care    Physician of Record for Attribution - Please select from Treatment Team: RICKY TA [[id number]]    Review needed by CMO to determine Physician of Record: No       Question Answer Comment   Physician of Record for Attribution - Please select from Treatment Team RICKY TA    Review needed by CMO to determine Physician of Record No        04/27/23 0848

## 2023-04-27 NOTE — PLAN OF CARE
Problem: Adult Inpatient Plan of Care  Goal: Plan of Care Review  Outcome: Met  Flowsheets (Taken 4/27/2023 1052)  Progress: improving  Plan of Care Reviewed With: patient  Outcome Evaluation: Vitals stable. Pain controlled. Room air. Discharge to SNF today - EMS  at 1300. Will continue to monitor.

## 2023-04-27 NOTE — OUTREACH NOTE
Prep Survey    Flowsheet Row Responses   Hinduism facility patient discharged from? [place]   Is LACE score < 7 ? No   Eligibility Not Eligible  [[facility] ]   What are the reasons patient is not eligible? [facility]   Does the patient have one of the following disease processes/diagnoses(primary or secondary)? General Surgery   Prep survey completed? Yes          [place] - Registered Nurse

## 2023-04-28 NOTE — CASE MANAGEMENT/SOCIAL WORK
Case Management Discharge Note      Final Note: [facility] with onsite HD setup. Transport by  Belinda salas.    Provided Post Acute Provider List?: Yes  Post Acute Provider List: Inpatient Rehab  Delivered To: Support Person  Support Person: Shannan Lemus [facility] [phone]  Method of Delivery: Telephone    Selected Continued Care - Discharged on 4/27/2023 Admission date: 4/22/2023 - Discharge disposition: Home or Self Care    Destination Coordination complete.    Service Provider Selected Services Address Phone Fax Patient Preferred    [facility] [address] [address] [phone] --          Durable Medical Equipment    No services have been selected for the patient.              Dialysis/Infusion    No services have been selected for the patient.              Home Medical Care    No services have been selected for the patient.              Therapy    No services have been selected for the patient.              Community Resources    No services have been selected for the patient.              Community & DME    No services have been selected for the patient.                  Transportation Services  Private: Car    Final Discharge Disposition Code: 03 - skilled nursing facility (SNF)

## 2023-05-20 PROBLEM — K55.9 MESENTERIC ISCHEMIA: Status: ACTIVE | Noted: 2023-05-20

## 2023-05-20 PROBLEM — N18.6 ESRD (END STAGE RENAL DISEASE): Status: ACTIVE | Noted: 2023-01-01

## 2023-05-20 PROBLEM — R10.0 ACUTE ABDOMEN: Status: ACTIVE | Noted: 2023-01-01

## 2023-05-20 NOTE — ED PROVIDER NOTES
EMERGENCY DEPARTMENT ENCOUNTER    Room Number:  P480/1  Date seen:  5/20/2023  PCP: Remi England MD  Historian: Patient      HPI:  Chief Complaint: Abdominal pain  A complete HPI/ROS/PMH/PSH/SH/FH are unobtainable due to: None  Context: Cristina Lemus is a 89 y.o. female who presents to the ED c/o abdominal pain.  Patient with history of end-stage renal disease on dialysis.  States abdominal pain started yesterday.  She did dialyze yesterday without difficulty.  States is diffuse abdominal pain as well as distention.  Patient has had nausea with no vomiting.  Had normal bowel movement today.  Has had no black stools or dark tarry stools.  No fevers or chills.            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/2014   • History of pulmonary embolus (PE) 06/30/2020   • HTN, goal below 140/80 09/01/2016   • Permanent atrial fibrillation (HCC) 07/07/2017   • Stage 4 chronic kidney disease 06/30/2020   • Chronic diastolic congestive heart failure (HCC) 10/01/2020   • Anxiety disorder 04/23/2021   • Chronic gout without tophus 04/23/2021   • KIM (acute kidney injury) 06/09/2021   • Essential hypertension 06/25/2021   • Dyspnea 06/25/2021   • Pleural effusion 06/25/2021   • Dyspnea on exertion 06/25/2021   • Anemia 07/12/2021   • Acute on chronic renal failure 01/16/2022   • Acute renal failure superimposed on chronic kidney disease 01/17/2022   • Anticoagulated on warfarin 01/18/2022   • UTI (urinary tract infection) 01/18/2022   • Metabolic encephalopathy 01/18/2022   • End stage renal disease 01/24/2022   • C. difficile colitis 01/26/2022   • Aftercare including intermittent dialysis 02/11/2022   • Allergy, unspecified, initial encounter 02/11/2022   • Anaphylactic shock, unspecified, initial encounter 02/11/2022   • Anemia in chronic kidney disease 02/19/2022   • Coagulation defect, unspecified 02/11/2022   • Conjunctivochalasis 01/07/2013    • Epiretinal membrane 01/05/2016   • Hypokalemia 02/11/2022   • Iron deficiency anemia 02/11/2022   • Secondary hyperparathyroidism of renal origin 02/11/2022   • Chronic mid back pain 02/25/2022   • Sacral fracture 05/12/2022   • Closed fracture of fifth thoracic vertebra with routine healing 04/12/2023   • Closed fracture of right hip, initial encounter 04/22/2023     Resolved Ambulatory Problems     Diagnosis Date Noted   • Closed fracture of multiple pubic rami, right, initial encounter 05/12/2022     Past Medical History:   Diagnosis Date   • Atrial fibrillation    • CHF (congestive heart failure)    • Hypertension    • Renal insufficiency          PAST SURGICAL HISTORY  Past Surgical History:   Procedure Laterality Date   • ARTERIOVENOUS FISTULA/SHUNT SURGERY Left 1/24/2022    Procedure: LEFT ARM DIALYSIS GRAFT;  Surgeon: King Reynaga MD;  Location: [facility];  Service: Vascular;  Laterality: Left;   • BACK SURGERY     • CYST REMOVAL     • FEMUR IM NAILING/RODDING Right 4/23/2023    Procedure: RIGHT HIP INTRAMEDULLARY NAILING WITH CERCLAGE CABLING;  Surgeon: King Marcano MD;  Location: [facility];  Service: Orthopedics;  Laterality: Right;  [place]   • INSERTION HEMODIALYSIS CATHETER N/A 1/18/2022    Procedure: HEMODIALYSIS CATHETER INSERTION with C arm;  Surgeon: King Reynaga MD;  Location: [facility];  Service: Vascular;  Laterality: N/A;   • OTHER SURGICAL HISTORY      rupture disk         FAMILY HISTORY  History reviewed. No pertinent family history.      SOCIAL HISTORY  Social History     Socioeconomic History   • Marital status:    Tobacco Use   • Smoking status: Former     Types: Cigarettes   • Smokeless tobacco: Never   Vaping Use   • Vaping Use: Never used   Substance and Sexual Activity   • Alcohol use: No     Comment: caffeine use    • Drug use: No   • Sexual activity: Not Currently         ALLERGIES  Statins and Sulfa antibiotics        REVIEW OF  SYSTEMS  Review of Systems   Abdominal pain      PHYSICAL EXAM  ED Triage Vitals [05/20/23 1101]   Temp Heart Rate Resp BP SpO2   98 °F (36.7 °C) 75 18 106/50 93 %      Temp src Heart Rate Source Patient Position BP Location FiO2 (%)   -- -- -- -- --       Physical Exam      GENERAL: no acute distress  HENT: nares patent  EYES: no scleral icterus  CV: regular rhythm, normal rate  RESPIRATORY: normal effort  ABDOMEN: soft.  Tenderness diffuse abdomen  MUSCULOSKELETAL: no deformity  NEURO: alert, moves all extremities, follows commands  PSYCH:  calm, cooperative  SKIN: warm, dry    Vital signs and nursing notes reviewed.          LAB RESULTS  Recent Results (from the past 24 hour(s))   ECG 12 Lead Chest Pain    Collection Time: 05/20/23 11:23 AM   Result Value Ref Range    QT Interval 397 ms   Comprehensive Metabolic Panel    Collection Time: 05/20/23 11:58 AM    Specimen: Blood   Result Value Ref Range    Glucose 120 (H) 65 - 99 mg/dL    BUN 54 (H) 8 - 23 mg/dL    Creatinine 3.31 (H) 0.57 - 1.00 mg/dL    Sodium 133 (L) 136 - 145 mmol/L    Potassium 5.2 3.5 - 5.2 mmol/L    Chloride 89 (L) 98 - 107 mmol/L    CO2 25.0 22.0 - 29.0 mmol/L    Calcium 8.8 8.2 - 9.6 mg/dL    Total Protein 6.9 6.0 - 8.5 g/dL    Albumin 3.5 3.5 - 5.2 g/dL    ALT (SGPT) 31 1 - 33 U/L    AST (SGOT) 49 (H) 1 - 32 U/L    Alkaline Phosphatase 150 (H) 39 - 117 U/L    Total Bilirubin 1.6 (H) 0.0 - 1.2 mg/dL    Globulin 3.4 gm/dL    A/G Ratio 1.0 g/dL    BUN/Creatinine Ratio 16.3 7.0 - 25.0    Anion Gap 19.0 (H) 5.0 - 15.0 mmol/L    eGFR 12.5 (L) >60.0 mL/min/1.73   Lipase    Collection Time: 05/20/23 11:58 AM    Specimen: Blood   Result Value Ref Range    Lipase 9 (L) 13 - 60 U/L   Protime-INR    Collection Time: 05/20/23 11:58 AM    Specimen: Blood   Result Value Ref Range    Protime 49.6 (C) 11.7 - 14.2 Seconds    INR 5.33 (C) 0.90 - 1.10   CBC Auto Differential    Collection Time: 05/20/23 11:58 AM    Specimen: Blood   Result Value Ref Range     WBC 38.09 (C) 3.40 - 10.80 10*3/mm3    RBC 3.45 (L) 3.77 - 5.28 10*6/mm3    Hemoglobin 10.9 (L) 12.0 - 15.9 g/dL    Hematocrit 33.6 (L) 34.0 - 46.6 %    MCV 97.4 (H) 79.0 - 97.0 fL    MCH 31.6 26.6 - 33.0 pg    MCHC 32.4 31.5 - 35.7 g/dL    RDW 14.1 12.3 - 15.4 %    RDW-SD 49.6 37.0 - 54.0 fl    MPV 9.5 6.0 - 12.0 fL    Platelets 254 140 - 450 10*3/mm3   Manual Differential    Collection Time: 05/20/23 11:58 AM    Specimen: Blood   Result Value Ref Range    Neutrophil % 89.8 (H) 42.7 - 76.0 %    Lymphocyte % 2.0 (L) 19.6 - 45.3 %    Monocyte % 8.2 5.0 - 12.0 %    Neutrophils Absolute 34.20 (H) 1.70 - 7.00 10*3/mm3    Lymphocytes Absolute 0.76 0.70 - 3.10 10*3/mm3    Monocytes Absolute 3.12 (H) 0.10 - 0.90 10*3/mm3    RBC Morphology Normal Normal    WBC Morphology Normal Normal    Platelet Morphology Normal Normal       Ordered the above labs and reviewed the results.        RADIOLOGY  CT Abdomen Pelvis Without Contrast    Result Date: 5/20/2023  CT ABDOMEN AND PELVIS WITHOUT IV CONTRAST  HISTORY: Abdominal pain.  TECHNIQUE:  CT includes axial imaging from the lung bases to the trochanters without intravenous contrast and without use of oral contrast. Data reconstructed in coronal and sagittal planes. Radiation dose reduction techniques were utilized, including automated exposure control and exposure modulation based on body size.  COMPARISON: CT pelvis 05/12/2022, CT chest 06/24/2021.  FINDINGS: Moderate right and small left pleural effusions are present and there is adjacent basilar atelectasis. The descending thoracic aorta is tortuous and exhibits atherosclerotic disease.  There is intrahepatic air with a pattern that is branching and predominantly peripheral and predominantly within the left hepatic lobe. This pattern is typical for portal venous gas rather than pneumobilia. Additionally, there is air within the central portal vein and within the superior mesenteric vein. There is pneumatosis involving a small  bowel loop within the central to left pelvis which is abnormally dilated and this bowel loop measures 3.3 cm in diameter and exhibits fecalization of contents. There is also a central abdominal small bowel loop that is moderately distended that exhibits pneumatosis along its posterior wall. There is moderate gaseous distention of the small bowel within the central and anterior abdomen. There is also mild-to-moderate gaseous distention of the cecum and segments of the transverse colon as well as of the stomach.  Adrenal glands, spleen, pancreas appear within normal limits. There are small stones layering dependently within the gallbladder. There is mild ascites with perihepatic and perisplenic fluid and fluid extends into the lower pelvis. Sigmoid diverticulosis is present without evidence for diverticulitis. There is no evidence for abscess or drainable collection.  There has been previous intramedullary nail fixation of the right proximal femur for a fracture that does not yet appear united. Diffuse atherosclerotic calcifications are present involving the abdominal aorta and iliac vasculature. The infrarenal abdominal aorta measures 2.5 cm diameter which is mildly aneurysmal. There is also enlargement of both common iliac arteries measuring 1.5 cm diameter. There are old healed right superior and inferior pubic rami fractures. There is degenerative disc disease at L4-5 and L5-S1 and there has been previous posterior decompression at L5 and S1. There is degenerative anterolisthesis of L5 with respect to S1.      1. Pneumatosis involving small bowel loops within the central abdomen and central to left lower pelvis suspicious for ischemic/necrotic bowel. There is also air within the superior mesenteric venous branches and the superior mesenteric vein, portal vein, and intrahepatic portal veins which is a finding associated with ischemic bowel. 2. Mild ascites. Moderate distention of the stomach and small bowel as well  as the cecum and mild distention of the transverse colon. 3. Moderate right and small left pleural effusions with adjacent basilar atelectasis. Cardiomegaly. 4. 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 5. Cholelithiasis.  Discussed with Dr. Byers in the emergency department on 05/20/2023 at 12:35 PM.  Radiation dose reduction techniques were utilized, including automated exposure control and exposure modulation based on body size.  This report was finalized on 5/20/2023 1:04 PM by Dr. Migel Fisher M.D.        Ordered the above noted radiological studies.  CT abdomen independently interpreted by me and shows portal venous gas and pneumatosis          PROCEDURES  Critical Care  Performed by: Polo Byers MD  Authorized by: Jayjay Echavarria MD     Critical care provider statement:     Critical care time (minutes):  45    Critical care time was exclusive of:  Separately billable procedures and treating other patients    Critical care was time spent personally by me on the following activities:  Ordering and performing treatments and interventions, ordering and review of laboratory studies, ordering and review of radiographic studies, discussions with consultants and discussions with primary provider        EKG          EKG time: 1123  Rhythm/Rate: Atrial fibrillation 91  P waves and LA: No P waves  QRS, axis: Normal QRS  ST and T waves: T wave inversions laterally    Interpreted Contemporaneously by me, independently viewed  Unchanged compared to prior 4/22/2023          MEDICATIONS GIVEN IN ER  Medications   LORazepam (ATIVAN) injection 0.5 mg (has no administration in time range)     Or   LORazepam (ATIVAN) injection 0.5 mg (has no administration in time range)     Or   LORazepam (ATIVAN) 2 MG/ML concentrated solution 0.5 mg (has no administration in time range)   LORazepam (ATIVAN) injection  1 mg (has no administration in time range)     Or   LORazepam (ATIVAN) injection 1 mg (has no administration in time range)     Or   LORazepam (ATIVAN) 2 MG/ML concentrated solution 1 mg (has no administration in time range)   LORazepam (ATIVAN) injection 2 mg (has no administration in time range)     Or   LORazepam (ATIVAN) injection 2 mg (has no administration in time range)     Or   LORazepam (ATIVAN) 2 MG/ML concentrated solution 2 mg (has no administration in time range)   diphenoxylate-atropine (LOMOTIL) 2.5-0.025 MG per tablet 1 tablet (has no administration in time range)   Glycerin-Hypromellose- (ARTIFICIAL TEARS) 0.2-0.2-1 % ophthalmic solution solution 1 drop (has no administration in time range)   acetaminophen (TYLENOL) tablet 650 mg (has no administration in time range)     Or   acetaminophen (TYLENOL) 160 MG/5ML solution 650 mg (has no administration in time range)     Or   acetaminophen (TYLENOL) suppository 650 mg (has no administration in time range)   HYDROmorphone (DILAUDID) injection 0.5 mg (has no administration in time range)     Or   morphine injection 2 mg (has no administration in time range)     Or   morphine concentrated solution 5 mg (has no administration in time range)     Or   ketorolac (TORADOL) injection 15 mg (has no administration in time range)   HYDROmorphone (DILAUDID) injection 1 mg (has no administration in time range)     Or   Morphine sulfate (PF) injection 4 mg (has no administration in time range)     Or   morphine concentrated solution 10 mg (has no administration in time range)   scopolamine patch 1 mg/72 hr (has no administration in time range)   glycopyrrolate (ROBINUL) injection 0.2 mg (has no administration in time range)     Or   glycopyrrolate (ROBINUL) injection 0.2 mg (has no administration in time range)     Or   glycopyrrolate (ROBINUL) injection 0.4 mg (has no administration in time range)     Or   glycopyrrolate (ROBINUL) injection 0.4 mg (has no  administration in time range)   piperacillin-tazobactam (ZOSYN) 3.375 g in iso-osmotic dextrose 50 ml (premix) (0 g Intravenous Stopped 5/20/23 1304)   HYDROmorphone (DILAUDID) injection 0.5 mg (0.5 mg Intravenous Given 5/20/23 1300)   ondansetron (ZOFRAN) injection 4 mg (4 mg Intravenous Given 5/20/23 1259)   HYDROmorphone (DILAUDID) injection 0.5 mg (0.5 mg Intravenous Given 5/20/23 1539)                   MEDICAL DECISION MAKING, PROGRESS, and CONSULTS    Discussion below represents my analysis of pertinent findings related to patient's condition, differential diagnosis, treatment plan and final disposition.      Additional sources:  - Discussed/ obtained information from independent historians: History and condition discussed with her children who are her POA    - External (non-ED) record review: Epic reviewed and patient was here 4/22/2023 for hip fracture    - Chronic or social conditions impacting care: None    - Shared decision making: None      Orders placed during this visit:  Orders Placed This Encounter   Procedures   • CT Abdomen Pelvis Without Contrast   • Comprehensive Metabolic Panel   • Lipase   • Protime-INR   • CBC Auto Differential   • Manual Differential   • Diet: Regular/House Diet; Texture: Regular Texture (IDDSI 7); Fluid Consistency: Thin (IDDSI 0)   • Maintain IV Access   • Insert Indwelling Urinary Catheter   • Continue Indwelling Urinary Catheter   • Assess Need for Indwelling Urinary Catheter - Follow Removal Protocol   • Urinary Catheter Care   • Code Status and Medical Interventions:   • Surgery (on-call MD unless specified)   • Inpatient Hospice / [facility] Consult   • Oxygen Therapy- Nasal Cannula; Titrate 1-6 LPM Per SpO2; 90 - 95%   • ECG 12 Lead Chest Pain   • Inpatient Admission   • CBC & Differential         Additional orders considered but not ordered:  None        Differential diagnosis includes but is not limited to:    Dead bowel, peritonitis      Independent interpretation  of labs, radiology studies, and discussions with consultants:  ED Course as of 05/20/23 1734   Sat May 20, 2023   1456 14:56 EDT  Patient presents for abdominal pain.  Patient has dead bowel on CT scan.  Patient has been seen by Dr. Villalobos who feels this is not survivable.  Patient has been discussed with both her and her family and they all states she does not want surgery she wants DNR.  Patient will be admitted to palliative care []   1525 15:26 EDT  Discussed with Dr. Echavarria who will admit. [SL]      ED Course User Index  [SL] Polo Byers MD                 DIAGNOSIS  Final diagnoses:   ESRD (end stage renal disease)   Acute abdomen   Pneumatosis intestinalis         DISPOSITION  Admit hospice            Latest Documented Vital Signs:  As of 17:34 EDT  BP- 131/59 HR- 86 Temp- 98 °F (36.7 °C) O2 sat- 96%              --    Please note that portions of this were completed with a voice recognition program.       Note Disclaimer: At [facility], we believe that sharing information builds trust and better relationships. You are receiving this note because you are receiving care at [facility] or recently visited. It is possible you will see health information before a provider has talked with you about it. This kind of information can be easy to misunderstand. To help you fully understand what it means for your health, we urge you to discuss this note with your provider.           Polo Byers MD  05/20/23 1412

## 2023-05-20 NOTE — ED NOTES
Pt to ED from [facility] for abd pain. Pt had BM this morning but it was soft. Pt denies N/V today. A/Ox 4. Dialysis pt and has not missed any treatmeant

## 2023-05-20 NOTE — ED NOTES
Pt arrives from [facility] c/o of lower abdominal pain. Pt reports pain as sharp and achy. Pt denies n/v/d at this time. Pt reports her stool was loose this AM. Pt is alert and oriented to questions.

## 2023-05-20 NOTE — CONSULTS
[facility]   Consult Note    Patient Name: Cristina Lemus  : 3/13/1930  MRN: [id number]  Primary Care Physician:  Remi England MD  Referring Physician: No ref. provider found  Date of admission: 2023    Surgery (on-call MD unless specified)  Consult performed by: Jese Villalobos Jr., MD  Consult ordered by: Polo Byers MD        Subjective   Subjective     Reason for Consult/ Chief Complaint: Acute abdomen    History of Present Illness  Cristina Lemus is a 89 y.o. female with a history of atrial fibrillation, chronic diastolic heart failure, end-stage renal disease on dialysis, and hypertension who is on chronic anticoagulation with warfarin and was brought to the emergency room with abdominal pain.  Apparently she developed abdominal pain yesterday and that progressively worsened and she was brought to the emergency room from [facility].  She is not able to provide much of a history due to the severity of her illness and the fact that she is hard of hearing.  She was noted to have a significantly elevated WBC and an INR of 5.33.  A CT scan of the abdomen and pelvis shows pneumatosis intestinalis involving small bowel loops in the mid abdomen as well as portal venous gas consistent with mesenteric ischemia and dead bowel.  The patient is refusing any surgical management.    Review of Systems   Unable to perform ROS: Acuity of condition        Personal History     Past Medical History:   Diagnosis Date   • Atrial fibrillation    • CHF (congestive heart failure)    • Hypertension    • Renal insufficiency        Past Surgical History:   Procedure Laterality Date   • ARTERIOVENOUS FISTULA/SHUNT SURGERY Left 2022    Procedure: LEFT ARM DIALYSIS GRAFT;  Surgeon: King Reynaga MD;  Location: [facility];  Service: Vascular;  Laterality: Left;   • BACK SURGERY     • CYST REMOVAL     • FEMUR IM NAILING/RODDING Right 2023    Procedure: RIGHT HIP INTRAMEDULLARY NAILING WITH  CERCLAGE CABLING;  Surgeon: King Marcano MD;  Location: [facility] MAIN OR;  Service: Orthopedics;  Laterality: Right;  [place]   • INSERTION HEMODIALYSIS CATHETER N/A 1/18/2022    Procedure: HEMODIALYSIS CATHETER INSERTION with C arm;  Surgeon: King Reynaga MD;  Location: [facility] MAIN OR;  Service: Vascular;  Laterality: N/A;   • OTHER SURGICAL HISTORY      rupture disk       Family History: family history is not on file. Otherwise pertinent FHx was reviewed and not pertinent to current issue.    Social History:  reports that she has quit smoking. Her smoking use included cigarettes. She has never used smokeless tobacco. She reports that she does not drink alcohol and does not use drugs.    Home Medications:   Cholecalciferol, LORazepam, Lidocaine, acetaminophen, amLODIPine, potassium chloride, torsemide, and warfarin    Allergies:  Allergies   Allergen Reactions   • Statins Nausea And Vomiting   • Sulfa Antibiotics Nausea And Vomiting and Other (See Comments)     CHILLS AND FEVER       Objective    Objective     Vitals:  Temp:  [98 °F (36.7 °C)] 98 °F (36.7 °C)  Heart Rate:  [75-93] 90  Resp:  [18] 18  BP: ()/(50-66) 110/66  Flow (L/min):  [2] 2    Physical Exam  Constitutional:       General: She is awake.      Appearance: She is ill-appearing. She is not toxic-appearing.   HENT:      Head: Normocephalic and atraumatic.   Eyes:      General: No scleral icterus.     Extraocular Movements: Extraocular movements intact.   Pulmonary:      Effort: Pulmonary effort is normal. No respiratory distress.   Abdominal:      General: Bowel sounds are absent. There is distension.      Palpations: Abdomen is rigid.      Tenderness: There is generalized abdominal tenderness. There is guarding and rebound.      Comments: The abdomen is rigid and diffusely tender with guarding and rebound.  It is mildly distended.  There are no bowel sounds.  The exam is consistent with an acute abdomen.   Psychiatric:          Behavior: Behavior is cooperative.         Result Review    Result Review:  I have personally reviewed the results from the time of this admission to 5/20/2023 13:47 EDT and agree with these findings:  [x]  Laboratory list / accordion  []  Microbiology  [x]  Radiology  []  EKG/Telemetry   []  Cardiology/Vascular   []  Pathology  [x]  Old records  []  Other:      Assessment & Plan   Assessment / Plan     Brief Patient Summary with assessment and plan:  Cristina Lemus is a 89 y.o. female who has multiple medical problems and presented to the emergency room with severe abdominal pain.    1.  Acute abdomen: The patient has an abdominal exam consistent with peritonitis and an acute abdomen.  The CT scan of the abdomen and pelvis shows pneumatosis intestinalis and portal venous gas consistent with dead bowel.  Based on the patient's advanced age and her significant medical problems, she is unlikely to survive surgical management of her dead bowel.  Any suggestion of surgery results that her adamantly stating that she will not have surgery.  Dr. Byers, emergency room physician, has spoken with her son who also indicated that she does not want surgery.  I tried to speak with her son but he did not answer.  I agree with nonoperative management due to her extremely poor prognosis and recommend palliative care.  I will sign off but remain available if needed.      Jese Villalobos Jr., MD

## 2023-05-20 NOTE — ED NOTES
Nursing report ED to floor  Cristina Lemus  89 y.o.  female    HPI :   Chief Complaint   Patient presents with    Abdominal Pain       Admitting doctor:   Jayjay Echavarria MD    Admitting diagnosis:   The primary encounter diagnosis was ESRD (end stage renal disease). Diagnoses of Acute abdomen and Pneumatosis intestinalis were also pertinent to this visit.    Code status:   Current Code Status       Date Active Code Status Order ID Comments User Context       Prior            Allergies:   Statins and Sulfa antibiotics    Isolation:   No active isolations    Intake and Output    Intake/Output Summary (Last 24 hours) at 5/20/2023 1506  Last data filed at 5/20/2023 1304  Gross per 24 hour   Intake 50 ml   Output --   Net 50 ml       Weight:   There were no vitals filed for this visit.    Most recent vitals:   Vitals:    05/20/23 1331 05/20/23 1332 05/20/23 1401 05/20/23 1431   BP: 110/66  116/62 131/59   Pulse: 92 90 85 86   Resp:       Temp:       SpO2: 97% 97% 96% 96%       Active LDAs/IV Access:   Lines, Drains & Airways       Active LDAs       Name Placement date Placement time Site Days    Peripheral IV 05/20/23 1154 Anterior;Right;Upper Arm 05/20/23  1154  Arm  less than 1                    Labs (abnormal labs have a star):   Labs Reviewed   COMPREHENSIVE METABOLIC PANEL - Abnormal; Notable for the following components:       Result Value    Glucose 120 (*)     BUN 54 (*)     Creatinine 3.31 (*)     Sodium 133 (*)     Chloride 89 (*)     AST (SGOT) 49 (*)     Alkaline Phosphatase 150 (*)     Total Bilirubin 1.6 (*)     Anion Gap 19.0 (*)     eGFR 12.5 (*)     All other components within normal limits    Narrative:     GFR Normal >60  Chronic Kidney Disease <60  Kidney Failure <15    The GFR formula is only valid for adults with stable renal function between ages 18 and 70.   LIPASE - Abnormal; Notable for the following components:    Lipase 9 (*)     All other components within normal limits   PROTIME-INR -  Abnormal; Notable for the following components:    Protime 49.6 (*)     INR 5.33 (*)     All other components within normal limits   CBC WITH AUTO DIFFERENTIAL - Abnormal; Notable for the following components:    WBC 38.09 (*)     RBC 3.45 (*)     Hemoglobin 10.9 (*)     Hematocrit 33.6 (*)     MCV 97.4 (*)     All other components within normal limits   MANUAL DIFFERENTIAL - Abnormal; Notable for the following components:    Neutrophil % 89.8 (*)     Lymphocyte % 2.0 (*)     Neutrophils Absolute 34.20 (*)     Monocytes Absolute 3.12 (*)     All other components within normal limits   CBC AND DIFFERENTIAL    Narrative:     The following orders were created for panel order CBC & Differential.  Procedure                               Abnormality         Status                     ---------                               -----------         ------                     CBC Auto Differential[[id number]]        Abnormal            Final result                 Please view results for these tests on the individual orders.       EKG:   ECG 12 Lead Chest Pain   Preliminary Result   HEART RATE= 91  bpm   RR Interval= 659  ms   ID Interval=   ms   P Horizontal Axis=   deg   P Front Axis=   deg   QRSD Interval= 101  ms   QT Interval= 397  ms   QRS Axis= 7  deg   T Wave Axis= 220  deg   - ABNORMAL ECG -   Atrial fibrillation   Borderline low voltage, extremity leads   Abnrm T, probable ischemia, anterolateral lds   Electronically Signed By:    Date and Time of Study: 2023-05-20 11:23:17          Meds given in ED:   Medications   piperacillin-tazobactam (ZOSYN) 3.375 g in iso-osmotic dextrose 50 ml (premix) (0 g Intravenous Stopped 5/20/23 1304)   HYDROmorphone (DILAUDID) injection 0.5 mg (0.5 mg Intravenous Given 5/20/23 1300)   ondansetron (ZOFRAN) injection 4 mg (4 mg Intravenous Given 5/20/23 1259)       Imaging results:  CT Abdomen Pelvis Without Contrast    Result Date: 5/20/2023  1. Pneumatosis involving small bowel loops  within the central abdomen and central to left lower pelvis suspicious for ischemic/necrotic bowel. There is also air within the superior mesenteric venous branches and the superior mesenteric vein, portal vein, and intrahepatic portal veins which is a finding associated with ischemic bowel. 2. Mild ascites. Moderate distention of the stomach and small bowel as well as the cecum and mild distention of the transverse colon. 3. Moderate right and small left pleural effusions with adjacent basilar atelectasis. Cardiomegaly. 4. 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 5. Cholelithiasis.  Discussed with Dr. Byers in the emergency department on 05/20/2023 at 12:35 PM.  Radiation dose reduction techniques were utilized, including automated exposure control and exposure modulation based on body size.  This report was finalized on 5/20/2023 1:04 PM by Dr. Migel Fisher M.D.       Ambulatory status:   slide    Social issues:   Social History     Socioeconomic History    Marital status:    Tobacco Use    Smoking status: Former     Types: Cigarettes    Smokeless tobacco: Never   Vaping Use    Vaping Use: Never used   Substance and Sexual Activity    Alcohol use: No     Comment: caffeine use     Drug use: No    Sexual activity: Not Currently       NIH Stroke Scale:         Emily aCrmen RN  05/20/23 15:06 EDT

## 2023-05-22 NOTE — PROGRESS NOTES
Discharge Planning Assessment  [facility]     Patient Name: Cristina Lemus  MRN: [id number]  Today's Date: 2023    Admit Date: 2023        Discharge Needs Assessment    No documentation.                Discharge Plan     Row Name 23 1443       Plan    Plan Comments The patient transferred to [facility] from ER on 23. The patient was palliative. JEFFRY Shrestha RN CCP.    Final Discharge Disposition Code 20 -     Final Note The patient  on 23 @ 02:05. JEFFRY Shrestha RN ,CCP              Continued Care and Services - Discharged on 2023 Admission date: 2023 - Discharge disposition:    Coordination has not been started for this encounter.     Selected Continued Care - Prior Encounters Includes continued care and service providers with selected services from prior encounters from 2023 to 2023    Discharged on 2023 Admission date: 2023 - Discharge disposition: Skilled Nursing Facility (DC - External)    Destination     Service Provider Selected Services Address Phone Fax Patient Preferred    [facility] [address] [address] [phone] --                    Expected Discharge Date and Time     Expected Discharge Date Expected Discharge Time    May 21, 2023          Demographic Summary    No documentation.                Functional Status    No documentation.                Psychosocial    No documentation.                Abuse/Neglect    No documentation.                Legal    No documentation.                Substance Abuse    No documentation.                Patient Forms    No documentation.                   María Shrestha RN

## 2023-05-22 NOTE — PROGRESS NOTES
Case Management Discharge Note      Final Note: The patient  on 23 @ 02:05. BDavid Shrestha RN ,CCP         Selected Continued Care - Discharged on 2023 Admission date: 2023 - Discharge disposition:     Destination    No services have been selected for the patient.              Durable Medical Equipment    No services have been selected for the patient.              Dialysis/Infusion    No services have been selected for the patient.              Home Medical Care    No services have been selected for the patient.              Therapy    No services have been selected for the patient.              Community Resources    No services have been selected for the patient.              Community & DME    No services have been selected for the patient.                Selected Continued Care - Prior Encounters Includes continued care and service providers with selected services from prior encounters from 2023 to 2023    Discharged on 2023 Admission date: 2023 - Discharge disposition: Skilled Nursing Facility (DC - External)    Destination     Service Provider Selected Services Address Phone Fax Patient Preferred    [facility] [address] [id number] [phone] --                         Final Discharge Disposition Code: 20 -

## 2023-05-25 PROBLEM — K63.89 PNEUMATOSIS INTESTINALIS OF SMALL INTESTINE: Status: ACTIVE | Noted: 2023-05-25

## 2023-05-25 PROBLEM — D72.825 BANDEMIA: Status: ACTIVE | Noted: 2023-05-25

## 2023-05-25 PROBLEM — I27.20 PULMONARY HYPERTENSION: Status: ACTIVE | Noted: 2023-05-25

## 2023-05-25 PROBLEM — K80.20 CALCULUS OF GALLBLADDER WITHOUT CHOLECYSTITIS WITHOUT OBSTRUCTION: Status: ACTIVE | Noted: 2023-05-25

## 2023-05-25 PROBLEM — R18.8 OTHER ASCITES: Status: ACTIVE | Noted: 2023-05-25

## 2023-05-25 PROBLEM — Z51.5 PALLIATIVE CARE BY SPECIALIST: Status: ACTIVE | Noted: 2023-05-25

## 2023-05-25 NOTE — DISCHARGE SUMMARY
Discharge As      Date of Admisssion:  2023  Date of Death:  2023  Time of Death:  2:05 AM    Patient Care Team:  Remi England MD as PCP - General (Internal Medicine)    Final Diagnosis:     Pneumatosis intestinalis of small intestine    Mesenteric ischemia    Acute abdomen    Palliative care by specialist    Permanent atrial fibrillation (HCC)    Chronic diastolic congestive heart failure (HCC)    Anticoagulated on warfarin    ESRD (end stage renal disease)    Other ascites    Bandemia    Pulmonary hypertension    Essential hypertension    Pleural effusion, bilateral    Anemia in chronic kidney disease    Iron deficiency anemia    Calculus of gallbladder without cholecystitis without obstruction      Hospital Course  Patient was a 89 y.o. female who presented to the ED 2023 at 1110 hrs with abdominal pain.  Patient with history of end-stage renal disease on dialysis.  Stated abdominal pain started the day prior to admission to the ED.  She did dialyze 2023 without difficulty.  Stated she had diffuse abdominal pain as well as distention.  Patient has had nausea with no vomiting.  Had normal bowel movement today.  Has had no black stools or dark tarry stools.  No fevers or chills.    CT abdomen:  1. Pneumatosis involving small bowel loops within the central abdomen and central to left lower pelvis suspicious for ischemic/necrotic bowel. There is also air within the superior mesenteric venous branches and the superior mesenteric vein, portal vein, and intrahepatic portal veins which is a finding associated with ischemic bowel. 2. Mild ascites. Moderate distention of the stomach and small bowel as well as the cecum and mild distention of the transverse colon. 3. Moderate right and small left pleural effusions with adjacent basilar atelectasis. Cardiomegaly. 4. 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 5. Cholelithiasis.      Patient has been seen by Dr. Jese Villalobos who feels this is not survivable.    The surgeon and ED physician discussed with both the patient and her family and they all stated she does not want surgery and she wants DNR/comfort measures.  Patient will be admitted to palliative care.  I was called to admit the patient.    Routine palliative care orders initiated.  Prior to evaluating the patient for her history and physical, I was called that the patient's respirations ceased and no pulse palpable. No heart sounds audible. I pronounced the patient at 0205 hours.        Jayjay Echavarria MD  Hospice and Palliative Medicine  05/25/23  19:13 EDT

## 2025-02-07 NOTE — CASE COMMUNICATION
I saw patient yesterday , she is still very weak and fatigued and is now asking to go to rehab  She was in hosp until Friday last week.  She is primary cg to her dtr who has MS.   Not safe for her at home, holding onto walls to walk, unsteady.   I think Rehab would be good for her, Called Dr Elias office and asked for MSW visit.   Waiting to hear.  No
